# Patient Record
Sex: FEMALE | Race: BLACK OR AFRICAN AMERICAN | HISPANIC OR LATINO | Employment: PART TIME | URBAN - METROPOLITAN AREA
[De-identification: names, ages, dates, MRNs, and addresses within clinical notes are randomized per-mention and may not be internally consistent; named-entity substitution may affect disease eponyms.]

---

## 2018-05-23 ENCOUNTER — OFFICE VISIT (OUTPATIENT)
Dept: URGENT CARE | Facility: CLINIC | Age: 38
End: 2018-05-23
Payer: COMMERCIAL

## 2018-05-23 VITALS
HEIGHT: 61 IN | RESPIRATION RATE: 20 BRPM | OXYGEN SATURATION: 99 % | TEMPERATURE: 98.9 F | DIASTOLIC BLOOD PRESSURE: 78 MMHG | HEART RATE: 84 BPM | WEIGHT: 160 LBS | SYSTOLIC BLOOD PRESSURE: 124 MMHG | BODY MASS INDEX: 30.21 KG/M2

## 2018-05-23 DIAGNOSIS — J06.9 ACUTE URI: Primary | ICD-10-CM

## 2018-05-23 PROCEDURE — S9088 SERVICES PROVIDED IN URGENT: HCPCS | Performed by: PHYSICIAN ASSISTANT

## 2018-05-23 PROCEDURE — 99213 OFFICE O/P EST LOW 20 MIN: CPT | Performed by: PHYSICIAN ASSISTANT

## 2018-05-23 RX ORDER — AZITHROMYCIN 250 MG/1
TABLET, FILM COATED ORAL
Qty: 6 TABLET | Refills: 0 | Status: SHIPPED | OUTPATIENT
Start: 2018-05-23 | End: 2018-05-28

## 2018-05-23 NOTE — PROGRESS NOTES
[facility]    NAME: Khadijah Kirkland is a 44 y o  female  : 1980    MRN: [id number]  DATE: May 23, 2018  TIME: 3:39 PM    Assessment and Plan   Acute URI [J06 9]  1  Acute URI  azithromycin (ZITHROMAX) 250 mg tablet       Patient Instructions     Patient Instructions   I have prescribed an antibiotic for the infection  Please take the antibiotic as prescribed and finish the entire prescription  I recommend that the patient takes an over the counter probiotic or eats yogurt with live cultures in it [place]) to keep good bacteria in the gut and help prevent diarrhea  Wash hands frequently to prevent the spread of infection  Can use over the counter cough and cold medications to help with symptoms  Ibuprofen and/or tylenol as needed for pain or fever  If not improving over the next 7-10 days, follow up with PCP  Chief Complaint     Chief Complaint   Patient presents with    Cold Like Symptoms     vomiting started yesterday       History of Present Illness   39-year-old female here with complaint of cold symptoms for the last 2 days  States she has a lot of postnasal drip, sinus pressure and headaches  Eyes hurt in the morning  No fever or chills  Review of Systems   Review of Systems   Constitutional: Negative for activity change, appetite change, chills, diaphoresis, fatigue, fever and unexpected weight change  HENT: Positive for congestion, postnasal drip, sinus pressure and sore throat  Negative for dental problem, hearing loss, sneezing, tinnitus, trouble swallowing and voice change  Eyes: Negative for photophobia, redness and visual disturbance  Respiratory: Positive for cough  Negative for apnea, chest tightness, shortness of breath, wheezing and stridor  Cardiovascular: Negative for chest pain, palpitations and leg swelling  Gastrointestinal: Negative for abdominal distention, abdominal pain, blood in stool, constipation, diarrhea, nausea and vomiting  Endocrine: Negative for cold intolerance, heat intolerance, polydipsia, polyphagia and polyuria  Genitourinary: Negative for difficulty urinating, dysuria, flank pain, frequency, hematuria and urgency  Musculoskeletal: Negative for arthralgias, back pain, gait problem, joint swelling, myalgias, neck pain and neck stiffness  Skin: Negative for pallor, rash and wound  Neurological: Negative for dizziness, tremors, seizures, speech difficulty, weakness and headaches  Hematological: Negative for adenopathy  Does not bruise/bleed easily  Psychiatric/Behavioral: Negative for agitation, confusion, dysphoric mood and sleep disturbance  The patient is not nervous/anxious  All other systems reviewed and are negative  Current Medications     Current Outpatient Prescriptions:     azithromycin (ZITHROMAX) 250 mg tablet, 2 tablets today then 1 tablet daily for 4 days  , Disp: 6 tablet, Rfl: 0    Current Allergies     Allergies as of 05/23/2018    (No Known Allergies)          The following portions of the patient's history were reviewed and updated as appropriate: allergies, current medications, past family history, past medical history, past social history, past surgical history and problem list    No past medical history on file  No past surgical history on file  No family history on file  Medications have been verified  Objective   /78   Pulse 84   Temp 98 9 °F (37 2 °C) (Tympanic)   Resp 20   Ht 5' 1" (1 549 m)   Wt 72 6 kg (160 lb)   SpO2 99%   BMI 30 23 kg/m²      Physical Exam   Physical Exam   Constitutional: She appears well-developed and well-nourished  No distress  HENT:   Head: Normocephalic  Right Ear: Tympanic membrane and external ear normal    Left Ear: Tympanic membrane and external ear normal    Nose: Mucosal edema present  Right sinus exhibits frontal sinus tenderness  Left sinus exhibits frontal sinus tenderness     Mouth/Throat: Posterior oropharyngeal erythema present  No oropharyngeal exudate  Neck: Normal range of motion  Neck supple  Cardiovascular: Normal rate, regular rhythm and normal heart sounds  No murmur heard  Pulmonary/Chest: Effort normal and breath sounds normal  No respiratory distress  She has no wheezes  She has no rales  Abdominal: Soft  Bowel sounds are normal  There is no tenderness  Musculoskeletal: Normal range of motion  Lymphadenopathy:     She has no cervical adenopathy  Skin: Skin is warm  No rash noted

## 2018-09-24 ENCOUNTER — OFFICE VISIT (OUTPATIENT)
Dept: URGENT CARE | Facility: CLINIC | Age: 38
End: 2018-09-24
Payer: COMMERCIAL

## 2018-09-24 ENCOUNTER — APPOINTMENT (OUTPATIENT)
Dept: RADIOLOGY | Facility: CLINIC | Age: 38
End: 2018-09-24
Payer: COMMERCIAL

## 2018-09-24 VITALS
DIASTOLIC BLOOD PRESSURE: 78 MMHG | TEMPERATURE: 97.9 F | HEART RATE: 83 BPM | RESPIRATION RATE: 20 BRPM | OXYGEN SATURATION: 100 % | WEIGHT: 150 LBS | SYSTOLIC BLOOD PRESSURE: 130 MMHG | HEIGHT: 59 IN | BODY MASS INDEX: 30.24 KG/M2

## 2018-09-24 DIAGNOSIS — R05.9 COUGH: ICD-10-CM

## 2018-09-24 DIAGNOSIS — B96.89 ACUTE BACTERIAL BRONCHITIS: Primary | ICD-10-CM

## 2018-09-24 DIAGNOSIS — J20.8 ACUTE BACTERIAL BRONCHITIS: Primary | ICD-10-CM

## 2018-09-24 DIAGNOSIS — R06.2 WHEEZING: ICD-10-CM

## 2018-09-24 PROCEDURE — 99214 OFFICE O/P EST MOD 30 MIN: CPT | Performed by: PHYSICIAN ASSISTANT

## 2018-09-24 PROCEDURE — 94640 AIRWAY INHALATION TREATMENT: CPT | Performed by: PHYSICIAN ASSISTANT

## 2018-09-24 PROCEDURE — S9088 SERVICES PROVIDED IN URGENT: HCPCS | Performed by: PHYSICIAN ASSISTANT

## 2018-09-24 PROCEDURE — 71046 X-RAY EXAM CHEST 2 VIEWS: CPT

## 2018-09-24 RX ORDER — AZITHROMYCIN 250 MG/1
TABLET, FILM COATED ORAL
Qty: 6 TABLET | Refills: 0 | Status: SHIPPED | OUTPATIENT
Start: 2018-09-24 | End: 2018-09-28

## 2018-09-24 RX ORDER — BENZONATATE 100 MG/1
100 CAPSULE ORAL 3 TIMES DAILY PRN
Qty: 20 CAPSULE | Refills: 0 | Status: SHIPPED | OUTPATIENT
Start: 2018-09-24 | End: 2018-10-01 | Stop reason: ALTCHOICE

## 2018-09-24 RX ORDER — ALBUTEROL SULFATE 90 UG/1
2 AEROSOL, METERED RESPIRATORY (INHALATION) EVERY 6 HOURS PRN
Qty: 18 G | Refills: 0 | Status: SHIPPED | OUTPATIENT
Start: 2018-09-24 | End: 2018-12-03 | Stop reason: ALTCHOICE

## 2018-09-24 RX ORDER — IPRATROPIUM BROMIDE AND ALBUTEROL SULFATE 2.5; .5 MG/3ML; MG/3ML
3 SOLUTION RESPIRATORY (INHALATION)
Status: DISCONTINUED | OUTPATIENT
Start: 2018-09-24 | End: 2018-09-24

## 2018-09-24 RX ORDER — IPRATROPIUM BROMIDE AND ALBUTEROL SULFATE 2.5; .5 MG/3ML; MG/3ML
3 SOLUTION RESPIRATORY (INHALATION) ONCE
Status: COMPLETED | OUTPATIENT
Start: 2018-09-24 | End: 2018-09-24

## 2018-09-24 RX ADMIN — IPRATROPIUM BROMIDE AND ALBUTEROL SULFATE 3 ML: 2.5; .5 SOLUTION RESPIRATORY (INHALATION) at 09:05

## 2018-09-24 RX ADMIN — IPRATROPIUM BROMIDE AND ALBUTEROL SULFATE 3 ML: 2.5; .5 SOLUTION RESPIRATORY (INHALATION) at 08:56

## 2018-09-24 NOTE — PATIENT INSTRUCTIONS
Chest xray; negative for pneumonia- will follow up with final radiology read if positive  Encourage fluids and hydration  Albuterol as needed for wheezing  Start antibiotics today as directed  Follow up with PCP in 3-5 days  Proceed to ER if symptoms worsen    Acute Bronchitis   AMBULATORY CARE:   Acute bronchitis  is swelling and irritation in the air passages of your lungs  This irritation may cause you to cough or have other breathing problems  Acute bronchitis often starts because of another illness, such as a cold or the flu  The illness spreads from your nose and throat to your windpipe and airways  Bronchitis is often called a chest cold  Acute bronchitis lasts about 3 to 6 weeks and is usually not a serious illness  Your cough can last for several weeks  You may have any of the following symptoms:   · A cough with sputum that may be clear, yellow, or green    · Feeling more tired than usual, and body aches    · A fever and chills    · Wheezing when you breathe    · A tight chest or pain when you breathe or cough  Seek care immediately if:   · You cough up blood  · Your lips or fingernails turn blue  · You feel like you are not getting enough air when you breathe  Contact your healthcare provider if:   · You have a fever  · Your breathing problems do not go away or get worse  · Your cough does not get better within 4 weeks  · You have questions or concerns about your condition or care  Self-care:   · Get more rest   Rest helps your body to heal  Slowly start to do more each day  Rest when you feel it is needed  · Avoid irritants in the air  Avoid chemicals, fumes, and dust  Wear a face mask if you must work around dust or fumes  Stay inside on days when air pollution levels are high  If you have allergies, stay inside when pollen counts are high  Do not use aerosol products, such as spray-on deodorant, bug spray, and hair spray  · Do not smoke or be around others who smoke    Nicotine and other chemicals in cigarettes and cigars damages the cilia that move mucus out of your lungs  Ask your healthcare provider for information if you currently smoke and need help to quit  E-cigarettes or smokeless tobacco still contain nicotine  Talk to your healthcare provider before you use these products  · Drink liquids as directed  Liquids help keep your air passages moist and help you cough up mucus  You may need to drink more liquids when you have acute bronchitis  Ask how much liquid to drink each day and which liquids are best for you  · Use a humidifier or vaporizer  Use a cool mist humidifier or a vaporizer to increase air moisture in your home  This may make it easier for you to breathe and help decrease your cough  Prevent acute bronchitis by doing the following:   · Get the vaccinations you need  Ask your healthcare provider if you should get vaccinated against the flu or pneumonia  · Prevent the spread of germs  You can decrease your risk of acute bronchitis and other illnesses by doing the following:     [facility] your hands often with soap and water  Carry germ-killing hand lotion or gel with you  You can use the lotion or gel to clean your hands when soap and water are not available  ¨ Do not touch your eyes, nose, or mouth unless you have washed your hands first     ¨ Always cover your mouth when you cough to prevent the spread of germs  It is best to cough into a tissue or your shirt sleeve instead of into your hand  Ask those around you cover their mouths when they cough  ¨ Try to avoid people who have a cold or the flu  If you are sick, stay away from others as much as possible  Medicines: Your healthcare provider may  give you any of the following:  · Ibuprofen or acetaminophen  are medicines that help lower your fever  They are available without a doctor's order  Ask your healthcare provider which medicine is right for you  Ask how much to take and how often to take it  Follow directions  These medicines can cause stomach bleeding if not taken correctly  Ibuprofen can cause kidney damage  Do not take ibuprofen if you have kidney disease, an ulcer, or allergies to aspirin  Acetaminophen can cause liver damage  Do not take more than 4,000 milligrams in 24 hours  · Decongestants  help loosen mucus in your lungs and make it easier to cough up  This can help you breathe easier  · Cough suppressants  decrease your urge to cough  If your cough produces mucus, do not take a cough suppressant unless your healthcare provider tells you to  Your healthcare provider may suggest that you take a cough suppressant at night so you can rest     · Inhalers  may be given  Your healthcare provider may give you one or more inhalers to help you breathe easier and cough less  An inhaler gives your medicine to open your airways  Ask your healthcare provider to show you how to use your inhaler correctly  Follow up with your healthcare provider as directed:  Write down questions you have so you will remember to ask them during your follow-up visits  © 2017 [facility] Information is for End User's use only and may not be sold, redistributed or otherwise used for commercial purposes  All illustrations and images included in CareNotes® are the copyrighted property of [employer] M [employer]  or [employer]  The above information is an  only  It is not intended as medical advice for individual conditions or treatments  Talk to your doctor, nurse or pharmacist before following any medical regimen to see if it is safe and effective for you

## 2018-09-24 NOTE — PROGRESS NOTES
[facility]        NAME: Doc Chinchilla is a 43 y o  female  : 1980    MRN: [id number]  DATE: 2018  TIME: 9:31 AM    Assessment and Plan   Acute bacterial bronchitis [J20 8, B96 89]  1  Acute bacterial bronchitis  azithromycin (ZITHROMAX) 250 mg tablet    benzonatate (TESSALON PERLES) 100 mg capsule   2  Wheezing  ipratropium-albuterol (DUO-NEB) 0 5-2 5 mg/3 mL inhalation solution 3 mL    albuterol (VENTOLIN HFA) 90 mcg/act inhaler    DISCONTINUED: ipratropium-albuterol (DUO-NEB) 0 5-2 5 mg/3 mL inhalation solution 3 mL   3  Cough  XR chest pa & lateral         Patient Instructions     Chest xray; negative for pneumonia- will follow up with final radiology read if positive  Encourage fluids and hydration  Albuterol as needed for wheezing  Start antibiotics today as directed  Follow up with PCP in 3-5 days  Proceed to  ER if symptoms worsen  Chief Complaint     Chief Complaint   Patient presents with    Cough     Cough and headache with congestion started yesterday         History of Present Illness       43 y o  Female presents with head pressure and productive cough x 3 days  Patient states she has been light sensitive and when she coughs is when she feels the pressure in her head  Patient states the cough is producing dark yellow sputum  Denies hemoptysis  C/o chills, SOB, nausea  Denies fever  Patient states her son and daughter are both sick  Patient states she used mothers inhaler yesterday with minimal relief  Review of Systems   Review of Systems   Constitutional: Positive for chills  Negative for fatigue and fever  HENT: Positive for congestion  Negative for ear pain, sinus pain, sore throat and trouble swallowing  Eyes: Negative for pain, discharge and redness  Respiratory: Positive for cough and shortness of breath  Negative for chest tightness  Cardiovascular: Negative for chest pain, palpitations and leg swelling  Gastrointestinal: Positive for nausea  Negative for abdominal pain, diarrhea and vomiting  Musculoskeletal: Negative for arthralgias, joint swelling and myalgias  Skin: Negative for rash  Neurological: Positive for headaches  Negative for dizziness, weakness and numbness  Current Medications       Current Outpatient Prescriptions:     albuterol (VENTOLIN HFA) 90 mcg/act inhaler, Inhale 2 puffs every 6 (six) hours as needed for wheezing, Disp: 18 g, Rfl: 0    azithromycin (ZITHROMAX) 250 mg tablet, Take 2 tablets today then 1 tablet daily x 4 days, Disp: 6 tablet, Rfl: 0    benzonatate (TESSALON PERLES) 100 mg capsule, Take 1 capsule (100 mg total) by mouth 3 (three) times a day as needed for cough, Disp: 20 capsule, Rfl: 0  No current facility-administered medications for this visit  Current Allergies     Allergies as of 09/24/2018    (No Known Allergies)            The following portions of the patient's history were reviewed and updated as appropriate: allergies, current medications, past family history, past medical history, past social history, past surgical history and problem list      History reviewed  No pertinent past medical history  History reviewed  No pertinent surgical history  No family history on file  Medications have been verified  Objective   /78   Pulse 83   Temp 97 9 °F (36 6 °C) (Tympanic)   Resp 20   Ht 4' 11" (1 499 m)   Wt 68 kg (150 lb)   LMP 09/16/2018   SpO2 100%   BMI 30 30 kg/m²        Physical Exam     Physical Exam   Constitutional: She is oriented to person, place, and time  She appears well-developed and well-nourished  No distress  HENT:   Head: Normocephalic and atraumatic  Right Ear: External ear normal    Left Ear: External ear normal    Mouth/Throat: Uvula is midline, oropharynx is clear and moist and mucous membranes are normal  No posterior oropharyngeal edema or posterior oropharyngeal erythema     Eyes: Conjunctivae and EOM are normal  Pupils are equal, round, and reactive to light  Neck: Normal range of motion  Neck supple  Cardiovascular: Normal rate, regular rhythm, S1 normal, S2 normal and normal heart sounds  Pulmonary/Chest: Effort normal  No respiratory distress  She has wheezes in the right upper field, the right middle field, the right lower field, the left upper field and the left middle field  She has rhonchi in the left lower field  She has no rales  She exhibits no tenderness  No History of intubation or hospitalization   Abdominal: Soft  Normal appearance and bowel sounds are normal    Musculoskeletal: Normal range of motion  Lymphadenopathy:     She has no cervical adenopathy  Neurological: She is alert and oriented to person, place, and time  Skin: Skin is warm and dry  Psychiatric: She has a normal mood and affect  Nursing note and vitals reviewed

## 2018-10-01 ENCOUNTER — OFFICE VISIT (OUTPATIENT)
Dept: URGENT CARE | Facility: CLINIC | Age: 38
End: 2018-10-01
Payer: COMMERCIAL

## 2018-10-01 VITALS
OXYGEN SATURATION: 99 % | SYSTOLIC BLOOD PRESSURE: 140 MMHG | TEMPERATURE: 98.8 F | WEIGHT: 160 LBS | HEART RATE: 75 BPM | HEIGHT: 59 IN | BODY MASS INDEX: 32.25 KG/M2 | DIASTOLIC BLOOD PRESSURE: 80 MMHG | RESPIRATION RATE: 20 BRPM

## 2018-10-01 DIAGNOSIS — K08.89 PAIN, DENTAL: Primary | ICD-10-CM

## 2018-10-01 PROCEDURE — 99213 OFFICE O/P EST LOW 20 MIN: CPT | Performed by: PHYSICIAN ASSISTANT

## 2018-10-01 PROCEDURE — S9088 SERVICES PROVIDED IN URGENT: HCPCS | Performed by: PHYSICIAN ASSISTANT

## 2018-10-01 RX ORDER — KETOROLAC TROMETHAMINE 30 MG/ML
30 INJECTION, SOLUTION INTRAMUSCULAR; INTRAVENOUS ONCE
Status: COMPLETED | OUTPATIENT
Start: 2018-10-01 | End: 2018-10-01

## 2018-10-01 RX ORDER — IBUPROFEN 800 MG/1
800 TABLET ORAL EVERY 6 HOURS PRN
Qty: 30 TABLET | Refills: 0 | Status: SHIPPED | OUTPATIENT
Start: 2018-10-01 | End: 2018-12-03 | Stop reason: ALTCHOICE

## 2018-10-01 RX ORDER — AMOXICILLIN AND CLAVULANATE POTASSIUM 875; 125 MG/1; MG/1
1 TABLET, FILM COATED ORAL EVERY 12 HOURS SCHEDULED
Qty: 20 TABLET | Refills: 0 | Status: SHIPPED | OUTPATIENT
Start: 2018-10-01 | End: 2018-10-11

## 2018-10-01 RX ADMIN — KETOROLAC TROMETHAMINE 30 MG: 30 INJECTION, SOLUTION INTRAMUSCULAR; INTRAVENOUS at 09:47

## 2018-10-01 NOTE — PROGRESS NOTES
[facility]        NAME: Tommy Galeas is a 43 y o  female  : 1980    MRN: [id number]  DATE: 2018  TIME: 9:50 AM    Assessment and Plan   Pain, dental [K08 89]  1  Pain, dental  ketorolac (TORADOL) injection 30 mg    amoxicillin-clavulanate (AUGMENTIN) 875-125 mg per tablet    ibuprofen (MOTRIN) 800 mg tablet         Patient Instructions     Follow up with dentist within 3-5 days  Follow up with PCP in 3-5 days  Proceed to ER if symptoms worsen    Chief Complaint     Chief Complaint   Patient presents with    Dental Pain     Broken tooth bottom molar with pain strted last night         History of Present Illness       43 y o  Female presents with dental pain x 1 month  Pt states the pain started a month ago and she was able to see a dentist  Pt states last night the tooth broke off and her mouth began to swell  C/o sharp pain on the left side of her jaw  Rates the pain a 5/10  States she took 1 percocet last night for the pain  Denies fever, chills, n/v          Review of Systems   Review of Systems   Constitutional: Negative for chills, fatigue and fever  HENT: Positive for dental problem  Negative for congestion, drooling, ear pain, mouth sores, sinus pain, sore throat and trouble swallowing  Eyes: Negative for pain, discharge and redness  Respiratory: Negative for cough, chest tightness, shortness of breath and wheezing  Cardiovascular: Negative for chest pain, palpitations and leg swelling  Gastrointestinal: Negative for abdominal pain, diarrhea, nausea and vomiting  Musculoskeletal: Negative for arthralgias, joint swelling and myalgias  Skin: Negative for rash  Neurological: Negative for dizziness, weakness, numbness and headaches           Current Medications       Current Outpatient Prescriptions:     albuterol (VENTOLIN HFA) 90 mcg/act inhaler, Inhale 2 puffs every 6 (six) hours as needed for wheezing, Disp: 18 g, Rfl: 0    amoxicillin-clavulanate (AUGMENTIN) 875-125 mg per tablet, Take 1 tablet by mouth every 12 (twelve) hours for 10 days, Disp: 20 tablet, Rfl: 0    ibuprofen (MOTRIN) 800 mg tablet, Take 1 tablet (800 mg total) by mouth every 6 (six) hours as needed for mild pain for up to 14 days, Disp: 30 tablet, Rfl: 0  No current facility-administered medications for this visit  Current Allergies     Allergies as of 10/01/2018    (No Known Allergies)            The following portions of the patient's history were reviewed and updated as appropriate: allergies, current medications, past family history, past medical history, past social history, past surgical history and problem list      Past Medical History:   Diagnosis Date    Allergic     Asthma        History reviewed  No pertinent surgical history  No family history on file  Medications have been verified  Objective   /80   Pulse 75   Temp 98 8 °F (37 1 °C) (Tympanic)   Resp 20   Ht 4' 11" (1 499 m)   Wt 72 6 kg (160 lb)   LMP 09/16/2018   SpO2 99%   BMI 32 32 kg/m²        Physical Exam     Physical Exam   Constitutional: She is oriented to person, place, and time  She appears well-developed and well-nourished  No distress  HENT:   Head: Normocephalic  Right Ear: External ear normal    Left Ear: External ear normal    Mouth/Throat: Oropharynx is clear and moist        Eyes: Pupils are equal, round, and reactive to light  Conjunctivae and EOM are normal    Neck: Normal range of motion  Neck supple  Cardiovascular: Normal rate, regular rhythm and normal heart sounds  No murmur heard  Pulmonary/Chest: Effort normal and breath sounds normal  No respiratory distress  She has no wheezes  Abdominal: Soft  Bowel sounds are normal  There is no tenderness  Musculoskeletal: Normal range of motion  Lymphadenopathy:     She has no cervical adenopathy  Neurological: She is alert and oriented to person, place, and time  She has normal reflexes     Skin: Skin is warm and dry    Psychiatric: She has a normal mood and affect  Nursing note and vitals reviewed

## 2018-10-01 NOTE — PATIENT INSTRUCTIONS
Follow up with dentist within 3-5 days  Follow up with PCP in 3-5 days  Proceed to ER if symptoms worsen    Dental Abscess   AMBULATORY CARE:   A dental abscess  is a collection of pus in or around a tooth  A dental abscess is caused by bacteria  The bacteria usually enter the tooth when the enamel (outer part of the tooth) is damaged by tooth decay  Bacteria may also enter the tooth through a break or chip in the tooth, or a cut in the gum  Food particles that are stuck between the teeth for a long time may also lead to an abscess  Signs and symptoms of a dental abscess:   · Toothache, a loose tooth, or a tooth that is very sensitive to pressure or temperature    · Bad breath, unpleasant taste, and drooling    · Fever    · Pain, redness, and swelling of the gums, or swelling of your face and neck    · Pain when you open or close your mouth    · Trouble opening your mouth  Seek care immediately if:   · You have severe pain  · You have trouble breathing because of pain or swelling  Contact your healthcare provider if:   · Your symptoms get worse, even after treatment  · Your mouth is bleeding  · You cannot eat or drink because of pain or swelling  · Your abscess returns  · You have an injury that causes a crack in your tooth  · You have questions or concerns about your condition or care  Treatment:  You may  need any of the following:  · Medicines  may be given to treat a bacterial infection and decrease pain  · Incision and drainage  is a cut in the abscess to allow the pus to drain  A sample of fluid may be collected from your abscess  The fluid is sent to a lab and tested for bacteria  Ask your healthcare provider for more information  · A root canal  is a procedure to remove the bacteria and prevent more infection  It is usually done after an incision and drainage   A filling or crown will be placed over the tooth after you have healed from your root canal      · Tooth removal may be needed if the infection affects deeper tissues  This is usually done after an incision and drainage  Self-care:   · Rinse your mouth every 2 hours with salt water  This will help keep the area clean  · Gently brush your teeth twice a day with a soft tooth brush  This will help keep the area clean  · Eat soft foods as directed  Soft foods may cause less pain  Examples include applesauce, yogurt, and cooked pasta  Ask your healthcare provider how long to follow this instruction  · Apply a warm compress to your tooth or gum  Use a cotton ball or gauze soaked in warm water  Remove the compress in 10 minutes or when it becomes cool  Repeat 3 times a day  Prevent another abscess:   · Brush your teeth at least 2 times a day with fluoride toothpaste  · Use dental floss to clean between your teeth at least once a day  · Rinse your mouth with water or mouthwash after meals and snacks  · Chew sugarless gum after meals and snacks  · Limit foods that are sticky and high in sugar such as raisons  Also limit drinks high in sugar, such as soda  · See your dentist every 6 months for dental cleanings and oral exams  Follow up with your healthcare provider in 24 hours: Your healthcare provider will need to check your teeth and gums  Write down your questions so you remember to ask them during your visits  © 2017 [address] [employer] Information is for End User's use only and may not be sold, redistributed or otherwise used for commercial purposes  All illustrations and images included in CareNotes® are the copyrighted property of A [employer] M [employer]  or [employer]  The above information is an  only  It is not intended as medical advice for individual conditions or treatments  Talk to your doctor, nurse or pharmacist before following any medical regimen to see if it is safe and effective for you

## 2018-12-03 ENCOUNTER — APPOINTMENT (OUTPATIENT)
Dept: RADIOLOGY | Facility: CLINIC | Age: 38
End: 2018-12-03
Payer: COMMERCIAL

## 2018-12-03 ENCOUNTER — OFFICE VISIT (OUTPATIENT)
Dept: URGENT CARE | Facility: CLINIC | Age: 38
End: 2018-12-03
Payer: COMMERCIAL

## 2018-12-03 VITALS
SYSTOLIC BLOOD PRESSURE: 125 MMHG | HEIGHT: 59 IN | OXYGEN SATURATION: 100 % | WEIGHT: 160 LBS | BODY MASS INDEX: 32.25 KG/M2 | TEMPERATURE: 97.2 F | DIASTOLIC BLOOD PRESSURE: 69 MMHG | HEART RATE: 75 BPM | RESPIRATION RATE: 18 BRPM

## 2018-12-03 DIAGNOSIS — R05.9 COUGH: ICD-10-CM

## 2018-12-03 DIAGNOSIS — J20.9 ACUTE BRONCHITIS, UNSPECIFIED ORGANISM: Primary | ICD-10-CM

## 2018-12-03 DIAGNOSIS — R06.2 WHEEZING: ICD-10-CM

## 2018-12-03 PROCEDURE — 71046 X-RAY EXAM CHEST 2 VIEWS: CPT

## 2018-12-03 PROCEDURE — S9088 SERVICES PROVIDED IN URGENT: HCPCS | Performed by: PHYSICIAN ASSISTANT

## 2018-12-03 PROCEDURE — 99203 OFFICE O/P NEW LOW 30 MIN: CPT | Performed by: PHYSICIAN ASSISTANT

## 2018-12-03 RX ORDER — AZITHROMYCIN 250 MG/1
TABLET, FILM COATED ORAL
Qty: 6 TABLET | Refills: 0 | Status: SHIPPED | OUTPATIENT
Start: 2018-12-03 | End: 2018-12-07

## 2018-12-03 RX ORDER — ALBUTEROL SULFATE 90 UG/1
2 AEROSOL, METERED RESPIRATORY (INHALATION) EVERY 4 HOURS PRN
Qty: 1 INHALER | Refills: 0 | Status: SHIPPED | OUTPATIENT
Start: 2018-12-03 | End: 2019-01-02

## 2018-12-03 NOTE — PROGRESS NOTES
[facility] Now        NAME: Tommy Galeas is a 43 y o  female  : 1980    MRN: [id number]  DATE: December 3, 2018  TIME: 2:08 PM    Assessment and Plan   Acute bronchitis, unspecified organism [J20 9]  1  Acute bronchitis, unspecified organism  azithromycin (ZITHROMAX) 250 mg tablet    albuterol (PROVENTIL HFA,VENTOLIN HFA) 90 mcg/act inhaler   2  Cough  XR chest pa & lateral         Patient Instructions       Follow up with PCP in 3-5 days  Proceed to  ER if symptoms worsen  Chief Complaint     Chief Complaint   Patient presents with    Cough     Pt c/o cough, hard time breathing, and left side pain x 1 week  History of Present Illness       Patient states she started with a cough approximately a week ago  Is occasionally productive of yellow mucus  More recently she developed pain in her left side which is worse with coughing  She denies any cold symptoms, fever, chills or shortness of breath on exertion  Review of Systems   Review of Systems   Constitutional: Negative for chills and fever  HENT: Positive for rhinorrhea (Clear)  Negative for congestion, ear pain, postnasal drip, sore throat and trouble swallowing  Eyes: Negative for redness  Respiratory: Positive for cough  Negative for chest tightness, shortness of breath and wheezing  Cardiovascular: Positive for chest pain (Left lower ribcage)  Gastrointestinal: Negative for abdominal pain, diarrhea, nausea and vomiting  Musculoskeletal: Negative for back pain  Skin: Negative for rash  Neurological: Negative for dizziness and headaches  Hematological: Negative for adenopathy           Current Medications       Current Outpatient Prescriptions:     albuterol (PROVENTIL HFA,VENTOLIN HFA) 90 mcg/act inhaler, Inhale 2 puffs every 4 (four) hours as needed for wheezing or shortness of breath for up to 30 days, Disp: 1 Inhaler, Rfl: 0    azithromycin (ZITHROMAX) 250 mg tablet, Take 2 tablets today then 1 tablet daily x 4 days, Disp: 6 tablet, Rfl: 0    Levonorgestrel 19 5 MCG/DAY IUD, 1 each by Intrauterine route, Disp: , Rfl:     Current Allergies     Allergies as of 12/03/2018 - Reviewed 12/03/2018   Allergen Reaction Noted    No known allergies  12/16/2016            The following portions of the patient's history were reviewed and updated as appropriate: allergies, current medications, past family history, past medical history, past social history, past surgical history and problem list      Past Medical History:   Diagnosis Date    Allergic     Asthma        No past surgical history on file  No family history on file  Medications have been verified  Objective   /69   Pulse 75   Temp (!) 97 2 °F (36 2 °C)   Resp 18   Ht 4' 11" (1 499 m)   Wt 72 6 kg (160 lb)   SpO2 100%   BMI 32 32 kg/m²        Physical Exam     Physical Exam   Constitutional: She appears well-developed and well-nourished  HENT:   Head: Normocephalic and atraumatic  Right Ear: External ear normal    Left Ear: External ear normal    Nose: Nose normal    Mouth/Throat: Oropharynx is clear and moist    Eyes: Conjunctivae are normal    Neck: Neck supple  Cardiovascular: Normal rate, regular rhythm and normal heart sounds  Pulmonary/Chest: Effort normal and breath sounds normal    Lymphadenopathy:     She has no cervical adenopathy  Neurological: She is alert  Skin: Skin is warm and dry  No rash noted  Psychiatric: She has a normal mood and affect  Her behavior is normal  Judgment and thought content normal    Nursing note and vitals reviewed  Chest x-ray viewed by me and independently reviewed by me contemporaneously as no acute cardiopulmonary process

## 2018-12-03 NOTE — PATIENT INSTRUCTIONS

## 2019-03-07 ENCOUNTER — OFFICE VISIT (OUTPATIENT)
Dept: URGENT CARE | Facility: CLINIC | Age: 39
End: 2019-03-07
Payer: COMMERCIAL

## 2019-03-07 VITALS
DIASTOLIC BLOOD PRESSURE: 84 MMHG | SYSTOLIC BLOOD PRESSURE: 155 MMHG | HEART RATE: 92 BPM | OXYGEN SATURATION: 100 % | RESPIRATION RATE: 18 BRPM | TEMPERATURE: 98.6 F

## 2019-03-07 DIAGNOSIS — J06.9 VIRAL URI WITH COUGH: ICD-10-CM

## 2019-03-07 DIAGNOSIS — J02.9 SORE THROAT: Primary | ICD-10-CM

## 2019-03-07 LAB — S PYO AG THROAT QL: NEGATIVE

## 2019-03-07 PROCEDURE — 87430 STREP A AG IA: CPT | Performed by: NURSE PRACTITIONER

## 2019-03-07 PROCEDURE — 87070 CULTURE OTHR SPECIMN AEROBIC: CPT | Performed by: NURSE PRACTITIONER

## 2019-03-07 PROCEDURE — 99213 OFFICE O/P EST LOW 20 MIN: CPT | Performed by: NURSE PRACTITIONER

## 2019-03-07 PROCEDURE — S9088 SERVICES PROVIDED IN URGENT: HCPCS | Performed by: NURSE PRACTITIONER

## 2019-03-07 NOTE — PROGRESS NOTES
[facility] Now        NAME: Terra Harley is a 43 y o  female  : 1980    MRN: [id number]  DATE: 2019  TIME: 5:50 PM    Assessment and Plan   Sore throat [J02 9]  1  Sore throat  POCT rapid strepA   2  Viral URI with cough           Patient Instructions     Infection appears viral   Recommend symptomatic treatment  Can take ibuprofen or tylenol as needed for pain or fever  Over the counter cough and cold medications to help with symptoms  Use salt water gargles for sore throat and throat lozenges  Cough drops as needed  Wash hands frequently to prevent the spread of infection  If not improving over the next 7-10 days, follow up with PCP  Symptoms may persist for 10-14 days  Follow up with PCP in 3-5 days  Proceed to  ER if symptoms worsen  Chief Complaint     Chief Complaint   Patient presents with    Sore Throat     Pt c/o a sore throat and a cough that started a few hours ago  History of Present Illness       59-year-old female presents to urgent care with chief complaint of new onset this morning of cough, sore throat, nasal congestion  Denies any fevers chills chest tightness or shortness of breath  Has not used any over-the-counter medication reports symptoms have been worsening    URI    This is a new problem  The current episode started today  The problem has been unchanged  There has been no fever  Associated symptoms include congestion, coughing, rhinorrhea and a sore throat  Pertinent negatives include no abdominal pain, chest pain, diarrhea, dysuria, ear pain, headaches, joint pain, joint swelling, nausea, neck pain, plugged ear sensation, rash, sinus pain, sneezing, swollen glands, vomiting or wheezing  She has tried nothing for the symptoms  The treatment provided no relief  Review of Systems   Review of Systems   Constitutional: Negative  HENT: Positive for congestion, postnasal drip, rhinorrhea, sinus pressure and sore throat   Negative for dental problem, drooling, ear discharge, ear pain, facial swelling, hearing loss, mouth sores, nosebleeds, sinus pain, sneezing, tinnitus, trouble swallowing and voice change  Eyes: Negative  Respiratory: Positive for cough  Negative for apnea, choking, chest tightness, shortness of breath, wheezing and stridor  Cardiovascular: Negative for chest pain, palpitations and leg swelling  Gastrointestinal: Negative  Negative for abdominal distention, abdominal pain, anal bleeding, blood in stool, constipation, diarrhea, nausea, rectal pain and vomiting  Endocrine: Negative  Genitourinary: Negative  Negative for dysuria  Musculoskeletal: Negative  Negative for joint pain and neck pain  Skin: Negative  Negative for rash  Allergic/Immunologic: Negative  Neurological: Negative  Negative for headaches  Hematological: Negative  Psychiatric/Behavioral: Negative  Current Medications       Current Outpatient Medications:     Levonorgestrel 19 5 MCG/DAY IUD, 1 each by Intrauterine route, Disp: , Rfl:     Current Allergies     Allergies as of 03/07/2019 - Reviewed 03/07/2019   Allergen Reaction Noted    No known allergies  12/16/2016            The following portions of the patient's history were reviewed and updated as appropriate: allergies, current medications, past family history, past medical history, past social history, past surgical history and problem list      Past Medical History:   Diagnosis Date    Allergic     Asthma        No past surgical history on file  No family history on file  Medications have been verified  Objective   /84   Pulse 92   Temp 98 6 °F (37 °C)   Resp 18   SpO2 100%        Physical Exam     Physical Exam   Constitutional: She is oriented to person, place, and time  Vital signs are normal  She appears well-developed and well-nourished  HENT:   Head: Normocephalic and atraumatic     Right Ear: Hearing, tympanic membrane, external ear and ear canal normal    Left Ear: Hearing, tympanic membrane, external ear and ear canal normal    Nose: Rhinorrhea and sinus tenderness present  Right sinus exhibits no maxillary sinus tenderness and no frontal sinus tenderness  Left sinus exhibits no maxillary sinus tenderness and no frontal sinus tenderness  Mouth/Throat: Uvula is midline and mucous membranes are normal  Posterior oropharyngeal erythema present  Eyes: Pupils are equal, round, and reactive to light  Conjunctivae and EOM are normal    Neck: Trachea normal, normal range of motion and full passive range of motion without pain  Cardiovascular: Normal rate, regular rhythm, S1 normal, S2 normal, normal heart sounds and normal pulses  Pulmonary/Chest: Effort normal and breath sounds normal    Abdominal: Soft  Normal appearance  Musculoskeletal: Normal range of motion  Lymphadenopathy:     She has cervical adenopathy  Right cervical: Superficial cervical adenopathy present  Left cervical: Superficial cervical adenopathy present  Neurological: She is alert and oriented to person, place, and time  Nursing note and vitals reviewed

## 2019-03-10 LAB — BACTERIA THROAT CULT: NORMAL

## 2019-03-18 ENCOUNTER — HOSPITAL ENCOUNTER (EMERGENCY)
Facility: HOSPITAL | Age: 39
Discharge: HOME | End: 2019-03-18
Attending: EMERGENCY MEDICINE | Admitting: EMERGENCY MEDICINE
Payer: COMMERCIAL

## 2019-03-18 VITALS
SYSTOLIC BLOOD PRESSURE: 137 MMHG | OXYGEN SATURATION: 97 % | HEART RATE: 79 BPM | DIASTOLIC BLOOD PRESSURE: 83 MMHG | TEMPERATURE: 98.7 F | HEIGHT: 59 IN | BODY MASS INDEX: 30.24 KG/M2 | WEIGHT: 150 LBS | RESPIRATION RATE: 12 BRPM

## 2019-03-18 DIAGNOSIS — R00.2 PALPITATIONS: Primary | ICD-10-CM

## 2019-03-18 PROCEDURE — 93005 ELECTROCARDIOGRAM TRACING: CPT

## 2019-03-18 PROCEDURE — 99284 EMERGENCY DEPT VISIT MOD MDM: CPT

## 2019-03-18 RX ORDER — ACETAMINOPHEN 325 MG/1
650 TABLET ORAL ONCE
Status: COMPLETED | OUTPATIENT
Start: 2019-03-18 | End: 2019-03-18

## 2019-03-18 RX ADMIN — ACETAMINOPHEN 650 MG: 325 TABLET ORAL at 20:02

## 2019-03-18 NOTE — ED PROVIDER NOTES
History  Chief Complaint   Patient presents with    Arm Pain     left arm, shoulder pain for the past two days  states it hurts worse when she does not move it   Rapid Heart Rate     43YEAR-OLD FEMALE WITH A HISTORY OF ASTHMA WHO PRESENTS TO THE EMERGENCY DEPARTMENT A RAPID HEARTBEAT SMOKING WEED  STATES THAT THERE ARE HEART THE AND THE BLADDER AND SHE GOT SCARED SO SHE PRESENTED TO THE EMERGENCY DEPARTMENT  SHE DENIES ANY SHORTNESS OF BREATH  NO NAUSEA VOMITING OR DIARRHEA  RECENT URI  BUT NO CURRENT COUGH OR SORE THROAT OR RUNNY NOSE  SHE REPORTS NO CHEST PAIN NOR DOES SHE REPORT ANY CHANGES IN HER BOWEL OR BLADDER HABITS  SHE STATES THAT SHE HAS HAD SOME NUMBNESS IN HER LEFT WRIST AND HAND  SHE ALSO EXPLAINS THAT SHE HAS ENGAGED DAILY IN LIFTING HEAVY BOXES  OTHERWISE SHE DENIES TRAUMA  SHE ATTRIBUTES THE LEFT WRIST AND ARM PAIN TO FALLING ASLEEP WITH HER GRANDDAUGHTER RESTING IN HER AXILLA WHILE THE LEFT ARM IS ELEVATED  Prior to Admission Medications   Prescriptions Last Dose Informant Patient Reported? Taking? Levonorgestrel 19 5 MCG/DAY IUD   Yes No   Si each by Intrauterine route      Facility-Administered Medications: None       Past Medical History:   Diagnosis Date    Allergic     Asthma        History reviewed  No pertinent surgical history  History reviewed  No pertinent family history  I have reviewed and agree with the history as documented  Social History     Tobacco Use    Smoking status: Current Every Day Smoker    Smokeless tobacco: Never Used   Substance Use Topics    Alcohol use: No    Drug use: No        Review of Systems   Constitutional: Negative for chills and fever  HENT: Negative for ear pain, rhinorrhea and sore throat  Eyes: Negative for pain, redness and visual disturbance  Respiratory: Negative for cough and shortness of breath  Cardiovascular: Negative for chest pain and leg swelling     Gastrointestinal: Negative for abdominal pain, diarrhea, nausea and vomiting  Genitourinary: Negative for dysuria, flank pain, frequency and urgency  Musculoskeletal: Negative for back pain, myalgias and neck pain  Skin: Negative for rash  Neurological: Negative for dizziness, weakness, light-headedness and headaches  Hematological: Negative  Psychiatric/Behavioral: Negative for agitation, confusion and suicidal ideas  The patient is not nervous/anxious  All other systems reviewed and are negative  Physical Exam  Physical Exam   Constitutional: She is oriented to person, place, and time  She appears well-developed and well-nourished  HENT:   Nose: Nose normal    Mouth/Throat: Oropharynx is clear and moist  No oropharyngeal exudate  Eyes: Pupils are equal, round, and reactive to light  Conjunctivae and EOM are normal  No scleral icterus  Neck: Normal range of motion  Neck supple  No JVD present  No tracheal deviation present  Cardiovascular: Normal rate, regular rhythm and normal heart sounds  No murmur heard  Pulmonary/Chest: Effort normal and breath sounds normal  No respiratory distress  She has no wheezes  She has no rales  Abdominal: Soft  Bowel sounds are normal  There is no tenderness  There is no guarding  Musculoskeletal: Normal range of motion  She exhibits no edema or tenderness  Neurological: She is alert and oriented to person, place, and time  No cranial nerve deficit or sensory deficit  She exhibits normal muscle tone  5/5 motor, nl sens   Skin: Skin is warm and dry  Psychiatric: She has a normal mood and affect  Her behavior is normal    Nursing note and vitals reviewed        Vital Signs  ED Triage Vitals [03/18/19 1837]   Temperature Pulse Respirations Blood Pressure SpO2   98 7 °F (37 1 °C) (!) 120 20 (!) 194/100 99 %      Temp Source Heart Rate Source Patient Position - Orthostatic VS BP Location FiO2 (%)   Temporal Monitor Sitting Right arm --      Pain Score       No Pain           Vitals: 03/18/19 1837 03/18/19 1900 03/18/19 1915 03/18/19 1930   BP: (!) 194/100 141/91  140/86   Pulse: (!) 120 101 94 90   Patient Position - Orthostatic VS: Sitting          qSOFA     Row Name 03/18/19 1930 03/18/19 1915 03/18/19 1900 03/18/19 1837       Altered mental status GCS < 15             Respiratory Rate > / =22  0  0  0  0     Systolic BP < / =215  0    0  0     Q Sofa Score  0  0  0  0           Visual Acuity      ED Medications  Medications - No data to display    Diagnostic Studies  Results Reviewed     None                 No orders to display              Procedures  ECG 12 Lead Documentation  Date/Time: 3/18/2019 6:48 PM  Performed by: Janet Ch MD  Authorized by: Janet Ch MD     ECG reviewed by me, the ED Provider: yes    Patient location:  ED  Previous ECG:     Previous ECG:  Unavailable    Comparison to cardiac monitor: No    Interpretation:     Interpretation: normal    Rate:     ECG rate:  90    ECG rate assessment: normal    Rhythm:     Rhythm comment:  SINUS ARRHYTHMIA  Ectopy:     Ectopy: none    QRS:     QRS axis:  Normal  Conduction:     Conduction: normal    ST segments:     ST segments:  Normal  T waves:     T waves: normal             Phone Contacts  ED Phone Contact    ED Course                               MDM    Disposition  Final diagnoses:   None     ED Disposition     None      Follow-up Information    None         Patient's Medications   Discharge Prescriptions    No medications on file     No discharge procedures on file      ED Provider  Electronically Signed by           Janet Ch MD  03/18/19 Tosha Acosta MD  03/18/19 1947       Janet Ch MD  03/18/19 1949

## 2019-03-19 LAB
ATRIAL RATE: 92 BPM
P AXIS: 50 DEGREES
PR INTERVAL: 142 MS
QRS AXIS: 64 DEGREES
QRSD INTERVAL: 92 MS
QT INTERVAL: 350 MS
QTC INTERVAL: 432 MS
T WAVE AXIS: 41 DEGREES
VENTRICULAR RATE: 92 BPM

## 2019-03-19 PROCEDURE — 93010 ELECTROCARDIOGRAM REPORT: CPT | Performed by: INTERNAL MEDICINE

## 2019-04-16 ENCOUNTER — TRANSCRIBE ORDERS (OUTPATIENT)
Dept: URGENT CARE | Facility: CLINIC | Age: 39
End: 2019-04-16

## 2019-04-16 ENCOUNTER — APPOINTMENT (OUTPATIENT)
Dept: LAB | Facility: CLINIC | Age: 39
End: 2019-04-16
Payer: COMMERCIAL

## 2019-04-16 DIAGNOSIS — R00.2 PALPITATIONS: ICD-10-CM

## 2019-04-16 DIAGNOSIS — R10.84 GENERALIZED ABDOMINAL PAIN: Primary | ICD-10-CM

## 2019-04-16 DIAGNOSIS — R00.0 TACHYCARDIA, UNSPECIFIED: ICD-10-CM

## 2019-04-16 DIAGNOSIS — E01.0 THYROMEGALY: ICD-10-CM

## 2019-04-16 DIAGNOSIS — E78.5 HYPERLIPIDEMIA, UNSPECIFIED HYPERLIPIDEMIA TYPE: ICD-10-CM

## 2019-04-16 LAB
ALBUMIN SERPL BCP-MCNC: 4 G/DL (ref 3.5–5)
ALP SERPL-CCNC: 60 U/L (ref 46–116)
ALT SERPL W P-5'-P-CCNC: 18 U/L (ref 12–78)
ANION GAP SERPL CALCULATED.3IONS-SCNC: 5 MMOL/L (ref 4–13)
AST SERPL W P-5'-P-CCNC: 15 U/L (ref 5–45)
BACTERIA UR QL AUTO: ABNORMAL /HPF
BASOPHILS # BLD AUTO: 0.05 THOUSANDS/ΜL (ref 0–0.1)
BASOPHILS NFR BLD AUTO: 1 % (ref 0–1)
BILIRUB SERPL-MCNC: 0.45 MG/DL (ref 0.2–1)
BILIRUB UR QL STRIP: ABNORMAL
BUN SERPL-MCNC: 8 MG/DL (ref 5–25)
CALCIUM SERPL-MCNC: 8.5 MG/DL (ref 8.3–10.1)
CHLORIDE SERPL-SCNC: 108 MMOL/L (ref 100–108)
CHOLEST SERPL-MCNC: 169 MG/DL (ref 50–200)
CLARITY UR: CLEAR
CO2 SERPL-SCNC: 30 MMOL/L (ref 21–32)
COLOR UR: YELLOW
CREAT SERPL-MCNC: 0.71 MG/DL (ref 0.6–1.3)
EOSINOPHIL # BLD AUTO: 0.15 THOUSAND/ΜL (ref 0–0.61)
EOSINOPHIL NFR BLD AUTO: 3 % (ref 0–6)
ERYTHROCYTE [DISTWIDTH] IN BLOOD BY AUTOMATED COUNT: 12.8 % (ref 11.6–15.1)
GFR SERPL CREATININE-BSD FRML MDRD: 124 ML/MIN/1.73SQ M
GLUCOSE P FAST SERPL-MCNC: 102 MG/DL (ref 65–99)
GLUCOSE UR STRIP-MCNC: NEGATIVE MG/DL
HCT VFR BLD AUTO: 41.2 % (ref 34.8–46.1)
HDLC SERPL-MCNC: 32 MG/DL (ref 40–60)
HGB BLD-MCNC: 13.4 G/DL (ref 11.5–15.4)
HGB UR QL STRIP.AUTO: ABNORMAL
HYALINE CASTS #/AREA URNS LPF: ABNORMAL /LPF
IMM GRANULOCYTES # BLD AUTO: 0.01 THOUSAND/UL (ref 0–0.2)
IMM GRANULOCYTES NFR BLD AUTO: 0 % (ref 0–2)
KETONES UR STRIP-MCNC: NEGATIVE MG/DL
LDLC SERPL CALC-MCNC: 122 MG/DL (ref 0–100)
LEUKOCYTE ESTERASE UR QL STRIP: NEGATIVE
LYMPHOCYTES # BLD AUTO: 1.31 THOUSANDS/ΜL (ref 0.6–4.47)
LYMPHOCYTES NFR BLD AUTO: 28 % (ref 14–44)
MAGNESIUM SERPL-MCNC: 2.1 MG/DL (ref 1.6–2.6)
MCH RBC QN AUTO: 31.1 PG (ref 26.8–34.3)
MCHC RBC AUTO-ENTMCNC: 32.5 G/DL (ref 31.4–37.4)
MCV RBC AUTO: 96 FL (ref 82–98)
MONOCYTES # BLD AUTO: 0.4 THOUSAND/ΜL (ref 0.17–1.22)
MONOCYTES NFR BLD AUTO: 9 % (ref 4–12)
NEUTROPHILS # BLD AUTO: 2.71 THOUSANDS/ΜL (ref 1.85–7.62)
NEUTS SEG NFR BLD AUTO: 59 % (ref 43–75)
NITRITE UR QL STRIP: NEGATIVE
NON-SQ EPI CELLS URNS QL MICRO: ABNORMAL /HPF
NONHDLC SERPL-MCNC: 137 MG/DL
NRBC BLD AUTO-RTO: 0 /100 WBCS
PH UR STRIP.AUTO: 6.5 [PH]
PLATELET # BLD AUTO: 188 THOUSANDS/UL (ref 149–390)
PMV BLD AUTO: 12.9 FL (ref 8.9–12.7)
POTASSIUM SERPL-SCNC: 3.7 MMOL/L (ref 3.5–5.3)
PROT SERPL-MCNC: 7.3 G/DL (ref 6.4–8.2)
PROT UR STRIP-MCNC: ABNORMAL MG/DL
RBC # BLD AUTO: 4.31 MILLION/UL (ref 3.81–5.12)
RBC #/AREA URNS AUTO: ABNORMAL /HPF
SODIUM SERPL-SCNC: 143 MMOL/L (ref 136–145)
SP GR UR STRIP.AUTO: 1.03 (ref 1–1.03)
TRIGL SERPL-MCNC: 75 MG/DL
TSH SERPL DL<=0.05 MIU/L-ACNC: 0.4 UIU/ML (ref 0.36–3.74)
UROBILINOGEN UR QL STRIP.AUTO: 1 E.U./DL
WBC # BLD AUTO: 4.63 THOUSAND/UL (ref 4.31–10.16)
WBC #/AREA URNS AUTO: ABNORMAL /HPF

## 2019-04-16 PROCEDURE — 80061 LIPID PANEL: CPT

## 2019-04-16 PROCEDURE — 85025 COMPLETE CBC W/AUTO DIFF WBC: CPT

## 2019-04-16 PROCEDURE — 36415 COLL VENOUS BLD VENIPUNCTURE: CPT

## 2019-04-16 PROCEDURE — 83735 ASSAY OF MAGNESIUM: CPT

## 2019-04-16 PROCEDURE — 86618 LYME DISEASE ANTIBODY: CPT

## 2019-04-16 PROCEDURE — 80053 COMPREHEN METABOLIC PANEL: CPT

## 2019-04-16 PROCEDURE — 84443 ASSAY THYROID STIM HORMONE: CPT

## 2019-04-16 PROCEDURE — 81001 URINALYSIS AUTO W/SCOPE: CPT

## 2019-04-17 LAB
B BURGDOR IGG SER IA-ACNC: 0.22
B BURGDOR IGM SER IA-ACNC: 0.43

## 2019-08-01 ENCOUNTER — OFFICE VISIT (OUTPATIENT)
Dept: URGENT CARE | Facility: CLINIC | Age: 39
End: 2019-08-01
Payer: COMMERCIAL

## 2019-08-01 VITALS
RESPIRATION RATE: 18 BRPM | OXYGEN SATURATION: 99 % | SYSTOLIC BLOOD PRESSURE: 155 MMHG | DIASTOLIC BLOOD PRESSURE: 90 MMHG | TEMPERATURE: 97.8 F | HEART RATE: 74 BPM

## 2019-08-01 DIAGNOSIS — J01.10 ACUTE FRONTAL SINUSITIS, RECURRENCE NOT SPECIFIED: Primary | ICD-10-CM

## 2019-08-01 PROCEDURE — 99213 OFFICE O/P EST LOW 20 MIN: CPT | Performed by: NURSE PRACTITIONER

## 2019-08-01 PROCEDURE — S9088 SERVICES PROVIDED IN URGENT: HCPCS | Performed by: NURSE PRACTITIONER

## 2019-08-01 RX ORDER — AMOXICILLIN 875 MG/1
875 TABLET, COATED ORAL 2 TIMES DAILY
Qty: 20 TABLET | Refills: 0 | Status: SHIPPED | OUTPATIENT
Start: 2019-08-01 | End: 2019-08-11

## 2019-08-01 NOTE — PROGRESS NOTES
[facility] Now        NAME: Isidro Pugh is a 43 y o  female  : 1980    MRN: [id number]  DATE: 2019  TIME: 5:21 PM    Assessment and Plan   Acute frontal sinusitis, recurrence not specified [J01 10]  1  Acute frontal sinusitis, recurrence not specified  amoxicillin (AMOXIL) 875 mg tablet         Patient Instructions     I have prescribed an antibiotic for the infection  Please take the antibiotic as prescribed and finish the entire prescription  I recommend that the patient takes an over the counter probiotic or eats yogurt with live cultures in it [place]) to keep good bacteria in the gut and help prevent diarrhea  Wash hands frequently to prevent the spread of infection  Can use over the counter cough and cold medications to help with symptoms  Ibuprofen and/or tylenol as needed for pain or fever  If not improving over the next 7-10 days, follow up with PCP  Follow up with PCP in 3-5 days  Proceed to  ER if symptoms worsen  Chief Complaint     Chief Complaint   Patient presents with    Headache     Pt c/o a headache since yesterday  History of Present Illness       50-year-old female presents to urgent care with chief complaint of new onset frontal headache for the past day  Patient also states she has sinus pressure, sinus congestion, postnasal drainage, for the past week  She denies any cough chest tightness or shortness of breath  She reports complaints of generalized fatigue for the past 2 days denies any fevers or chills  Review of Systems   Review of Systems   Constitutional: Negative  HENT: Positive for congestion, postnasal drip, sinus pressure and sinus pain  Negative for dental problem, drooling, ear discharge, ear pain, facial swelling, hearing loss, mouth sores, nosebleeds, sneezing, tinnitus, trouble swallowing and voice change  Eyes: Negative  Respiratory: Negative    Negative for apnea, choking, chest tightness, shortness of breath, wheezing and stridor  Cardiovascular: Negative  Negative for chest pain, palpitations and leg swelling  Gastrointestinal: Negative  Negative for abdominal distention, abdominal pain, anal bleeding, blood in stool, constipation, diarrhea, nausea, rectal pain and vomiting  Endocrine: Negative  Genitourinary: Negative  Musculoskeletal: Negative  Skin: Negative  Allergic/Immunologic: Negative  Neurological: Positive for headaches  Hematological: Negative  Psychiatric/Behavioral: Negative  All other systems reviewed and are negative  Current Medications       Current Outpatient Medications:     amoxicillin (AMOXIL) 875 mg tablet, Take 1 tablet (875 mg total) by mouth 2 (two) times a day for 10 days, Disp: 20 tablet, Rfl: 0    Levonorgestrel 19 5 MCG/DAY IUD, 1 each by Intrauterine route, Disp: , Rfl:     Current Allergies     Allergies as of 08/01/2019 - Reviewed 08/01/2019   Allergen Reaction Noted    No known allergies  12/16/2016            The following portions of the patient's history were reviewed and updated as appropriate: allergies, current medications, past family history, past medical history, past social history, past surgical history and problem list      Past Medical History:   Diagnosis Date    Allergic     Asthma        No past surgical history on file  No family history on file  Medications have been verified  Objective   /90   Pulse 74   Temp 97 8 °F (36 6 °C)   Resp 18   SpO2 99%        Physical Exam     Physical Exam   Constitutional: She is oriented to person, place, and time  Vital signs are normal  She appears well-developed and well-nourished  HENT:   Head: Normocephalic and atraumatic  Right Ear: Hearing, tympanic membrane, external ear and ear canal normal    Left Ear: Hearing, tympanic membrane, external ear and ear canal normal    Nose: Rhinorrhea and sinus tenderness present   Right sinus exhibits frontal sinus tenderness  Left sinus exhibits frontal sinus tenderness  Mouth/Throat: Uvula is midline and mucous membranes are normal  Posterior oropharyngeal erythema present  Eyes: Pupils are equal, round, and reactive to light  Conjunctivae and EOM are normal    Neck: Trachea normal, normal range of motion and full passive range of motion without pain  Cardiovascular: Normal rate and regular rhythm  Pulmonary/Chest: Effort normal and breath sounds normal    Abdominal: Soft  Normal appearance  Musculoskeletal: Normal range of motion  Lymphadenopathy:     She has cervical adenopathy  Right cervical: Superficial cervical adenopathy present  Left cervical: Superficial cervical adenopathy present  Neurological: She is alert and oriented to person, place, and time

## 2019-09-25 ENCOUNTER — OFFICE VISIT (OUTPATIENT)
Dept: URGENT CARE | Facility: CLINIC | Age: 39
End: 2019-09-25
Payer: COMMERCIAL

## 2019-09-25 VITALS
OXYGEN SATURATION: 100 % | TEMPERATURE: 98 F | BODY MASS INDEX: 23.32 KG/M2 | RESPIRATION RATE: 18 BRPM | HEART RATE: 78 BPM | HEIGHT: 65 IN | DIASTOLIC BLOOD PRESSURE: 87 MMHG | WEIGHT: 140 LBS | SYSTOLIC BLOOD PRESSURE: 139 MMHG

## 2019-09-25 DIAGNOSIS — M79.602 LEFT ARM PAIN: Primary | ICD-10-CM

## 2019-09-25 PROCEDURE — 99213 OFFICE O/P EST LOW 20 MIN: CPT | Performed by: PHYSICIAN ASSISTANT

## 2019-09-25 PROCEDURE — S9088 SERVICES PROVIDED IN URGENT: HCPCS | Performed by: PHYSICIAN ASSISTANT

## 2019-09-25 RX ORDER — METHYLPREDNISOLONE 4 MG/1
TABLET ORAL
Qty: 1 EACH | Refills: 0 | Status: SHIPPED | OUTPATIENT
Start: 2019-09-25 | End: 2019-11-24

## 2019-09-25 NOTE — PROGRESS NOTES
[facility] Now        NAME: Jonas Martin is a 35 y o  female  : 1980    MRN: [id number]  DATE: 2019  TIME: 8:42 AM    Assessment and Plan   Left arm pain [M79 602]  1  Left arm pain  methylPREDNISolone 4 MG tablet therapy pack         Patient Instructions     Medrol dose pack  RICE  F/U with ortho if no improvement  Follow up with PCP in 3-5 days  Proceed to  ER if symptoms worsen  Chief Complaint     Chief Complaint   Patient presents with    Arm Pain     left upper arm pain for 2 days pain in deltoid niyah and radiates down arm and up to neck         History of Present Illness       36 y/o F presents for eval of L arm pain onset 3 days ago with no known injury, though pt does state she thinks she may have slept on it wrong  She has been taking aleve which does help slightly  States pain is mostly in the bicep muscle but sometimes radiates down her forearm and then up into her shoulder  No numbness or tingling, erythema, ecchymosis, trauma or injury  Review of Systems   Review of Systems   All other systems reviewed and are negative  Current Medications       Current Outpatient Medications:     Levonorgestrel 19 5 MCG/DAY IUD, 1 each by Intrauterine route, Disp: , Rfl:     methylPREDNISolone 4 MG tablet therapy pack, Use as directed on package, Disp: 1 each, Rfl: 0    Current Allergies     Allergies as of 2019 - Reviewed 2019   Allergen Reaction Noted    No known allergies  2016            The following portions of the patient's history were reviewed and updated as appropriate: allergies, current medications, past family history, past medical history, past social history, past surgical history and problem list      Past Medical History:   Diagnosis Date    Allergic     Asthma        History reviewed  No pertinent surgical history  No family history on file  Medications have been verified          Objective   /87   Pulse 78   Temp 98 °F (36 7 °C) (Tympanic)   Resp 18   Ht 5' 5" (1 651 m)   Wt 63 5 kg (140 lb)   LMP 09/20/2019   SpO2 100%   BMI 23 30 kg/m²        Physical Exam     Physical Exam   Constitutional: She is oriented to person, place, and time  She appears well-developed and well-nourished  No distress  HENT:   Head: Normocephalic and atraumatic  Cardiovascular: Normal rate, regular rhythm and normal heart sounds  Exam reveals no gallop and no friction rub  No murmur heard  Pulmonary/Chest: Effort normal and breath sounds normal  She has no wheezes  She has no rales  Musculoskeletal:        Left shoulder: Normal         Left elbow: Normal    L arm - Tenderness to palpation of entire bicep muscle  No deformity, swelling   Neurological: She is alert and oriented to person, place, and time  Psychiatric: She has a normal mood and affect  Vitals reviewed

## 2019-10-10 ENCOUNTER — HOSPITAL ENCOUNTER (EMERGENCY)
Facility: HOSPITAL | Age: 39
Discharge: HOME | End: 2019-10-10
Attending: EMERGENCY MEDICINE | Admitting: EMERGENCY MEDICINE
Payer: COMMERCIAL

## 2019-10-10 ENCOUNTER — TRANSCRIBE ORDERS (OUTPATIENT)
Dept: LAB | Facility: CLINIC | Age: 39
End: 2019-10-10

## 2019-10-10 ENCOUNTER — APPOINTMENT (EMERGENCY)
Dept: RADIOLOGY | Facility: HOSPITAL | Age: 39
End: 2019-10-10
Payer: COMMERCIAL

## 2019-10-10 ENCOUNTER — APPOINTMENT (OUTPATIENT)
Dept: LAB | Facility: CLINIC | Age: 39
End: 2019-10-10
Payer: COMMERCIAL

## 2019-10-10 VITALS
WEIGHT: 150 LBS | DIASTOLIC BLOOD PRESSURE: 90 MMHG | SYSTOLIC BLOOD PRESSURE: 169 MMHG | BODY MASS INDEX: 24.99 KG/M2 | TEMPERATURE: 97.8 F | HEART RATE: 98 BPM | RESPIRATION RATE: 18 BRPM | HEIGHT: 65 IN | OXYGEN SATURATION: 98 %

## 2019-10-10 DIAGNOSIS — M25.50 PAIN IN JOINT, MULTIPLE SITES: ICD-10-CM

## 2019-10-10 DIAGNOSIS — R00.2 PALPITATIONS: Primary | ICD-10-CM

## 2019-10-10 DIAGNOSIS — R73.9 BLOOD GLUCOSE ELEVATED: ICD-10-CM

## 2019-10-10 DIAGNOSIS — M79.602 LEFT ARM PAIN: ICD-10-CM

## 2019-10-10 DIAGNOSIS — R00.2 PALPITATIONS: ICD-10-CM

## 2019-10-10 DIAGNOSIS — M54.12 CERVICAL RADICULOPATHY: Primary | ICD-10-CM

## 2019-10-10 DIAGNOSIS — E01.0 THYROMEGALY: ICD-10-CM

## 2019-10-10 LAB
ALBUMIN SERPL BCP-MCNC: 4.1 G/DL (ref 3.5–5)
ALP SERPL-CCNC: 76 U/L (ref 46–116)
ALT SERPL W P-5'-P-CCNC: 16 U/L (ref 12–78)
ANION GAP SERPL CALCULATED.3IONS-SCNC: 7 MMOL/L (ref 4–13)
AST SERPL W P-5'-P-CCNC: 7 U/L (ref 5–45)
BACTERIA UR QL AUTO: ABNORMAL /HPF
BASOPHILS # BLD AUTO: 0.02 THOUSANDS/ΜL (ref 0–0.1)
BASOPHILS NFR BLD AUTO: 0 % (ref 0–1)
BILIRUB SERPL-MCNC: 0.31 MG/DL (ref 0.2–1)
BILIRUB UR QL STRIP: NEGATIVE
BUN SERPL-MCNC: 10 MG/DL (ref 5–25)
CALCIUM SERPL-MCNC: 9.6 MG/DL (ref 8.3–10.1)
CHLORIDE SERPL-SCNC: 107 MMOL/L (ref 100–108)
CLARITY UR: CLEAR
CO2 SERPL-SCNC: 26 MMOL/L (ref 21–32)
COLOR UR: YELLOW
CREAT SERPL-MCNC: 0.68 MG/DL (ref 0.6–1.3)
EOSINOPHIL # BLD AUTO: 0 THOUSAND/ΜL (ref 0–0.61)
EOSINOPHIL NFR BLD AUTO: 0 % (ref 0–6)
ERYTHROCYTE [DISTWIDTH] IN BLOOD BY AUTOMATED COUNT: 12.3 % (ref 11.6–15.1)
ERYTHROCYTE [SEDIMENTATION RATE] IN BLOOD: 31 MM/HOUR (ref 0–20)
EST. AVERAGE GLUCOSE BLD GHB EST-MCNC: 103 MG/DL
GFR SERPL CREATININE-BSD FRML MDRD: 127 ML/MIN/1.73SQ M
GLUCOSE P FAST SERPL-MCNC: 137 MG/DL (ref 65–99)
GLUCOSE UR STRIP-MCNC: NEGATIVE MG/DL
HBA1C MFR BLD: 5.2 % (ref 4.2–6.3)
HCT VFR BLD AUTO: 42.9 % (ref 34.8–46.1)
HGB BLD-MCNC: 13.9 G/DL (ref 11.5–15.4)
HGB UR QL STRIP.AUTO: ABNORMAL
HYALINE CASTS #/AREA URNS LPF: ABNORMAL /LPF
IMM GRANULOCYTES # BLD AUTO: 0.02 THOUSAND/UL (ref 0–0.2)
IMM GRANULOCYTES NFR BLD AUTO: 0 % (ref 0–2)
KETONES UR STRIP-MCNC: ABNORMAL MG/DL
LEUKOCYTE ESTERASE UR QL STRIP: NEGATIVE
LYMPHOCYTES # BLD AUTO: 0.61 THOUSANDS/ΜL (ref 0.6–4.47)
LYMPHOCYTES NFR BLD AUTO: 10 % (ref 14–44)
MAGNESIUM SERPL-MCNC: 2.2 MG/DL (ref 1.6–2.6)
MCH RBC QN AUTO: 30.6 PG (ref 26.8–34.3)
MCHC RBC AUTO-ENTMCNC: 32.4 G/DL (ref 31.4–37.4)
MCV RBC AUTO: 95 FL (ref 82–98)
MONOCYTES # BLD AUTO: 0.14 THOUSAND/ΜL (ref 0.17–1.22)
MONOCYTES NFR BLD AUTO: 2 % (ref 4–12)
NEUTROPHILS # BLD AUTO: 5.65 THOUSANDS/ΜL (ref 1.85–7.62)
NEUTS SEG NFR BLD AUTO: 88 % (ref 43–75)
NITRITE UR QL STRIP: NEGATIVE
NON-SQ EPI CELLS URNS QL MICRO: ABNORMAL /HPF
NRBC BLD AUTO-RTO: 0 /100 WBCS
PH UR STRIP.AUTO: 7 [PH]
PLATELET # BLD AUTO: 268 THOUSANDS/UL (ref 149–390)
PMV BLD AUTO: 12.7 FL (ref 8.9–12.7)
POTASSIUM SERPL-SCNC: 3.7 MMOL/L (ref 3.5–5.3)
PROT SERPL-MCNC: 8.5 G/DL (ref 6.4–8.2)
PROT UR STRIP-MCNC: NEGATIVE MG/DL
RBC # BLD AUTO: 4.54 MILLION/UL (ref 3.81–5.12)
RBC #/AREA URNS AUTO: ABNORMAL /HPF
SODIUM SERPL-SCNC: 140 MMOL/L (ref 136–145)
SP GR UR STRIP.AUTO: 1.02 (ref 1–1.03)
T4 FREE SERPL-MCNC: 1.16 NG/DL (ref 0.76–1.46)
TSH SERPL DL<=0.05 MIU/L-ACNC: 0.44 UIU/ML (ref 0.36–3.74)
UROBILINOGEN UR QL STRIP.AUTO: 1 E.U./DL
VIT B12 SERPL-MCNC: 422 PG/ML (ref 100–900)
WBC # BLD AUTO: 6.44 THOUSAND/UL (ref 4.31–10.16)
WBC #/AREA URNS AUTO: ABNORMAL /HPF

## 2019-10-10 PROCEDURE — 99283 EMERGENCY DEPT VISIT LOW MDM: CPT

## 2019-10-10 PROCEDURE — 73030 X-RAY EXAM OF SHOULDER: CPT

## 2019-10-10 PROCEDURE — 83036 HEMOGLOBIN GLYCOSYLATED A1C: CPT

## 2019-10-10 PROCEDURE — 85652 RBC SED RATE AUTOMATED: CPT

## 2019-10-10 PROCEDURE — 86618 LYME DISEASE ANTIBODY: CPT

## 2019-10-10 PROCEDURE — 86430 RHEUMATOID FACTOR TEST QUAL: CPT

## 2019-10-10 PROCEDURE — 81001 URINALYSIS AUTO W/SCOPE: CPT

## 2019-10-10 PROCEDURE — 85025 COMPLETE CBC W/AUTO DIFF WBC: CPT

## 2019-10-10 PROCEDURE — 84439 ASSAY OF FREE THYROXINE: CPT

## 2019-10-10 PROCEDURE — 82607 VITAMIN B-12: CPT

## 2019-10-10 PROCEDURE — 72050 X-RAY EXAM NECK SPINE 4/5VWS: CPT

## 2019-10-10 PROCEDURE — 84443 ASSAY THYROID STIM HORMONE: CPT

## 2019-10-10 PROCEDURE — 80053 COMPREHEN METABOLIC PANEL: CPT

## 2019-10-10 PROCEDURE — 83735 ASSAY OF MAGNESIUM: CPT

## 2019-10-10 PROCEDURE — 36415 COLL VENOUS BLD VENIPUNCTURE: CPT

## 2019-10-10 PROCEDURE — 86800 THYROGLOBULIN ANTIBODY: CPT

## 2019-10-10 RX ORDER — PREDNISONE 20 MG/1
TABLET ORAL
Qty: 15 TABLET | Refills: 0 | Status: SHIPPED | OUTPATIENT
Start: 2019-10-10 | End: 2019-11-24

## 2019-10-10 RX ADMIN — PREDNISONE 30 MG: 10 TABLET ORAL at 01:47

## 2019-10-10 NOTE — DISCHARGE INSTRUCTIONS
AVOID NECK POSITIONS THAT AGGRAVATE YOUR PAIN  APPLY GENTLE HEAT, 10 MINUTES 3-4 TIMES DAILY TO THE LEFT SIDE OF HER NECK  TAKE THE PRESCRIPTION PREDNISONE TWICE DAILY WITH FOOD AS DIRECTED  YOU MAY ALSO USE TYLENOL FOR ADDITIONAL PAIN RELIEF UP TO 4 TIMES DAILY  RECOMMEND TAKING PEPCID TO HELP REDUCE STOMACH ACID AND STOMACH UPSET

## 2019-10-10 NOTE — ED PROVIDER NOTES
History  Chief Complaint   Patient presents with    Arm Pain     Pt states she's had LT arm pain for about a week  Pt was seen at [facility] and dx with muscle strain  Pt given Rx for "muscle relaxer" but was unable to take r/t it made her sick  Pt c/o LT arm pain that radiates into her shoulder blade and side  Pt as been taking Tylenol arthrits with some relief  PATIENT PRESENTS TO THE ER BECAUSE OF 3 WEEKS OF LEFT ARM PAIN  SHE STATES SHE WAS SEEN PREVIOUSLY AT [facility] IN GIVEN A MUSCLE RELAXER  HOWEVER AFTER 1 DOSE IT MADE HER SICK AND DID NOT TAKE ANY FURTHER OF THE MEDICATION  ACCORDING TO THE CHARTING, SHE WAS GIVEN A MEDROL DOSEPAK  THE LEFT ARM PAIN IS IN THE MIDPORTION OF THE UPPER ARM AND ALSO THE LEFT TRAPEZIUS AND THE LEFT RHOMBOIDS  PATIENT DID NOT HAVE ANY X-RAYS  Prior to Admission Medications   Prescriptions Last Dose Informant Patient Reported? Taking? Levonorgestrel 19 5 MCG/DAY IUD   Yes No   Si each by Intrauterine route   methylPREDNISolone 4 MG tablet therapy pack   No No   Sig: Use as directed on package      Facility-Administered Medications: None       Past Medical History:   Diagnosis Date    Allergic     Asthma        History reviewed  No pertinent surgical history  History reviewed  No pertinent family history  I have reviewed and agree with the history as documented  Social History     Tobacco Use    Smoking status: Current Every Day Smoker     Packs/day: 0 50    Smokeless tobacco: Never Used   Substance Use Topics    Alcohol use: No    Drug use: Yes     Types: Marijuana        Review of Systems   Constitutional: Negative for chills and fever  Respiratory: Negative  Cardiovascular: Negative  Musculoskeletal: Positive for arthralgias and back pain  SEE HPI  LEFT UPPER ARM PAIN, LEFT UPPER BACK AND TRAPEZIUS PAIN   Neurological: Positive for weakness   Negative for dizziness, facial asymmetry, light-headedness, numbness and headaches  Physical Exam  Physical Exam   Constitutional: She is oriented to person, place, and time  She appears well-nourished  PATIENT IS AMBULATORY, NO ACUTE DISTRESS AND NONTOXIC  HENT:   Head: Normocephalic and atraumatic  Eyes: Conjunctivae and EOM are normal    Neck: Normal range of motion  NORMAL RANGE OF MOTION, HOWEVER UNCOMFORTABLE ON THE LEFT SIDE WHEN ROTATING LEFT AND SIDE BENDING RIGHT  THERE IS INCREASED LEFT-SIDED PARA SPINAL MUSCLE SPASM IN THE CERVICAL REGION   Cardiovascular: Normal rate and normal heart sounds  NO ECTOPY   Pulmonary/Chest: Effort normal and breath sounds normal  No respiratory distress  Musculoskeletal:   NONTENDER CERVICAL AND DORSAL SPINE  THERE IS MUSCLE SPASM OF THE SCALENES AND RHOMBOIDS ON THE LEFT AS WELL AS THE LEFT TRAPEZIUS  THERE IS NO CREPITUS OF THE LEFT SHOULDER NO TENDERNESS TO ABDUCTION OR FLEXION OF THE SHOULDER  Lymphadenopathy:     She has no cervical adenopathy  Neurological: She is alert and oriented to person, place, and time  No cranial nerve deficit or sensory deficit  She exhibits normal muscle tone  Skin: Capillary refill takes less than 2 seconds  No rash noted  She is not diaphoretic  No erythema         Vital Signs  ED Triage Vitals [10/10/19 0038]   Temperature Pulse Respirations Blood Pressure SpO2   97 8 °F (36 6 °C) 98 18 169/90 98 %      Temp Source Heart Rate Source Patient Position - Orthostatic VS BP Location FiO2 (%)   Temporal Monitor -- Left arm --      Pain Score       6           Vitals:    10/10/19 0038   BP: 169/90   Pulse: 98         Visual Acuity      ED Medications  Medications   predniSONE tablet 30 mg (30 mg Oral Given 10/10/19 0147)       Diagnostic Studies  Results Reviewed     None                 XR shoulder 2+ views LEFT   ED Interpretation by Tiara Pedersen DO (10/10 0133)   NO ACUTE OSSEOUS ABNORMALITY, NO SIGNIFICANT DEGENERATIVE ABNORMALITIES      XR spine cervical complete 4 or 5 vw non injury ED Interpretation by Kit Soler DO (10/10 0133)   LIKELY DIFFUSE LEFT-SIDED FACET ARTHROPATHIES                 Procedures  Procedures       ED Course      EXAMINATIONS AND EVALUATION  PATIENT'S SYMPTOM AND EXAM SUGGESTIVE POSSIBLE CERVICAL RADICULOPATHY  X-RAY OF THE LEFT SHOULDER IS UNREMARKABLE, NO SIGNS OF DJD OR CALCIFIC TENDINITIS  THE C-SPINE X-RAYS ARE CONCERNING FOR POTENTIAL LEFT-SIDED FACET ARTHROPATHY ON MULTIPLE LEVELS  PATIENT GIVEN PREDNISONE HERE AND RECOMMENDED TAKE PREDNISONE TWICE DAILY WITH FOOD  SHE IS ALSO RECOMMENDED TO TAKE PEPCID TWICE A DAY FOR STOMACH PROTECTION  ALSO RECOMMENDED POTENTIALLY USING BENADRYL OR MELATONIN A SHE HAS DIFFICULTY SLEEPING WHILE TAKING THE PREDNISONE  SHE IS ASKED TO CALL HER DOCTOR MORNING FOR THE PHYSICAL THERAPY REFERRAL  MDM    Disposition  Final diagnoses:   Cervical radiculopathy   Left arm pain     Time reflects when diagnosis was documented in both MDM as applicable and the Disposition within this note     Time User Action Codes Description Comment    10/10/2019  1:41 AM Jocelyn Garrison [[id number]] Cervical radiculopathy     10/10/2019  1:41 AM Jocelyn Garrison [[id number]] Left arm pain       ED Disposition     ED Disposition Condition Date/Time Comment    Discharge Stable u Oct 10, 2019  1:41 AM Park Escalera discharge to home/self care              Follow-up Information     Follow up With Specialties Details Why [address], MD Internal Medicine  CALL TODAY FOR A FOLLOW-UP APPOINTMENT, POSSIBLE PHYSICAL THERAPY REFERRAL [address] [place] [address]  [phone]            Discharge Medication List as of 10/10/2019  1:45 AM      START taking these medications    Details   predniSONE 20 mg tablet TAKE 1 TABLET TWICE DAILY WITH FOOD FOR 4 DAYS THEN ONCE DAILY FOR 7 DAYS, Print         CONTINUE these medications which have NOT CHANGED    Details   Levonorgestrel 19 5 MCG/DAY IUD 1 each by Intrauterine route, Historical Med      methylPREDNISolone 4 MG tablet therapy pack Use as directed on package, Normal           No discharge procedures on file      ED Provider  Electronically Signed by           Sherri John DO  10/10/19 1750

## 2019-10-11 LAB
B BURGDOR IGG+IGM SER-ACNC: <0.91 ISR (ref 0–0.9)
RHEUMATOID FACT SER QL LA: NEGATIVE
THYROGLOB AB SERPL-ACNC: 3 IU/ML (ref 0–0.9)

## 2019-10-16 ENCOUNTER — TRANSCRIBE ORDERS (OUTPATIENT)
Dept: URGENT CARE | Facility: CLINIC | Age: 39
End: 2019-10-16

## 2019-10-16 ENCOUNTER — APPOINTMENT (OUTPATIENT)
Dept: LAB | Facility: CLINIC | Age: 39
End: 2019-10-16
Payer: COMMERCIAL

## 2019-10-16 DIAGNOSIS — E07.9 DISEASE OF THYROID GLAND: Primary | ICD-10-CM

## 2019-10-16 LAB — TSH SERPL DL<=0.05 MIU/L-ACNC: 0.6 UIU/ML (ref 0.36–3.74)

## 2019-10-16 PROCEDURE — 84443 ASSAY THYROID STIM HORMONE: CPT

## 2019-10-16 PROCEDURE — 36415 COLL VENOUS BLD VENIPUNCTURE: CPT

## 2019-11-16 ENCOUNTER — HOSPITAL ENCOUNTER (EMERGENCY)
Facility: HOSPITAL | Age: 39
Discharge: HOME | End: 2019-11-16
Attending: INTERNAL MEDICINE
Payer: COMMERCIAL

## 2019-11-16 ENCOUNTER — APPOINTMENT (EMERGENCY)
Dept: CT IMAGING | Facility: HOSPITAL | Age: 39
End: 2019-11-16
Payer: COMMERCIAL

## 2019-11-16 VITALS
WEIGHT: 150 LBS | HEART RATE: 82 BPM | SYSTOLIC BLOOD PRESSURE: 144 MMHG | DIASTOLIC BLOOD PRESSURE: 89 MMHG | HEIGHT: 61 IN | OXYGEN SATURATION: 97 % | RESPIRATION RATE: 16 BRPM | BODY MASS INDEX: 28.32 KG/M2

## 2019-11-16 DIAGNOSIS — M50.20 HERNIATED CERVICAL DISC: ICD-10-CM

## 2019-11-16 DIAGNOSIS — R20.2 FACIAL PARESTHESIA: Primary | ICD-10-CM

## 2019-11-16 LAB
ALBUMIN SERPL BCP-MCNC: 4.6 G/DL (ref 3.5–5.7)
ALP SERPL-CCNC: 55 U/L (ref 40–150)
ALT SERPL W P-5'-P-CCNC: 7 U/L (ref 7–52)
ANION GAP SERPL CALCULATED.3IONS-SCNC: 7 MMOL/L (ref 4–13)
APTT PPP: 29 SECONDS (ref 23–37)
AST SERPL W P-5'-P-CCNC: 12 U/L (ref 13–39)
BASOPHILS # BLD AUTO: 0 THOUSANDS/ΜL (ref 0–0.1)
BASOPHILS NFR BLD AUTO: 0 % (ref 0–2)
BILIRUB SERPL-MCNC: 0.4 MG/DL (ref 0.2–1)
BUN SERPL-MCNC: 8 MG/DL (ref 7–25)
CALCIUM SERPL-MCNC: 9.4 MG/DL (ref 8.6–10.5)
CHLORIDE SERPL-SCNC: 104 MMOL/L (ref 98–107)
CO2 SERPL-SCNC: 29 MMOL/L (ref 21–31)
CREAT SERPL-MCNC: 0.69 MG/DL (ref 0.6–1.2)
CRP SERPL HS-MCNC: 1 MG/L
EOSINOPHIL # BLD AUTO: 0 THOUSAND/ΜL (ref 0–0.61)
EOSINOPHIL NFR BLD AUTO: 0 % (ref 0–5)
ERYTHROCYTE [DISTWIDTH] IN BLOOD BY AUTOMATED COUNT: 13.7 % (ref 11.5–14.5)
ERYTHROCYTE [SEDIMENTATION RATE] IN BLOOD: 9 MM/HOUR (ref 0–20)
GFR SERPL CREATININE-BSD FRML MDRD: 126 ML/MIN/1.73SQ M
GLUCOSE SERPL-MCNC: 124 MG/DL (ref 65–99)
HCT VFR BLD AUTO: 41.4 % (ref 42–47)
HGB BLD-MCNC: 14.1 G/DL (ref 12–16)
INR PPP: 1.04 (ref 0.9–1.5)
LYMPHOCYTES # BLD AUTO: 1.2 THOUSANDS/ΜL (ref 0.6–4.47)
LYMPHOCYTES NFR BLD AUTO: 14 % (ref 21–51)
MCH RBC QN AUTO: 31.3 PG (ref 26–34)
MCHC RBC AUTO-ENTMCNC: 33.9 G/DL (ref 31–37)
MCV RBC AUTO: 92 FL (ref 81–99)
MONOCYTES # BLD AUTO: 0.4 THOUSAND/ΜL (ref 0.17–1.22)
MONOCYTES NFR BLD AUTO: 5 % (ref 2–12)
NEUTROPHILS # BLD AUTO: 7.2 THOUSANDS/ΜL (ref 1.4–6.5)
NEUTS SEG NFR BLD AUTO: 81 % (ref 42–75)
PLATELET # BLD AUTO: 235 THOUSANDS/UL (ref 149–390)
PMV BLD AUTO: 9.9 FL (ref 8.6–11.7)
POTASSIUM SERPL-SCNC: 3.3 MMOL/L (ref 3.5–5.5)
PROT SERPL-MCNC: 7.3 G/DL (ref 6.4–8.9)
PROTHROMBIN TIME: 12.1 SECONDS (ref 10.2–13)
RBC # BLD AUTO: 4.49 MILLION/UL (ref 3.9–5.2)
SODIUM SERPL-SCNC: 140 MMOL/L (ref 134–143)
WBC # BLD AUTO: 8.9 THOUSAND/UL (ref 4.8–10.8)

## 2019-11-16 PROCEDURE — 85652 RBC SED RATE AUTOMATED: CPT | Performed by: INTERNAL MEDICINE

## 2019-11-16 PROCEDURE — 99284 EMERGENCY DEPT VISIT MOD MDM: CPT

## 2019-11-16 PROCEDURE — 80053 COMPREHEN METABOLIC PANEL: CPT | Performed by: INTERNAL MEDICINE

## 2019-11-16 PROCEDURE — 86618 LYME DISEASE ANTIBODY: CPT | Performed by: INTERNAL MEDICINE

## 2019-11-16 PROCEDURE — 85025 COMPLETE CBC W/AUTO DIFF WBC: CPT | Performed by: INTERNAL MEDICINE

## 2019-11-16 PROCEDURE — 70496 CT ANGIOGRAPHY HEAD: CPT

## 2019-11-16 PROCEDURE — 36415 COLL VENOUS BLD VENIPUNCTURE: CPT | Performed by: INTERNAL MEDICINE

## 2019-11-16 PROCEDURE — 85610 PROTHROMBIN TIME: CPT | Performed by: INTERNAL MEDICINE

## 2019-11-16 PROCEDURE — 70498 CT ANGIOGRAPHY NECK: CPT

## 2019-11-16 PROCEDURE — 85730 THROMBOPLASTIN TIME PARTIAL: CPT | Performed by: INTERNAL MEDICINE

## 2019-11-16 PROCEDURE — 86141 C-REACTIVE PROTEIN HS: CPT | Performed by: INTERNAL MEDICINE

## 2019-11-16 PROCEDURE — 93005 ELECTROCARDIOGRAM TRACING: CPT

## 2019-11-16 PROCEDURE — 99285 EMERGENCY DEPT VISIT HI MDM: CPT | Performed by: INTERNAL MEDICINE

## 2019-11-16 RX ORDER — METHOCARBAMOL 750 MG/1
750 TABLET, FILM COATED ORAL EVERY 6 HOURS PRN
COMMUNITY
End: 2019-11-24

## 2019-11-16 RX ORDER — METHOCARBAMOL 750 MG/1
750 TABLET, FILM COATED ORAL ONCE
Status: COMPLETED | OUTPATIENT
Start: 2019-11-16 | End: 2019-11-16

## 2019-11-16 RX ADMIN — METHOCARBAMOL TABLETS 750 MG: 750 TABLET, COATED ORAL at 20:45

## 2019-11-16 RX ADMIN — IOHEXOL 85 ML: 350 INJECTION, SOLUTION INTRAVENOUS at 19:57

## 2019-11-16 NOTE — ED PROVIDER NOTES
History  Chief Complaint   Patient presents with    Facial Numbness     left sided facial numbness,resolved, left sided upper extremity numbness  26-year-old female, currently being worked up for left arm numbness and tingling  During that workup she had an MRI of her neck revealing a herniated disc at C6  This is consistent with her symptoms  She went to work today at roughly 330  She arrive in be developed left facial numbness  She just felt like her lip was swollen, and her face she just could not feel correctly  She is anxious over this neck issue  The symptoms lasted roughly 2 hours, have pretty much gone since she is here now  She is a smoker, has been told recently she has been having some high blood pressure  Notably her initial workup was based on left arm symptomatology, which started with complete left arm numbness while she was driving  This also dissipated  Persistent numbness or continued and therefore she sought help  Evaluation revealing herniated disc thus far  She has had issues in her legs as well, stating she also has a herniated disc in her low back  She states she has had MRIs of both areas but I only see the MRI of her cervical spine  She denies any head trauma  Facial trauma  Rash  No fevers or chills  Denies significant headaches  No visual abnormalities  Prior to Admission Medications   Prescriptions Last Dose Informant Patient Reported? Taking?    Levonorgestrel 19 5 MCG/DAY IUD   Yes Yes   Si each by Intrauterine route   methocarbamol (ROBAXIN) 750 mg tablet   Yes Yes   Sig: Take 750 mg by mouth every 6 (six) hours as needed for muscle spasms   methylPREDNISolone 4 MG tablet therapy pack Not Taking at Unknown time  No No   Sig: Use as directed on package   Patient not taking: Reported on 2019   predniSONE 20 mg tablet Not Taking at Unknown time  No No   Sig: TAKE 1 TABLET TWICE DAILY WITH FOOD FOR 4 DAYS THEN ONCE DAILY FOR 7 DAYS   Patient not taking: Reported on 2019      Facility-Administered Medications: None       Past Medical History:   Diagnosis Date    Allergic     Asthma     Herniated disc, cervical        Past Surgical History:   Procedure Laterality Date     SECTION         History reviewed  No pertinent family history  I have reviewed and agree with the history as documented  Social History     Tobacco Use    Smoking status: Current Every Day Smoker     Packs/day: 1 00     Types: Cigarettes    Smokeless tobacco: Never Used   Substance Use Topics    Alcohol use: No    Drug use: Not Currently     Types: Marijuana        Review of Systems   Constitutional: Negative for chills and fever  HENT: Negative for rhinorrhea and sore throat  Eyes: Negative for visual disturbance  Respiratory: Negative for cough and shortness of breath  Cardiovascular: Negative for chest pain and leg swelling  Gastrointestinal: Negative for abdominal pain, diarrhea, nausea and vomiting  Genitourinary: Negative for dysuria  Musculoskeletal: Negative for back pain and myalgias  Skin: Negative for rash  Neurological: Positive for numbness  Negative for dizziness and headaches  Psychiatric/Behavioral: Negative for confusion  The patient is nervous/anxious  All other systems reviewed and are negative  Physical Exam  Physical Exam   Constitutional: She is oriented to person, place, and time  She appears well-developed and well-nourished  HENT:   Nose: Nose normal    Mouth/Throat: Oropharynx is clear and moist  No oropharyngeal exudate  Eyes: Pupils are equal, round, and reactive to light  Conjunctivae and EOM are normal  No scleral icterus  Neck: Normal range of motion  Neck supple  No JVD present  No tracheal deviation present  Cardiovascular: Normal rate, regular rhythm and normal heart sounds  No murmur heard  Pulmonary/Chest: Effort normal and breath sounds normal  No respiratory distress  She has no wheezes  She has no rales  Abdominal: Soft  Bowel sounds are normal  There is no tenderness  There is no guarding  Musculoskeletal: Normal range of motion  She exhibits no edema or tenderness  Neurological: She is alert and oriented to person, place, and time  No cranial nerve deficit or sensory deficit  She exhibits normal muscle tone  5/5 motor, nl sens   Skin: Skin is warm and dry  Psychiatric: She has a normal mood and affect  Her behavior is normal    Nursing note and vitals reviewed  Vital Signs  ED Triage Vitals [11/16/19 1726]   Temp Pulse Respirations Blood Pressure SpO2   -- 95 16 152/93 98 %      Temp src Heart Rate Source Patient Position - Orthostatic VS BP Location FiO2 (%)   -- -- -- -- --      Pain Score       No Pain           Vitals:    11/16/19 1726   BP: 152/93   Pulse: 95         Visual Acuity      ED Medications  Medications - No data to display    Diagnostic Studies  Results Reviewed     None                 No orders to display              Procedures  ECG 12 Lead Documentation Only  Date/Time: 11/16/2019 6:23 PM  Performed by: Eitan Sellers DO  Authorized by: Eitan Sellers DO     Indications / Diagnosis:  Facial numbness  ECG reviewed by me, the ED Provider: yes    Patient location:  ED  Previous ECG:     Previous ECG:  Unavailable    Comparison to cardiac monitor: No    Interpretation:     Interpretation: non-specific    Rate:     ECG rate:  88    ECG rate assessment: normal    Rhythm:     Rhythm: sinus rhythm    Ectopy:     Ectopy: none    QRS:     QRS axis:  Normal    QRS intervals:  Normal  Conduction:     Conduction: normal    ST segments:     ST segments:  Non-specific  T waves:     T waves: non-specific and inverted      Inverted:  III and aVF           ED Course  ED Course as of Nov 16 2113   Sat Nov 16, 2019 2038 Discussed with patient the abnormality on her CT scan within the sphenoid bone    She will follow up with her family doctor regards to this rate appropriate follow-up testing  Clinically not significant to her current symptomatology  Review of her MRI from [facility] does show the C6 herniated disc to the left explaining her left arm symptoms  Facial numbness remains resolved  MDM    Disposition  Final diagnoses:   None     ED Disposition     None      Follow-up Information    None         Patient's Medications   Discharge Prescriptions    No medications on file     No discharge procedures on file      ED Provider  Electronically Signed by           Barak Felton DO  11/16/19 6372

## 2019-11-17 NOTE — DISCHARGE INSTRUCTIONS
Up with family doctor with regards to the abnormality seen within your CT scan within your sphenoid bone of your skull  Continue medications as before

## 2019-11-18 ENCOUNTER — OFFICE VISIT (OUTPATIENT)
Dept: URGENT CARE | Facility: CLINIC | Age: 39
End: 2019-11-18
Payer: COMMERCIAL

## 2019-11-18 VITALS
BODY MASS INDEX: 28.32 KG/M2 | HEART RATE: 99 BPM | OXYGEN SATURATION: 99 % | RESPIRATION RATE: 20 BRPM | SYSTOLIC BLOOD PRESSURE: 100 MMHG | HEIGHT: 61 IN | WEIGHT: 150 LBS | TEMPERATURE: 98 F | DIASTOLIC BLOOD PRESSURE: 68 MMHG

## 2019-11-18 DIAGNOSIS — E01.0 THYROMEGALY: Primary | ICD-10-CM

## 2019-11-18 DIAGNOSIS — E04.1 THYROID NODULE: ICD-10-CM

## 2019-11-18 LAB
ATRIAL RATE: 88 BPM
P AXIS: 37 DEGREES
PR INTERVAL: 142 MS
QRS AXIS: 78 DEGREES
QRSD INTERVAL: 80 MS
QT INTERVAL: 348 MS
QTC INTERVAL: 421 MS
T WAVE AXIS: 5 DEGREES
VENTRICULAR RATE: 88 BPM

## 2019-11-18 PROCEDURE — S9088 SERVICES PROVIDED IN URGENT: HCPCS | Performed by: NURSE PRACTITIONER

## 2019-11-18 PROCEDURE — 93010 ELECTROCARDIOGRAM REPORT: CPT | Performed by: INTERNAL MEDICINE

## 2019-11-18 PROCEDURE — 99213 OFFICE O/P EST LOW 20 MIN: CPT | Performed by: NURSE PRACTITIONER

## 2019-11-19 LAB — B BURGDOR IGG+IGM SER-ACNC: <0.91 ISR (ref 0–0.9)

## 2019-11-19 NOTE — PATIENT INSTRUCTIONS
The thyroid nodule is likely causing that full feeling in your neck/throat and heaviness when you sleep  Your PCP did thorough blood work  Everything appears stable for routine monitoring  If the full feeling would significantly worsen, you should see your PCP again or an endocrinologist (unless severe like your throat is closing--then go to the ER)  Thyroid Nodules   WHAT YOU NEED TO KNOW:   What are thyroid nodules? Thyroid nodules are growths on your thyroid gland  Your thyroid makes hormones that help control your body temperature, heart rate, and growth  The hormones also control how fast your body uses food for energy  Some nodules are lumps of tissue, and others are filled with fluid  What increases my risk for thyroid nodules? A lack of iodine in the foods you eat is the most common cause of thyroid nodules  The following may increase your risk:  · Autoimmune thyroid disorders, such as Hashimoto disease    · Medical conditions, such as cancer, a thyroid infection, thyroid goiter, or a thyroid cyst    · A family history  of thyroid nodules or thyroid cancer    · Pregnancy  that causes your body to create more hormones    · Past radiation  treatment to your head or neck  What are the signs and symptoms of thyroid nodules? A small nodule may have no signs or symptoms  As your nodule grows, you may be able to see a lump on your neck  A large nodule may press on your airway or neck veins and cause the following:  · A cough or choking and hoarse voice    · Flushed face and swollen neck or neck veins    · Noisy, high-pitched breathing    · Pain when you swallow or trouble swallowing    · Trouble breathing when you lie down  How are thyroid nodules diagnosed? · Blood tests  are done to check the level of thyroid hormones in your body  A blood test may also show if you have an autoimmune disease that caused your nodules      · An ultrasound  uses sound waves to show pictures of your thyroid on a screen  · A fine-needle biopsy  is done to get a tissue sample from your thyroid gland to be tested  How are thyroid nodules treated? · Thyroid medicine  is given to bring your thyroid hormone levels back to a normal range  · Radioactive iodine  is given to damage cells in your thyroid gland and decrease the size of your nodules  · Laser ablation  is done to make your nodules smaller  Ask for more information about laser ablation  · Surgery  may be done to remove all or part of your thyroid gland  Surgery is done if your nodules are cancerous  Ask for more information about thyroid surgery  What can I do to manage my thyroid nodules? · Eat iodine-rich foods  Examples include fish, seaweed, dairy products, eggs, beans, and lean meat  Iodized salt also contains iodine  You may need to use iodized table salt when you cook and season your food  Iodine may be added to bread or to your drinking water  Ask for a list of foods that contain iodine, and ask how much iodine you need each day  · Go to follow-up appointments  Write down your questions so you remember to ask them during your visits  When should I contact my healthcare provider? · You have a new cough that does not improve  · You begin choking or have new or increased trouble swallowing  · Your voice becomes hoarse  · You are losing weight without trying  · You have questions or concerns about your condition or care  When should I seek immediate care or call 911? · You have sudden chest pain or trouble breathing  · Your symptoms worsen, even after you take your medicines  CARE AGREEMENT:   You have the right to help plan your care  Learn about your health condition and how it may be treated  Discuss treatment options with your caregivers to decide what care you want to receive  You always have the right to refuse treatment  The above information is an  only   It is not intended as medical advice for individual conditions or treatments  Talk to your doctor, nurse or pharmacist before following any medical regimen to see if it is safe and effective for you  © 2017 [employer] Information is for End User's use only and may not be sold, redistributed or otherwise used for commercial purposes  All illustrations and images included in CareNotes® are the copyrighted property of [employer]  or Sher[employer] Cramer

## 2019-11-19 NOTE — PROGRESS NOTES
[facility]        NAME: Juli Harris is a 35 y o  female  : 1980    MRN: [id number]  DATE: 2019  TIME: 8:04 PM    Assessment and Plan   Thyromegaly [E01 0]  1  Thyromegaly     2  Thyroid nodule           Patient Instructions     Patient Instructions     The thyroid nodule is likely causing that full feeling in your neck/throat and heaviness when you sleep  Your PCP did thorough blood work  Everything appears stable for routine monitoring  If the full feeling would significantly worsen, you should see your PCP again or an endocrinologist (unless severe like your throat is closing--then go to the ER)  Thyroid Nodules   WHAT YOU NEED TO KNOW:   What are thyroid nodules? Thyroid nodules are growths on your thyroid gland  Your thyroid makes hormones that help control your body temperature, heart rate, and growth  The hormones also control how fast your body uses food for energy  Some nodules are lumps of tissue, and others are filled with fluid  What increases my risk for thyroid nodules? A lack of iodine in the foods you eat is the most common cause of thyroid nodules  The following may increase your risk:  · Autoimmune thyroid disorders, such as Hashimoto disease    · Medical conditions, such as cancer, a thyroid infection, thyroid goiter, or a thyroid cyst    · A family history  of thyroid nodules or thyroid cancer    · Pregnancy  that causes your body to create more hormones    · Past radiation  treatment to your head or neck  What are the signs and symptoms of thyroid nodules? A small nodule may have no signs or symptoms  As your nodule grows, you may be able to see a lump on your neck   A large nodule may press on your airway or neck veins and cause the following:  · A cough or choking and hoarse voice    · Flushed face and swollen neck or neck veins    · Noisy, high-pitched breathing    · Pain when you swallow or trouble swallowing    · Trouble breathing when you lie down  How are thyroid nodules diagnosed? · Blood tests  are done to check the level of thyroid hormones in your body  A blood test may also show if you have an autoimmune disease that caused your nodules  · An ultrasound  uses sound waves to show pictures of your thyroid on a screen  · A fine-needle biopsy  is done to get a tissue sample from your thyroid gland to be tested  How are thyroid nodules treated? · Thyroid medicine  is given to bring your thyroid hormone levels back to a normal range  · Radioactive iodine  is given to damage cells in your thyroid gland and decrease the size of your nodules  · Laser ablation  is done to make your nodules smaller  Ask for more information about laser ablation  · Surgery  may be done to remove all or part of your thyroid gland  Surgery is done if your nodules are cancerous  Ask for more information about thyroid surgery  What can I do to manage my thyroid nodules? · Eat iodine-rich foods  Examples include fish, seaweed, dairy products, eggs, beans, and lean meat  Iodized salt also contains iodine  You may need to use iodized table salt when you cook and season your food  Iodine may be added to bread or to your drinking water  Ask for a list of foods that contain iodine, and ask how much iodine you need each day  · Go to follow-up appointments  Write down your questions so you remember to ask them during your visits  When should I contact my healthcare provider? · You have a new cough that does not improve  · You begin choking or have new or increased trouble swallowing  · Your voice becomes hoarse  · You are losing weight without trying  · You have questions or concerns about your condition or care  When should I seek immediate care or call 911? · You have sudden chest pain or trouble breathing  · Your symptoms worsen, even after you take your medicines  CARE AGREEMENT:   You have the right to help plan your care  Learn about your health condition and how it may be treated  Discuss treatment options with your caregivers to decide what care you want to receive  You always have the right to refuse treatment  The above information is an  only  It is not intended as medical advice for individual conditions or treatments  Talk to your doctor, nurse or pharmacist before following any medical regimen to see if it is safe and effective for you  © 2017 [facility] Information is for End User's use only and may not be sold, redistributed or otherwise used for commercial purposes  All illustrations and images included in CareNotes® are the copyrighted property of A D [employer] M [employer]  or Sherwin Cramer  Follow up with PCP in 3-5 days  Proceed to  ER if symptoms worsen  Chief Complaint     Chief Complaint   Patient presents with    Allergic Reaction     possible allergic reaction, having trouble swallowing for 1 day  Seen in ER yesterday for numbness in lips         History of Present Illness       Patient's left forearm symptoms are stable  She has not had a return of the lip tingling that she experienced yesterday before her ER visit  She expresses concern about trouble swallowing  She has had a full feeling in her neck for quite a while now, at least several months  She states it is worse when she was laying down  Her PCP brand thyroid lab work last month  A nodule was noted on the CT scan yesterday  She has been noticing the symptoms more, but on reflection states they are not really different than they have been  She states that she has been more anxious since finding out about the C6 disc issue (sees neuro 11/26)  She has also had increased heartburn  She was originally on Celebrex for her left arm pain which she states did not help  Her PCP ordered famotidine at the end of October which was helping the heartburn symptoms    Patient stopped the phimotic denied when she was ordered the Robaxin because she was not sure they were safe to take together  Confirmed patient she should not take Celebrex and Robaxin together, but it is fine to resume her from what edema with the Robaxin      Review of Systems   Review of Systems   HENT: Positive for trouble swallowing  Musculoskeletal:        Left arm--stable symptoms   All other systems reviewed and are negative  Current Medications       Current Outpatient Medications:     methocarbamol (ROBAXIN) 750 mg tablet, Take 750 mg by mouth every 6 (six) hours as needed for muscle spasms, Disp: , Rfl:     Levonorgestrel 19 5 MCG/DAY IUD, 1 each by Intrauterine route, Disp: , Rfl:     methylPREDNISolone 4 MG tablet therapy pack, Use as directed on package (Patient not taking: Reported on 2019), Disp: 1 each, Rfl: 0    predniSONE 20 mg tablet, TAKE 1 TABLET TWICE DAILY WITH FOOD FOR 4 DAYS THEN ONCE DAILY FOR 7 DAYS (Patient not taking: Reported on 2019), Disp: 15 tablet, Rfl: 0    Current Allergies     Allergies as of 2019 - Reviewed 2019   Allergen Reaction Noted    No known allergies  2016            The following portions of the patient's history were reviewed and updated as appropriate: allergies, current medications, past family history, past medical history, past social history, past surgical history and problem list      Past Medical History:   Diagnosis Date    Allergic     Asthma     Herniated disc, cervical        Past Surgical History:   Procedure Laterality Date     SECTION         History reviewed  No pertinent family history  Medications have been verified  Objective   /68   Pulse 99   Temp 98 °F (36 7 °C)   Resp 20   Ht 5' 1" (1 549 m)   Wt 68 kg (150 lb)   LMP 2019   SpO2 99%   BMI 28 34 kg/m²        Physical Exam     Physical Exam   Constitutional: She is oriented to person, place, and time  She appears well-developed and well-nourished   No distress  HENT:   Head: Normocephalic and atraumatic  Right Ear: Hearing, tympanic membrane, external ear and ear canal normal    Left Ear: Hearing, tympanic membrane, external ear and ear canal normal    Nose: Nose normal    Mouth/Throat: Uvula is midline, oropharynx is clear and moist and mucous membranes are normal  No uvula swelling  No oropharyngeal exudate, posterior oropharyngeal edema, posterior oropharyngeal erythema or tonsillar abscesses  Tonsils are 1+ on the right  Tonsils are 1+ on the left  No tonsillar exudate  Eyes: Pupils are equal, round, and reactive to light  Neck: Normal range of motion  Neck supple  Thyromegaly present  Cardiovascular: Normal rate, regular rhythm and normal heart sounds  Pulmonary/Chest: Effort normal and breath sounds normal  No accessory muscle usage or stridor  No apnea, no tachypnea and no bradypnea  No respiratory distress  She has no decreased breath sounds  She has no wheezes  She has no rhonchi  She has no rales  Abdominal: Soft  Bowel sounds are normal  She exhibits no distension  There is no tenderness  Musculoskeletal: Normal range of motion  Neurological: She is alert and oriented to person, place, and time  Skin: Skin is warm and dry  Capillary refill takes less than 2 seconds  She is not diaphoretic  Psychiatric: She has a normal mood and affect  Her behavior is normal  Judgment and thought content normal    Nursing note and vitals reviewed

## 2019-11-24 ENCOUNTER — HOSPITAL ENCOUNTER (EMERGENCY)
Facility: HOSPITAL | Age: 39
Discharge: HOME | End: 2019-11-24
Attending: EMERGENCY MEDICINE | Admitting: EMERGENCY MEDICINE
Payer: COMMERCIAL

## 2019-11-24 VITALS
SYSTOLIC BLOOD PRESSURE: 146 MMHG | OXYGEN SATURATION: 95 % | RESPIRATION RATE: 16 BRPM | HEIGHT: 61 IN | TEMPERATURE: 97.6 F | DIASTOLIC BLOOD PRESSURE: 89 MMHG | HEART RATE: 82 BPM | WEIGHT: 150 LBS | BODY MASS INDEX: 28.32 KG/M2

## 2019-11-24 DIAGNOSIS — M54.9 BACK PAIN: Primary | ICD-10-CM

## 2019-11-24 DIAGNOSIS — M62.830 SPASM OF MUSCLE, BACK: ICD-10-CM

## 2019-11-24 PROCEDURE — 99283 EMERGENCY DEPT VISIT LOW MDM: CPT

## 2019-11-24 PROCEDURE — 99284 EMERGENCY DEPT VISIT MOD MDM: CPT | Performed by: EMERGENCY MEDICINE

## 2019-11-24 RX ORDER — LIDOCAINE 50 MG/G
1 PATCH TOPICAL ONCE
Status: DISCONTINUED | OUTPATIENT
Start: 2019-11-24 | End: 2019-11-24 | Stop reason: HOSPADM

## 2019-11-24 RX ORDER — ACETAMINOPHEN 325 MG/1
650 TABLET ORAL ONCE
Status: COMPLETED | OUTPATIENT
Start: 2019-11-24 | End: 2019-11-24

## 2019-11-24 RX ORDER — METHOCARBAMOL 500 MG/1
500 TABLET, FILM COATED ORAL 2 TIMES DAILY PRN
Qty: 20 TABLET | Refills: 0 | Status: SHIPPED | OUTPATIENT
Start: 2019-11-24

## 2019-11-24 RX ADMIN — ACETAMINOPHEN 650 MG: 325 TABLET ORAL at 17:34

## 2019-11-24 RX ADMIN — LIDOCAINE 1 PATCH: 50 PATCH TOPICAL at 17:34

## 2019-11-24 NOTE — DISCHARGE INSTRUCTIONS
Please follow-up with the primary care provider  If anything changes or worsens, please return to the emergency department

## 2019-11-24 NOTE — ED PROVIDER NOTES
History  Chief Complaint   Patient presents with    Back Pain     upper back pain  Seen here last week  History provided by:  Patient  Back Pain   Location:  Thoracic spine  Quality:  Aching and cramping  Radiates to:  R shoulder  Pain severity:  Moderate  Pain is: Worse during the day  Onset quality:  Gradual  Duration:  6 hours  Timing:  Constant  Progression:  Worsening  Context: physical stress and twisting    Relieved by:  Nothing  Worsened by:  Nothing  Ineffective treatments:  Ibuprofen  Associated symptoms: no abdominal pain, no chest pain, no dysuria, no fever, no headaches, no leg pain, no pelvic pain, no perianal numbness, no tingling and no weakness    Risk factors: no hx of cancer, no hx of osteoporosis, no recent surgery and no steroid use          Prior to Admission Medications   Prescriptions Last Dose Informant Patient Reported? Taking? Levonorgestrel 19 5 MCG/DAY IUD   Yes No   Si each by Intrauterine route      Facility-Administered Medications: None       Past Medical History:   Diagnosis Date    Allergic     Asthma     Herniated disc, cervical        Past Surgical History:   Procedure Laterality Date     SECTION         History reviewed  No pertinent family history  I have reviewed and agree with the history as documented  Social History     Tobacco Use    Smoking status: Current Every Day Smoker     Packs/day: 1 00     Types: Cigarettes    Smokeless tobacco: Never Used   Substance Use Topics    Alcohol use: No    Drug use: Not Currently     Types: Marijuana        Review of Systems   Constitutional: Negative  Negative for chills and fever  HENT: Negative  Negative for congestion and sore throat  Eyes: Negative  Negative for discharge and redness  Respiratory: Negative  Negative for chest tightness and shortness of breath  Cardiovascular: Negative  Negative for chest pain and palpitations  Gastrointestinal: Negative    Negative for abdominal pain, nausea and vomiting  Endocrine: Negative  Negative for cold intolerance and polyphagia  Genitourinary: Negative  Negative for difficulty urinating, dysuria and pelvic pain  Musculoskeletal: Positive for back pain  Negative for arthralgias  Skin: Negative  Negative for color change and wound  Allergic/Immunologic: Negative  Negative for environmental allergies  Neurological: Negative  Negative for dizziness, tingling, weakness and headaches  Hematological: Negative  Psychiatric/Behavioral: Negative  Negative for behavioral problems  The patient is not nervous/anxious  All other systems reviewed and are negative  Physical Exam  Physical Exam   Constitutional: She is oriented to person, place, and time  She appears well-developed and well-nourished  No distress  HENT:   Head: Normocephalic and atraumatic  Right Ear: External ear normal    Left Ear: External ear normal    Mouth/Throat: Oropharynx is clear and moist    Eyes: Pupils are equal, round, and reactive to light  Conjunctivae and EOM are normal  Right eye exhibits no discharge  Left eye exhibits no discharge  No scleral icterus  Neck: Normal range of motion  Neck supple  No tracheal deviation present  No thyromegaly present  Cardiovascular: Normal rate, regular rhythm and intact distal pulses  Exam reveals no gallop and no friction rub  No murmur heard  Pulmonary/Chest: Effort normal and breath sounds normal  No stridor  No respiratory distress  She has no wheezes  She has no rales  Abdominal: Soft  Bowel sounds are normal  She exhibits no distension  There is no tenderness  There is no rebound and no guarding  Musculoskeletal: Normal range of motion  She exhibits tenderness (Tenderness and spasm to the room right thoracic paraspinal)  She exhibits no edema or deformity  Neurological: She is alert and oriented to person, place, and time  No cranial nerve deficit  Skin: Skin is warm and dry  No rash noted  She is not diaphoretic  No erythema  Psychiatric: She has a normal mood and affect  Her behavior is normal  Thought content normal    Nursing note and vitals reviewed  Vital Signs  ED Triage Vitals   Temperature Pulse Respirations Blood Pressure SpO2   11/24/19 1646 11/24/19 1646 11/24/19 1646 11/24/19 1647 11/24/19 1646   97 6 °F (36 4 °C) 92 20 147/92 100 %      Temp Source Heart Rate Source Patient Position - Orthostatic VS BP Location FiO2 (%)   11/24/19 1646 11/24/19 1646 11/24/19 1647 11/24/19 1647 --   Temporal Monitor Sitting Left arm       Pain Score       11/24/19 1646       8           Vitals:    11/24/19 1646 11/24/19 1647 11/24/19 1731   BP:  147/92 146/89   Pulse: 92  82   Patient Position - Orthostatic VS:  Sitting Sitting         Visual Acuity      ED Medications  Medications   lidocaine (LIDODERM) 5 % patch 1 patch (1 patch Topical Medication Applied 11/24/19 1734)   acetaminophen (TYLENOL) tablet 650 mg (650 mg Oral Given 11/24/19 1734)       Diagnostic Studies  Results Reviewed     None                 No orders to display              Procedures  Procedures       ED Course  ED Course as of Nov 24 1825   Mary Jimenez Nov 24, 2019   1727 This is a 58-year-old woman who presents with thoracic back pain  Started today progressively worsen, right paraspinal   She was doing a lot of lifting today at work  Denies any shortness of breath, denies any hemoptysis tachypnea, denies any dyspnea on exertion  Denies any numbness in her legs, denies saddle anesthesia, denies fevers chills denies history cancer IV drug use  Denies any urinary or bowel continence  Denies fevers chills sweats  On exam, patient has a muscle spasm in the right thoracic paraspinal   It is tender to touch, does get better with massage  Assessment plan: This is a muscle spasm  Patient PERCs out  She has normal vital signs  Will defer any workup at this time  Will treat symptomatically with Tylenol and lidocaine patch  Patient did take Motrin prior to arrival   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, will defer any muscle relaxants and will discharge with muscle relaxants and a work note  [facility] patient  Patient states that her pain is much improved after the lidocaine patch  Discussed with her that I would not give her a muscle relaxant that she drove here but I will give her a prescription and work note  Patient states this is falling  I did stress that while it looks like a muscle spasm, no test is been done so if anything changed specifically if she had any chest pain cough fevers chills trouble breathing or any other concerns to return emergency department  Patient verbalized understanding patient was discharged  I personally discussed return precautions with this patient and family  I provided the patient with written discharge instructions and particularly highlighted specific areas of interest to this patient, including but not limited to: medications for symptom managment, follow up recommendations, and return precautions  Patient and family are in agreement with this plan as outlined above  PERC Rule for PE      Most Recent Value   PERC Rule for PE   Age >=50  0 Filed at: 11/24/2019 1724   HR >=100  0 Filed at: 11/24/2019 1724   O2 Sat on room air < 95%  0 Filed at: 11/24/2019 1724   History of PE or DVT  0 Filed at: 11/24/2019 1724   Recent trauma or surgery  0 Filed at: 11/24/2019 1724   Hemoptysis  0 Filed at: 11/24/2019 1724   Exogenous estrogen  0 Filed at: 11/24/2019 1724   Unilateral leg swelling  0 Filed at: 11/24/2019 1724   PERC Rule for PE Results  0 Filed at: 11/24/2019 1724                      MDM  Number of Diagnoses or Management Options  Back pain:   Spasm of muscle, back:   Diagnosis management comments: 2year-old female presents with a right-sided thoracic paraspinal muscle spasm  Perc negative  Vitals are stable  The paraspinals felt better with massage    Will treat conservatively with muscle relaxants, lidocaine patch, Motrin Tylenol  Disposition  Final diagnoses:   Back pain   Spasm of muscle, back     Time reflects when diagnosis was documented in both MDM as applicable and the Disposition within this note     Time User Action Codes Description Comment    11/24/2019  5:42 PM Eulogio Level Add [M54 9] Back pain     11/24/2019  5:42 PM Eulogio Level Add [S18 431] Spasm of muscle, back       ED Disposition     ED Disposition Condition Date/Time Comment    Discharge Stable Sun Nov 24, 2019  5:42 PM [facility] JIMENEZLELLAN [facility] discharge to home/self care              Follow-up Information     Follow up With Specialties Details Why [facility], [place] Internal Medicine Schedule an appointment as soon as possible for a visit in 3 days follow up being seen in the emergency department [address] [place] [address]  Emergency Department Emergency Medicine Go to  As needed, If symptoms worsen [address] [place] [id number] [phone]          Discharge Medication List as of 11/24/2019  5:53 PM      START taking these medications    Details   methocarbamol (ROBAXIN) 500 mg tablet Take 1 tablet (500 mg total) by mouth 2 (two) times a day as needed for muscle spasms, Starting Sun 11/24/2019, Normal         CONTINUE these medications which have NOT CHANGED    Details   Levonorgestrel 19 5 MCG/DAY IUD 1 each by Intrauterine route, Historical [facility]               ED Provider  Electronically Signed by           Harvey Tello MD  11/24/19 0327

## 2019-11-25 ENCOUNTER — OFFICE VISIT (OUTPATIENT)
Dept: OBGYN CLINIC | Facility: CLINIC | Age: 39
End: 2019-11-25
Payer: COMMERCIAL

## 2019-11-25 ENCOUNTER — TELEPHONE (OUTPATIENT)
Dept: PHYSICAL THERAPY | Facility: OTHER | Age: 39
End: 2019-11-25

## 2019-11-25 VITALS
RESPIRATION RATE: 16 BRPM | SYSTOLIC BLOOD PRESSURE: 118 MMHG | HEART RATE: 76 BPM | HEIGHT: 61 IN | BODY MASS INDEX: 27.7 KG/M2 | DIASTOLIC BLOOD PRESSURE: 84 MMHG | WEIGHT: 146.7 LBS

## 2019-11-25 DIAGNOSIS — M54.12 RADICULOPATHY, CERVICAL REGION: Primary | ICD-10-CM

## 2019-11-25 DIAGNOSIS — M79.602 LEFT ARM PAIN: ICD-10-CM

## 2019-11-25 PROCEDURE — 99203 OFFICE O/P NEW LOW 30 MIN: CPT | Performed by: EMERGENCY MEDICINE

## 2019-11-25 RX ORDER — NAPROXEN 500 MG/1
500 TABLET ORAL 2 TIMES DAILY WITH MEALS
Qty: 30 TABLET | Refills: 0 | Status: SHIPPED | OUTPATIENT
Start: 2019-11-25 | End: 2019-12-10

## 2019-11-25 NOTE — PROGRESS NOTES
Assessment/Plan:    Diagnoses and all orders for this visit:    Radiculopathy, cervical region  -     naproxen (EC NAPROSYN) 500 MG EC tablet; Take 1 tablet (500 mg total) by mouth 2 (two) times a day with meals    Left arm pain  -     naproxen (EC NAPROSYN) 500 MG EC tablet; Take 1 tablet (500 mg total) by mouth 2 (two) times a day with meals    Patient presents with approximately 1-2 months of left arm pain most likely caused by a cervical etiology  I have reviewed her imaging from the emergency department  She is scheduled to see neuro surgery and start physical therapy tomorrow  I have provided a prescription for naproxen she may continue the muscle relaxant as needed we will see her back on an as-needed basis  Return if symptoms worsen or fail to improve  Chief Complaint:     Chief Complaint   Patient presents with    Neck - Pain, Follow-up    Left Shoulder - Pain, Follow-up       Subjective:   Patient ID: Lesa Tellez is a 35 y o  female  NP presents for LEFT arm pain from shoulder to hand x 1-2 months, intermittent, shooting sensation, along with tightness and tingling  She also has upper back pain  Worse bending, twisting, reaching, and she does have night pain  Also worse with stress  She was evaluated in ER with imaging with concerns of possible TIA  She is taking methocarbamol  No NSAIDs  She has PT and neurosurgery appt tomorrow  Hx of herniated disc of her spine,       Review of Systems   Constitutional: Negative for chills and fever  HENT: Negative for drooling and sore throat  Eyes: Negative for pain and redness  Respiratory: Negative for shortness of breath and stridor  Cardiovascular: Negative for chest pain and palpitations  Gastrointestinal: Negative for abdominal pain  Genitourinary: Negative for difficulty urinating  Musculoskeletal: Positive for arthralgias, back pain and neck pain  Skin: Negative for rash  Neurological: Negative for weakness  Psychiatric/Behavioral: Negative for self-injury and suicidal ideas  The following portions of the patient's chart were reviewed and updated as appropriate:      Allergie  Allergies   Allergen Reactions    No Known Allergies      Other reaction(s): Unknown   s   Allergen Reactions    No Known Allergies      Other reaction(s): Unknown      Diagnosis Date    Allergic     Asthma     Herniated disc, cervical        Past Surgical History:   Procedure Laterality Date     SECTION         Social History     Socioeconomic History    Marital status: Single     Spouse name: Not on file    Number of children: Not on file    Years of education: Not on file    Highest education level: Not on file   Occupational History    Not on file   Social Needs    Financial resource strain: Not on file    Food insecurity:     Worry: Not on file     Inability: Not on file    Transportation needs:     Medical: Not on file     Non-medical: Not on file   Tobacco Use    Smoking status: Current Every Day Smoker     Packs/day: 1 00     Types: Cigarettes    Smokeless tobacco: Never Used   Substance and Sexual Activity    Alcohol use: No    Drug use: Not Currently     Types: Marijuana    Sexual activity: Not on file   Lifestyle    Physical activity:     Days per week: Not on file     Minutes per session: Not on file    Stress: Not on file   Relationships    Social connections:     Talks on phone: Not on file     Gets together: Not on file     Attends Anabaptist service: Not on file     Active member of club or organization: Not on file     Attends meetings of clubs or organizations: Not on file     Relationship status: Not on file    Intimate partner violence:     Fear of current or ex partner: Not on file     Emotionally abused: Not on file     Physically abused: Not on file     Forced sexual activity: Not on file   Other Topics Concern    Not on file   Social History Narrative    Not on file       Family History   Problem Relation Age of Onset    Diabetes Mother     Bone cancer Father     Diabetes Sister        Medications:    Current Outpatient Medications:     Levonorgestrel 19 5 MCG/DAY IUD, 1 each by Intrauterine route, Disp: , Rfl:     methocarbamol (ROBAXIN) 500 mg tablet, Take 1 tablet (500 mg total) by mouth 2 (two) times a day as needed for muscle spasms, Disp: 20 tablet, Rfl: 0    naproxen (EC NAPROSYN) 500 MG EC tablet, Take 1 tablet (500 mg total) by mouth 2 (two) times a day with meals, Disp: 30 tablet, Rfl: 0  No current facility-administered medications for this visit  There is no problem list on file for this patient  Objective:  /84 (BP Location: Right arm, Patient Position: Sitting, Cuff Size: Large)   Pulse 76   Resp 16   Ht 5' 1" (1 549 m)   Wt 66 5 kg (146 lb 11 2 oz)   LMP 11/02/2019   BMI 27 72 kg/m²      Back Exam     Other   Gait: normal     Comments:  C spine full AROm pain with rotation right and with flexion, also reproducing left arm tingling     + Spurlings LEFT  B/L UE strength 5/5      Right Shoulder Exam     Range of Motion   The patient has normal right shoulder ROM  Left Shoulder Exam     Range of Motion   The patient has normal left shoulder ROM  Muscle Strength   The patient has normal left shoulder strength  Tests   Drop arm: negative            Physical Exam   Constitutional: She is oriented to person, place, and time  She appears well-developed and well-nourished  HENT:   Head: Normocephalic and atraumatic  Eyes: Conjunctivae are normal    Neck: Normal range of motion  Neck supple  Cardiovascular: Normal rate and intact distal pulses  Pulmonary/Chest: Effort normal  No respiratory distress  Neurological: She is alert and oriented to person, place, and time  Skin: Skin is warm and dry  Psychiatric: She has a normal mood and affect  Her behavior is normal    Vitals reviewed          Neurologic Exam     Mental Status   Oriented to person, place, and time  Procedures    I have personally reviewed the written report of the pertinent studies  CERVICAL SPINE     INDICATION:   Left neck and left arm pain      COMPARISON:  None     VIEWS:  XR SPINE CERVICAL COMPLETE 4 OR 5 VW NON INJURY   Study consists of AP, oblique, and lateral views      FINDINGS:     No evidence of fracture       Normal alignment without subluxation      The intervertebral disc spaces are preserved       Left-sided neural foramina appear unremarkable, oblique view for right neuroforamina suboptimally positioned but no obvious narrowing      The prevertebral soft tissues are within normal limits        The lung apices are clear      IMPRESSION:     Unremarkable cervical spine

## 2019-11-25 NOTE — TELEPHONE ENCOUNTER
This nurse did review in detail the comp spine program and what we can provide for the pt for their back pain  Pt is already doing physical therapy, and is following up with Orthopedic doctor      Referral closed

## 2019-11-25 NOTE — PATIENT INSTRUCTIONS
Follow up with your family physician regarding your thyroid gland, and to review CT neck findings:  Heterogeneous enlarged left thyroid lobe with a large hypodense lesion measuring at least 2 5 x 2 7 x 3 5 cm  There is mild indentation and rightward deviation of the trachea the level of the enlarged thyroid gland

## 2019-11-26 ENCOUNTER — OFFICE VISIT (OUTPATIENT)
Dept: URGENT CARE | Facility: CLINIC | Age: 39
End: 2019-11-26
Payer: COMMERCIAL

## 2019-11-26 VITALS
TEMPERATURE: 97.8 F | OXYGEN SATURATION: 100 % | WEIGHT: 146 LBS | HEIGHT: 61 IN | BODY MASS INDEX: 27.56 KG/M2 | DIASTOLIC BLOOD PRESSURE: 80 MMHG | SYSTOLIC BLOOD PRESSURE: 124 MMHG | HEART RATE: 72 BPM | RESPIRATION RATE: 18 BRPM

## 2019-11-26 DIAGNOSIS — R00.2 PALPITATIONS: Primary | ICD-10-CM

## 2019-11-26 LAB
ATRIAL RATE: 69 BPM
P AXIS: 27 DEGREES
PR INTERVAL: 146 MS
QRS AXIS: 47 DEGREES
QRSD INTERVAL: 82 MS
QT INTERVAL: 388 MS
QTC INTERVAL: 415 MS
T WAVE AXIS: 18 DEGREES
VENTRICULAR RATE: 69 BPM

## 2019-11-26 PROCEDURE — S9088 SERVICES PROVIDED IN URGENT: HCPCS | Performed by: PHYSICIAN ASSISTANT

## 2019-11-26 PROCEDURE — 93005 ELECTROCARDIOGRAM TRACING: CPT | Performed by: PHYSICIAN ASSISTANT

## 2019-11-26 PROCEDURE — 93010 ELECTROCARDIOGRAM REPORT: CPT | Performed by: INTERNAL MEDICINE

## 2019-11-26 PROCEDURE — 99213 OFFICE O/P EST LOW 20 MIN: CPT | Performed by: PHYSICIAN ASSISTANT

## 2019-11-26 RX ORDER — CELECOXIB 200 MG/1
200 CAPSULE ORAL 2 TIMES DAILY
COMMUNITY
Start: 2019-10-30 | End: 2019-12-10

## 2019-11-26 RX ORDER — FAMOTIDINE 40 MG/1
40 TABLET, FILM COATED ORAL DAILY
COMMUNITY
Start: 2019-10-30 | End: 2020-01-14

## 2019-11-26 NOTE — PROGRESS NOTES
[facility]        NAME: Leah Honeycutt is a 35 y o  female  : 1980    MRN: [id number]  DATE: 2019  TIME: 8:33 AM    Assessment and Plan   Palpitations [R00 2]  1  Palpitations  ECG 12 lead         Patient Instructions     EKG shows NSR with sinus arrhythmia  Recommend patient d/c robaxin and f/u with PCP  If she develops any worsening of symptoms, dyspnea, chest pain should go to ED immediately  Chief Complaint     Chief Complaint   Patient presents with    Palpitations     C/O feeling of heart palpitations after using Robaxin last PM           History of Present Illness       36 y/o F w h/o recently dx cervical disc herniation at C6 presents for eval of palpitations  Pt was seen in the ED 2 days ago due to symptoms from the disc herniation and prescribed robaxin  She states she took this last night to fall asleep but it kept her awake, so she took another dose at 2AM and thinks she may have taken the doses too close together as since then she has been having palpitations  She denies any chest pain, shortness of breath, N/V, dizziness, weakness  She states this is the first time she has ever taken this dose of robaxin  Review of Systems   Review of Systems   All other systems reviewed and are negative          Current Medications       Current Outpatient Medications:     famotidine (PEPCID) 40 MG tablet, Take 40 mg by mouth daily, Disp: , Rfl:     celecoxib (CeleBREX) 200 mg capsule, Take 200 mg by mouth 2 (two) times a day, Disp: , Rfl:     Levonorgestrel 19 5 MCG/DAY IUD, 1 each by Intrauterine route, Disp: , Rfl:     methocarbamol (ROBAXIN) 500 mg tablet, Take 1 tablet (500 mg total) by mouth 2 (two) times a day as needed for muscle spasms, Disp: 20 tablet, Rfl: 0    naproxen (EC NAPROSYN) 500 MG EC tablet, Take 1 tablet (500 mg total) by mouth 2 (two) times a day with meals, Disp: 30 tablet, Rfl: 0    Current Allergies     Allergies as of 2019 - Reviewed 2019   Allergen Reaction Noted    No known allergies  2016            The following portions of the patient's history were reviewed and updated as appropriate: allergies, current medications, past family history, past medical history, past social history, past surgical history and problem list      Past Medical History:   Diagnosis Date    Allergic     Asthma     Herniated disc, cervical        Past Surgical History:   Procedure Laterality Date     SECTION         Family History   Problem Relation Age of Onset    Diabetes Mother     Bone cancer Father     Diabetes Sister          Medications have been verified  Objective   /80   Pulse 72   Temp 97 8 °F (36 6 °C) (Tympanic)   Resp 18   Ht 5' 1" (1 549 m)   Wt 66 2 kg (146 lb)   LMP 2019   SpO2 100%   BMI 27 59 kg/m²        Physical Exam     Physical Exam   Constitutional: She is oriented to person, place, and time  She appears well-developed and well-nourished  No distress  HENT:   Head: Normocephalic and atraumatic  Right Ear: Hearing, tympanic membrane, external ear and ear canal normal    Left Ear: Hearing, tympanic membrane, external ear and ear canal normal    Nose: Nose normal    Mouth/Throat: Uvula is midline, oropharynx is clear and moist and mucous membranes are normal    Eyes: Pupils are equal, round, and reactive to light  Conjunctivae and EOM are normal    Cardiovascular: Normal rate and regular rhythm  Exam reveals no gallop and no friction rub  No murmur heard  Pulmonary/Chest: Effort normal and breath sounds normal  No tachypnea and no bradypnea  No respiratory distress  She has no wheezes  She has no rhonchi  She has no rales  Abdominal: Soft  Neurological: She is alert and oriented to person, place, and time  Skin: Skin is warm and dry

## 2019-12-05 ENCOUNTER — EVALUATION (OUTPATIENT)
Dept: PHYSICAL THERAPY | Facility: CLINIC | Age: 39
End: 2019-12-05
Payer: COMMERCIAL

## 2019-12-05 DIAGNOSIS — M50.20 HERNIATED CERVICAL INTERVERTEBRAL DISC: Primary | ICD-10-CM

## 2019-12-05 PROCEDURE — 97162 PT EVAL MOD COMPLEX 30 MIN: CPT | Performed by: PHYSICAL THERAPIST

## 2019-12-05 NOTE — PROGRESS NOTES
PT Evaluation     Today's date: 2019  Patient name: Bassam Cheng  : 1980  MRN: [id number]  Referring provider: Delaney Morgan MD  Dx:   Encounter Diagnosis     ICD-10-CM    1  Herniated cervical intervertebral disc M50 20                   Assessment  Assessment details: Bassam Cheng is a 35 y o  female who presents to outpatient PT with a  Herniated cervical intervertebral disc  (primary encounter diagnosis)  No further referral appears necessary at this time based upon examination results  Pt presents with decreased strength, ROM, balance, functional activity tolerance, and pain with movement in her cervical spine,  which is  limiting her ability to perform the aforementioned functional activities  Posture is grossly poor with forward head and rounded shoulders in sitting, Special testing unremarkable this session  Decreased sensation to T1 dermatome level  Etiologic factors include repetitive poor body mechanics  Prognosis is good given HEP compliance and PT 2/wk  Please contact me if you have any questions or recommendations  Thank you for the opportunity to share in  Park's care  Impairments: abnormal or restricted ROM, abnormal movement, activity intolerance, difficulty understanding, lacks appropriate home exercise program, pain with function and poor posture     Symptom irritability: moderateUnderstanding of Dx/Px/POC: good   Prognosis: good    Goals  STG    1  In 4-6 weeks, patient will demonstrate a decrease in neck pain to 0/10 during functional activities  2  In 4-6 weeks, patient will demonstrate an increase in cervical range of motion by 5 degrees  3  In 4-6 weeks, patient will demonstrate an increase in Bilateral shoulder strength by 1/2 grade on MMT    LTG    1  In 6-8 weeks, patient will be independent with their home exercise program  2  In 6-8 weeks, patient will have zero limitations with B/L shoulder strength  3    In 6-8 weeks, patient will have zero limitations with cervical and B/L shoulder ROM   4  In 6-8 weeks, patient will have zero limitations with return to work   5  In 6-8 weeks, patient will have zero limitations with Sleeping             Plan  Patient would benefit from: skilled physical therapy  Planned therapy interventions: manual therapy, therapeutic exercise, balance, balance/weight bearing training, body mechanics training, flexibility, functional ROM exercises, graded motor, graded exercise, home exercise program, therapeutic activities, stretching, strengthening, postural training, neuromuscular re-education and joint mobilization  Frequency: 2x week  Duration in weeks: 4  Plan of Care beginning date: 2019  Plan of Care expiration date: 2020  Treatment plan discussed with: patient        Subjective Evaluation    History of Present Illness  Mechanism of injury: Pt is a 35year old female who presents to outpatient physical therapy with a chief complaint of left arm/neck pain  Insidious onset  Pt reports that pain started in left arm  Was getting progressively worse, so she went to the ED  Had X-rays, and MRI of the neck, which shows herniated cervical disc  Reports pain in her left UE at the time of her initial evaluation     Pain  Current pain ratin  At best pain ratin  At worst pain ratin  Quality: throbbing and tight  Relieving factors: medications  Progression: improved    Social Support    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, has to [employer] )  Hand dominance: right      Diagnostic Tests  X-ray: abnormal  MRI studies: abnormal  Treatments  Previous treatment: medication  Current treatment: physical therapy  Patient Goals  Patient goals for therapy: increased strength, decreased pain and increased motion          Objective     Active Range of Motion   Cervical/Thoracic Spine       Cervical    Flexion: 30 degrees   Extension: 30 degrees      Left lateral flexion: 35 degrees      Right lateral flexion: 35 degrees      Left rotation: 80 degrees  Right rotation: 80 degrees         Left Shoulder   Flexion: WFL  Abduction: WFL  External rotation 90°: WFL  Internal rotation 90°: WFL    Right Shoulder   Flexion: WFL  Abduction: WFL  External rotation 90°: WFL  Internal rotation 90°: WFL    Additional Active Range of Motion Details  Compression/Distraction -  Negative     Spurling - Negative     VBI - Negative     Sharp Nya - Negative     Decreased Sensation to Left T1    Strength/Myotome Testing     Left Shoulder     Planes of Motion   Flexion: 4   Abduction: 4   External rotation at 90°: 4   Internal rotation at 90°: 4     Right Shoulder     Planes of Motion   Flexion: 4   Abduction: 4   External rotation at 90°: 4   Internal rotation at 90°: 4              Precautions:  None RA due 1/9       Manual              SOR                                                                      Exercise Diary              Cervical AROM              Cervical Isometrics              Thoracic Ext Over chair              Doorway Stretch              Scap Squeeze              MTP/LTP             TB ER/IR             Chin Tucks              UBE                                                                                                                                                                 Modalities              Cervical Traction

## 2019-12-06 ENCOUNTER — TRANSCRIBE ORDERS (OUTPATIENT)
Dept: LAB | Facility: CLINIC | Age: 39
End: 2019-12-06

## 2019-12-06 ENCOUNTER — APPOINTMENT (OUTPATIENT)
Dept: RADIOLOGY | Facility: CLINIC | Age: 39
End: 2019-12-06
Payer: COMMERCIAL

## 2019-12-06 DIAGNOSIS — M50.20 HERNIATED CERVICAL INTERVERTEBRAL DISC: ICD-10-CM

## 2019-12-06 DIAGNOSIS — M50.20 HERNIATED CERVICAL INTERVERTEBRAL DISC: Primary | ICD-10-CM

## 2019-12-06 PROCEDURE — 72040 X-RAY EXAM NECK SPINE 2-3 VW: CPT

## 2019-12-10 ENCOUNTER — HOSPITAL ENCOUNTER (EMERGENCY)
Facility: HOSPITAL | Age: 39
Discharge: HOME | End: 2019-12-10
Attending: EMERGENCY MEDICINE
Payer: COMMERCIAL

## 2019-12-10 ENCOUNTER — TRANSCRIBE ORDERS (OUTPATIENT)
Dept: PHYSICAL THERAPY | Facility: CLINIC | Age: 39
End: 2019-12-10

## 2019-12-10 ENCOUNTER — APPOINTMENT (OUTPATIENT)
Dept: PHYSICAL THERAPY | Facility: CLINIC | Age: 39
End: 2019-12-10
Payer: COMMERCIAL

## 2019-12-10 VITALS
SYSTOLIC BLOOD PRESSURE: 161 MMHG | RESPIRATION RATE: 18 BRPM | WEIGHT: 150 LBS | TEMPERATURE: 98.5 F | HEART RATE: 111 BPM | OXYGEN SATURATION: 99 % | HEIGHT: 59 IN | DIASTOLIC BLOOD PRESSURE: 92 MMHG | BODY MASS INDEX: 30.24 KG/M2

## 2019-12-10 DIAGNOSIS — M50.20 HERNIATED CERVICAL INTERVERTEBRAL DISC: Primary | ICD-10-CM

## 2019-12-10 DIAGNOSIS — M79.604 RIGHT LEG PAIN: Primary | ICD-10-CM

## 2019-12-10 PROCEDURE — 99284 EMERGENCY DEPT VISIT MOD MDM: CPT | Performed by: EMERGENCY MEDICINE

## 2019-12-10 PROCEDURE — 99283 EMERGENCY DEPT VISIT LOW MDM: CPT

## 2019-12-10 RX ORDER — IBUPROFEN 400 MG/1
400 TABLET ORAL EVERY 6 HOURS PRN
Qty: 30 TABLET | Refills: 0 | Status: ON HOLD | OUTPATIENT
Start: 2019-12-10 | End: 2020-06-26

## 2019-12-10 NOTE — ED PROVIDER NOTES
History  Chief Complaint   Patient presents with    Leg Pain     right tib-fib area feels like it is burning  no injury  79-year-old female presents to the emergency department for evaluation of intermittent right calf numbness  Patient states that today she started to notice intermittent burning sensation of her right lateral calf and wanted to be evaluated  Patient states she took 1 of her at home Robaxin which resolved her symptoms  Patient denies any exacerbating factors  Currently the patient has no symptoms  Patient is trauma, history of MS, visual changes/ocular pain, chest pain, shortness of breath, dyspnea, palpitations, history of diabetes  Prior to Admission Medications   Prescriptions Last Dose Informant Patient Reported? Taking? Levonorgestrel 19 5 MCG/DAY IUD   Yes No   Si each by Intrauterine route   famotidine (PEPCID) 40 MG tablet 2019 at Unknown time  Yes Yes   Sig: Take 40 mg by mouth daily   methocarbamol (ROBAXIN) 500 mg tablet 12/10/2019 at Unknown time  No Yes   Sig: Take 1 tablet (500 mg total) by mouth 2 (two) times a day as needed for muscle spasms      Facility-Administered Medications: None       Past Medical History:   Diagnosis Date    Allergic     Asthma     Herniated disc, cervical        Past Surgical History:   Procedure Laterality Date     SECTION         Family History   Problem Relation Age of Onset    Diabetes Mother     Bone cancer Father     Diabetes Sister      I have reviewed and agree with the history as documented  Social History     Tobacco Use    Smoking status: Current Every Day Smoker     Packs/day: 0 50     Types: Cigarettes    Smokeless tobacco: Never Used   Substance Use Topics    Alcohol use: No    Drug use: Not Currently     Types: Marijuana        Review of Systems   Constitutional: Negative for chills, diaphoresis, fatigue and fever     HENT: Negative for congestion, ear discharge, facial swelling, hearing loss, rhinorrhea, sinus pressure, sinus pain, sneezing, sore throat, tinnitus and trouble swallowing  Eyes: Negative for pain, discharge and redness  Respiratory: Negative for cough, choking, chest tightness, shortness of breath, wheezing and stridor  Cardiovascular: Negative for chest pain, palpitations and leg swelling  Gastrointestinal: Negative for abdominal distention, abdominal pain, blood in stool, constipation, diarrhea, nausea and vomiting  Endocrine: Negative for cold intolerance, polydipsia and polyuria  Genitourinary: Negative for difficulty urinating, dysuria, enuresis, flank pain, frequency and hematuria  Musculoskeletal: Negative for arthralgias, back pain, gait problem and neck stiffness  Skin: Negative for rash and wound  Burning sensation, right lateral calf   Neurological: Negative for dizziness, seizures, syncope, weakness, numbness and headaches  Hematological: Negative for adenopathy  Psychiatric/Behavioral: Negative for agitation, confusion, hallucinations, sleep disturbance and suicidal ideas  All other systems reviewed and are negative  Physical Exam  Physical Exam   Constitutional: She is oriented to person, place, and time  She appears well-developed and well-nourished  HENT:   Head: Normocephalic and atraumatic  Right Ear: External ear normal    Left Ear: External ear normal    Nose: No sinus tenderness  No epistaxis  Mouth/Throat: No oropharyngeal exudate  Eyes: Pupils are equal, round, and reactive to light  Conjunctivae and EOM are normal  Right eye exhibits no discharge  Left eye exhibits no discharge  Neck: No JVD present  Cardiovascular: Normal rate, regular rhythm, normal heart sounds and intact distal pulses  Exam reveals no gallop and no friction rub  No murmur heard  Pulmonary/Chest: Effort normal and breath sounds normal  No stridor  No respiratory distress  She has no wheezes  She has no rales  Abdominal: Soft   Bowel sounds are normal  She exhibits no distension and no mass  There is no tenderness  There is no rebound and no guarding  Musculoskeletal: Normal range of motion  She exhibits no edema, tenderness or deformity  Lymphadenopathy:     She has no cervical adenopathy  Neurological: She is alert and oriented to person, place, and time  She has normal strength  No cranial nerve deficit or sensory deficit  GCS eye subscore is 4  GCS verbal subscore is 5  GCS motor subscore is 6  Reflex Scores:       Patellar reflexes are 2+ on the right side and 2+ on the left side  UE and LE 5/5 strength, No focal neuro deficits  Skin: Skin is warm, dry and intact  Capillary refill takes less than 2 seconds  Psychiatric: She has a normal mood and affect  Her speech is normal and behavior is normal  Judgment and thought content normal    Nursing note and vitals reviewed  Vital Signs  ED Triage Vitals [12/10/19 1548]   Temperature Pulse Respirations Blood Pressure SpO2   98 5 °F (36 9 °C) (!) 111 18 161/92 99 %      Temp Source Heart Rate Source Patient Position - Orthostatic VS BP Location FiO2 (%)   Temporal Monitor Sitting Left arm --      Pain Score       3           Vitals:    12/10/19 1548   BP: 161/92   Pulse: (!) 111   Patient Position - Orthostatic VS: Sitting         Visual Acuity      ED Medications  Medications - No data to display    Diagnostic Studies  Results Reviewed     None                 No orders to display              Procedures  Procedures         ED Course                               MDM  Number of Diagnoses or Management Options  Right leg pain: new and requires workup  Diagnosis management comments: No current symptoms    Negative physical exam     1  Right calf pain  -Motrin as needed  -PCP follow-up  -ED as needed          Disposition  Final diagnoses:   Right leg pain     Time reflects when diagnosis was documented in both MDM as applicable and the Disposition within this note     Time User Action Codes Description Comment    12/10/2019  4:04 PM Parmjit Burnett Add [B55 453] Right leg pain       ED Disposition     ED Disposition Condition Date/Time Comment    Discharge Stable Tue Dec 10, 2019  4:04 PM Park JIMENEZLELLAN [facility] discharge to home/self care  Follow-up Information     Follow up With Specialties Details Why [address], [place] Internal Medicine Schedule an appointment as soon as possible for a visit in 1 day  [address] [facility] [address]      [facility] 55  Emergency Department Emergency Medicine Go to  If symptoms worsen, As needed [address] [place] [id number] [phone]          Patient's Medications   Discharge Prescriptions    IBUPROFEN (MOTRIN) 400 MG TABLET    Take 1 tablet (400 mg total) by mouth every 6 (six) hours as needed for mild pain       Start Date: 12/10/2019End Date: --       Order Dose: 400 mg       Quantity: 30 tablet    Refills: 0     No discharge procedures on file      ED Provider  Electronically Signed by           Trisha Romero DO  12/10/19 4527

## 2019-12-11 ENCOUNTER — OFFICE VISIT (OUTPATIENT)
Dept: PHYSICAL THERAPY | Facility: CLINIC | Age: 39
End: 2019-12-11
Payer: COMMERCIAL

## 2019-12-11 DIAGNOSIS — M50.20 HERNIATED CERVICAL INTERVERTEBRAL DISC: Primary | ICD-10-CM

## 2019-12-11 PROCEDURE — 97110 THERAPEUTIC EXERCISES: CPT

## 2019-12-11 PROCEDURE — 97012 MECHANICAL TRACTION THERAPY: CPT

## 2019-12-11 NOTE — PROGRESS NOTES
Daily Note     Today's date: 2019  Patient name: Madeleine Bansal  : 1980  MRN: [id number]  Referring provider: Alessandro Lara MD  Dx:   Encounter Diagnosis     ICD-10-CM    1  Herniated cervical intervertebral disc M50 20                   Subjective: "My neck is sore today " Pt denies all contraindications for mechanical cervical traction  Objective: See treatment diary below      Assessment: Tolerated treatment well  Initiated multiple new exercises as outlined in pt POC  No exacerbation of pain this date  Patient demonstrated fatigue post treatment and would benefit from continued PT  No adverse affect to mechanical traction this date  Plan: Continue per plan of care        Precautions:  None RA due        Manual         SOR                                             Exercise Diary         Cervical AROM  5" x10 Rot/ Flex/ Ext       Cervical Isometrics  5" x10 ext at wall with towel       Thoracic Ext Over chair  5" x10 with towel        Doorway Stretch  10" x5 high hands       Scap Squeeze         MTP/LTP Red TB 5" x10 ea       TB ER/IR        Chin Tucks  5" x10       UBE  120 rpm 3' fwd/3' retro                                                                                                   Modalities         Cervical Traction  Static 12# pull @ 10* w/ a 30% rope speed, 3 steps up/down x 10 min hold 15# NV

## 2019-12-13 ENCOUNTER — OFFICE VISIT (OUTPATIENT)
Dept: PHYSICAL THERAPY | Facility: CLINIC | Age: 39
End: 2019-12-13
Payer: COMMERCIAL

## 2019-12-13 DIAGNOSIS — M50.20 HERNIATED CERVICAL INTERVERTEBRAL DISC: Primary | ICD-10-CM

## 2019-12-13 PROCEDURE — 97012 MECHANICAL TRACTION THERAPY: CPT

## 2019-12-13 PROCEDURE — 97110 THERAPEUTIC EXERCISES: CPT

## 2019-12-13 NOTE — PROGRESS NOTES
Daily Note     Today's date: 2019  Patient name: Robin Luna  : 1980  MRN: [id number]  Referring provider: Nehemias Quinones MD  Dx:   Encounter Diagnosis     ICD-10-CM    1  Herniated cervical intervertebral disc M50 20                   Subjective: Pt  reports her neck is feeling really good today  Objective: See treatment diary below      Assessment: Tolerated treatment well  Patient exhibited good technique with therapeutic exercises and would benefit from continued PT  Pt  does note some discomfort with active extension  Pt  without c/o following RX session  Plan: Progress treatment as tolerated         Precautions:  None RA due        Manual        SOR                                             Exercise Diary        Cervical AROM  5" x10 Rot/ Flex/ Ext 5"x10 Rot/Flex/  Ext      Cervical Isometrics  5" x10 ext at wall with towel 5" x10 ext  at wall  with towel      Thoracic Ext Over chair  5" x10 with towel  5" x10  With towel      Doorway Stretch  10" x5 high hands 10" x 5 high hands      Scap Squeeze         MTP/LTP Red TB 5" x10 ea Red TB  5" x10 ea      TB ER/IR        Chin Tucks  5" x10 5"x10      UBE  120 rpm 3' fwd/3' retro 120 rpm  4' fwd/4' retro                                                                                                  Modalities        Cervical Traction  Static 12# pull @ 10* w/ a 30% rope speed, 3 steps up/down x 10 min hold 15# NV

## 2019-12-17 ENCOUNTER — APPOINTMENT (OUTPATIENT)
Dept: PHYSICAL THERAPY | Facility: CLINIC | Age: 39
End: 2019-12-17
Payer: COMMERCIAL

## 2019-12-19 ENCOUNTER — OFFICE VISIT (OUTPATIENT)
Dept: PHYSICAL THERAPY | Facility: CLINIC | Age: 39
End: 2019-12-19
Payer: COMMERCIAL

## 2019-12-19 DIAGNOSIS — M50.20 HERNIATED CERVICAL INTERVERTEBRAL DISC: Primary | ICD-10-CM

## 2019-12-19 PROCEDURE — 97012 MECHANICAL TRACTION THERAPY: CPT

## 2019-12-19 PROCEDURE — 97110 THERAPEUTIC EXERCISES: CPT

## 2019-12-19 NOTE — PROGRESS NOTES
Daily Note     Today's date: 2019  Patient name: Elena Rodriguez  : 1980  MRN: [id number]  Referring provider: Tommy Ortega MD  Dx:   Encounter Diagnosis     ICD-10-CM    1  Herniated cervical intervertebral disc M50 20                   Subjective: Pt c/o L cervical soreness today  Feels traction is helpful  Objective: See treatment diary below      Assessment: Tolerated treatment fair  Pt c/o pain with extension and rot to R cervical ROM today  Pt reports some relief of pain following treatment  Patient would benefit from continued PT to decrease pain and increase strength and  functional cervical  ROM  Plan: Continue per plan of care        Precautions:  None RA due        Manual       SOR                                             Exercise Diary       Cervical AROM  5" x10 Rot/ Flex/ Ext 5"x10 Rot/Flex/  Ext 5" x10  Rot/flex/  ext     Cervical Isometrics  5" x10 ext at wall with towel 5" x10 ext  at wall  with towel 5" x10 ext  With towel     Thoracic Ext Over chair  5" x10 with towel  5" x10  With towel 5" x10 with towel     Doorway Stretch  10" x5 high hands 10" x 5 high hands 10" x5  High hands     Scap Squeeze         MTP/LTP Red TB 5" x10 ea Red TB  5" x10 ea Red TB  5" x10 ea     TB ER/IR        Chin Tucks  5" x10 5"x10 5" x10      UBE  120 rpm 3' fwd/3' retro 120 rpm  4' fwd/4' retro 120 rpm  4'fwd/4'retro                                                                                                 Modalities       Cervical Traction  Static 12# pull @ 10* w/ a 30% rope speed, 3 steps up/down x 10 min hold 15# NV Static 15# pull @ 10*, 30% rope speed, 3 steps up/down x  10 min

## 2019-12-24 ENCOUNTER — OFFICE VISIT (OUTPATIENT)
Dept: PHYSICAL THERAPY | Facility: CLINIC | Age: 39
End: 2019-12-24
Payer: COMMERCIAL

## 2019-12-24 DIAGNOSIS — M50.20 HERNIATED CERVICAL INTERVERTEBRAL DISC: Primary | ICD-10-CM

## 2019-12-24 PROCEDURE — 97110 THERAPEUTIC EXERCISES: CPT

## 2019-12-24 PROCEDURE — 97012 MECHANICAL TRACTION THERAPY: CPT

## 2019-12-24 NOTE — PROGRESS NOTES
Daily Note     Today's date: 2019  Patient name: Rayna Dinh  : 1980  MRN: [id number]  Referring provider: Denny Crandall MD  Dx:   Encounter Diagnosis     ICD-10-CM    1  Herniated cervical intervertebral disc M50 20        Start Time: 0800  Stop Time: 1569  Total time in clinic (min): 55 minutes    Subjective: Pt reports L cervical into L shoulder soreness today  Thinks she may have slept "wrong"  States she returns to doctor 19  Objective: See treatment diary below      Assessment: Tolerated treatment well  Discomfort noted with chin tucks today  Pt reports noticed relief following traction today  Patient exhibited good technique with therapeutic exercises and would benefit from continued PT      Plan: Continue per plan of care        Precautions:  None RA due        Manual      SOR                                             Exercise Diary      Cervical AROM  5" x10 Rot/ Flex/ Ext 5"x10 Rot/Flex/  Ext 5" x10  Rot/flex/  ext 5" x10   rot    Cervical Isometrics  5" x10 ext at wall with towel 5" x10 ext  at wall  with towel 5" x10 ext  With towel 5" x10 ext with towel    Thoracic Ext Over chair  5" x10 with towel  5" x10  With towel 5" x10 with towel 5" x10  With towel    Doorway Stretch  10" x5 high hands 10" x 5 high hands 10" x5  High hands 10" x5   High hands    Scap Squeeze         MTP/LTP Red TB 5" x10 ea Red TB  5" x10 ea Red TB  5" x10 ea Red TB  5" x10    TB ER/IR        Chin Tucks  5" x10 5"x10 5" x10 5" x5    UBE  120 rpm 3' fwd/3' retro 120 rpm  4' fwd/4' retro 120 rpm  4'fwd/4'retro 120 rpm  5'fwd/5'retro                                                                                                Modalities      Cervical Traction  Static 12# pull @ 10* w/ a 30% rope speed, 3 steps up/down x 10 min hold 15# NV Static 15# pull @ 10*, 30% rope speed, 3 steps up/down x  10 min  Static 15# pull @ 10*, 30% rope speed, 3 steps up/down x  10 min

## 2019-12-26 ENCOUNTER — OFFICE VISIT (OUTPATIENT)
Dept: PHYSICAL THERAPY | Facility: CLINIC | Age: 39
End: 2019-12-26
Payer: COMMERCIAL

## 2019-12-26 DIAGNOSIS — M50.20 HERNIATED CERVICAL INTERVERTEBRAL DISC: Primary | ICD-10-CM

## 2019-12-26 PROCEDURE — 97012 MECHANICAL TRACTION THERAPY: CPT

## 2019-12-26 PROCEDURE — 97110 THERAPEUTIC EXERCISES: CPT

## 2019-12-26 NOTE — PROGRESS NOTES
Daily Note     Today's date: 2019  Patient name: Aamir Glass  : 1980  MRN: [id number]  Referring provider: Alexa Martinez MD  Dx:   Encounter Diagnosis     ICD-10-CM    1  Herniated cervical intervertebral disc M50 20                   Subjective: "My neck is feeling a little better " Pt reports minor "soreness" before therapy with relief post Tx  Objective: See treatment diary below      Assessment: Tolerated treatment well  Progressed TB resistance to increase UE strength and endurance this date  Patient demonstrated fatigue post treatment and would benefit from continued PT  No adverse affect to Mechanical traction this date  Plan: Continue per plan of care        Precautions:  None RA due        Manual     SOR                                             Exercise Diary     Cervical AROM  5" x10 Rot/ Flex/ Ext 5"x10 Rot/Flex/  Ext 5" x10  Rot/flex/  ext 5" x10   rot 5" x10  Rot   Cervical Isometrics  5" x10 ext at wall with towel 5" x10 ext  at wall  with towel 5" x10 ext  With towel 5" x10 ext with towel 5" x10 ext with towel   Thoracic Ext Over chair  5" x10 with towel  5" x10  With towel 5" x10 with towel 5" x10  With towel 5" x10 with towel   Doorway Stretch  10" x5 high hands 10" x 5 high hands 10" x5  High hands 10" x5   High hands 10" x5   High hands   Scap Squeeze         MTP/LTP Red TB 5" x10 ea Red TB  5" x10 ea Red TB  5" x10 ea Red TB  5" x10 Green TB 2x10 5"   TB ER/IR        Chin Tucks  5" x10 5"x10 5" x10 5" x5 5" x10   UBE  120 rpm 3' fwd/3' retro 120 rpm  4' fwd/4' retro 120 rpm  4'fwd/4'retro 120 rpm  5'fwd/5'retro 120 rpm  5'fwd/5'retro                                                                                               Modalities     Cervical Traction  Static 12# pull @ 10* w/ a 30% rope speed, 3 steps up/down x 10 min hold 15# NV Static 15# pull @ 10*, 30% morgan speed, 3 steps up/down x  10 min  Static 15# pull @ 10*, 30% rope speed, 3 steps up/down x  10 min Static 15# pull @ 10*, 30% rope speed, 3 steps up/down x  10 min hold

## 2019-12-27 ENCOUNTER — TELEPHONE (OUTPATIENT)
Dept: OTOLARYNGOLOGY | Facility: CLINIC | Age: 39
End: 2019-12-27

## 2019-12-27 NOTE — TELEPHONE ENCOUNTER
I called and left message for the patient to call the office back regarding to schedule an appointment        12/30/19  I called and left another message for the patient to call the office back regarding an appointment for ENT     1/2/20  I called and left message for the patient to call the office back regarding we have a sooner appt than her scheduled time of 1/21/20 @ 1130am   We have a sooner appt for 1/14/20 for 845am or 215pm

## 2019-12-31 ENCOUNTER — OFFICE VISIT (OUTPATIENT)
Dept: PHYSICAL THERAPY | Facility: CLINIC | Age: 39
End: 2019-12-31
Payer: COMMERCIAL

## 2019-12-31 DIAGNOSIS — M50.20 HERNIATED CERVICAL INTERVERTEBRAL DISC: Primary | ICD-10-CM

## 2019-12-31 PROCEDURE — 97012 MECHANICAL TRACTION THERAPY: CPT | Performed by: PHYSICAL THERAPIST

## 2019-12-31 PROCEDURE — 97110 THERAPEUTIC EXERCISES: CPT | Performed by: PHYSICAL THERAPIST

## 2019-12-31 NOTE — PROGRESS NOTES
Daily Note     Today's date: 2019  Patient name: Kenyetta Deluna  : 1980  MRN: [id number]  Referring provider: Lei Buitrago MD  Dx:   Encounter Diagnosis     ICD-10-CM    1  Herniated cervical intervertebral disc M50 20                   Subjective: " I need to leave by 8:30, I feel pretty good "       Objective: See treatment diary below      Assessment: Tolerated treatment well  Patient exhibited good technique with therapeutic exercises and would benefit from continued PT  Pt reports decreased sx post treatment  Modified POC this session 2* to subjective comments        Plan: Continue per plan of care        Precautions:  None RA due        Manual     SOR                                             Exercise Diary     Cervical AROM   5"x10 Rot/Flex/  Ext 5" x10  Rot/flex/  ext 5" x10   rot 5" x10  Rot   Cervical Isometrics   5" x10 ext  at wall  with towel 5" x10 ext  With towel 5" x10 ext with towel 5" x10 ext with towel   Thoracic Ext Over chair   5" x10  With towel 5" x10 with towel 5" x10  With towel 5" x10 with towel   Doorway Stretch   10" x 5 high hands 10" x5  High hands 10" x5   High hands 10" x5   High hands   Scap Squeeze         MTP/LTP  Red TB  5" x10 ea Red TB  5" x10 ea Red TB  5" x10 Green TB 2x10 5"   TB ER/IR        Chin Tucks   5"x10 5" x10 5" x5 5" x10   UBE  120 rpm 5' fwd/5' retro 120 rpm  4' fwd/4' retro 120 rpm  4'fwd/4'retro 120 rpm  5'fwd/5'retro 120 rpm  5'fwd/5'retro                                                                                               Modalities     Cervical Traction  Static 15# pull @ 10* w/ a 30% rope speed, 3 steps up/down x 10 min hold 15# NV Static 15# pull @ 10*, 30% rope speed, 3 steps up/down x  10 min  Static 15# pull @ 10*, 30% rope speed, 3 steps up/down x  10 min Static 15# pull @ 10*, 30% rope speed, 3 steps up/down x  10 min hold

## 2020-01-02 ENCOUNTER — HOSPITAL ENCOUNTER (EMERGENCY)
Facility: HOSPITAL | Age: 40
Discharge: HOME | End: 2020-01-02
Attending: EMERGENCY MEDICINE | Admitting: EMERGENCY MEDICINE
Payer: COMMERCIAL

## 2020-01-02 VITALS
DIASTOLIC BLOOD PRESSURE: 80 MMHG | BODY MASS INDEX: 30.24 KG/M2 | WEIGHT: 150 LBS | SYSTOLIC BLOOD PRESSURE: 153 MMHG | OXYGEN SATURATION: 97 % | RESPIRATION RATE: 18 BRPM | TEMPERATURE: 97.8 F | HEART RATE: 74 BPM | HEIGHT: 59 IN

## 2020-01-02 DIAGNOSIS — R10.11 ABDOMINAL PRESSURE IN RIGHT UPPER QUADRANT: Primary | ICD-10-CM

## 2020-01-02 LAB
ALBUMIN SERPL BCP-MCNC: 4.3 G/DL (ref 3.5–5.7)
ALP SERPL-CCNC: 46 U/L (ref 40–150)
ALT SERPL W P-5'-P-CCNC: 9 U/L (ref 7–52)
ANION GAP SERPL CALCULATED.3IONS-SCNC: 7 MMOL/L (ref 4–13)
AST SERPL W P-5'-P-CCNC: 11 U/L (ref 13–39)
ATRIAL RATE: 68 BPM
BASOPHILS # BLD AUTO: 0.1 THOUSANDS/ΜL (ref 0–0.1)
BASOPHILS NFR BLD AUTO: 1 % (ref 0–2)
BILIRUB SERPL-MCNC: 0.6 MG/DL (ref 0.2–1)
BUN SERPL-MCNC: 14 MG/DL (ref 7–25)
CALCIUM SERPL-MCNC: 9.3 MG/DL (ref 8.6–10.5)
CHLORIDE SERPL-SCNC: 105 MMOL/L (ref 98–107)
CO2 SERPL-SCNC: 28 MMOL/L (ref 21–31)
CREAT SERPL-MCNC: 0.75 MG/DL (ref 0.6–1.2)
EOSINOPHIL # BLD AUTO: 0.1 THOUSAND/ΜL (ref 0–0.61)
EOSINOPHIL NFR BLD AUTO: 2 % (ref 0–5)
ERYTHROCYTE [DISTWIDTH] IN BLOOD BY AUTOMATED COUNT: 13.4 % (ref 11.5–14.5)
GFR SERPL CREATININE-BSD FRML MDRD: 115 ML/MIN/1.73SQ M
GLUCOSE SERPL-MCNC: 95 MG/DL (ref 65–99)
HCT VFR BLD AUTO: 40.8 % (ref 42–47)
HGB BLD-MCNC: 13.6 G/DL (ref 12–16)
LIPASE SERPL-CCNC: 25 U/L (ref 11–82)
LYMPHOCYTES # BLD AUTO: 2.1 THOUSANDS/ΜL (ref 0.6–4.47)
LYMPHOCYTES NFR BLD AUTO: 36 % (ref 21–51)
MCH RBC QN AUTO: 31.1 PG (ref 26–34)
MCHC RBC AUTO-ENTMCNC: 33.2 G/DL (ref 31–37)
MCV RBC AUTO: 94 FL (ref 81–99)
MONOCYTES # BLD AUTO: 0.4 THOUSAND/ΜL (ref 0.17–1.22)
MONOCYTES NFR BLD AUTO: 8 % (ref 2–12)
NEUTROPHILS # BLD AUTO: 3.1 THOUSANDS/ΜL (ref 1.4–6.5)
NEUTS SEG NFR BLD AUTO: 53 % (ref 42–75)
P AXIS: 37 DEGREES
PLATELET # BLD AUTO: 182 THOUSANDS/UL (ref 149–390)
PMV BLD AUTO: 10.1 FL (ref 8.6–11.7)
POTASSIUM SERPL-SCNC: 3.3 MMOL/L (ref 3.5–5.5)
PR INTERVAL: 158 MS
PROT SERPL-MCNC: 7.1 G/DL (ref 6.4–8.9)
QRS AXIS: 82 DEGREES
QRSD INTERVAL: 88 MS
QT INTERVAL: 406 MS
QTC INTERVAL: 431 MS
RBC # BLD AUTO: 4.35 MILLION/UL (ref 3.9–5.2)
SODIUM SERPL-SCNC: 140 MMOL/L (ref 134–143)
T WAVE AXIS: 35 DEGREES
VENTRICULAR RATE: 68 BPM
WBC # BLD AUTO: 5.7 THOUSAND/UL (ref 4.8–10.8)

## 2020-01-02 PROCEDURE — 99213 OFFICE O/P EST LOW 20 MIN: CPT | Performed by: EMERGENCY MEDICINE

## 2020-01-02 PROCEDURE — 93005 ELECTROCARDIOGRAM TRACING: CPT

## 2020-01-02 PROCEDURE — 83690 ASSAY OF LIPASE: CPT | Performed by: EMERGENCY MEDICINE

## 2020-01-02 PROCEDURE — 85025 COMPLETE CBC W/AUTO DIFF WBC: CPT | Performed by: EMERGENCY MEDICINE

## 2020-01-02 PROCEDURE — 93010 ELECTROCARDIOGRAM REPORT: CPT | Performed by: INTERNAL MEDICINE

## 2020-01-02 PROCEDURE — 80053 COMPREHEN METABOLIC PANEL: CPT | Performed by: EMERGENCY MEDICINE

## 2020-01-02 PROCEDURE — 36415 COLL VENOUS BLD VENIPUNCTURE: CPT | Performed by: EMERGENCY MEDICINE

## 2020-01-02 PROCEDURE — 99284 EMERGENCY DEPT VISIT MOD MDM: CPT

## 2020-01-02 NOTE — ED PROVIDER NOTES
History  Chief Complaint   Patient presents with    Abdominal Pain     RUQ abdominal pressure, not pain, x2 days "feels like there's something there"  belching relieves pain momentarily     This is a 58-year-old female who complains of epigastric discomfort but not pain in 1 specific location  She has 0/10 but it is a feeling of fullness  It is worsened with palpation of a specific 1 cm wide area  She states she has never had anything like this before  She states she could not sleep due to unrelated issue so decided that she would come in for evaluation  She has she has not tried any medicine to make it go away and nothing seems to make to the worse  She denies nausea, vomiting, fever, chills, change in bowel or bladder habits  Prior to Admission Medications   Prescriptions Last Dose Informant Patient Reported? Taking? Levonorgestrel 19 5 MCG/DAY IUD 2020 at Unknown time  Yes Yes   Si each by Intrauterine route   famotidine (PEPCID) 40 MG tablet Not Taking at Unknown time  Yes No   Sig: Take 40 mg by mouth daily   ibuprofen (MOTRIN) 400 mg tablet   No No   Sig: Take 1 tablet (400 mg total) by mouth every 6 (six) hours as needed for mild pain   methocarbamol (ROBAXIN) 500 mg tablet Not Taking at Unknown time  No No   Sig: Take 1 tablet (500 mg total) by mouth 2 (two) times a day as needed for muscle spasms   Patient not taking: Reported on 2020      Facility-Administered Medications: None       Past Medical History:   Diagnosis Date    Allergic     Asthma     Herniated disc, cervical     Pinched nerve in shoulder, right        Past Surgical History:   Procedure Laterality Date     SECTION         Family History   Problem Relation Age of Onset    Diabetes Mother     Bone cancer Father     Diabetes Sister      I have reviewed and agree with the history as documented      Social History     Tobacco Use    Smoking status: Current Every Day Smoker     Packs/day: 0 50 Types: Cigarettes    Smokeless tobacco: Never Used   Substance Use Topics    Alcohol use: No    Drug use: Not Currently     Types: Marijuana        Review of Systems   Constitutional: Negative for activity change, chills, fatigue and fever  HENT: Negative for congestion  Eyes: Negative for visual disturbance  Respiratory: Negative for cough, chest tightness and shortness of breath  Cardiovascular: Negative for chest pain  Gastrointestinal: Negative for abdominal pain, diarrhea and vomiting  Genitourinary: Negative for dysuria  Skin: Negative for rash  Allergic/Immunologic: Negative for immunocompromised state  Neurological: Negative for dizziness, weakness and numbness  Physical Exam  Physical Exam   Constitutional: She is oriented to person, place, and time  She appears well-developed and well-nourished  HENT:   Head: Normocephalic and atraumatic  Eyes: Pupils are equal, round, and reactive to light  Conjunctivae and EOM are normal    Neck: Normal range of motion  Neck supple  Cardiovascular: Normal rate, regular rhythm and normal heart sounds  Pulmonary/Chest: Effort normal and breath sounds normal  No respiratory distress  Abdominal: Soft  Bowel sounds are normal  She exhibits no distension and no mass  There is no hepatosplenomegaly  There is no tenderness  There is no rigidity, no rebound, no guarding, no CVA tenderness, no tenderness at McBurney's point and negative Duran's sign  Musculoskeletal: Normal range of motion  Neurological: She is alert and oriented to person, place, and time  Skin: Skin is warm and dry  Capillary refill takes less than 2 seconds  Psychiatric: She has a normal mood and affect   Her behavior is normal        Vital Signs  ED Triage Vitals   Temperature Pulse Respirations Blood Pressure SpO2   01/02/20 0213 01/02/20 0213 01/02/20 0213 01/02/20 0213 01/02/20 0213   97 8 °F (36 6 °C) 78 18 143/90 98 %      Temp Source Heart Rate Source Patient Position - Orthostatic VS BP Location FiO2 (%)   01/02/20 0213 01/02/20 0407 -- 01/02/20 0407 --   Temporal Monitor  Left arm       Pain Score       01/02/20 0213       4           Vitals:    01/02/20 0213 01/02/20 0407   BP: 143/90 153/80   Pulse: 78 74         Visual Acuity      ED Medications  Medications - No data to display    Diagnostic Studies  Results Reviewed     Procedure Component Value Units Date/Time    Lipase [[id number]]  (Normal) Collected:  01/02/20 0328    Lab Status:  Final result Specimen:  Blood from Arm, Right Updated:  01/02/20 0355     Lipase 25 u/L     CMP [[id number]]  (Abnormal) Collected:  01/02/20 0328    Lab Status:  Final result Specimen:  Blood from Arm, Right Updated:  01/02/20 0355     Sodium 140 mmol/L      Potassium 3 3 mmol/L      Chloride 105 mmol/L      CO2 28 mmol/L      ANION GAP 7 mmol/L      BUN 14 mg/dL      Creatinine 0 75 mg/dL      Glucose 95 mg/dL      Calcium 9 3 mg/dL      AST 11 U/L      ALT 9 U/L      Alkaline Phosphatase 46 U/L      Total Protein 7 1 g/dL      Albumin 4 3 g/dL      Total Bilirubin 0 60 mg/dL      eGFR 115 ml/min/1 73sq m     Narrative:       [place] guidelines for Chronic Kidney Disease (CKD):     Stage 1 with normal or high GFR (GFR > 90 mL/min/1 73 square meters)    Stage 2 Mild CKD (GFR = 60-89 mL/min/1 73 square meters)    Stage 3A Moderate CKD (GFR = 45-59 mL/min/1 73 square meters)    Stage 3B Moderate CKD (GFR = 30-44 mL/min/1 73 square meters)    Stage 4 Severe CKD (GFR = 15-29 mL/min/1 73 square meters)    Stage 5 End Stage CKD (GFR <15 mL/min/1 73 square meters)  Note: GFR calculation is accurate only with a steady state creatinine    CBC and differential [[id number]]  (Abnormal) Collected:  01/02/20 0328    Lab Status:  Final result Specimen:  Blood from Arm, Right Updated:  01/02/20 0340     WBC 5 70 Thousand/uL      RBC 4 35 Million/uL      Hemoglobin 13 6 g/dL      Hematocrit 40 8 %      MCV 94 fL      MCH 31 1 pg      MCHC 33 2 g/dL      RDW 13 4 %      MPV 10 1 fL      Platelets 717 Thousands/uL      Neutrophils Relative 53 %      Lymphocytes Relative 36 %      Monocytes Relative 8 %      Eosinophils Relative 2 %      Basophils Relative 1 %      Neutrophils Absolute 3 10 Thousands/µL      Lymphocytes Absolute 2 10 Thousands/µL      Monocytes Absolute 0 40 Thousand/µL      Eosinophils Absolute 0 10 Thousand/µL      Basophils Absolute 0 10 Thousands/µL                  No orders to display              Procedures  ECG 12 Lead Documentation Only  Date/Time: 1/2/2020 5:41 AM  Performed by: Nessa Everett MD  Authorized by: Nessa Everett MD     ECG reviewed by me, the ED Provider: yes    Patient location:  ED  Interpretation:     Interpretation: normal    Rate:     ECG rate assessment: normal    Rhythm:     Rhythm: sinus rhythm    Ectopy:     Ectopy: none    QRS:     QRS axis:  Normal  Conduction:     Conduction: normal    ST segments:     ST segments:  Normal  T waves:     T waves: normal               ED Course                               MDM  Number of Diagnoses or Management Options  Abdominal pressure in right upper quadrant: new and requires workup  Diagnosis management comments: This is a 43-year-old female with right upper quadrant point sensation of fullness  Patient appears clinically well, states she is not currently having any symptoms  EKG did not demonstrate any ST changes  CBC, CMP and lipase within normal limits  Patient has close active outpatient follow-up available  Discussed warning signs and symptoms with the patient as well as when to return to the emergency department versus follow up with [facility]  Patient states understanding and agreement with the plan            Disposition  Final diagnoses:   Abdominal pressure in right upper quadrant     Time reflects when diagnosis was documented in both MDM as applicable and the Disposition within this note     Time User Action Codes Description Comment    1/2/2020  4:05 AM Shirley Jeronimo Add [R10 11] Abdominal pressure in right upper quadrant       ED Disposition     ED Disposition Condition Date/Time Comment    Discharge Stable Thu Jan 2, 2020  4:05 AM Tiarra Shine discharge to home/self care  Follow-up Information     Follow up With Specialties Details Why [address], [place] Internal Medicine In 2 days  [address]  [phone]            Discharge Medication List as of 1/2/2020  4:05 AM      CONTINUE these medications which have NOT CHANGED    Details   Levonorgestrel 19 5 MCG/DAY IUD 1 each by Intrauterine route, Historical Med      famotidine (PEPCID) 40 MG tablet Take 40 mg by mouth daily, Starting Wed 10/30/2019, Until Thu 10/29/2020, Historical Med      ibuprofen (MOTRIN) 400 mg tablet Take 1 tablet (400 mg total) by mouth every 6 (six) hours as needed for mild pain, Starting Tue 12/10/2019, Print      methocarbamol (ROBAXIN) 500 mg tablet Take 1 tablet (500 mg total) by mouth 2 (two) times a day as needed for muscle spasms, Starting Sun 11/24/2019, Normal           No discharge procedures on file      ED Provider  Electronically Signed by           Mei Haddad MD  01/02/20 9677

## 2020-01-03 ENCOUNTER — OFFICE VISIT (OUTPATIENT)
Dept: PHYSICAL THERAPY | Facility: CLINIC | Age: 40
End: 2020-01-03
Payer: COMMERCIAL

## 2020-01-03 DIAGNOSIS — M50.20 HERNIATED CERVICAL INTERVERTEBRAL DISC: Primary | ICD-10-CM

## 2020-01-03 PROCEDURE — 97012 MECHANICAL TRACTION THERAPY: CPT

## 2020-01-03 PROCEDURE — 97110 THERAPEUTIC EXERCISES: CPT

## 2020-01-03 NOTE — PROGRESS NOTES
Daily Note     Today's date: 1/3/2020  Patient name: Leah Honeycutt  : 1980  MRN: [id number]  Referring provider: Blanche Teran MD  Dx:   Encounter Diagnosis     ICD-10-CM    1  Herniated cervical intervertebral disc M50 20                   Subjective: Pt reports she had appt with doctor and was not released to return to work at this time  States she is to continue PT and if no improvement, then possible surgery  C/o neck feels achy today  Objective: See treatment diary below      Assessment: Tolerated treatment well  Patient exhibited good technique with therapeutic exercises and would benefit from continued PT      Plan: Continue per plan of care        Precautions:  None RA due        Manual  12/31 1/3 12/19 12/24 12/26   SOR                                             Exercise Diary  12/31 1/3 12/19 12/24 12/26   Cervical AROM   5" x10ea  Rot/flex/  ext 5" x10  Rot/flex/  ext 5" x10   rot 5" x10  Rot   Cervical Isometrics   5" x10 ext with towel 5" x10 ext  With towel 5" x10 ext with towel 5" x10 ext with towel   Thoracic Ext Over chair   5" x10 with towel 5" x10 with towel 5" x10  With towel 5" x10 with towel   Doorway Stretch   10" x5  10" x5  High hands 10" x5   High hands 10" x5   High hands   Scap Squeeze   5" x10      MTP/LTP  Green TB  5" 2x10 ea Red TB  5" x10 ea Red TB  5" x10 Green TB 2x10 5"   TB ER/IR        Chin Tucks   5" x10 5" x10 5" x5 5" x10   UBE  120 rpm 5' fwd/5' retro 110 rpm  5'fwd/5'retro 120 rpm  4'fwd/4'retro 120 rpm  5'fwd/5'retro 120 rpm  5'fwd/5'retro                                                                                               Modalities  12/31 1/3 12/19 12/24 12/26   Cervical Traction  Static 15# pull @ 10* w/ a 30% rope speed, 3 steps up/down x 10 min hold Static 15# pull @ 10* w/ a 30% rope speed, 3 steps up/down x 10 min hold Static 15# pull @ 10*, 30% rope speed, 3 steps up/down x  10 min  Static 15# pull @ 10*, 30% rope speed, 3 steps up/down x  10 min Static 15# pull @ 10*, 30% rope speed, 3 steps up/down x  10 min hold

## 2020-01-06 ENCOUNTER — APPOINTMENT (OUTPATIENT)
Dept: RADIOLOGY | Facility: CLINIC | Age: 40
End: 2020-01-06
Payer: COMMERCIAL

## 2020-01-06 ENCOUNTER — CONSULT (OUTPATIENT)
Dept: PAIN MEDICINE | Facility: CLINIC | Age: 40
End: 2020-01-06
Payer: COMMERCIAL

## 2020-01-06 VITALS
SYSTOLIC BLOOD PRESSURE: 152 MMHG | HEIGHT: 59 IN | WEIGHT: 152.4 LBS | DIASTOLIC BLOOD PRESSURE: 87 MMHG | BODY MASS INDEX: 30.72 KG/M2

## 2020-01-06 DIAGNOSIS — M50.120 CERVICAL DISC DISORDER WITH RADICULOPATHY OF MID-CERVICAL REGION: ICD-10-CM

## 2020-01-06 DIAGNOSIS — M54.2 NECK PAIN: ICD-10-CM

## 2020-01-06 DIAGNOSIS — M54.12 CERVICAL RADICULOPATHY: Primary | ICD-10-CM

## 2020-01-06 DIAGNOSIS — M43.12 ACQUIRED SPONDYLOLISTHESIS OF CERVICAL VERTEBRA: ICD-10-CM

## 2020-01-06 DIAGNOSIS — M54.12 CERVICAL RADICULOPATHY: ICD-10-CM

## 2020-01-06 PROCEDURE — 72052 X-RAY EXAM NECK SPINE 6/>VWS: CPT

## 2020-01-06 PROCEDURE — 99244 OFF/OP CNSLTJ NEW/EST MOD 40: CPT | Performed by: ANESTHESIOLOGY

## 2020-01-06 NOTE — PROGRESS NOTES
Assessment:  1  Cervical radiculopathy    2  Neck pain    3  Cervical disc disorder with radiculopathy of mid-cervical region    4  Acquired spondylolisthesis of cervical vertebra        Plan:  Patient is a 54-year-old female with complaints of neck pain and left-sided arm pain with history significant for cervical spondylolisthesis with slight C4-C5 instability, C5-C6 left paramedian disc protrusion with impingement on the C6 nerve root presents office for initial consultation  Patient reports the moderate alleviation of her symptoms after 6 weeks of physical therapy for cervical spine strengthening  Patient has been seen by neurosurgeon who at this time would like to exhaust physical therapy and conservative management via on cervical epidural steroid injections  1  We will order repeat cervical spine flexion extension to assess for stability status post physical therapy for cervical spine strengthening  3  We will schedule patient for a C7-T1 interlaminar epidural steroid injection    [place] Prescription Drug Monitoring Program report was reviewed and was appropriate     Complete risks and benefits including bleeding, infection, tissue reaction, nerve injury and allergic reaction were discussed  The approach was demonstrated using models and literature was provided  Verbal and written consent was obtained  History of Present Illness: The patient is a 35 y o  female who presents for consultation in regards to Neck Pain; Shoulder Pain; and Arm Pain  Symptoms have been present for 4 months  Symptoms began without any precipitating injury or trauma  Pain is reported to be 8 on the numeric rating scale  Symptoms are felt nearly constantly and worst in the morning, nighttime  Symptoms are characterized as burning, sharp, dull/aching, numbing and tingling  Symptoms are associated with no weakness  Aggravating factors include lying down, standing, sitting and exercise    Relieving factors include nothing  No change in symptoms with nothing  Treatments that have been helpful include physical therapy  Medications to relieve symptoms include ibuprofen  Review of Systems:    Review of Systems   Musculoskeletal: Positive for arthralgias  All other systems reviewed and are negative  Past Medical History:   Diagnosis Date    Allergic     Asthma     Herniated disc, cervical     Pinched nerve in shoulder, right        Past Surgical History:   Procedure Laterality Date     SECTION         Family History   Problem Relation Age of Onset    Diabetes Mother     Bone cancer Father     Diabetes Sister        Social History     Occupational History    Not on file   Tobacco Use    Smoking status: Current Every Day Smoker     Packs/day: 0 50     Types: Cigarettes    Smokeless tobacco: Never Used   Substance and Sexual Activity    Alcohol use: No    Drug use: Not Currently     Types: Marijuana    Sexual activity: Not on file         Current Outpatient Medications:     famotidine (PEPCID) 40 MG tablet, Take 40 mg by mouth daily, Disp: , Rfl:     ibuprofen (MOTRIN) 400 mg tablet, Take 1 tablet (400 mg total) by mouth every 6 (six) hours as needed for mild pain, Disp: 30 tablet, Rfl: 0    Levonorgestrel 19 5 MCG/DAY IUD, 1 each by Intrauterine route, Disp: , Rfl:     methocarbamol (ROBAXIN) 500 mg tablet, Take 1 tablet (500 mg total) by mouth 2 (two) times a day as needed for muscle spasms (Patient not taking: Reported on 2020), Disp: 20 tablet, Rfl: 0    Allergies   Allergen Reactions    No Known Allergies      Other reaction(s): Unknown       Physical Exam:    /87 (BP Location: Right arm, Patient Position: Sitting, Cuff Size: Standard)   Ht 4' 11" (1 499 m)   Wt 69 1 kg (152 lb 6 4 oz)   LMP 2019   BMI 30 78 kg/m²     Constitutional: normal, well developed, well nourished, alert, in no distress and non-toxic and no overt pain behavior   and overweight  Eyes: anicteric  HEENT: grossly intact  Neck: supple, symmetric, trachea midline and no masses   Pulmonary:even and unlabored  Cardiovascular:No edema or pitting edema present  Skin:Normal without rashes or lesions and well hydrated  Psychiatric:Mood and affect appropriate  Neurologic:Cranial Nerves II-XII grossly intact  Musculoskeletal:normal     Cervical Spine examination demonstrates  Decreased ROM secondary to pain with lateral rotation to the left/right and bending to the left/right, in addition to neck flexion  5/5 upper extremity strength in all muscle groups bilaterally  Negative Spurling's maneuver to the b/l Ue, sensitivity to light touch intact b/l Ue  Imaging  CERVICAL SPINE     INDICATION:   M50 20: Other cervical disc displacement, unspecified cervical region      COMPARISON:  None     VIEWS:  XR SPINE CERVICAL 2 OR 3 VW INJURY         FINDINGS:     No evidence of fracture       Anatomic alignment with cervical lordosis straightening      C4-C5 slight spondylolisthesis with flexion and C4-C5 slight retrolisthesis with extension      The intervertebral disc spaces are preserved      The prevertebral soft tissues are within normal limits        The lung apices are clear      IMPRESSION:     Cervical lordosis straightening      Slight C4-C5 instability      No acute osseous abnormality        MRI of the cervical spine   Comparison: None    Findings:     Alignment: The cervical vertebrae are normal in alignment  Marrow: There is no abnormal marrow signal on the STIR images  Vertebral bodies: Unremarkable    Disk Spaces: Degenerative disc disease at C5-C6    Cord: The spinal cord is normal in volume and signal intensity       C2-C3: No neuroforaminal or spinal canal stenosis  C3-C4: Mild facet degeneration with mild neuroforaminal narrowing  No  significant spinal canal narrowing  C4-C5:Mild facet degeneration  No significant neuroforaminal narrowing or spinal  canal stenosis    C5-C6:There is a 1 1 x 0 6 x 0 4 centimeter (CC x AP x TR) left paramedian disc  extrusion which likely impinges the exiting C6 nerve root  There is also mild  ventral cord flattening on the left  Mild to moderate right neuroforaminal  stenosis is also present  C6-C7:No neuroforaminal or spinal canal stenosis  C7-T1: No neuroforaminal or spinal canal stenosis  Posterior Fossa: Unremarkable    Flow Voids: Unremarkable    Additional Findings: There is a probable exophytic left thyroid lobe cystic  lesion       Impression: At C5-C6 there is a 1 1 cm left paramedian disc extrusion which likely impinges  the exiting C6 nerve root  There is also mild ventral cord flattening at this  level on the left  Surgical consultation is advised

## 2020-01-07 ENCOUNTER — EVALUATION (OUTPATIENT)
Dept: PHYSICAL THERAPY | Facility: CLINIC | Age: 40
End: 2020-01-07
Payer: COMMERCIAL

## 2020-01-07 ENCOUNTER — TRANSCRIBE ORDERS (OUTPATIENT)
Dept: PHYSICAL THERAPY | Facility: CLINIC | Age: 40
End: 2020-01-07

## 2020-01-07 DIAGNOSIS — M50.20 HERNIATED CERVICAL INTERVERTEBRAL DISC: Primary | ICD-10-CM

## 2020-01-07 PROCEDURE — 97012 MECHANICAL TRACTION THERAPY: CPT | Performed by: PHYSICAL THERAPIST

## 2020-01-07 PROCEDURE — 97110 THERAPEUTIC EXERCISES: CPT | Performed by: PHYSICAL THERAPIST

## 2020-01-07 NOTE — LETTER
January 7, 2020    [facility] [address] [place] [address]    Patient: Daiana Waddell   YOB: 1980   Date of Visit: 1/7/2020     Encounter Diagnosis     ICD-10-CM    1  Herniated cervical intervertebral disc M50 20        Dear Dr Brodie Shaikh:    Thank you for your recent referral of Daiana Waddell  Please review the attached evaluation summary from Park's recent visit  Please verify that you agree with the plan of care by signing the attached order  If you have any questions or concerns, please do not hesitate to call  I sincerely appreciate the opportunity to share in the care of one of your patients and hope to have another opportunity to work with you in the near future  Sincerely,    Cher Betancourt, PT      Referring Provider:      I certify that I have read the below Plan of Care and certify the need for these services furnished under this plan of treatment while under my care  Jemal Candelario MD  [address]  VIA Facsimile: [phone]                                        PT Re-Evaluation         Assessment  Assessment details: Daiana Waddell is a 35 y o  female who presents to outpatient PT with a  Herniated cervical intervertebral disc  (primary encounter diagnosis)  No further referral appears necessary at this time based upon examination results  Pt presents with decreased strength, ROM, balance, functional activity tolerance, and pain with movement in her cervical spine,  which is  limiting her ability to perform the aforementioned functional activities  Posture is grossly poor with forward head and rounded shoulders in sitting, Special testing unremarkable this session  Decreased sensation to T1 dermatome level  Etiologic factors include repetitive poor body mechanics  Prognosis is good given HEP compliance and PT 2/wk  Please contact me if you have any questions or recommendations    Thank you for the opportunity to share in  Park's care  RA 1/7    Park Browne has demonstrated decreased pain, increased strength, increased range of motion, and increased activity tolerance since starting physical therapy services  She reports an overall improvement of 60% thus far  Limitations in right cervical rotation, and side bending  She continues to present with pain, decreased strength, decreased range of motion, and decreased activity tolerance and would benefit from additional skilled physical therapy interventions to address impairments and maximize function  Impairments: abnormal or restricted ROM, abnormal movement, activity intolerance, difficulty understanding, lacks appropriate home exercise program, pain with function and poor posture     Symptom irritability: moderateUnderstanding of Dx/Px/POC: good   Prognosis: good    Goals  STG    1  In 4-6 weeks, patient will demonstrate a decrease in neck pain to 0/10 during functional activities  Progressing   2  In 4-6 weeks, patient will demonstrate an increase in cervical range of motion by 5 degrees   Progressing   3  In 4-6 weeks, patient will demonstrate an increase in Bilateral shoulder strength by 1/2 grade on MMT  Progressing     LTG    1  In 6-8 weeks, patient will be independent with their home exercise program  Progressing   2  In 6-8 weeks, patient will have zero limitations with B/L shoulder strength  Progressing   3  In 6-8 weeks, patient will have zero limitations with cervical and B/L shoulder ROM,   Progressing   4  In 6-8 weeks, patient will have zero limitations with return to work   Progressing   5    In 6-8 weeks, patient will have zero limitations with Sleeping   Progressing              Plan  Patient would benefit from: skilled physical therapy  Planned therapy interventions: manual therapy, therapeutic exercise, balance, balance/weight bearing training, body mechanics training, flexibility, functional ROM exercises, graded motor, graded exercise, home exercise program, therapeutic activities, stretching, strengthening, postural training, neuromuscular re-education and joint mobilization  Frequency: 2x week  Duration in weeks: 4  Plan of Care beginning date: 2020  Plan of Care expiration date: 2/3/2020  Treatment plan discussed with: patient        Subjective Evaluation    History of Present Illness  Mechanism of injury: Pt is a 35year old female who presents to outpatient physical therapy with a chief complaint of left arm/neck pain  Insidious onset  Pt reports that pain started in left arm  Was getting progressively worse, so she went to the ED  Had X-rays, and MRI of the neck, which shows herniated cervical disc  Reports pain in her left UE at the time of her initial evaluation  RA      The patient reports an improvement of 60 percent since beginning Skilled PT  She recently saw her MD, who is keeping her out of work for another 30 days  She reports that she does not have to take her muscle relaxers, as often as before   She has an appointment with pain management on , for injections  " My Dr Debbi Bajwa think the injections are going to work, he thinks I may need surgery "  Pain                                          RA    Current pain ratin                    7   At best pain ratin                     0   At worst pain ratin                  10  Quality: throbbing and tight  Relieving factors: medications  Progression: improved    Social Support    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, has to [employer] )  Hand dominance: right      Diagnostic Tests  X-ray: abnormal  MRI studies: abnormal  Treatments  Previous treatment: medication  Current treatment: physical therapy  Patient Goals  Patient goals for therapy: increased strength, decreased pain and increased motion          Objective      Active Range of Motion   Cervical/Thoracic Spine       Cervical 1/7   Flexion: 30 degrees                      25 degrees   Extension: 30 degrees                  42 degrees                  Left lateral flexion: 35 degrees      45 degrees      Right lateral flexion: 35 degrees    35 degrees      Left rotation: 80 degrees                80 degrees   Right rotation: 80 degrees              60 degrees          Left Shoulder   Flexion: WFL  Abduction: WFL  External rotation 90°: WFL  Internal rotation 90°: WFL    Right Shoulder   Flexion: WFL  Abduction: WFL  External rotation 90°: WFL  Internal rotation 90°: WFL    Additional Active Range of Motion Details  Compression/Distraction -  Negative     Spurling - Negative     VBI - Negative     Sharp Nya - Negative     Decreased Sensation to Left T1    Strength/Myotome Testing     Left Shoulder     Planes of Motion                             RA 1/7   Flexion: 4                                          4+/5                         Abduction: 4                                   4+/5   External rotation at 90°: 4                4+/5   Internal rotation at 90°: 4                  4+/5     Right Shoulder     Planes of Motion                            RA 1/7   Flexion: 4                                         4+/5                                    Abduction: 4             4+/5   External rotation at 90°: 4                4+/5   Internal rotation at 90°: 4          4+/5     Modified treatment this session, due to patient arriving 25 minutes late         Manual  12/31 1/3 1/7 12/24 12/26   SOR                                             Exercise Diary  12/31 1/3 1/7 12/24 12/26   Cervical AROM   5" x10ea  Rot/flex/  ext  5" x10   rot 5" x10  Rot   Cervical Isometrics   5" x10 ext with towel  5" x10 ext with towel 5" x10 ext with towel   Thoracic Ext Over chair   5" x10 with towel  5" x10  With towel 5" x10 with towel   Doorway Stretch   10" x5   10" x5   High hands 10" x5   High hands   Scap Squeeze   5" x10      MTP/LTP Green TB  5" 2x10 ea  Red TB  5" x10 Green TB 2x10 5"   TB ER/IR        Chin Tucks   5" x10  5" x5 5" x10   UBE  120 rpm 5' fwd/5' retro 110 rpm  5'fwd/5'retro 120 rpm  5'fwd/5'retro 120 rpm  5'fwd/5'retro 120 rpm  5'fwd/5'retro                                                                                               Modalities  12/31 1/3 1/7 12/24 12/26   Cervical Traction  Static 15# pull @ 10* w/ a 30% rope speed, 3 steps up/down x 10 min hold Static 15# pull @ 10* w/ a 30% rope speed, 3 steps up/down x 10 min hold Static 15# pull @ 10*, 30% rope speed, 3 steps up/down x  10 min  Static 15# pull @ 10*, 30% rope speed, 3 steps up/down x  10 min Static 15# pull @ 10*, 30% rope speed, 3 steps up/down x  10 min hold

## 2020-01-07 NOTE — PROGRESS NOTES
PT Re-Evaluation         Assessment  Assessment details: Rai Hernadez is a 35 y o  female who presents to outpatient PT with a  Herniated cervical intervertebral disc  (primary encounter diagnosis)  No further referral appears necessary at this time based upon examination results  Pt presents with decreased strength, ROM, balance, functional activity tolerance, and pain with movement in her cervical spine,  which is  limiting her ability to perform the aforementioned functional activities  Posture is grossly poor with forward head and rounded shoulders in sitting, Special testing unremarkable this session  Decreased sensation to T1 dermatome level  Etiologic factors include repetitive poor body mechanics  Prognosis is good given HEP compliance and PT 2/wk  Please contact me if you have any questions or recommendations  Thank you for the opportunity to share in  Park's care  RA 1/7    Parkmaximo Boxsusu Nagel has demonstrated decreased pain, increased strength, increased range of motion, and increased activity tolerance since starting physical therapy services  She reports an overall improvement of 60% thus far  Limitations in right cervical rotation, and side bending  She continues to present with pain, decreased strength, decreased range of motion, and decreased activity tolerance and would benefit from additional skilled physical therapy interventions to address impairments and maximize function  Impairments: abnormal or restricted ROM, abnormal movement, activity intolerance, difficulty understanding, lacks appropriate home exercise program, pain with function and poor posture     Symptom irritability: moderateUnderstanding of Dx/Px/POC: good   Prognosis: good    Goals  STG    1  In 4-6 weeks, patient will demonstrate a decrease in neck pain to 0/10 during functional activities  Progressing   2    In 4-6 weeks, patient will demonstrate an increase in cervical range of motion by 5 degrees   Progressing   3  In 4-6 weeks, patient will demonstrate an increase in Bilateral shoulder strength by 1/2 grade on MMT  Progressing     LTG    1  In 6-8 weeks, patient will be independent with their home exercise program  Progressing   2  In 6-8 weeks, patient will have zero limitations with B/L shoulder strength  Progressing   3  In 6-8 weeks, patient will have zero limitations with cervical and B/L shoulder ROM,   Progressing   4  In 6-8 weeks, patient will have zero limitations with return to work   Progressing   5  In 6-8 weeks, patient will have zero limitations with Sleeping   Progressing              Plan  Patient would benefit from: skilled physical therapy  Planned therapy interventions: manual therapy, therapeutic exercise, balance, balance/weight bearing training, body mechanics training, flexibility, functional ROM exercises, graded motor, graded exercise, home exercise program, therapeutic activities, stretching, strengthening, postural training, neuromuscular re-education and joint mobilization  Frequency: 2x week  Duration in weeks: 4  Plan of Care beginning date: 1/7/2020  Plan of Care expiration date: 2/3/2020  Treatment plan discussed with: patient        Subjective Evaluation    History of Present Illness  Mechanism of injury: Pt is a 35year old female who presents to outpatient physical therapy with a chief complaint of left arm/neck pain  Insidious onset  Pt reports that pain started in left arm  Was getting progressively worse, so she went to the ED  Had X-rays, and MRI of the neck, which shows herniated cervical disc  Reports pain in her left UE at the time of her initial evaluation  RA 1/7     The patient reports an improvement of 60 percent since beginning Skilled PT  She recently saw her MD, who is keeping her out of work for another 30 days  She reports that she does not have to take her muscle relaxers, as often as before   She has an appointment with pain management on , for injections  " My Dr Rebeka Payton think the injections are going to work, he thinks I may need surgery "  Pain                                          RA    Current pain ratin                    7   At best pain ratin                     0   At worst pain ratin                  10  Quality: throbbing and tight  Relieving factors: medications  Progression: improved    Social Support    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, has to [employer] )  Hand dominance: right      Diagnostic Tests  X-ray: abnormal  MRI studies: abnormal  Treatments  Previous treatment: medication  Current treatment: physical therapy  Patient Goals  Patient goals for therapy: increased strength, decreased pain and increased motion          Objective      Active Range of Motion   Cervical/Thoracic Spine       Cervical                                                               Flexion: 30 degrees                      25 degrees   Extension: 30 degrees                  42 degrees                  Left lateral flexion: 35 degrees      45 degrees      Right lateral flexion: 35 degrees    35 degrees      Left rotation: 80 degrees                80 degrees   Right rotation: 80 degrees              60 degrees          Left Shoulder   Flexion: WFL  Abduction: WFL  External rotation 90°: WFL  Internal rotation 90°: WFL    Right Shoulder   Flexion: WFL  Abduction: WFL  External rotation 90°: WFL  Internal rotation 90°: WFL    Additional Active Range of Motion Details  Compression/Distraction -  Negative     Spurling - Negative     VBI - Negative     Sharp Nya - Negative     Decreased Sensation to Left T1    Strength/Myotome Testing     Left Shoulder     Planes of Motion                             RA    Flexion: 4                                          4+/5                         Abduction: 4                                   4+/5   External rotation at 90°: 4                4+/5 Internal rotation at 90°: 4                  4+/5     Right Shoulder     Planes of Motion                            RA 1/7   Flexion: 4                                         4+/5                                    Abduction: 4             4+/5   External rotation at 90°: 4                4+/5   Internal rotation at 90°: 4          4+/5     Modified treatment this session, due to patient arriving 25 minutes late         Manual  12/31 1/3 1/7 12/24 12/26   SOR                                             Exercise Diary  12/31 1/3 1/7 12/24 12/26   Cervical AROM   5" x10ea  Rot/flex/  ext  5" x10   rot 5" x10  Rot   Cervical Isometrics   5" x10 ext with towel  5" x10 ext with towel 5" x10 ext with towel   Thoracic Ext Over chair   5" x10 with towel  5" x10  With towel 5" x10 with towel   Doorway Stretch   10" x5   10" x5   High hands 10" x5   High hands   Scap Squeeze   5" x10      MTP/LTP  Green TB  5" 2x10 ea  Red TB  5" x10 Green TB 2x10 5"   TB ER/IR        Chin Tucks   5" x10  5" x5 5" x10   UBE  120 rpm 5' fwd/5' retro 110 rpm  5'fwd/5'retro 120 rpm  5'fwd/5'retro 120 rpm  5'fwd/5'retro 120 rpm  5'fwd/5'retro                                                                                               Modalities  12/31 1/3 1/7 12/24 12/26   Cervical Traction  Static 15# pull @ 10* w/ a 30% rope speed, 3 steps up/down x 10 min hold Static 15# pull @ 10* w/ a 30% rope speed, 3 steps up/down x 10 min hold Static 15# pull @ 10*, 30% rope speed, 3 steps up/down x  10 min  Static 15# pull @ 10*, 30% rope speed, 3 steps up/down x  10 min Static 15# pull @ 10*, 30% rope speed, 3 steps up/down x  10 min hold

## 2020-01-09 ENCOUNTER — OFFICE VISIT (OUTPATIENT)
Dept: PHYSICAL THERAPY | Facility: CLINIC | Age: 40
End: 2020-01-09
Payer: COMMERCIAL

## 2020-01-09 DIAGNOSIS — M50.20 HERNIATED CERVICAL INTERVERTEBRAL DISC: Primary | ICD-10-CM

## 2020-01-09 PROCEDURE — 97012 MECHANICAL TRACTION THERAPY: CPT

## 2020-01-09 PROCEDURE — 97110 THERAPEUTIC EXERCISES: CPT

## 2020-01-13 ENCOUNTER — TELEPHONE (OUTPATIENT)
Dept: OTOLARYNGOLOGY | Facility: CLINIC | Age: 40
End: 2020-01-13

## 2020-01-14 ENCOUNTER — HOSPITAL ENCOUNTER (OUTPATIENT)
Dept: ULTRASOUND IMAGING | Facility: HOSPITAL | Age: 40
Discharge: HOME | End: 2020-01-14
Payer: COMMERCIAL

## 2020-01-14 ENCOUNTER — OFFICE VISIT (OUTPATIENT)
Dept: OTOLARYNGOLOGY | Facility: CLINIC | Age: 40
End: 2020-01-14
Payer: COMMERCIAL

## 2020-01-14 ENCOUNTER — APPOINTMENT (OUTPATIENT)
Dept: PHYSICAL THERAPY | Facility: CLINIC | Age: 40
End: 2020-01-14
Payer: COMMERCIAL

## 2020-01-14 ENCOUNTER — TELEPHONE (OUTPATIENT)
Dept: OTOLARYNGOLOGY | Facility: CLINIC | Age: 40
End: 2020-01-14

## 2020-01-14 VITALS
HEART RATE: 96 BPM | WEIGHT: 151 LBS | BODY MASS INDEX: 30.44 KG/M2 | DIASTOLIC BLOOD PRESSURE: 82 MMHG | SYSTOLIC BLOOD PRESSURE: 122 MMHG | OXYGEN SATURATION: 98 % | HEIGHT: 59 IN

## 2020-01-14 DIAGNOSIS — E04.1 THYROID NODULE: ICD-10-CM

## 2020-01-14 DIAGNOSIS — E04.1 THYROID NODULE: Primary | ICD-10-CM

## 2020-01-14 PROCEDURE — 99243 OFF/OP CNSLTJ NEW/EST LOW 30: CPT | Performed by: OTOLARYNGOLOGY

## 2020-01-14 PROCEDURE — 76536 US EXAM OF HEAD AND NECK: CPT

## 2020-01-14 NOTE — PROGRESS NOTES
Daily Note     Today's date: 2020  Patient name: Tiarra Shine  : 1980  MRN: [id number]  Referring provider: aJciel Mejias MD  Dx: No diagnosis found  Subjective: ***      Objective: See treatment diary below      Assessment: Tolerated treatment {Tolerated treatment :[id number]}   Patient {assessment:[id number]}      Plan: {PLAN:6477250364}     Precautions:  None RA due        Manual  12/31 1/3 1/9 1/14 12/26   SOR                                             Exercise Diary  12/31 1/3 1/9 1/14 12/26   Cervical AROM  5" x10 5" x10ea  Rot/flex/  ext 5" x10 ea   Rot/flex/  ext  5" x10  Rot   Cervical Isometrics   5" x10 ext with towel   5" x10 ext with towel   Thoracic Ext Over chair   5" x10 with towel   5" x10 with towel   Doorway Stretch   10" x5  10" x5  High hands  10" x5   High hands   Scap Squeeze   5" x10 10" x5     MTP/LTP  Green TB  5" 2x10 ea Green TB  5" 2x10 ea  Green TB 2x10 5"   TB ER/IR        Chin Tucks   5" x10 5" x10  5" x10   UBE  120 rpm 5' fwd/5' retro 110 rpm  5'fwd/5'retro 110rpm x5'fwd/  5'retro  120 rpm  5'fwd/5'retro                                                                                               Modalities  12/31 1/3 1/9 12/24 12/26   Cervical Traction  Static 15# pull @ 10* w/ a 30% rope speed, 3 steps up/down x 10 min hold Static 15# pull @ 10* w/ a 30% rope speed, 3 steps up/down x 10 min hold Static 15# pull @ 10*, 30% rope speed, 3 steps up/down x  10 min  Static 15# pull @ 10*, 30% rope speed, 3 steps up/down x  10 min Static 15# pull @ 10*, 30% rope speed, 3 steps up/down x  10 min hold

## 2020-01-14 NOTE — TELEPHONE ENCOUNTER
2nd time calling and for referral  Spoke to [facility] yesterday and again today

## 2020-01-14 NOTE — PROGRESS NOTES
Consultation - Otolaryngology - Head and Neck Surgery  Facial Plastic and Reconstructive Surgery  Park Hurtado 35 y o  female MRN: [id number]  Encounter: [id number]        Assessment/Plan:  1  Thyroid nodule  US thyroid       We discussed at length the nature of thyroid nodules and the classification of the nodules with TIRADS  Her prior ultrasound did not comment on the characteristics of the nodule aside from the size  Prior to any biopsy recommend she have another ultrasound done preferably at [facility] this time  Depending on the nodules and classifications we can consider biopsy or aspiration of the left-sided nodule  At this point she agrees to proceed with ultrasound and follow-up afterwards to review results discussed options  History of Present Illness   Physician Requesting Consult: Anastacio Childress MD  Reason for Consult / Principal Problem:  Thyroid nodules  HPI: Cammie Schrader is a 35y o  year old female who presents for evaluation of thyroid nodules  She was previously followed with another physician however she was not satisfied with the care she was receiving  She had an ultrasound of her thyroid done in April 2019 which showed bilateral nodules  There was a left nodule that was predominantly cystic measuring 3 8 x 2 0 x 2 0 cm  She was also noted a right-sided nodule measuring 1 8 x 0 9 x 0 9 cm  Unfortunately the ultrasound was done without using TIRAD classification  It was recommended that she have a biopsy of the right nodule and drainage of the left cystic nodule  She denies any symptoms of mass effect including shortness of breath or difficulty swallowing  No family history of thyroid cancers  No personal history of radiation exposure  Review of systems:  ROS was performed by the MA and documented in the attached note  This was reviewed personally      Historical Information   Past Medical History:   Diagnosis Date    Allergic     Asthma     Herniated disc, cervical     Pinched nerve in shoulder, right      Past Surgical History:   Procedure Laterality Date     SECTION       Social History   Social History     Substance and Sexual Activity   Alcohol Use No     Social History     Substance and Sexual Activity   Drug Use Not Currently    Types: Marijuana     Social History     Tobacco Use   Smoking Status Current Every Day Smoker    Packs/day: 0 50    Types: Cigarettes   Smokeless Tobacco Never Used     Family History:   Family History   Problem Relation Age of Onset    Diabetes Mother     Bone cancer Father     Diabetes Sister        Current Outpatient Medications on File Prior to Visit   Medication Sig    ibuprofen (MOTRIN) 400 mg tablet Take 1 tablet (400 mg total) by mouth every 6 (six) hours as needed for mild pain    Levonorgestrel 19 5 MCG/DAY IUD 1 each by Intrauterine route    methocarbamol (ROBAXIN) 500 mg tablet Take 1 tablet (500 mg total) by mouth 2 (two) times a day as needed for muscle spasms    [DISCONTINUED] famotidine (PEPCID) 40 MG tablet Take 40 mg by mouth daily     No current facility-administered medications on file prior to visit  Allergies   Allergen Reactions    No Known Allergies      Other reaction(s): Unknown       Vitals:    20 0859   BP: 122/82   Pulse: 96   SpO2: 98%       Physical Exam   Constitutional: Oriented to person, place, and time  Well-developed and well-nourished, no apparent distress, non-toxic appearance  Cooperative, able to hear and answer questions without difficulty  Voice: Normal voice quality  Head: Normocephalic, atraumatic  No scars, masses or lesions  Face: Symmetric, no edema, no sinus tenderness  Eyes: Vision grossly intact, extra-ocular movement intact  Ears: External ears normal  Tympanic membranes intact with intact normal landmarks  No post-auricular erythema or tenderness  Nose: Septum intact, nares clear  Mucosa moist, turbinates well appearing    No crusting, polyps or discharge evident  Oral cavity: Dentition intact  Mucosa moist, lips without lesions or masses  Tongue mobile, floor of mouth soft and flat  Hard palate intact  No masses or lesions  Oropharynx: Uvula is midline, soft palate intact without lesion or mass  Oropharyngeal inlet without obstruction  Tonsils unremarkable  Posterior pharyngeal wall clear  No masses or lesions  Salivary glands:  Parotid glands and submandibular glands symmetric, no enlargement or tenderness  Neck: Normal laryngeal elevation with swallow  Trachea midline  No masses or lesions  No palpable adenopathy  Thyroid:  Bilateral nodules, left > than right  Pulmonary/Chest: Normal effort and rate  No respiratory distress  No stertor or stridor  Musculoskeletal: Normal range of motion  Neurological: Cranial nerves 2-12 intact  Skin: Skin is warm and dry  Psychiatric: Normal mood and affect  Imaging Studies: I have personally reviewed pertinent reports  Lab Results: I have personally reviewed pertinent lab results  Greater than 40 minutes were spent in consultation  More than 1/2 of that time was spent in counselling and coordination of care

## 2020-01-15 ENCOUNTER — OFFICE VISIT (OUTPATIENT)
Dept: OBGYN CLINIC | Facility: MEDICAL CENTER | Age: 40
End: 2020-01-15
Payer: COMMERCIAL

## 2020-01-15 ENCOUNTER — TELEPHONE (OUTPATIENT)
Dept: PAIN MEDICINE | Facility: CLINIC | Age: 40
End: 2020-01-15

## 2020-01-15 ENCOUNTER — TRANSCRIBE ORDERS (OUTPATIENT)
Dept: ADMINISTRATIVE | Facility: HOSPITAL | Age: 40
End: 2020-01-15

## 2020-01-15 VITALS
BODY MASS INDEX: 28.32 KG/M2 | HEIGHT: 61 IN | DIASTOLIC BLOOD PRESSURE: 80 MMHG | WEIGHT: 150 LBS | SYSTOLIC BLOOD PRESSURE: 110 MMHG

## 2020-01-15 DIAGNOSIS — Z12.31 VISIT FOR SCREENING MAMMOGRAM: Primary | ICD-10-CM

## 2020-01-15 DIAGNOSIS — Z01.419 ENCNTR FOR GYN EXAM (GENERAL) (ROUTINE) W/O ABN FINDINGS: Primary | ICD-10-CM

## 2020-01-15 DIAGNOSIS — Z12.31 ENCOUNTER FOR SCREENING MAMMOGRAM FOR MALIGNANT NEOPLASM OF BREAST: ICD-10-CM

## 2020-01-15 PROCEDURE — 99386 PREV VISIT NEW AGE 40-64: CPT | Performed by: NURSE PRACTITIONER

## 2020-01-15 NOTE — TELEPHONE ENCOUNTER
Pt called and stated she missed the call about the procedure being scheduled  Pt disconnected prior to me being able to let her know I would send a task      Pt would like a callback at [phone]

## 2020-01-15 NOTE — PROGRESS NOTES
ASSESSMENT & PLAN: Margot Lovell is a 35 y o  [id number] with normal gynecologic exam     1   Routine well woman exam done today  2  Pap and HPV:  The patient's last pap and hpv was 2018  It was normal     Pap and cotesting was not done today  Current ASCCP Guidelines reviewed  3   Mammogram ordered  4  The following were reviewed in today's visit: breast self exam, mammography screening ordered, adequate intake of calcium and vitamin D, exercise, healthy diet and tobacco cessation  5  Rto yearly gyn exam     CC:  Annual Gynecologic Examination    HPI: Margot Lovell is a 35 y o  [id number] who presents for annual gynecologic examination  She has the following concerns: none  Wants iud string checked  Has Liljazlyn since 2017  Health Maintenance:    She wears her seatbelt routinely  She does perform regular monthly self breast exams  She feels safe at home  Patient Active Problem List   Diagnosis    Acquired spondylolisthesis of cervical vertebra    Neck pain    Cervical radiculopathy    Cervical disc disorder with radiculopathy of mid-cervical region    Thyroid nodule       Past Medical History:   Diagnosis Date    Allergic     Asthma     Herniated disc, cervical     Pinched nerve in shoulder, right        Past Surgical History:   Procedure Laterality Date     SECTION         Past OB/Gyn History:  OB History        2    Para   2    Term   2            AB        Living   2       SAB        TAB        Ectopic        Multiple        Live Births   2                  Pt does not have menstrual issues  Has spotting q month     History of sexually transmitted infection: No   History of abnormal pap smears: No      Patient is currently sexually active  heterosexual  The current method of family planning is IUD      Family History   Problem Relation Age of Onset    Diabetes Mother     Heart disease Mother     Bone cancer Father     Diabetes Brother        Social History:  Social History     Socioeconomic History    Marital status: Single     Spouse name: Not on file    Number of children: Not on file    Years of education: Not on file    Highest education level: Not on file   Occupational History    Not on file   Social Needs    Financial resource strain: Not on file    Food insecurity:     Worry: Not on file     Inability: Not on file    Transportation needs:     Medical: Not on file     Non-medical: Not on file   Tobacco Use    Smoking status: Current Every Day Smoker     Packs/day: 0 50     Types: Cigarettes    Smokeless tobacco: Never Used   Substance and Sexual Activity    Alcohol use: No    Drug use: Not Currently     Types: Marijuana    Sexual activity: Yes     Partners: Male     Birth control/protection: IUD   Lifestyle    Physical activity:     Days per week: Not on file     Minutes per session: Not on file    Stress: Not on file   Relationships    Social connections:     Talks on phone: Not on file     Gets together: Not on file     Attends Restorationist service: Not on file     Active member of club or organization: Not on file     Attends meetings of clubs or organizations: Not on file     Relationship status: Not on file    Intimate partner violence:     Fear of current or ex partner: Not on file     Emotionally abused: Not on file     Physically abused: Not on file     Forced sexual activity: Not on file   Other Topics Concern    Not on file   Social History Narrative    Not on file     Presently lives with boyfriend 13 yo daughter and 2 yo son     Patient is single  Patient is currently employed at a state [employer]    Allergies   Allergen Reactions    No Known Allergies      Other reaction(s): Unknown       Current Outpatient Medications:     ibuprofen (MOTRIN) 400 mg tablet, Take 1 tablet (400 mg total) by mouth every 6 (six) hours as needed for mild pain, Disp: 30 tablet, Rfl: 0    Levonorgestrel 19 5 MCG/DAY IUD, 1 each by Intrauterine route, Disp: , Rfl:     methocarbamol (ROBAXIN) 500 mg tablet, Take 1 tablet (500 mg total) by mouth 2 (two) times a day as needed for muscle spasms, Disp: 20 tablet, Rfl: 0    Review of Systems:    Review of Systems  Constitutional :no fever, feels well, no tiredness, no recent weight gain or loss  ENT: no ear ache, no loss of hearing, no nosebleeds or nasal discharge, no sore throat or hoarseness  Cardiovascular: no complaints of slow or fast heart beat, no chest pain, no palpitations, no leg claudication or lower extremity edema  Respiratory: no complaints of shortness of shortness of breath, no PARNELL  Breasts:no complaints of breast pain, breast lump, or nipple discharge  Gastrointestinal: no complaints of abdominal pain, constipation, nausea, vomiting, or diarrhea or bloody stools  Genitourinary : no complaints of dysuria, incontinence, pelvic pain, no dysmenorrhea, vaginal discharge or abnormal vaginal bleeding and as noted in HPI  Musculoskeletal: no complaints of arthralgia, no myalgia, no joint swelling or stiffness, no limb pain or swelling  Integumentary: no complaints of skin rash or lesion, itching or dry skin  Neurological: no complaints of headache, no confusion, no numbness or tingling, no dizziness or fainting    Objective      /80   Ht 5' 1" (1 549 m)   Wt 68 kg (150 lb)   LMP 12/27/2019   BMI 28 34 kg/m²     General:   appears stated age, cooperative, alert normal mood and affect   Neck: normal, supple,trachea midline, no masses   Heart: regular rate and rhythm, S1, S2 normal, no murmur, click, rub or gallop   Lungs: clear to auscultation bilaterally   Breasts: normal appearance, no masses or tenderness   Abdomen: soft, non-tender, without masses or organomegaly   Vulva: normal   Vagina: normal vagina   Urethra: normal   Cervix: Normal, no discharge  iud string seen     Uterus: normal size, contour, position, consistency, mobility, non-tender   Adnexa: no mass, fullness, tenderness Lymphatic palpation of lymph nodes in neck, axilla, groin and/or other locations: no lymphadenopathy or masses noted   Skin normal skin turgor and no rashes     Psychiatric orientation to person, place, and time: normal  mood and affect: normal

## 2020-01-15 NOTE — TELEPHONE ENCOUNTER
I tried calling patient back to discuss who she got a call from as I have not reached out to this patient  Patient did not answer  I will try calling again later

## 2020-01-16 ENCOUNTER — OFFICE VISIT (OUTPATIENT)
Dept: PHYSICAL THERAPY | Facility: CLINIC | Age: 40
End: 2020-01-16
Payer: COMMERCIAL

## 2020-01-16 DIAGNOSIS — M50.20 HERNIATED CERVICAL INTERVERTEBRAL DISC: Primary | ICD-10-CM

## 2020-01-16 PROCEDURE — 97012 MECHANICAL TRACTION THERAPY: CPT

## 2020-01-16 PROCEDURE — 97110 THERAPEUTIC EXERCISES: CPT

## 2020-01-16 NOTE — PROGRESS NOTES
Daily Note     Today's date: 2020  Patient name: Kenyetta Deluna  : 1980  MRN: [id number]  Referring provider: Lei Buitrago MD  Dx:   Encounter Diagnosis     ICD-10-CM    1  Herniated cervical intervertebral disc M50 20                   Subjective: "My neck feels stiff today "      Objective: See treatment diary below      Assessment: Tolerated treatment well  Patient demonstrated fatigue post treatment and would benefit from continued PT  No adverse affect to mechanical traction this date  Plan: Continue per plan of care        Precautions:  None RA due        Manual  12/31 1/3 1/9 1/16 12/26   SOR                                             Exercise Diary  12/31 1/3 1/9 1/16 12/26   Cervical AROM  5" x10 5" x10ea  Rot/flex/  ext 5" x10 ea   Rot/flex/  ext 5" x10   Rot/flex/ ext 5" x10  Rot   Cervical Isometrics   5" x10 ext with towel   5" x10 ext with towel   Thoracic Ext Over chair   5" x10 with towel  5" x10 with towel 5" x10 with towel   Doorway Stretch   10" x5  10" x5  High hands 30" x3   High hands 10" x5   High hands   Scap Squeeze   5" x10 10" x5     MTP/LTP  Green TB  5" 2x10 ea Green TB  5" 2x10 ea Green TB  5" 2x10 Green TB 2x10 5"   TB ER/IR        Chin Tucks   5" x10 5" x10 5" x10 5" x10   UBE  120 rpm 5' fwd/5' retro 110 rpm  5'fwd/5'retro 110rpm x5'fwd/  5'retro 110 rpm  5'fwd/5'retro 120 rpm  5'fwd/5'retro                                                                                               Modalities  12/31 1/3 1/9 1/16 12/26   Cervical Traction  Static 15# pull @ 10* w/ a 30% rope speed, 3 steps up/down x 10 min hold Static 15# pull @ 10* w/ a 30% rope speed, 3 steps up/down x 10 min hold Static 15# pull @ 10*, 30% rope speed, 3 steps up/down x  10 min  Static 15# pull @ 10*, 30% rope speed, 3 steps up/down x  10 min Static 15# pull @ 10*, 30% rope speed, 3 steps up/down x  10 min hold

## 2020-01-19 ENCOUNTER — HOSPITAL ENCOUNTER (EMERGENCY)
Facility: HOSPITAL | Age: 40
Discharge: HOME | End: 2020-01-19
Attending: EMERGENCY MEDICINE | Admitting: EMERGENCY MEDICINE
Payer: COMMERCIAL

## 2020-01-19 VITALS
DIASTOLIC BLOOD PRESSURE: 94 MMHG | OXYGEN SATURATION: 100 % | TEMPERATURE: 97.9 F | BODY MASS INDEX: 30.24 KG/M2 | HEIGHT: 59 IN | HEART RATE: 65 BPM | RESPIRATION RATE: 16 BRPM | SYSTOLIC BLOOD PRESSURE: 180 MMHG | WEIGHT: 150 LBS

## 2020-01-19 DIAGNOSIS — H53.9 TRANSIENT VISUAL DISTURBANCE: Primary | ICD-10-CM

## 2020-01-19 PROCEDURE — 99281 EMR DPT VST MAYX REQ PHY/QHP: CPT | Performed by: EMERGENCY MEDICINE

## 2020-01-19 PROCEDURE — 99284 EMERGENCY DEPT VISIT MOD MDM: CPT

## 2020-01-19 NOTE — ED PROVIDER NOTES
History  Chief Complaint   Patient presents with    Visual Field Change     patient states since yesterday she has had visual disturbances, patient states she has no eye doctor and wears no prescription lenses, patient denies slurred speech, dizziness, or weakness     78-year-old female presents to just get checked out   Since she has another individual here with a toothache  She states that last night before she went to bed the closed caption on her TV seem to be a little bit blurry  When she woke this morning there was no blurriness  She had no stroke-like symptoms  She has no headache  She has no neck pain  She has no paresthesias in her arms or legs  She has no focal neurologic deficits  Patient's visual acuity when I checked it myself was 20/13 bilaterally  She denies any symptoms at this time  History provided by:  Patient  Eye Problem   Location:  Both eyes  Quality: 3D type vision  Now resolved  Severity:  Mild  Onset quality:  Gradual  Timing:  Rare  Progression:  Resolved  Chronicity:  New  Context: not burn, not chemical exposure and not contact lens problem    Relieved by: Sleep  Worsened by:  Nothing  Ineffective treatments:  None tried  Associated symptoms: blurred vision    Associated symptoms: no crusting, no decreased vision and no discharge    Risk factors: no conjunctival hemorrhage and no exposure to pinkeye        Prior to Admission Medications   Prescriptions Last Dose Informant Patient Reported? Taking?    Levonorgestrel 19 5 MCG/DAY IUD  Self Yes No   Si each by Intrauterine route   ibuprofen (MOTRIN) 400 mg tablet  Self No No   Sig: Take 1 tablet (400 mg total) by mouth every 6 (six) hours as needed for mild pain   methocarbamol (ROBAXIN) 500 mg tablet  Self No No   Sig: Take 1 tablet (500 mg total) by mouth 2 (two) times a day as needed for muscle spasms      Facility-Administered Medications: None       Past Medical History:   Diagnosis Date    Allergic     Asthma     Herniated disc, cervical     Pinched nerve in shoulder, right        Past Surgical History:   Procedure Laterality Date     SECTION         Family History   Problem Relation Age of Onset    Diabetes Mother     Heart disease Mother     Bone cancer Father     Diabetes Brother      I have reviewed and agree with the history as documented  Social History     Tobacco Use    Smoking status: Current Every Day Smoker     Packs/day: 0 50     Types: Cigarettes    Smokeless tobacco: Never Used   Substance Use Topics    Alcohol use: No    Drug use: Not Currently     Types: Marijuana        Review of Systems   Constitutional: Negative  Eyes: Positive for blurred vision  Negative for discharge  All other systems reviewed and are negative  Physical Exam  Physical Exam   Constitutional: She appears well-developed and well-nourished  HENT:   Head: Normocephalic  Right Ear: External ear normal    Left Ear: External ear normal    Eyes: Pupils are equal, round, and reactive to light  Right eye exhibits no discharge  Left eye exhibits no discharge  Neck: Normal range of motion  Cardiovascular: Normal rate, regular rhythm and normal heart sounds  Pulmonary/Chest: Effort normal  No stridor  No respiratory distress  She has no wheezes  Abdominal: Soft  She exhibits no distension  There is no tenderness  There is no guarding  Musculoskeletal: Normal range of motion  She exhibits no edema or deformity  Neurological: She is alert  She displays normal reflexes  No cranial nerve deficit  Coordination normal    Skin: Skin is warm  Capillary refill takes less than 2 seconds  No erythema  Psychiatric: She has a normal mood and affect  Her behavior is normal    Vitals reviewed        Vital Signs  ED Triage Vitals [20 1140]   Temperature Pulse Respirations Blood Pressure SpO2   97 9 °F (36 6 °C) 65 16 (!) 180/94 100 %      Temp src Heart Rate Source Patient Position - Orthostatic VS BP Location FiO2 (%)   -- Monitor Lying Right arm --      Pain Score       No Pain           Vitals:    01/19/20 1140   BP: (!) 180/94   Pulse: 65   Patient Position - Orthostatic VS: Lying         Visual Acuity  Visual Acuity      Most Recent Value   Visual acuity R eye is  20/20   Visual acuity Left eye is  20/20   Visual acuity in both eyes is  20/20   Wearing corrective eyewear/lenses? No   L Pupil Size (mm)  3   R Pupil Size (mm)  3   L Pupil Shape  Round   R Pupil Shape  Round          ED Medications  Medications - No data to display    Diagnostic Studies  Results Reviewed     None                 No orders to display              Procedures  Procedures         ED Course                               MDM      Disposition  Final diagnoses:   Transient visual disturbance     Time reflects when diagnosis was documented in both MDM as applicable and the Disposition within this note     Time User Action Codes Description Comment    1/19/2020 11:57 AM Terese Gardner Add [H53 9] Transient visual disturbance       ED Disposition     ED Disposition Condition Date/Time Comment    Discharge Stable Sun Jan 19, 2020 11:57 AM Ai Cao discharge to home/self care  Follow-up Information    None         Patient's Medications   Discharge Prescriptions    No medications on file     No discharge procedures on file      ED Provider  Electronically Signed by           Paulo Steiner DO  01/19/20 1201

## 2020-01-20 ENCOUNTER — APPOINTMENT (OUTPATIENT)
Dept: PHYSICAL THERAPY | Facility: CLINIC | Age: 40
End: 2020-01-20
Payer: COMMERCIAL

## 2020-01-21 ENCOUNTER — APPOINTMENT (EMERGENCY)
Dept: CT IMAGING | Facility: HOSPITAL | Age: 40
End: 2020-01-21
Payer: COMMERCIAL

## 2020-01-21 ENCOUNTER — OFFICE VISIT (OUTPATIENT)
Dept: PHYSICAL THERAPY | Facility: CLINIC | Age: 40
End: 2020-01-21
Payer: COMMERCIAL

## 2020-01-21 ENCOUNTER — HOSPITAL ENCOUNTER (EMERGENCY)
Facility: HOSPITAL | Age: 40
Discharge: HOME | End: 2020-01-21
Attending: EMERGENCY MEDICINE | Admitting: EMERGENCY MEDICINE
Payer: COMMERCIAL

## 2020-01-21 VITALS
DIASTOLIC BLOOD PRESSURE: 84 MMHG | RESPIRATION RATE: 18 BRPM | SYSTOLIC BLOOD PRESSURE: 156 MMHG | BODY MASS INDEX: 30.24 KG/M2 | HEIGHT: 59 IN | OXYGEN SATURATION: 99 % | TEMPERATURE: 97.7 F | HEART RATE: 86 BPM | WEIGHT: 150 LBS

## 2020-01-21 DIAGNOSIS — R51.9 ACUTE NONINTRACTABLE HEADACHE, UNSPECIFIED HEADACHE TYPE: Primary | ICD-10-CM

## 2020-01-21 DIAGNOSIS — M50.20 HERNIATED CERVICAL INTERVERTEBRAL DISC: Primary | ICD-10-CM

## 2020-01-21 DIAGNOSIS — I10 ESSENTIAL HYPERTENSION: ICD-10-CM

## 2020-01-21 LAB
ALBUMIN SERPL BCP-MCNC: 4.2 G/DL (ref 3.5–5.7)
ALP SERPL-CCNC: 50 U/L (ref 40–150)
ALT SERPL W P-5'-P-CCNC: 7 U/L (ref 7–52)
ANION GAP SERPL CALCULATED.3IONS-SCNC: 6 MMOL/L (ref 4–13)
APTT PPP: 31 SECONDS (ref 23–37)
AST SERPL W P-5'-P-CCNC: 11 U/L (ref 13–39)
BASOPHILS # BLD AUTO: 0.1 THOUSANDS/ΜL (ref 0–0.1)
BASOPHILS NFR BLD AUTO: 1 % (ref 0–2)
BILIRUB SERPL-MCNC: 0.5 MG/DL (ref 0.2–1)
BUN SERPL-MCNC: 10 MG/DL (ref 7–25)
CALCIUM SERPL-MCNC: 9.2 MG/DL (ref 8.6–10.5)
CHLORIDE SERPL-SCNC: 105 MMOL/L (ref 98–107)
CK SERPL-CCNC: 70 U/L (ref 30–192)
CO2 SERPL-SCNC: 28 MMOL/L (ref 21–31)
CREAT SERPL-MCNC: 0.78 MG/DL (ref 0.6–1.2)
EOSINOPHIL # BLD AUTO: 0.1 THOUSAND/ΜL (ref 0–0.61)
EOSINOPHIL NFR BLD AUTO: 2 % (ref 0–5)
ERYTHROCYTE [DISTWIDTH] IN BLOOD BY AUTOMATED COUNT: 13 % (ref 11.5–14.5)
GFR SERPL CREATININE-BSD FRML MDRD: 110 ML/MIN/1.73SQ M
GLUCOSE SERPL-MCNC: 120 MG/DL (ref 65–99)
HCG SERPL QL: NEGATIVE
HCT VFR BLD AUTO: 40.4 % (ref 42–47)
HGB BLD-MCNC: 13.4 G/DL (ref 12–16)
INR PPP: 1.05 (ref 0.9–1.5)
LYMPHOCYTES # BLD AUTO: 2.3 THOUSANDS/ΜL (ref 0.6–4.47)
LYMPHOCYTES NFR BLD AUTO: 38 % (ref 21–51)
MAGNESIUM SERPL-MCNC: 2 MG/DL (ref 1.9–2.7)
MCH RBC QN AUTO: 31.2 PG (ref 26–34)
MCHC RBC AUTO-ENTMCNC: 33.3 G/DL (ref 31–37)
MCV RBC AUTO: 94 FL (ref 81–99)
MONOCYTES # BLD AUTO: 0.4 THOUSAND/ΜL (ref 0.17–1.22)
MONOCYTES NFR BLD AUTO: 7 % (ref 2–12)
NEUTROPHILS # BLD AUTO: 3.2 THOUSANDS/ΜL (ref 1.4–6.5)
NEUTS SEG NFR BLD AUTO: 53 % (ref 42–75)
PLATELET # BLD AUTO: 176 THOUSANDS/UL (ref 149–390)
PMV BLD AUTO: 10.2 FL (ref 8.6–11.7)
POTASSIUM SERPL-SCNC: 3.4 MMOL/L (ref 3.5–5.5)
PROT SERPL-MCNC: 6.8 G/DL (ref 6.4–8.9)
PROTHROMBIN TIME: 12.2 SECONDS (ref 10.2–13)
RBC # BLD AUTO: 4.32 MILLION/UL (ref 3.9–5.2)
SODIUM SERPL-SCNC: 139 MMOL/L (ref 134–143)
TROPONIN I SERPL-MCNC: <0.03 NG/ML
TSH SERPL DL<=0.05 MIU/L-ACNC: 1.72 UIU/ML (ref 0.45–5.33)
WBC # BLD AUTO: 6.2 THOUSAND/UL (ref 4.8–10.8)

## 2020-01-21 PROCEDURE — 85025 COMPLETE CBC W/AUTO DIFF WBC: CPT | Performed by: EMERGENCY MEDICINE

## 2020-01-21 PROCEDURE — 96361 HYDRATE IV INFUSION ADD-ON: CPT

## 2020-01-21 PROCEDURE — 99284 EMERGENCY DEPT VISIT MOD MDM: CPT

## 2020-01-21 PROCEDURE — 85610 PROTHROMBIN TIME: CPT | Performed by: EMERGENCY MEDICINE

## 2020-01-21 PROCEDURE — 97110 THERAPEUTIC EXERCISES: CPT | Performed by: PHYSICAL THERAPIST

## 2020-01-21 PROCEDURE — 84484 ASSAY OF TROPONIN QUANT: CPT | Performed by: EMERGENCY MEDICINE

## 2020-01-21 PROCEDURE — 80053 COMPREHEN METABOLIC PANEL: CPT | Performed by: EMERGENCY MEDICINE

## 2020-01-21 PROCEDURE — 70450 CT HEAD/BRAIN W/O DYE: CPT

## 2020-01-21 PROCEDURE — 83735 ASSAY OF MAGNESIUM: CPT | Performed by: EMERGENCY MEDICINE

## 2020-01-21 PROCEDURE — 84443 ASSAY THYROID STIM HORMONE: CPT | Performed by: EMERGENCY MEDICINE

## 2020-01-21 PROCEDURE — 36415 COLL VENOUS BLD VENIPUNCTURE: CPT | Performed by: EMERGENCY MEDICINE

## 2020-01-21 PROCEDURE — 97012 MECHANICAL TRACTION THERAPY: CPT | Performed by: PHYSICAL THERAPIST

## 2020-01-21 PROCEDURE — 96374 THER/PROPH/DIAG INJ IV PUSH: CPT

## 2020-01-21 PROCEDURE — 82550 ASSAY OF CK (CPK): CPT | Performed by: EMERGENCY MEDICINE

## 2020-01-21 PROCEDURE — 85730 THROMBOPLASTIN TIME PARTIAL: CPT | Performed by: EMERGENCY MEDICINE

## 2020-01-21 PROCEDURE — 84703 CHORIONIC GONADOTROPIN ASSAY: CPT | Performed by: EMERGENCY MEDICINE

## 2020-01-21 PROCEDURE — 99284 EMERGENCY DEPT VISIT MOD MDM: CPT | Performed by: EMERGENCY MEDICINE

## 2020-01-21 RX ORDER — DIPHENHYDRAMINE HYDROCHLORIDE 50 MG/ML
25 INJECTION INTRAMUSCULAR; INTRAVENOUS ONCE
Status: DISCONTINUED | OUTPATIENT
Start: 2020-01-21 | End: 2020-01-21

## 2020-01-21 RX ORDER — METOCLOPRAMIDE HYDROCHLORIDE 5 MG/ML
10 INJECTION INTRAMUSCULAR; INTRAVENOUS ONCE
Status: COMPLETED | OUTPATIENT
Start: 2020-01-21 | End: 2020-01-21

## 2020-01-21 RX ADMIN — METOCLOPRAMIDE 10 MG: 5 INJECTION, SOLUTION INTRAMUSCULAR; INTRAVENOUS at 02:40

## 2020-01-21 RX ADMIN — SODIUM CHLORIDE 1000 ML: 0.9 INJECTION, SOLUTION INTRAVENOUS at 02:40

## 2020-01-21 NOTE — DISCHARGE INSTRUCTIONS
RETURN IF WORSE IN ANY WAY:   CHEST PAIN, SHORTNESS OF BREATH, INCREASED PAIN, FEVER OR FLU LIKE SYMPTOMS, OR NEW AND CONCERNING SYMPTOMS SIGNS OR SYMPTOMS    PLEASE CALL YOUR PRIMARY DOCTOR IN THE MORNING TO SET UP FOLLOW UP  FOR TODAY  PLEASE REVIEW THE WORK UP RESULTS WITH YOUR DOCTOR  PLEASE HAVE YOUR BLOOD PRESSURE REVIEWED   TODAY IT /93

## 2020-01-21 NOTE — PROGRESS NOTES
Daily Note     Today's date: 2020  Patient name: Davy Rodríguez  : 1980  MRN: [id number]  Referring provider: Jocy Pemberton MD  Dx:   Encounter Diagnosis     ICD-10-CM    1  Herniated cervical intervertebral disc M50 20                   Subjective:      " My neck actually feels pretty good "       Objective: See treatment diary below      Assessment: Tolerated treatment well  Patient exhibited good technique with therapeutic exercises and would benefit from continued PT      Plan: Continue per plan of care        Precautions:  None RA due        Manual  12/31 1/3 1/9 1/16 1/21   SOR                                             Exercise Diary  12/31 1/3 1/9 1/16 1/21   Cervical AROM  5" x10 5" x10ea  Rot/flex/  ext 5" x10 ea   Rot/flex/  ext 5" x10   Rot/flex/ ext 5" x10  Rot   Cervical Isometrics   5" x10 ext with towel   5" x10 ext with towel   Thoracic Ext Over chair   5" x10 with towel  5" x10 with towel 5" x10 with towel   Doorway Stretch   10" x5  10" x5  High hands 30" x3   High hands 10" x5   High hands   Scap Squeeze   5" x10 10" x5  10''x5   MTP/LTP  Green TB  5" 2x10 ea Green TB  5" 2x10 ea Green TB  5" 2x10 Green TB 2x10 5"   TB ER/IR        Chin Tucks   5" x10 5" x10 5" x10 5" x10   UBE  120 rpm 5' fwd/5' retro 110 rpm  5'fwd/5'retro 110rpm x5'fwd/  5'retro 110 rpm  5'fwd/5'retro 120 rpm  5'fwd/5'retro                                                                                               Modalities  12/31 1/3 1/9 1/16 1/21   Cervical Traction  Static 15# pull @ 10* w/ a 30% rope speed, 3 steps up/down x 10 min hold Static 15# pull @ 10* w/ a 30% rope speed, 3 steps up/down x 10 min hold Static 15# pull @ 10*, 30% rope speed, 3 steps up/down x  10 min  Static 15# pull @ 10*, 30% rope speed, 3 steps up/down x  10 min Static 15# pull @ 10*, 30% rope speed, 3 steps up/down x  10 min hold

## 2020-01-21 NOTE — ED PROVIDER NOTES
History  Chief Complaint   Patient presents with    Headache     Pt states she began with throbbing in her head around 0030  Pt came into ER for BP check   Hypertension     35YEAR-OLD FEMALE  PMH:  Thyroid nodules  Soc: 1/2 ppd smoker, no drugs/etoh       PATIENT IS HERE FOR ACUTE "foggy headache", 4/10    STATES THIS STARTED THIS MORNING, around 2 hours ago, CAME ON GRADUALLY, BUT NOW CONSTANT    DESCRIBED AS Pressure    Pt states that she does not get headaches much, but this headache feels familiar to her - SYMPTOMS ARE STABLE IN CHARACTER AND INTENSITY WHEN COMPARED TO PRIOR HEADACHES  SHE STATES SHE HAS NO NEW SYMPTOMS WHEN COMPARED TO PRIOR HEADACHES      PAIN LOCATION:  LOCATED IN THE Front OF THE HEAD AND THE Bilateral Temples  SHE Denies NAUSEA, DENIES VOMITING        NO FEVERS OR CHILLS  NO LIGHT SENSITIVITY  NO NECK PAIN OR STIFFNESS  NO TRAUMA    NO PT HX OR FAM HX OF ANEURYSM  PAIN DID NOT COME ON WITH EXERTION AND WAS NOT SUDDEN, OR DESCRIBED AS WORST HEADACHE OF HER LIFE      INTERVENTIONS:   None      PRIOR WORK UP:   SHE HAS not HAD PRIOR MRI TO EVALUATE HER HEADACHE  SHE Does Not FOLLOW W/ NEUROLOGY       OTHER ASSOCIATED SYMPTOMS:   SHE DOES NOT HAVE ANY ABDOMINAL PAIN ASSOCIATED   URINARY  SYMPTOMS: THERE IS NO DYSURIA, NO HEMATURIA, NO FREQUENCY   NO STOOL CHANGES       ALLEVIATING OR EXACERBATING FACTORS:  UNCERTAIN  PAIN IS NOT WORSE WITH MOVEMENT        History provided by:  Patient  Headache   Pain location:  Frontal, L temporal and R temporal  Quality:  Dull  Radiates to:  Does not radiate  Severity currently:  4/10  Timing:  Constant  Similar to prior headaches: yes    Context: not activity, not exposure to bright light, not defecating, not eating, not intercourse and not loud noise    Relieved by:  Nothing  Worsened by:  Nothing  Associated symptoms: no abdominal pain, no back pain, no blurred vision, no congestion, no cough, no diarrhea, no dizziness, no drainage, no ear pain, no eye pain, no facial pain, no fatigue, no fever, no focal weakness, no hearing loss, no loss of balance, no myalgias, no nausea, no near-syncope, no neck pain, no neck stiffness, no numbness, no paresthesias, no photophobia, no sinus pressure, no sore throat, no swollen glands, no syncope, no tingling, no URI, no visual change, no vomiting and no weakness    Risk factors: no anger, no family hx of SAH, does not have insomnia and lifestyle not sedentary    Hypertension   Associated symptoms: headaches    Associated symptoms: no abdominal pain, no blurred vision, no chest pain, no dizziness, no ear pain, no fatigue, no fever, no nausea, no neck pain, no palpitations, no shortness of breath, no syncope, no tinnitus, not vomiting and no weakness        Prior to Admission Medications   Prescriptions Last Dose Informant Patient Reported? Taking? Levonorgestrel 19 5 MCG/DAY IUD 2020 at Unknown time Self Yes Yes   Si each by Intrauterine route   ibuprofen (MOTRIN) 400 mg tablet 2020 at Unknown time Self No Yes   Sig: Take 1 tablet (400 mg total) by mouth every 6 (six) hours as needed for mild pain   methocarbamol (ROBAXIN) 500 mg tablet 2020 at Unknown time Self No Yes   Sig: Take 1 tablet (500 mg total) by mouth 2 (two) times a day as needed for muscle spasms      Facility-Administered Medications: None       Past Medical History:   Diagnosis Date    Allergic     Asthma     Herniated disc, cervical     Pinched nerve in shoulder, right     Thyroid nodule        Past Surgical History:   Procedure Laterality Date     SECTION         Family History   Problem Relation Age of Onset    Diabetes Mother     Heart disease Mother     Bone cancer Father     Diabetes Brother      I have reviewed and agree with the history as documented      Social History     Tobacco Use    Smoking status: Current Every Day Smoker     Packs/day: 0 50     Types: Cigarettes    Smokeless tobacco: Never Used   Substance Use Topics    Alcohol use: No    Drug use: Not Currently        Review of Systems   Constitutional: Negative for chills, diaphoresis, fatigue and fever  HENT: Negative for congestion, ear pain, hearing loss, postnasal drip, rhinorrhea, sinus pressure, sinus pain, sneezing, sore throat, tinnitus and voice change  Eyes: Negative for blurred vision, photophobia and pain  Respiratory: Negative for cough, shortness of breath, wheezing and stridor  Cardiovascular: Negative for chest pain, palpitations, leg swelling, syncope and near-syncope  Gastrointestinal: Negative for abdominal pain, blood in stool, diarrhea, nausea and vomiting  Genitourinary: Negative for difficulty urinating, dysuria, flank pain and frequency  Musculoskeletal: Negative for arthralgias, back pain, gait problem, joint swelling, myalgias, neck pain and neck stiffness  Skin: Negative for rash and wound  Neurological: Positive for headaches  Negative for dizziness, focal weakness, syncope, speech difficulty, weakness, light-headedness, numbness, paresthesias and loss of balance  Hematological: Negative for adenopathy  Does not bruise/bleed easily  All other systems reviewed and are negative  Physical Exam  Physical Exam   Constitutional: She is oriented to person, place, and time  She appears well-developed and well-nourished  No distress  HENT:   Head: Normocephalic and atraumatic  Nose: Nose normal    Mouth/Throat: Oropharynx is clear and moist  No oropharyngeal exudate  Eyes: Pupils are equal, round, and reactive to light  Conjunctivae and EOM are normal  Right eye exhibits no discharge  Left eye exhibits no discharge  No scleral icterus  Neck: Normal range of motion  Neck supple  No JVD present  No tracheal deviation present  Cardiovascular: Normal rate, regular rhythm, normal heart sounds and intact distal pulses  Exam reveals no gallop and no friction rub  No murmur heard    Pulmonary/Chest: Effort normal and breath sounds normal  No stridor  No respiratory distress  She has no wheezes  She has no rales  She exhibits no tenderness  Abdominal: Soft  Bowel sounds are normal  She exhibits no distension and no mass  There is no tenderness  There is no rebound and no guarding  No hernia  Musculoskeletal: Normal range of motion  She exhibits no edema, tenderness or deformity  Lymphadenopathy:     She has no cervical adenopathy  Neurological: She is alert and oriented to person, place, and time  No cranial nerve deficit or sensory deficit  She exhibits normal muscle tone  Coordination normal    Skin: Skin is warm  Capillary refill takes less than 2 seconds  No rash noted  She is not diaphoretic  No erythema  No pallor  Psychiatric: She has a normal mood and affect  Her behavior is normal  Judgment and thought content normal        Vital Signs  ED Triage Vitals [01/21/20 0146]   Temperature Pulse Respirations Blood Pressure SpO2   97 7 °F (36 5 °C) 84 18 168/93 100 %      Temp Source Heart Rate Source Patient Position - Orthostatic VS BP Location FiO2 (%)   Temporal Monitor -- Left arm --      Pain Score       No Pain           Vitals:    01/21/20 0146 01/21/20 0338   BP: 168/93 156/84   Pulse: 84 86         Visual Acuity  Visual Acuity      Most Recent Value   L Pupil Size (mm)  3   R Pupil Size (mm)  3          ED Medications  Medications   sodium chloride 0 9 % bolus 1,000 mL (0 mL Intravenous Stopped 1/21/20 0330)   metoclopramide (REGLAN) injection 10 mg (10 mg Intravenous Given 1/21/20 0240)       Diagnostic Studies  Results Reviewed     Procedure Component Value Units Date/Time    TSH [[id number]]  (Normal) Collected:  01/21/20 0216    Lab Status:  Final result Specimen:  Blood from Arm, Left Updated:  01/21/20 0252     TSH 3RD GENERATON 1 720 uIU/mL     Narrative:       Patients undergoing fluorescein dye angiography may retain small amounts of fluorescein in the body for 48-72 hours post procedure   Samples containing fluorescein can produce falsely depressed TSH values  If the patient had this procedure,a specimen should be resubmitted post fluorescein clearance        hCG, qualitative pregnancy [[id number]]  (Normal) Collected:  01/21/20 0216    Lab Status:  Final result Specimen:  Blood from Arm, Left Updated:  01/21/20 0246     Preg, Serum Negative    Comprehensive metabolic panel [[id number]]  (Abnormal) Collected:  01/21/20 0216    Lab Status:  Final result Specimen:  Blood from Arm, Left Updated:  01/21/20 0241     Sodium 139 mmol/L      Potassium 3 4 mmol/L      Chloride 105 mmol/L      CO2 28 mmol/L      ANION GAP 6 mmol/L      BUN 10 mg/dL      Creatinine 0 78 mg/dL      Glucose 120 mg/dL      Calcium 9 2 mg/dL      AST 11 U/L      ALT 7 U/L      Alkaline Phosphatase 50 U/L      Total Protein 6 8 g/dL      Albumin 4 2 g/dL      Total Bilirubin 0 50 mg/dL      eGFR 110 ml/min/1 73sq m     Narrative:       [place] guidelines for Chronic Kidney Disease (CKD):     Stage 1 with normal or high GFR (GFR > 90 mL/min/1 73 square meters)    Stage 2 Mild CKD (GFR = 60-89 mL/min/1 73 square meters)    Stage 3A Moderate CKD (GFR = 45-59 mL/min/1 73 square meters)    Stage 3B Moderate CKD (GFR = 30-44 mL/min/1 73 square meters)    Stage 4 Severe CKD (GFR = 15-29 mL/min/1 73 square meters)    Stage 5 End Stage CKD (GFR <15 mL/min/1 73 square meters)  Note: GFR calculation is accurate only with a steady state creatinine    Magnesium [[id number]]  (Normal) Collected:  01/21/20 0216    Lab Status:  Final result Specimen:  Blood from Arm, Left Updated:  01/21/20 0241     Magnesium 2 0 mg/dL     CK Total with Reflex CKMB [[id number]]  (Normal) Collected:  01/21/20 0216    Lab Status:  Final result Specimen:  Blood from Arm, Left Updated:  01/21/20 0241     Total CK 70 U/L     Troponin I [[id number]]  (Normal) Collected:  01/21/20 0216    Lab Status:  Final result Specimen:  Blood from Arm, Left Updated:  01/21/20 0240     Troponin I <0 03 ng/mL     Protime-INR [[id number]]  (Normal) Collected:  01/21/20 0216    Lab Status:  Final result Specimen:  Blood from Arm, Left Updated:  01/21/20 0240     Protime 12 2 seconds      INR 1 05    APTT [[id number]]  (Normal) Collected:  01/21/20 0216    Lab Status:  Final result Specimen:  Blood from Arm, Left Updated:  01/21/20 0240     PTT 31 seconds     CBC and differential [[id number]]  (Abnormal) Collected:  01/21/20 0216    Lab Status:  Final result Specimen:  Blood from Arm, Left Updated:  01/21/20 0227     WBC 6 20 Thousand/uL      RBC 4 32 Million/uL      Hemoglobin 13 4 g/dL      Hematocrit 40 4 %      MCV 94 fL      MCH 31 2 pg      MCHC 33 3 g/dL      RDW 13 0 %      MPV 10 2 fL      Platelets 458 Thousands/uL      Neutrophils Relative 53 %      Lymphocytes Relative 38 %      Monocytes Relative 7 %      Eosinophils Relative 2 %      Basophils Relative 1 %      Neutrophils Absolute 3 20 Thousands/µL      Lymphocytes Absolute 2 30 Thousands/µL      Monocytes Absolute 0 40 Thousand/µL      Eosinophils Absolute 0 10 Thousand/µL      Basophils Absolute 0 10 Thousands/µL                  CT head without contrast   Final Result by Lillie Godoy MD (01/21 0322)      No acute intracranial abnormality                    Workstation performed: [id number]                    Procedures  Procedures         ED Course  ED Course as of Jan 21 0342   Tue Jan 21, 2020   0318 TSH 3RD GENERATON: 1 720   0318 Troponin I: <0 03   0318 PREGNANCY, SERUM: Negative   0318 Total CK: 70   0318 PTT: 31   0318 Magnesium: 2 0   0318 INR: 1 05   0318 WBC: 6 20   0318 Hemoglobin: 13 4   0318 HCT(!): 40 4   0318 MCV: 94   0318 Platelet Count: 869   0318 Neutrophils %: 53   0318 Lymphocytes Relative: 38   0318 Monocytes Relative: 7   0318 Eosinophils: 2   0318 Sodium: 139   0318 Potassium(!): 3 4   0318 Chloride: 105   0318 CO2: 28   0318 Anion Gap: 6   0318 BUN: 10   0318 Creatinine: 0 78 0318 Glucose, Random(!): 120   0318 Calcium: 9 2   0318 AST(!): 11   0318 ALT: 7   0318 Labs reviewed, unremarkable    CT brain pending    Alkaline Phosphatase: 50   0336 CT BRAIN - WITHOUT CONTRAST     INDICATION:   Headache, acute, normal neuro exam      COMPARISON:  November 16, 2019      TECHNIQUE:  CT examination of the brain was performed  In addition to axial images, coronal 2D reformatted images were created and submitted for interpretation        Radiation dose length product (DLP) for this visit:  852 9 mGy-cm   This examination, like all CT scans performed in the [facility], was performed utilizing techniques to minimize radiation dose exposure, including the use of iterative   reconstruction and automated exposure control        IMAGE QUALITY:  Diagnostic      FINDINGS:     PARENCHYMA:  No intracranial mass, mass effect or midline shift  No CT signs of acute infarction  No acute parenchymal hemorrhage      VENTRICLES AND EXTRA-AXIAL SPACES:  Normal for the patient's age      VISUALIZED ORBITS AND PARANASAL SINUSES:  Unremarkable      CALVARIUM AND EXTRACRANIAL SOFT TISSUES:  No acute osseous abnormality  The appearance of the basisphenoid appears similar to the prior study      IMPRESSION:     No acute intracranial abnormality             0336 PATIENT UNDERSTANDS THE RESULTS OF HER WORKUP  SHE IS VERY APPRECIATIVE OF HER ER CARE     SHE IS READY TO MANAGE FROM HOME                                      MDM      Disposition  Final diagnoses:   None     ED Disposition     None      Follow-up Information    None         Patient's Medications   Discharge Prescriptions    No medications on file     No discharge procedures on file      ED Provider  Electronically Signed by           Kenan Dukes MD  01/21/20 2462

## 2020-01-23 ENCOUNTER — APPOINTMENT (OUTPATIENT)
Dept: PHYSICAL THERAPY | Facility: CLINIC | Age: 40
End: 2020-01-23
Payer: COMMERCIAL

## 2020-01-27 ENCOUNTER — APPOINTMENT (EMERGENCY)
Dept: RADIOLOGY | Facility: HOSPITAL | Age: 40
End: 2020-01-27
Payer: COMMERCIAL

## 2020-01-27 ENCOUNTER — OFFICE VISIT (OUTPATIENT)
Dept: PHYSICAL THERAPY | Facility: CLINIC | Age: 40
End: 2020-01-27
Payer: COMMERCIAL

## 2020-01-27 ENCOUNTER — HOSPITAL ENCOUNTER (EMERGENCY)
Facility: HOSPITAL | Age: 40
Discharge: HOME | End: 2020-01-27
Attending: EMERGENCY MEDICINE
Payer: COMMERCIAL

## 2020-01-27 VITALS
TEMPERATURE: 98.1 F | WEIGHT: 150 LBS | RESPIRATION RATE: 18 BRPM | HEART RATE: 97 BPM | DIASTOLIC BLOOD PRESSURE: 93 MMHG | OXYGEN SATURATION: 99 % | BODY MASS INDEX: 30.3 KG/M2 | SYSTOLIC BLOOD PRESSURE: 142 MMHG

## 2020-01-27 DIAGNOSIS — M50.20 HERNIATED CERVICAL INTERVERTEBRAL DISC: Primary | ICD-10-CM

## 2020-01-27 DIAGNOSIS — K21.00 GERD WITH ESOPHAGITIS: Primary | ICD-10-CM

## 2020-01-27 LAB
ALBUMIN SERPL BCP-MCNC: 4 G/DL (ref 3.5–5)
ALP SERPL-CCNC: 60 U/L (ref 46–116)
ALT SERPL W P-5'-P-CCNC: 13 U/L (ref 12–78)
ANION GAP SERPL CALCULATED.3IONS-SCNC: 8 MMOL/L (ref 4–13)
APTT PPP: 28 SECONDS (ref 23–37)
AST SERPL W P-5'-P-CCNC: 12 U/L (ref 5–45)
ATRIAL RATE: 75 BPM
BACTERIA UR QL AUTO: ABNORMAL /HPF
BASOPHILS # BLD AUTO: 0.04 THOUSANDS/ΜL (ref 0–0.1)
BASOPHILS NFR BLD AUTO: 1 % (ref 0–1)
BILIRUB SERPL-MCNC: 0.3 MG/DL (ref 0.2–1)
BILIRUB UR QL STRIP: NEGATIVE
BUN SERPL-MCNC: 9 MG/DL (ref 5–25)
CALCIUM SERPL-MCNC: 9 MG/DL (ref 8.3–10.1)
CHLORIDE SERPL-SCNC: 103 MMOL/L (ref 100–108)
CK SERPL-CCNC: 68 U/L (ref 26–192)
CLARITY UR: ABNORMAL
CO2 SERPL-SCNC: 29 MMOL/L (ref 21–32)
COLOR UR: YELLOW
CREAT SERPL-MCNC: 0.73 MG/DL (ref 0.6–1.3)
EOSINOPHIL # BLD AUTO: 0.13 THOUSAND/ΜL (ref 0–0.61)
EOSINOPHIL NFR BLD AUTO: 3 % (ref 0–6)
ERYTHROCYTE [DISTWIDTH] IN BLOOD BY AUTOMATED COUNT: 12.3 % (ref 11.6–15.1)
EXT PREG TEST URINE: NEGATIVE
EXT. CONTROL ED NAV: NORMAL
GFR SERPL CREATININE-BSD FRML MDRD: 119 ML/MIN/1.73SQ M
GLUCOSE SERPL-MCNC: 103 MG/DL (ref 65–140)
GLUCOSE UR STRIP-MCNC: NEGATIVE MG/DL
HCT VFR BLD AUTO: 42.8 % (ref 34.8–46.1)
HGB BLD-MCNC: 13.9 G/DL (ref 11.5–15.4)
HGB UR QL STRIP.AUTO: ABNORMAL
IMM GRANULOCYTES # BLD AUTO: 0.01 THOUSAND/UL (ref 0–0.2)
IMM GRANULOCYTES NFR BLD AUTO: 0 % (ref 0–2)
INR PPP: 0.96 (ref 0.84–1.19)
KETONES UR STRIP-MCNC: NEGATIVE MG/DL
LEUKOCYTE ESTERASE UR QL STRIP: NEGATIVE
LIPASE SERPL-CCNC: 181 U/L (ref 73–393)
LYMPHOCYTES # BLD AUTO: 1.43 THOUSANDS/ΜL (ref 0.6–4.47)
LYMPHOCYTES NFR BLD AUTO: 35 % (ref 14–44)
MAGNESIUM SERPL-MCNC: 2.1 MG/DL (ref 1.6–2.6)
MCH RBC QN AUTO: 31 PG (ref 26.8–34.3)
MCHC RBC AUTO-ENTMCNC: 32.5 G/DL (ref 31.4–37.4)
MCV RBC AUTO: 96 FL (ref 82–98)
MONOCYTES # BLD AUTO: 0.33 THOUSAND/ΜL (ref 0.17–1.22)
MONOCYTES NFR BLD AUTO: 8 % (ref 4–12)
MUCOUS THREADS UR QL AUTO: ABNORMAL
NEUTROPHILS # BLD AUTO: 2.17 THOUSANDS/ΜL (ref 1.85–7.62)
NEUTS SEG NFR BLD AUTO: 53 % (ref 43–75)
NITRITE UR QL STRIP: NEGATIVE
NON-SQ EPI CELLS URNS QL MICRO: ABNORMAL /HPF
NRBC BLD AUTO-RTO: 0 /100 WBCS
NT-PROBNP SERPL-MCNC: 8 PG/ML
P AXIS: 30 DEGREES
PH UR STRIP.AUTO: 6.5 [PH]
PLATELET # BLD AUTO: 202 THOUSANDS/UL (ref 149–390)
PMV BLD AUTO: 11.6 FL (ref 8.9–12.7)
POTASSIUM SERPL-SCNC: 3.2 MMOL/L (ref 3.5–5.3)
PR INTERVAL: 164 MS
PROT SERPL-MCNC: 7.6 G/DL (ref 6.4–8.2)
PROT UR STRIP-MCNC: NEGATIVE MG/DL
PROTHROMBIN TIME: 12.8 SECONDS (ref 11.6–14.5)
QRS AXIS: 46 DEGREES
QRSD INTERVAL: 86 MS
QT INTERVAL: 382 MS
QTC INTERVAL: 426 MS
RBC # BLD AUTO: 4.48 MILLION/UL (ref 3.81–5.12)
RBC #/AREA URNS AUTO: ABNORMAL /HPF
SODIUM SERPL-SCNC: 140 MMOL/L (ref 136–145)
SP GR UR STRIP.AUTO: <=1.005 (ref 1–1.03)
T WAVE AXIS: 22 DEGREES
TROPONIN I SERPL-MCNC: <0.02 NG/ML
UROBILINOGEN UR QL STRIP.AUTO: 0.2 E.U./DL
VENTRICULAR RATE: 75 BPM
WBC # BLD AUTO: 4.11 THOUSAND/UL (ref 4.31–10.16)
WBC #/AREA URNS AUTO: ABNORMAL /HPF

## 2020-01-27 PROCEDURE — 83690 ASSAY OF LIPASE: CPT | Performed by: EMERGENCY MEDICINE

## 2020-01-27 PROCEDURE — 83880 ASSAY OF NATRIURETIC PEPTIDE: CPT | Performed by: EMERGENCY MEDICINE

## 2020-01-27 PROCEDURE — 71046 X-RAY EXAM CHEST 2 VIEWS: CPT

## 2020-01-27 PROCEDURE — 82550 ASSAY OF CK (CPK): CPT | Performed by: EMERGENCY MEDICINE

## 2020-01-27 PROCEDURE — 85730 THROMBOPLASTIN TIME PARTIAL: CPT | Performed by: EMERGENCY MEDICINE

## 2020-01-27 PROCEDURE — 85025 COMPLETE CBC W/AUTO DIFF WBC: CPT | Performed by: EMERGENCY MEDICINE

## 2020-01-27 PROCEDURE — 80053 COMPREHEN METABOLIC PANEL: CPT | Performed by: EMERGENCY MEDICINE

## 2020-01-27 PROCEDURE — 97110 THERAPEUTIC EXERCISES: CPT

## 2020-01-27 PROCEDURE — 81001 URINALYSIS AUTO W/SCOPE: CPT | Performed by: EMERGENCY MEDICINE

## 2020-01-27 PROCEDURE — 99284 EMERGENCY DEPT VISIT MOD MDM: CPT | Performed by: EMERGENCY MEDICINE

## 2020-01-27 PROCEDURE — 85610 PROTHROMBIN TIME: CPT | Performed by: EMERGENCY MEDICINE

## 2020-01-27 PROCEDURE — 99284 EMERGENCY DEPT VISIT MOD MDM: CPT

## 2020-01-27 PROCEDURE — 36415 COLL VENOUS BLD VENIPUNCTURE: CPT | Performed by: EMERGENCY MEDICINE

## 2020-01-27 PROCEDURE — 93005 ELECTROCARDIOGRAM TRACING: CPT

## 2020-01-27 PROCEDURE — 84484 ASSAY OF TROPONIN QUANT: CPT | Performed by: EMERGENCY MEDICINE

## 2020-01-27 PROCEDURE — 83735 ASSAY OF MAGNESIUM: CPT | Performed by: EMERGENCY MEDICINE

## 2020-01-27 PROCEDURE — 93010 ELECTROCARDIOGRAM REPORT: CPT | Performed by: INTERNAL MEDICINE

## 2020-01-27 PROCEDURE — 81025 URINE PREGNANCY TEST: CPT | Performed by: EMERGENCY MEDICINE

## 2020-01-27 PROCEDURE — 97012 MECHANICAL TRACTION THERAPY: CPT

## 2020-01-27 RX ORDER — METHOCARBAMOL 500 MG/1
500 TABLET, FILM COATED ORAL 2 TIMES DAILY PRN
COMMUNITY
Start: 2020-01-21 | End: 2020-02-21 | Stop reason: SDUPTHER

## 2020-01-27 RX ORDER — PANTOPRAZOLE SODIUM 40 MG/1
40 TABLET, DELAYED RELEASE ORAL DAILY
COMMUNITY
Start: 2020-01-20

## 2020-01-27 RX ORDER — SODIUM CHLORIDE 9 MG/ML
3 INJECTION INTRAVENOUS AS NEEDED
Status: DISCONTINUED | OUTPATIENT
Start: 2020-01-27 | End: 2020-01-27 | Stop reason: HOSPADM

## 2020-01-27 NOTE — ED PROVIDER NOTES
History  Chief Complaint   Patient presents with    Anxiety     flushing sensation starts in the chest and moves into her head, feels hot all over self resolving,      HPI     Pt presents from home, hx of asthma, cervical radiculopathy, c/o burning epigastric pain with radiation into her anterior chest, and to her head  Pt denies any prior similar symptoms  Pt states she was laying in bed when the pain began tonight  Pt states she notices the symptoms more with eating as well  Pt was seen several days ago at [address] [place] [address] ED for chest pain and headache  Her workup included cardiac labs and a ct head that was negative for any acute  Pt o/w denies any symptoms  Pt denies fevers, cough, sob, n/v/d/c, dysuria, focal def or syncope  Prior to Admission Medications   Prescriptions Last Dose Informant Patient Reported? Taking? Levonorgestrel 19 5 MCG/DAY IUD  Self Yes No   Si each by Intrauterine route   ibuprofen (MOTRIN) 400 mg tablet  Self No No   Sig: Take 1 tablet (400 mg total) by mouth every 6 (six) hours as needed for mild pain   methocarbamol (ROBAXIN) 500 mg tablet  Self No No   Sig: Take 1 tablet (500 mg total) by mouth 2 (two) times a day as needed for muscle spasms   methocarbamol (ROBAXIN) 500 mg tablet   Yes Yes   Sig: Take 500 mg by mouth 2 (two) times a day as needed   pantoprazole (PROTONIX) 40 mg tablet   Yes Yes   Sig: Take 40 mg by mouth daily      Facility-Administered Medications: None       Past Medical History:   Diagnosis Date    Allergic     Asthma     Herniated disc, cervical     Pinched nerve in shoulder, right     Thyroid nodule        Past Surgical History:   Procedure Laterality Date     SECTION         Family History   Problem Relation Age of Onset    Diabetes Mother     Heart disease Mother     Bone cancer Father     Diabetes Brother      I have reviewed and agree with the history as documented      Social History     Tobacco Use    Smoking status: Current Every Day Smoker     Packs/day: 0 50     Types: Cigarettes    Smokeless tobacco: Never Used   Substance Use Topics    Alcohol use: No    Drug use: Not Currently        Review of Systems   Constitutional: Negative for activity change, appetite change, diaphoresis, fatigue and fever  HENT: Negative for congestion, facial swelling, mouth sores and trouble swallowing  Eyes: Negative for photophobia, discharge and visual disturbance  Respiratory: Negative for apnea, cough, shortness of breath and wheezing  Cardiovascular: Positive for chest pain  Negative for leg swelling  Gastrointestinal: Positive for abdominal pain  Negative for constipation, diarrhea, nausea and vomiting  Endocrine: Negative for heat intolerance and polydipsia  Genitourinary: Negative for dysuria, flank pain, frequency and hematuria  Musculoskeletal: Negative for back pain, gait problem, myalgias and neck pain  Skin: Negative for rash and wound  Allergic/Immunologic: Negative for immunocompromised state  Neurological: Positive for headaches  Negative for dizziness, syncope, weakness and light-headedness  Hematological: Negative for adenopathy  Psychiatric/Behavioral: Negative for agitation, confusion and self-injury  The patient is not nervous/anxious  Physical Exam  Physical Exam   Constitutional: She is oriented to person, place, and time  She appears well-developed and well-nourished  No distress  HENT:   Head: Normocephalic and atraumatic  Right Ear: External ear normal    Left Ear: External ear normal    Nose: Nose normal    Mouth/Throat: Oropharynx is clear and moist  No oropharyngeal exudate  Eyes: Pupils are equal, round, and reactive to light  Conjunctivae and EOM are normal  Right eye exhibits no discharge  Left eye exhibits no discharge  No scleral icterus  Neck: Normal range of motion  Neck supple  No JVD present  No tracheal deviation present  No thyromegaly present     Cardiovascular: Normal rate, regular rhythm and normal heart sounds  No murmur heard  Pulmonary/Chest: Effort normal and breath sounds normal  No stridor  No respiratory distress  She has no wheezes  She has no rales  She exhibits no tenderness  Abdominal: Soft  Bowel sounds are normal  She exhibits no distension and no mass  There is no tenderness  There is no rebound and no guarding  Musculoskeletal: Normal range of motion  She exhibits no edema, tenderness or deformity  Lymphadenopathy:     She has no cervical adenopathy  Neurological: She is alert and oriented to person, place, and time  She has normal reflexes  She displays normal reflexes  No cranial nerve deficit  She exhibits normal muscle tone  Coordination normal    Skin: Skin is warm and dry  No rash noted  She is not diaphoretic  No erythema  No pallor  Psychiatric: She has a normal mood and affect  Her behavior is normal  Judgment and thought content normal    Nursing note and vitals reviewed        Vital Signs  ED Triage Vitals [01/27/20 0255]   Temperature Pulse Respirations Blood Pressure SpO2   98 1 °F (36 7 °C) 97 18 142/93 99 %      Temp Source Heart Rate Source Patient Position - Orthostatic VS BP Location FiO2 (%)   Temporal Monitor Lying Left arm --      Pain Score       No Pain           Vitals:    01/27/20 0255   BP: 142/93   Pulse: 97   Patient Position - Orthostatic VS: Lying         Visual Acuity      ED Medications  Medications   sodium chloride (PF) 0 9 % injection 3 mL (has no administration in time range)       Diagnostic Studies  Results Reviewed     Procedure Component Value Units Date/Time    Urine Microscopic [[id number]]  (Abnormal) Collected:  01/27/20 0459    Lab Status:  Final result Specimen:  Urine, Clean Catch Updated:  01/27/20 0523     RBC, UA 1-2 /hpf      WBC, UA 0-1 /hpf      Epithelial Cells Moderate /hpf      Bacteria, UA Occasional /hpf      MUCUS THREADS Occasional    UA w Reflex to Microscopic w Reflex to Culture [[id number]] (Abnormal) Collected:  01/27/20 0450    Lab Status:  Final result Specimen:  Urine, Clean Catch Updated:  01/27/20 0506     Color, UA Yellow     Clarity, UA Slightly Cloudy     Specific Gravity, UA <=1 005     pH, UA 6 5     Leukocytes, UA Negative     Nitrite, UA Negative     Protein, UA Negative mg/dl      Glucose, UA Negative mg/dl      Ketones, UA Negative mg/dl      Urobilinogen, UA 0 2 E U /dl      Bilirubin, UA Negative     Blood, UA Trace-Intact    POCT pregnancy, urine [[id number]]  (Normal) Resulted:  01/27/20 0445    Lab Status:  Final result Updated:  01/27/20 0505     EXT PREG TEST UR (Ref: Negative) negative     Control valid    NT-BNP PRO [[id number]]  (Normal) Collected:  01/27/20 0359    Lab Status:  Final result Specimen:  Blood from Arm, Left Updated:  01/27/20 0430     NT-proBNP 8 pg/mL     Lipase [[id number]]  (Normal) Collected:  01/27/20 0359    Lab Status:  Final result Specimen:  Blood from Arm, Left Updated:  01/27/20 0430     Lipase 181 u/L     Magnesium [[id number]]  (Normal) Collected:  01/27/20 0359    Lab Status:  Final result Specimen:  Blood from Arm, Left Updated:  01/27/20 0430     Magnesium 2 1 mg/dL     CK (with reflex to MB) [[id number]]  (Normal) Collected:  01/27/20 0359    Lab Status:  Final result Specimen:  Blood from Arm, Left Updated:  01/27/20 0430     Total CK 68 U/L     Troponin I [[id number]]  (Normal) Collected:  01/27/20 0359    Lab Status:  Final result Specimen:  Blood from Arm, Left Updated:  01/27/20 0429     Troponin I <0 02 ng/mL     Comprehensive metabolic panel [[id number]]  (Abnormal) Collected:  01/27/20 0359    Lab Status:  Final result Specimen:  Blood from Arm, Left Updated:  01/27/20 0426     Sodium 140 mmol/L      Potassium 3 2 mmol/L      Chloride 103 mmol/L      CO2 29 mmol/L      ANION GAP 8 mmol/L      BUN 9 mg/dL      Creatinine 0 73 mg/dL      Glucose 103 mg/dL      Calcium 9 0 mg/dL      AST 12 U/L      ALT 13 U/L      Alkaline Phosphatase 60 U/L Total Protein 7 6 g/dL      Albumin 4 0 g/dL      Total Bilirubin 0 30 mg/dL      eGFR 119 ml/min/1 73sq m     Narrative:       [place] guidelines for Chronic Kidney Disease (CKD):     Stage 1 with normal or high GFR (GFR > 90 mL/min/1 73 square meters)    Stage 2 Mild CKD (GFR = 60-89 mL/min/1 73 square meters)    Stage 3A Moderate CKD (GFR = 45-59 mL/min/1 73 square meters)    Stage 3B Moderate CKD (GFR = 30-44 mL/min/1 73 square meters)    Stage 4 Severe CKD (GFR = 15-29 mL/min/1 73 square meters)    Stage 5 End Stage CKD (GFR <15 mL/min/1 73 square meters)  Note: GFR calculation is accurate only with a steady state creatinine    Protime-INR [[id number]]  (Normal) Collected:  01/27/20 0359    Lab Status:  Final result Specimen:  Blood from Arm, Left Updated:  01/27/20 0420     Protime 12 8 seconds      INR 0 96    APTT [[id number]]  (Normal) Collected:  01/27/20 0359    Lab Status:  Final result Specimen:  Blood from Arm, Left Updated:  01/27/20 0420     PTT 28 seconds     CBC and differential [[id number]]  (Abnormal) Collected:  01/27/20 0359    Lab Status:  Final result Specimen:  Blood from Arm, Left Updated:  01/27/20 0410     WBC 4 11 Thousand/uL      RBC 4 48 Million/uL      Hemoglobin 13 9 g/dL      Hematocrit 42 8 %      MCV 96 fL      MCH 31 0 pg      MCHC 32 5 g/dL      RDW 12 3 %      MPV 11 6 fL      Platelets 122 Thousands/uL      nRBC 0 /100 WBCs      Neutrophils Relative 53 %      Immat GRANS % 0 %      Lymphocytes Relative 35 %      Monocytes Relative 8 %      Eosinophils Relative 3 %      Basophils Relative 1 %      Neutrophils Absolute 2 17 Thousands/µL      Immature Grans Absolute 0 01 Thousand/uL      Lymphocytes Absolute 1 43 Thousands/µL      Monocytes Absolute 0 33 Thousand/µL      Eosinophils Absolute 0 13 Thousand/µL      Basophils Absolute 0 04 Thousands/µL              EKG: normal sinus rhythm, no acute ischemia    X-ray chest 2 views    (Results Pending) Procedures  Procedures         ED Course                               MDM        Disposition  Final diagnoses:   GERD with esophagitis     Time reflects when diagnosis was documented in both MDM as applicable and the Disposition within this note     Time User Action Codes Description Comment    1/27/2020  5:28 AM Syed Marroquin Add [K21 0] GERD with esophagitis       ED Disposition     ED Disposition Condition Date/Time Comment    Discharge Stable Mon Jan 27, 2020  5:28 AM Dasha Clements discharge to home/self care  Follow-up Information     Follow up With Specialties Details Why [address], [place] Internal Medicine Schedule an appointment as soon as possible for a visit in 1 day Return immediately, If symptoms worsen [address] [place] [address]  [phone]            Patient's Medications   Discharge Prescriptions    No medications on file     No discharge procedures on file      ED Provider  Electronically Signed by           Joe Chery DO  01/27/20 9900

## 2020-01-27 NOTE — PROGRESS NOTES
Daily Note     Today's date: 2020  Patient name: Rayna Dinh  : 1980  MRN: [id number]  Referring provider: Denny Crandall MD  Dx:   Encounter Diagnosis     ICD-10-CM    1  Herniated cervical intervertebral disc M50 20        Start Time: 805          Subjective: Pt reports neck is so so today  States she has an appt with pain management 20  Objective: See treatment diary below      Assessment: Tolerated treatment well  Patient exhibited good technique with therapeutic exercises and would benefit from continued PT      Plan: Continue per plan of care        Precautions:  None        Manual  1/27 1/3 1/9 1/16 1/21   SOR                                             Exercise Diary  1/27 1/3 1/9 1/16 1/21   Cervical AROM  5" x10 flex/ext /rot 5" x10ea  Rot/flex/  ext 5" x10 ea   Rot/flex/  ext 5" x10   Rot/flex/ ext 5" x10  Rot   Cervical Isometrics  5" x10 with towel 5" x10 ext with towel   5" x10 ext with towel   Thoracic Ext Over chair  5" x10 with towel 5" x10 with towel  5" x10 with towel 5" x10 with towel   Doorway Stretch  10" x5 high hands 10" x5  10" x5  High hands 30" x3   High hands 10" x5   High hands   Scap Squeeze  5" 2x10 5" x10 10" x5  10''x5   MTP/LTP Green TB  5" 2x10 Green TB  5" 2x10 ea Green TB  5" 2x10 ea Green TB  5" 2x10 Green TB 2x10 5"   TB ER/IR        Chin Tucks  5" x10 5" x10 5" x10 5" x10 5" x10   UBE  110 rpm  5'fwd/  5'retro 110 rpm  5'fwd/5'retro 110rpm x5'fwd/  5'retro 110 rpm  5'fwd/5'retro 120 rpm  5'fwd/5'retro                                                                                               Modalities  1/27 1/3 1/9 1/16 1/21   Cervical Traction  Static 15# pull @ 10* w/ a 30% rope speed, 3 steps up/down x 10 min hold Static 15# pull @ 10* w/ a 30% rope speed, 3 steps up/down x 10 min hold Static 15# pull @ 10*, 30% rope speed, 3 steps up/down x  10 min  Static 15# pull @ 10*, 30% rope speed, 3 steps up/down x  10 min Static 15# pull @ 10*, 30% rope speed, 3 steps up/down x  10 min hold

## 2020-01-30 ENCOUNTER — OFFICE VISIT (OUTPATIENT)
Dept: PHYSICAL THERAPY | Facility: CLINIC | Age: 40
End: 2020-01-30
Payer: COMMERCIAL

## 2020-01-30 DIAGNOSIS — M50.20 HERNIATED CERVICAL INTERVERTEBRAL DISC: Primary | ICD-10-CM

## 2020-01-30 PROCEDURE — 97012 MECHANICAL TRACTION THERAPY: CPT

## 2020-01-30 PROCEDURE — 97110 THERAPEUTIC EXERCISES: CPT

## 2020-01-30 NOTE — PROGRESS NOTES
Daily Note     Today's date: 2020  Patient name: Yanely Hernández  : 1980  MRN: [id number]  Referring provider: Genny Bonilla MD  Dx:   Encounter Diagnosis     ICD-10-CM    1  Herniated cervical intervertebral disc M50 20                   Subjective: Pt's chief complaint is a pain that she recently started having in her L/S region  Pt denies pain in the cervical region at rest        Objective: See treatment diary below      Assessment: Tolerated treatment well  Progressed TB and UBE resistance to facilitate strength this date  No exacerbation of symptoms this date  Patient demonstrated fatigue post treatment and would benefit from continued PT  No adverse affect to Mechanical traction this date  Plan: Continue per plan of care        Precautions:  None        Manual     SOR                                             Exercise Diary     Cervical AROM  5" x10 flex/ext /rot 5" x10 ea  Rot/flex/  ext 5" x10 ea   Rot/flex/  ext 5" x10   Rot/flex/ ext 5" x10  Rot   Cervical Isometrics  5" x10 with towel    5" x10 ext with towel   Thoracic Ext Over chair  5" x10 with towel 5" x10 with towel  5" x10 with towel 5" x10 with towel   Doorway Stretch  10" x5 high hands 10" x5  High hands 10" x5  High hands 30" x3   High hands 10" x5   High hands   Scap Squeeze  5" 2x10  10" x5  10''x5   MTP/LTP Green TB  5" 2x10 Blue TB  5" 2x10 ea Green TB  5" 2x10 ea Green TB  5" 2x10 Green TB 2x10 5"   TB ER/IR        Chin Tucks  5" x10 5" x10 5" x10 5" x10 5" x10   UBE  110 rpm  5'fwd/  5'retro 100 rpm  5'fwd/5'retro 110rpm x5'fwd/  5'retro 110 rpm  5'fwd/5'retro 120 rpm  5'fwd/5'retro                                                                                               Modalities     Cervical Traction  Static 15# pull @ 10* w/ a 30% rope speed, 3 steps up/down x 10 min hold Static 15# pull @ 10* w/ a 30% rope speed, 3 steps up/down x 10 min hold Static 15# pull @ 10*, 30% rope speed, 3 steps up/down x  10 min  Static 15# pull @ 10*, 30% rope speed, 3 steps up/down x  10 min Static 15# pull @ 10*, 30% rope speed, 3 steps up/down x  10 min hold

## 2020-02-09 ENCOUNTER — OFFICE VISIT (OUTPATIENT)
Dept: URGENT CARE | Facility: CLINIC | Age: 40
End: 2020-02-09
Payer: COMMERCIAL

## 2020-02-09 VITALS
TEMPERATURE: 97.8 F | DIASTOLIC BLOOD PRESSURE: 86 MMHG | RESPIRATION RATE: 18 BRPM | SYSTOLIC BLOOD PRESSURE: 153 MMHG | OXYGEN SATURATION: 100 % | HEART RATE: 64 BPM

## 2020-02-09 DIAGNOSIS — J40 BRONCHITIS: Primary | ICD-10-CM

## 2020-02-09 PROCEDURE — S9088 SERVICES PROVIDED IN URGENT: HCPCS | Performed by: PHYSICIAN ASSISTANT

## 2020-02-09 PROCEDURE — 99213 OFFICE O/P EST LOW 20 MIN: CPT | Performed by: PHYSICIAN ASSISTANT

## 2020-02-09 RX ORDER — PREDNISONE 10 MG/1
TABLET ORAL
Qty: 18 TABLET | Refills: 0 | Status: SHIPPED | OUTPATIENT
Start: 2020-02-09 | End: 2020-02-18

## 2020-02-09 RX ORDER — ALBUTEROL SULFATE 90 UG/1
2 AEROSOL, METERED RESPIRATORY (INHALATION) EVERY 6 HOURS PRN
Qty: 8.5 G | Refills: 0 | Status: SHIPPED | OUTPATIENT
Start: 2020-02-09

## 2020-02-09 RX ORDER — BROMPHENIRAMINE MALEATE, PSEUDOEPHEDRINE HYDROCHLORIDE, AND DEXTROMETHORPHAN HYDROBROMIDE 2; 30; 10 MG/5ML; MG/5ML; MG/5ML
5 SYRUP ORAL 4 TIMES DAILY PRN
Qty: 118 ML | Refills: 0 | Status: SHIPPED | OUTPATIENT
Start: 2020-02-09 | End: 2020-02-14

## 2020-02-09 NOTE — PROGRESS NOTES
[facility]        NAME: Margot Lovell is a 35 y o  female  : 1980    MRN: [id number]  DATE: 2020  TIME: 9:10 AM    Assessment and Plan   Bronchitis [J40]  1  Bronchitis  albuterol (PROAIR HFA) 90 mcg/act inhaler    predniSONE 10 mg tablet    brompheniramine-pseudoephedrine-DM 30-2-10 MG/5ML syrup         Patient Instructions   Patient Instructions   Hydration and rest  Tylenol and motrin for pain and fever  Humidifier at night  Nasal saline rinses  Follow up with PCP in 3-5 days  Go to ER with worsening symptoms, difficulty breathing or swallowing  Chief Complaint     Chief Complaint   Patient presents with    Cough     Pt c/o a cough and congestion since yesterday  History of Present Illness       50-year-old female presents to clinic with complaints of runny nose and cough x3 days  She denies fever, chills, body aches, nausea vomiting or diarrhea  She is tolerating oral hydration  No difficulty breathing or chest pain  Patient has upper and has a history of COPD  No over-the-counter medications  Review of Systems   Review of Systems   Constitutional: Negative for chills, fatigue and fever  HENT: Positive for congestion, rhinorrhea and sore throat  Negative for ear pain, postnasal drip, sinus pressure and voice change  Eyes: Negative for discharge and redness  Respiratory: Positive for cough  Negative for shortness of breath and wheezing  Cardiovascular: Negative for chest pain  Gastrointestinal: Negative for diarrhea, nausea and vomiting  Musculoskeletal: Positive for myalgias  Neurological: Negative for dizziness and headaches  Hematological: Negative for adenopathy           Current Medications       Current Outpatient Medications:     albuterol (PROAIR HFA) 90 mcg/act inhaler, Inhale 2 puffs every 6 (six) hours as needed for wheezing, Disp: 8 5 g, Rfl: 0    brompheniramine-pseudoephedrine-DM 30-2-10 MG/5ML syrup, Take 5 mL by mouth 4 (four) times a day as needed for congestion or cough for up to 5 days, Disp: 118 mL, Rfl: 0    ibuprofen (MOTRIN) 400 mg tablet, Take 1 tablet (400 mg total) by mouth every 6 (six) hours as needed for mild pain, Disp: 30 tablet, Rfl: 0    Levonorgestrel 19 5 MCG/DAY IUD, 1 each by Intrauterine route, Disp: , Rfl:     methocarbamol (ROBAXIN) 500 mg tablet, Take 1 tablet (500 mg total) by mouth 2 (two) times a day as needed for muscle spasms, Disp: 20 tablet, Rfl: 0    methocarbamol (ROBAXIN) 500 mg tablet, Take 500 mg by mouth 2 (two) times a day as needed, Disp: , Rfl:     pantoprazole (PROTONIX) 40 mg tablet, Take 40 mg by mouth daily, Disp: , Rfl:     predniSONE 10 mg tablet, Take 3 tablets (30 mg total) by mouth daily for 3 days, THEN 2 tablets (20 mg total) daily for 3 days, THEN 1 tablet (10 mg total) daily for 3 days  , Disp: 18 tablet, Rfl: 0    Current Allergies     Allergies as of 2020 - Reviewed 2020   Allergen Reaction Noted    No known allergies  2016            The following portions of the patient's history were reviewed and updated as appropriate: allergies, current medications, past family history, past medical history, past social history, past surgical history and problem list      Past Medical History:   Diagnosis Date    Allergic     Asthma     Herniated disc, cervical     Pinched nerve in shoulder, right     Thyroid nodule        Past Surgical History:   Procedure Laterality Date     SECTION         Family History   Problem Relation Age of Onset    Diabetes Mother     Heart disease Mother     Bone cancer Father     Diabetes Brother          Medications have been verified  Objective   /86   Pulse 64   Temp 97 8 °F (36 6 °C)   Resp 18   SpO2 100%        Physical Exam     Physical Exam   Constitutional: She appears well-developed and well-nourished  No distress  HENT:   Head: Normocephalic and atraumatic     Right Ear: Tympanic membrane normal    Left Ear: Tympanic membrane normal    Nose: Mucosal edema and rhinorrhea present  Mouth/Throat: Uvula is midline  Posterior oropharyngeal erythema present  No tonsillar exudate  Cardiovascular: Normal rate, regular rhythm and normal heart sounds  Pulmonary/Chest: Effort normal  No respiratory distress  She has wheezes  Lymphadenopathy:     She has no cervical adenopathy  Skin: No rash noted  Vitals reviewed

## 2020-02-09 NOTE — PATIENT INSTRUCTIONS
Hydration and rest  Tylenol and motrin for pain and fever  Humidifier at night  Nasal saline rinses  Follow up with PCP in 3-5 days  Go to ER with worsening symptoms, difficulty breathing or swallowing

## 2020-02-11 ENCOUNTER — TRANSCRIBE ORDERS (OUTPATIENT)
Dept: NEUROSURGERY | Facility: CLINIC | Age: 40
End: 2020-02-11

## 2020-02-11 DIAGNOSIS — M54.2 NECK PAIN: Primary | ICD-10-CM

## 2020-02-12 ENCOUNTER — OFFICE VISIT (OUTPATIENT)
Dept: URGENT CARE | Facility: CLINIC | Age: 40
End: 2020-02-12
Payer: COMMERCIAL

## 2020-02-12 VITALS
SYSTOLIC BLOOD PRESSURE: 153 MMHG | TEMPERATURE: 98 F | HEART RATE: 90 BPM | BODY MASS INDEX: 27.56 KG/M2 | OXYGEN SATURATION: 99 % | DIASTOLIC BLOOD PRESSURE: 86 MMHG | RESPIRATION RATE: 18 BRPM | HEIGHT: 61 IN | WEIGHT: 146 LBS

## 2020-02-12 DIAGNOSIS — J06.9 ACUTE URI: Primary | ICD-10-CM

## 2020-02-12 PROCEDURE — 99213 OFFICE O/P EST LOW 20 MIN: CPT | Performed by: PHYSICIAN ASSISTANT

## 2020-02-12 PROCEDURE — S9088 SERVICES PROVIDED IN URGENT: HCPCS | Performed by: PHYSICIAN ASSISTANT

## 2020-02-12 NOTE — PROGRESS NOTES
[facility]        NAME: Lesa Tellez is a 35 y o  female  : 1980    MRN: [id number]  DATE: 2020  TIME: 4:45 PM    Assessment and Plan   Acute URI [J06 9]  1  Acute URI           Patient Instructions     Use Ocean nasal spray throughout the day and Vaseline on a Q-tip at nighttime to each nares before bed to was tries the nasal passages  Continue prednisone albuterol and cough medicine as previously prescribed  Symptoms worsen go to the emergency room for further evaluation  Follow up with PCP in 3-5 days  Proceed to  ER if symptoms worsen  Chief Complaint     Chief Complaint   Patient presents with    Cough     cough started 3 days ago, went [facility] on  RX cough syrup and prednisone; here today for dry nose and continuing cough  History of Present Illness       Patient is currently on a prednisone taper using albuterol HFA and cough medicine with some relief but she still has a lingering cough  She states she is now having very dry nasal passages in does not know what to do  Denies fever chills chest pain shortness of breath nausea vomiting diarrhea  Patient was also complaining of some insomnia and muscle relaxers for her neck do not allow her to fall asleep  Patient was advised to take melatonin  Review of Systems   Review of Systems   Constitutional: Negative for chills and fever  HENT: Negative for congestion, ear pain, postnasal drip, rhinorrhea and sore throat  Respiratory: Positive for cough  Negative for chest tightness, shortness of breath and wheezing  Cardiovascular: Negative for chest pain  Gastrointestinal: Negative for diarrhea, nausea and vomiting  Musculoskeletal: Positive for neck pain  Skin: Negative for rash  Neurological: Negative for headaches  Hematological: Negative for adenopathy           Current Medications       Current Outpatient Medications:     albuterol (PROAIR HFA) 90 mcg/act inhaler, Inhale 2 puffs every 6 (six) hours as needed for wheezing, Disp: 8 5 g, Rfl: 0    brompheniramine-pseudoephedrine-DM 30-2-10 MG/5ML syrup, Take 5 mL by mouth 4 (four) times a day as needed for congestion or cough for up to 5 days, Disp: 118 mL, Rfl: 0    ibuprofen (MOTRIN) 400 mg tablet, Take 1 tablet (400 mg total) by mouth every 6 (six) hours as needed for mild pain, Disp: 30 tablet, Rfl: 0    Levonorgestrel 19 5 MCG/DAY IUD, 1 each by Intrauterine route, Disp: , Rfl:     methocarbamol (ROBAXIN) 500 mg tablet, Take 1 tablet (500 mg total) by mouth 2 (two) times a day as needed for muscle spasms, Disp: 20 tablet, Rfl: 0    methocarbamol (ROBAXIN) 500 mg tablet, Take 500 mg by mouth 2 (two) times a day as needed, Disp: , Rfl:     pantoprazole (PROTONIX) 40 mg tablet, Take 40 mg by mouth daily, Disp: , Rfl:     predniSONE 10 mg tablet, Take 3 tablets (30 mg total) by mouth daily for 3 days, THEN 2 tablets (20 mg total) daily for 3 days, THEN 1 tablet (10 mg total) daily for 3 days  , Disp: 18 tablet, Rfl: 0    Current Allergies     Allergies as of 2020 - Reviewed 2020   Allergen Reaction Noted    No known allergies  2016            The following portions of the patient's history were reviewed and updated as appropriate: allergies, current medications, past family history, past medical history, past social history, past surgical history and problem list      Past Medical History:   Diagnosis Date    Allergic     Asthma     Herniated disc, cervical     Pinched nerve in shoulder, right     Thyroid nodule        Past Surgical History:   Procedure Laterality Date     SECTION         Family History   Problem Relation Age of Onset    Diabetes Mother     Heart disease Mother     Bone cancer Father     Diabetes Brother          Medications have been verified          Objective   /86 (BP Location: Left arm, Patient Position: Sitting, Cuff Size: Large)   Pulse 90   Temp 98 °F (36 7 °C) (Tympanic)   Resp 18   Ht 5' 1" (1 549 m)   Wt 66 2 kg (146 lb)   SpO2 99%   BMI 27 59 kg/m²        Physical Exam     Physical Exam   Constitutional: She is oriented to person, place, and time  She appears well-developed and well-nourished  HENT:   Head: Normocephalic and atraumatic  Right Ear: External ear normal    Left Ear: External ear normal    Nose: Nose normal    Mouth/Throat: Oropharynx is clear and moist    Eyes: Conjunctivae are normal    Neck: Neck supple  Cardiovascular: Normal rate, regular rhythm and normal heart sounds  Pulmonary/Chest: Effort normal and breath sounds normal    Lymphadenopathy:     She has no cervical adenopathy  Neurological: She is alert and oriented to person, place, and time  Skin: Skin is warm and dry  Psychiatric: She has a normal mood and affect  Her behavior is normal    Nursing note and vitals reviewed

## 2020-02-12 NOTE — PATIENT INSTRUCTIONS
Use Ocean nasal spray throughout the day and Vaseline on a Q-tip at nighttime to each nares before bed to was tries the nasal passages  Continue prednisone albuterol and cough medicine as previously prescribed  Symptoms worsen go to the emergency room for further evaluation  Upper Respiratory Infection   AMBULATORY CARE:   An upper respiratory infection  is also called a common cold  It can affect your nose, throat, ears, and sinuses  Common signs and symptoms include the following:  Cold symptoms are usually worst for the first 3 to 5 days  You may have any of the following:  · Runny or stuffy nose    · Sneezing and coughing    · Sore throat or hoarseness    · Red, watery, and sore eyes    · Fatigue     · Chills and fever    · Headache, body aches, or sore muscles  Seek care immediately if:   · You have chest pain or trouble breathing  Contact your healthcare provider if:   · You have a fever over 102ºF (39°C)  · Your sore throat gets worse or you see white or yellow spots in your throat  · Your symptoms get worse after 3 to 5 days or your cold is not better in 14 days  · You have a rash anywhere on your skin  · You have large, tender lumps in your neck  · You have thick, green or yellow drainage from your nose  · You cough up thick yellow, green, or bloody mucus  · You have vomiting for more than 24 hours and cannot keep fluids down  · You have a bad earache  · You have questions or concerns about your condition or care  Treatment for a cold: There is no cure for the common cold  Colds are caused by viruses and do not get better with antibiotics  Most people get better in 7 to 14 days  You may continue to cough for 2 to 3 weeks  The following may help decrease your symptoms:  · Decongestants  help reduce nasal congestion and help you breathe more easily  If you take decongestant pills, they may make you feel restless or not able to sleep   Do not use decongestant sprays for more than a few days  · Cough suppressants  help reduce coughing  Ask your healthcare provider which type of cough medicine is best for you  · NSAIDs , such as ibuprofen, help decrease swelling, pain, and fever  NSAIDs can cause stomach bleeding or kidney problems in certain people  If you take blood thinner medicine, always ask your healthcare provider if NSAIDs are safe for you  Always read the medicine label and follow directions  · Acetaminophen  decreases pain and fever  It is available without a doctor's order  Ask how much to take and how often to take it  Follow directions  Read the labels of all other medicines you are using to see if they also contain acetaminophen, or ask your doctor or pharmacist  Acetaminophen can cause liver damage if not taken correctly  Do not use more than 4 grams (4,000 milligrams) total of acetaminophen in one day  Manage your cold:   · Rest as much as possible  Slowly start to do more each day  · Drink more liquids as directed  Liquids will help thin and loosen mucus so you can cough it up  Liquids will also help prevent dehydration  Liquids that help prevent dehydration include water, fruit juice, and broth  Do not drink liquids that contain caffeine  Caffeine can increase your risk for dehydration  Ask your healthcare provider how much liquid to drink each day  · Soothe a sore throat  Gargle with warm salt water  This helps your sore throat feel better  Make salt water by dissolving ¼ teaspoon salt in 1 cup warm water  You may also suck on hard candy or throat lozenges  You may use a sore throat spray  · Use a humidifier or vaporizer  Use a cool mist humidifier or a vaporizer to increase air moisture in your home  This may make it easier for you to breathe and help decrease your cough  · Use saline nasal drops as directed  These help relieve congestion  · Apply petroleum-based jelly around the outside of your nostrils    This can decrease irritation from blowing your nose  · Do not smoke  Nicotine and other chemicals in cigarettes and cigars can make your symptoms worse  They can also cause infections such as bronchitis or pneumonia  Ask your healthcare provider for information if you currently smoke and need help to quit  E-cigarettes or smokeless tobacco still contain nicotine  Talk to your healthcare provider before you use these products  Prevent spreading your cold to others:   · Try to stay away from other people during the first 2 to 3 days of your cold when it is more easily spread  · Do not share food or drinks  · Do not share hand towels with household members  · Wash your hands often, especially after you blow your nose  Turn away from other people and cover your mouth and nose with a tissue when you sneeze or cough  Follow up with your healthcare provider as directed:  Write down your questions so you remember to ask them during your visits  © 2017 [address] [employer] Information is for End User's use only and may not be sold, redistributed or otherwise used for commercial purposes  All illustrations and images included in CareNotes® are the copyrighted property of [employer]  or [employer]  The above information is an  only  It is not intended as medical advice for individual conditions or treatments  Talk to your doctor, nurse or pharmacist before following any medical regimen to see if it is safe and effective for you

## 2020-02-15 ENCOUNTER — HOSPITAL ENCOUNTER (OUTPATIENT)
Dept: MAMMOGRAPHY | Facility: HOSPITAL | Age: 40
Discharge: HOME | End: 2020-02-15
Payer: COMMERCIAL

## 2020-02-15 VITALS — WEIGHT: 146 LBS | HEIGHT: 61 IN | BODY MASS INDEX: 27.56 KG/M2

## 2020-02-15 DIAGNOSIS — Z12.31 ENCOUNTER FOR SCREENING MAMMOGRAM FOR MALIGNANT NEOPLASM OF BREAST: ICD-10-CM

## 2020-02-15 PROCEDURE — 77067 SCR MAMMO BI INCL CAD: CPT

## 2020-02-15 PROCEDURE — 77063 BREAST TOMOSYNTHESIS BI: CPT

## 2020-02-20 ENCOUNTER — TELEPHONE (OUTPATIENT)
Dept: NEUROSURGERY | Facility: CLINIC | Age: 40
End: 2020-02-20

## 2020-02-20 NOTE — TELEPHONE ENCOUNTER
Tried to reach patient to offer sooner available appointment, no answer, LMOM and provided office phone number, asked for return call      (When patient returns call, needs to be rescheduled either way, provider unavailable for current appointment )

## 2020-02-21 ENCOUNTER — OFFICE VISIT (OUTPATIENT)
Dept: NEUROSURGERY | Facility: CLINIC | Age: 40
End: 2020-02-21
Payer: COMMERCIAL

## 2020-02-21 VITALS
HEART RATE: 103 BPM | TEMPERATURE: 99 F | HEIGHT: 61 IN | DIASTOLIC BLOOD PRESSURE: 98 MMHG | BODY MASS INDEX: 29.27 KG/M2 | WEIGHT: 155 LBS | SYSTOLIC BLOOD PRESSURE: 150 MMHG | RESPIRATION RATE: 18 BRPM

## 2020-02-21 DIAGNOSIS — M50.20 HERNIATED CERVICAL DISC: ICD-10-CM

## 2020-02-21 DIAGNOSIS — M54.2 NECK PAIN: Primary | ICD-10-CM

## 2020-02-21 PROBLEM — M50.120 CERVICAL DISC DISORDER WITH RADICULOPATHY OF MID-CERVICAL REGION: Status: RESOLVED | Noted: 2020-01-06 | Resolved: 2020-02-21

## 2020-02-21 PROBLEM — M54.12 CERVICAL RADICULOPATHY: Status: RESOLVED | Noted: 2020-01-06 | Resolved: 2020-02-21

## 2020-02-21 PROCEDURE — 99203 OFFICE O/P NEW LOW 30 MIN: CPT | Performed by: NEUROLOGICAL SURGERY

## 2020-02-21 RX ORDER — CELECOXIB 200 MG/1
200 CAPSULE ORAL 2 TIMES DAILY PRN
COMMUNITY
Start: 2020-02-17 | End: 2021-02-16

## 2020-02-21 NOTE — PROGRESS NOTES
Neurosurgery Office Note  Raquel Leopoldo Chain 35 y o  female MRN: [id number]      Assessment/Plan      Diagnoses and all orders for this visit:    Neck pain    -     Ambulatory referral to Physical Medicine Rehab; Future    Herniated cervical disc            Discussion:     21-year-old woman who spontaneously developed left arm numbness in 10/2019  Numbness has since resolved  She has no arm weakness, pins needles, or radicular pain  She has neck pain particularly precipitated with driving  Severity 6/10  Worse if she is active  She is not working presently, her primary care doctor has suggested she not work  Her job involves lifting boxes, she works at the   [employer]  MRI scan from 12/2019 demonstrated herniated disc C5-6, ectopic into the left foramina  On physical exam, she has no weakness biceps, triceps, wrist extensors,  Interosseous  Flexion-extension shows good range of motion through her cervical spinal levels  As she is improving, and has no radicular pain or weakness, I do not recommend surgery for her  She does have good range of motion therefore would be a candidate for disc replacement if she relapsed  She has questions about return to work  I informed her we do not handle functional capacity evaluations etc and therefore am referring her to [facility] for that assessment  They can help her primary physician manage that aspect of her care  Otherwise we will see her back on an as-needed basis  Neck pain 8/10, arm pain 0/10, and the eye 52%, DQ 5 D3 L: 20085, VA S 90, mJOA 17/17  CHIEF COMPLAINT    Chief Complaint   Patient presents with    Consult     NP Consult for C-spine evaluation; self-referred       HISTORY    History of Present Illness     35y o  year old female     HPI    See Discussion    REVIEW OF SYSTEMS    Review of Systems   Cardiovascular: Positive for palpitations     Musculoskeletal: Positive for arthralgias (across back of shoulder), back pain, gait problem (occasional with low back pain) and neck pain  Neurological: Positive for headaches (with neck pain)  Negative for weakness and numbness  Hematological: Bruises/bleeds easily  Psychiatric/Behavioral: Positive for dysphoric mood  Meds/Allergies     Current Outpatient Medications   Medication Sig Dispense Refill    albuterol (PROAIR HFA) 90 mcg/act inhaler Inhale 2 puffs every 6 (six) hours as needed for wheezing 8 5 g 0    celecoxib (CeleBREX) 200 mg capsule Take 200 mg by mouth 2 (two) times a day as needed      Levonorgestrel 19 5 MCG/DAY IUD 1 each by Intrauterine route      methocarbamol (ROBAXIN) 500 mg tablet Take 1 tablet (500 mg total) by mouth 2 (two) times a day as needed for muscle spasms 20 tablet 0    pantoprazole (PROTONIX) 40 mg tablet Take 40 mg by mouth daily      ibuprofen (MOTRIN) 400 mg tablet Take 1 tablet (400 mg total) by mouth every 6 (six) hours as needed for mild pain (Patient not taking: Reported on 2020) 30 tablet 0     No current facility-administered medications for this visit          Allergies   Allergen Reactions    No Known Allergies      Other reaction(s): Unknown       PAST HISTORY    Past Medical History:   Diagnosis Date    Allergic     Asthma     Herniated disc, cervical     Pinched nerve in shoulder, right     Thyroid nodule        Past Surgical History:   Procedure Laterality Date     SECTION         Social History     Tobacco Use    Smoking status: Current Every Day Smoker     Packs/day: 1 00     Types: Cigarettes    Smokeless tobacco: Never Used   Substance Use Topics    Alcohol use: No    Drug use: Not Currently       Family History   Problem Relation Age of Onset    Diabetes Mother     Heart disease Mother     Bone cancer Father     Diabetes Brother     No Known Problems Daughter     No Known Problems Maternal Grandmother     No Known Problems Paternal Grandmother     No Known Problems Maternal Aunt     No Known Problems Paternal Aunt     No Known Problems Paternal Aunt          The following portions of the patient's history were reviewed in this encounter and updated as appropriate: Past medical, surgical, family, and social history, as well as medications, allergies, and review of systems  EXAM    Vitals:Blood pressure 150/98, pulse 103, temperature 99 °F (37 2 °C), temperature source Tympanic, resp  rate 18, height 5' 1" (1 549 m), weight 70 3 kg (155 lb)  ,Body mass index is 29 29 kg/m²  Physical Exam   Constitutional: She is oriented to person, place, and time  She appears well-developed and well-nourished  HENT:   Head: Normocephalic  Eyes: No scleral icterus  Neck: Neck supple  Cardiovascular: Normal rate  Pulmonary/Chest: Effort normal    Abdominal: Soft  Neurological: She is alert and oriented to person, place, and time  GCS eye subscore is 4  GCS verbal subscore is 5  GCS motor subscore is 6  Skin: Skin is warm and dry  Psychiatric: She has a normal mood and affect  Her speech is normal and behavior is normal    Vitals reviewed  Neurologic Exam     Mental Status   Oriented to person, place, and time  Attention: normal    Speech: speech is normal   Level of consciousness: alert    Cranial Nerves     CN VII   Facial expression full, symmetric  Motor Exam   Muscle bulk: normal  Overall muscle tone: normal    Strength   Right biceps: 5/5  Left biceps: 5/5  Right triceps: 5/5  Left triceps: 5/5  Right wrist extension: 5/5  Left wrist extension: 5/5  Right interossei: 5/5  Left interossei: 5/5  Moves all extremities, grossly normal     Gait, Coordination, and Reflexes     Tremor   Resting tremor: absent  Intention tremor: absent  Action tremor: absent  No aids  MEDICAL DECISION MAKING    Imaging Studies:       MRI scan 12/19, and flexion-extension x-rays cervical spine 01/2020: I have personally reviewed pertinent reports     and I have personally reviewed pertinent films in PACS

## 2020-02-26 ENCOUNTER — OFFICE VISIT (OUTPATIENT)
Dept: GASTROENTEROLOGY | Facility: CLINIC | Age: 40
End: 2020-02-26
Payer: COMMERCIAL

## 2020-02-26 VITALS
BODY MASS INDEX: 29.07 KG/M2 | RESPIRATION RATE: 16 BRPM | OXYGEN SATURATION: 98 % | WEIGHT: 154 LBS | DIASTOLIC BLOOD PRESSURE: 80 MMHG | HEART RATE: 87 BPM | HEIGHT: 61 IN | TEMPERATURE: 98.8 F | SYSTOLIC BLOOD PRESSURE: 122 MMHG

## 2020-02-26 DIAGNOSIS — K21.9 GASTROESOPHAGEAL REFLUX DISEASE, ESOPHAGITIS PRESENCE NOT SPECIFIED: ICD-10-CM

## 2020-02-26 DIAGNOSIS — R10.13 EPIGASTRIC ABDOMINAL PAIN: Primary | ICD-10-CM

## 2020-02-26 PROCEDURE — 99244 OFF/OP CNSLTJ NEW/EST MOD 40: CPT | Performed by: INTERNAL MEDICINE

## 2020-02-26 RX ORDER — FAMOTIDINE 40 MG/1
40 TABLET, FILM COATED ORAL
Qty: 30 TABLET | Refills: 3 | Status: SHIPPED | OUTPATIENT
Start: 2020-02-26 | End: 2021-08-19 | Stop reason: SDUPTHER

## 2020-02-26 NOTE — PATIENT INSTRUCTIONS
GERD (gastroesophageal reflux disease)  Patient does have reflux dating back a couple years  She was using Nexium on an intermittent basis, currently on pantoprazole 40 mg once a day  I am not sure that reflux is playing a role in her current symptoms of upper abdominal pain in the feeling of fullness  Continue pantoprazole 40 mg once a day  She should limit her NSAID use such as Celebrex, and she certainly should not combine this with any other NSAIDs such as Motrin, Advil, ibuprofen etc  Lifestyle modifications for gastroesophageal reflux disease were discussed and include limiting fried and fatty foods, mints, chocolates, carbonated and caffeinated beverages , and alcohol, etc   Avoid lying down for 2-3 hours after meals  If you have nighttime symptoms consider raising the head of the bed up on 4-6 inch blocks  Pillows typically are not useful  If you are overweight, weight loss will be helpful  Epigastric abdominal pain  Patient is experiencing upper abdominal pain at least 3 times per week no associated nausea or vomiting however  Mylanta may help  Burping may help as well  Rule out gallbladder disease, although liver enzymes are normal   Esophageal spasm must be considered as well  At 1 point during the interview it sounded like she was experiencing some dysphagia as well  Not entirely clear even with a careful history  Check ultrasound of the abdomen  Schedule upper endoscopy  I explained to the patient the procedure of upper endoscopy as well as its potential risk which are approximately 1 in 1000 chance of bleeding, infection, and perforation  Given that Mylanta helps her symptom, I will add famotidine 40 mg to be taken 2 hours prior to bed        Pt is scheduled with dr Andrei Chua at [facility] for egd on 3/17/20, pt has instructions in blue folder  Patient is aware she will need a  to and from   She will get a call the day before with an exact time for arrival   Us is scheduled on 3/5/20 at [address] [place] [address]

## 2020-02-26 NOTE — ASSESSMENT & PLAN NOTE
Patient is experiencing upper abdominal pain at least 3 times per week no associated nausea or vomiting however  Mylanta may help  Burping may help as well  Rule out gallbladder disease, although liver enzymes are normal   Esophageal spasm must be considered as well  At 1 point during the interview it sounded like she was experiencing some dysphagia as well  Not entirely clear even with a careful history  Check ultrasound of the abdomen  Schedule upper endoscopy  I explained to the patient the procedure of upper endoscopy as well as its potential risk which are approximately 1 in 1000 chance of bleeding, infection, and perforation    Given that Mylanta helps her symptom, I will add famotidine 40 mg to be taken 2 hours prior to bed

## 2020-02-26 NOTE — ASSESSMENT & PLAN NOTE
Patient does have reflux dating back a couple years  She was using Nexium on an intermittent basis, currently on pantoprazole 40 mg once a day  I am not sure that reflux is playing a role in her current symptoms of upper abdominal pain in the feeling of fullness  Continue pantoprazole 40 mg once a day  She should limit her NSAID use such as Celebrex, and she certainly should not combine this with any other NSAIDs such as Motrin, Advil, ibuprofen etc  Lifestyle modifications for gastroesophageal reflux disease were discussed and include limiting fried and fatty foods, mints, chocolates, carbonated and caffeinated beverages , and alcohol, etc   Avoid lying down for 2-3 hours after meals  If you have nighttime symptoms consider raising the head of the bed up on 4-6 inch blocks  Pillows typically are not useful  If you are overweight, weight loss will be helpful

## 2020-02-26 NOTE — PROGRESS NOTES
[address] Gastroenterology  Gastroenterology Outpatient Consultation  Patient Charles Stephens   Age 35 y o  Gender female   MRN: [id number]  [facility] [id number]     ASSESSMENT AND PLAN:   Problem List Items Addressed This Visit        Digestive    GERD (gastroesophageal reflux disease)     Patient does have reflux dating back a couple years  She was using Nexium on an intermittent basis, currently on pantoprazole 40 mg once a day  I am not sure that reflux is playing a role in her current symptoms of upper abdominal pain in the feeling of fullness  Continue pantoprazole 40 mg once a day  She should limit her NSAID use such as Celebrex, and she certainly should not combine this with any other NSAIDs such as Motrin, Advil, ibuprofen etc  Lifestyle modifications for gastroesophageal reflux disease were discussed and include limiting fried and fatty foods, mints, chocolates, carbonated and caffeinated beverages , and alcohol, etc   Avoid lying down for 2-3 hours after meals  If you have nighttime symptoms consider raising the head of the bed up on 4-6 inch blocks  Pillows typically are not useful  If you are overweight, weight loss will be helpful  Relevant Medications    famotidine (PEPCID) 40 MG tablet    Other Relevant Orders    EGD       Other    Epigastric abdominal pain - Primary     Patient is experiencing upper abdominal pain at least 3 times per week no associated nausea or vomiting however  Mylanta may help  Burping may help as well  Rule out gallbladder disease, although liver enzymes are normal   Esophageal spasm must be considered as well  At 1 point during the interview it sounded like she was experiencing some dysphagia as well  Not entirely clear even with a careful history  Check ultrasound of the abdomen  Schedule upper endoscopy    I explained to the patient the procedure of upper endoscopy as well as its potential risk which are approximately 1 in 1000 chance of bleeding, infection, and perforation  Given that Mylanta helps her symptom, I will add famotidine 40 mg to be taken 2 hours prior to bed         Relevant Medications    famotidine (PEPCID) 40 MG tablet    Other Relevant Orders    US abdomen complete    EGD         _____________________________________________________________    HPI: Mrs Mini Sagastume is a delightful 40-year-old woman who I had the opportunity see my office in consultation for evaluation of upper abdominal pain  Patient has been getting pain on an intermittent basis probably for over a year  She did have to go the ER once for this and was on January 27, 2020  She reports that certain foods may trigger this but she cannot be any more specific  She had eaten a bagel on the day she went to the ER  She states that she would feel like there is something getting stuck in her esophagus  She would feel very full and developed right upper quadrant pain  She would have to rub her right side for relief  She will also take Mylanta which tends to help  She will also have a lot of eructation when this occurs  It is not entirely clear she is having actual esophageal dysphagia or not  She does report having reflux dating back a couple years for which he would intermittently use Nexium  She is on Celebrex and also takes ibuprofen from time to time, thus her primary care doctor started her on pantoprazole 40 mg in October  This seems to control her heartburn but is not less than these episodes of right upper quadrant pain  She will get these episodes of pain probably 3 times per week  Maybe that would last 10-15 minutes  There is no associated nausea or vomiting  No associated diarrhea or constipation  There has been no rectal bleeding or black stools  Her bowel habits for the most part have been fairly regular without any change in the size she per frequency although yesterday after eating some vague CT she had frequent bowel movements  This is not typical for her    As mentioned above she is on Celebrex on a daily basis but will intermittently use Motrin for back and neck pain  (I did advise her not to use these 2 medications together)  She has been using the Celebrex for about a month now  She did try to cut out fried foods, she seems to get the symptoms less frequently, but there still occurring about 3 times per week  I did also review the emergency room record from January 27, 2020  She was complaining of burning epigastric pain with radiation to her chest and into her head at that time  She was also in the emergency room on January 2nd with similar upper abdominal symptoms  On January 27th her chemistry panel was normal except for a potassium of 3 2  Her liver enzymes and normal AST 12 ALT 13 lipase normal at 181  White count 4 11 hemoglobin 13 9 hematocrit 42 8 platelet count was 155,297  She has never had a prior endoscopy or colonoscopy  There is no family history colon cancer colon polyps  She smokes anywhere from 1/2 to 1 full pack of cigarettes per day    She does not drink alcohol      Allergies   Allergen Reactions    No Known Allergies      Other reaction(s): Unknown     Current Outpatient Medications   Medication Sig Dispense Refill    albuterol (PROAIR HFA) 90 mcg/act inhaler Inhale 2 puffs every 6 (six) hours as needed for wheezing 8 5 g 0    celecoxib (CeleBREX) 200 mg capsule Take 200 mg by mouth 2 (two) times a day as needed      ibuprofen (MOTRIN) 400 mg tablet Take 1 tablet (400 mg total) by mouth every 6 (six) hours as needed for mild pain 30 tablet 0    Levonorgestrel 19 5 MCG/DAY IUD 1 each by Intrauterine route      methocarbamol (ROBAXIN) 500 mg tablet Take 1 tablet (500 mg total) by mouth 2 (two) times a day as needed for muscle spasms 20 tablet 0    pantoprazole (PROTONIX) 40 mg tablet Take 40 mg by mouth daily      famotidine (PEPCID) 40 MG tablet Take 1 tablet (40 mg total) by mouth daily at bedtime 30 tablet 3     No current facility-administered medications for this visit  MEDICAL HISTORY:  Past Medical History:   Diagnosis Date    Allergic     Asthma     Cervical disc disorder with radiculopathy of mid-cervical region 2020    Cervical radiculopathy 2020    GERD (gastroesophageal reflux disease)     Herniated disc, cervical     Pinched nerve in shoulder, right     Thyroid nodule      Past Surgical History:   Procedure Laterality Date     SECTION       Social History     Substance and Sexual Activity   Alcohol Use No     Social History     Substance and Sexual Activity   Drug Use Not Currently     Social History     Tobacco Use   Smoking Status Current Every Day Smoker    Packs/day: 1 00    Types: Cigarettes   Smokeless Tobacco Never Used     Family History   Problem Relation Age of Onset    Diabetes Mother     Heart disease Mother     Bone cancer Father     Diabetes Brother     No Known Problems Daughter     No Known Problems Maternal Grandmother     No Known Problems Paternal Grandmother     No Known Problems Maternal Aunt     No Known Problems Paternal Aunt     No Known Problems Paternal Aunt        REVIEW OF SYSTEMS:  CONSTITUTIONAL: Denies any fever, chills, rigors, and weight loss  HEENT: No earache or tinnitus  Denies hearing loss or visual disturbances  CARDIOVASCULAR: No chest pain or palpitations  RESPIRATORY: Denies any cough, hemoptysis, shortness of breath or dyspnea on exertion  GASTROINTESTINAL: As noted in the History of Present Illness  GENITOURINARY: No problems with urination  Denies any hematuria or dysuria  NEUROLOGIC: No dizziness or vertigo, denies headaches  MUSCULOSKELETAL:  Patient does report a lot of neck and back pain  SKIN: Denies skin rashes or itching  ENDOCRINE: Denies excessive thirst  Denies intolerance to heat or cold    PSYCHOSOCIAL:  She does report some anxiety but no depression  Objective   Blood pressure 122/80, pulse 87, temperature 98 8 °F (37 1 °C), temperature source Tympanic, resp  rate 16, height 5' 1" (1 549 m), weight 69 9 kg (154 lb), SpO2 98 %  Body mass index is 29 1 kg/m²  PHYSICAL EXAM:   General Appearance: Alert, cooperative, no distress  HEENT: Normocephalic, atraumatic, anicteric  Neck: Supple, symmetrical, trachea midline  Lungs: Clear to auscultation bilaterally; no rales, rhonchi or wheezing; respirations unlabored   Heart: Regular rate and rhythm; no murmur, rub, or gallop  Abdomen: Soft, bowel sounds normal, non-tender, non-distended; no masses, there is no hepatosplenomegaly   No spider angiomas  Genitalia: Deferred   Rectal: Deferred   Extremities: No cyanosis, clubbing or edema   Skin: No jaundice, rashes, or lesions   Lymph nodes: No palpable cervical lymphadenopathy   Lab Results:   Admission on 01/27/2020, Discharged on 01/27/2020   Component Date Value    WBC 01/27/2020 4 11*    RBC 01/27/2020 4 48     Hemoglobin 01/27/2020 13 9     Hematocrit 01/27/2020 42 8     MCV 01/27/2020 96     MCH 01/27/2020 31 0     MCHC 01/27/2020 32 5     RDW 01/27/2020 12 3     MPV 01/27/2020 11 6     Platelets 48/04/4130 202     nRBC 01/27/2020 0     Neutrophils Relative 01/27/2020 53     Immat GRANS % 01/27/2020 0     Lymphocytes Relative 01/27/2020 35     Monocytes Relative 01/27/2020 8     Eosinophils Relative 01/27/2020 3     Basophils Relative 01/27/2020 1     Neutrophils Absolute 01/27/2020 2 17     Immature Grans Absolute 01/27/2020 0 01     Lymphocytes Absolute 01/27/2020 1 43     Monocytes Absolute 01/27/2020 0 33     Eosinophils Absolute 01/27/2020 0 13     Basophils Absolute 01/27/2020 0 04     Troponin I 01/27/2020 <0 02     NT-proBNP 01/27/2020 8     Sodium 01/27/2020 140     Potassium 01/27/2020 3 2*    Chloride 01/27/2020 103     CO2 01/27/2020 29     ANION GAP 01/27/2020 8     BUN 01/27/2020 9     Creatinine 01/27/2020 0 73     Glucose 01/27/2020 103     Calcium 01/27/2020 9 0     AST 01/27/2020 12     ALT 01/27/2020 13     Alkaline Phosphatase 01/27/2020 60     Total Protein 01/27/2020 7 6     Albumin 01/27/2020 4 0     Total Bilirubin 01/27/2020 0 30     eGFR 01/27/2020 119     Lipase 01/27/2020 181     Magnesium 01/27/2020 2 1     Protime 01/27/2020 12 8     INR 01/27/2020 0 96     PTT 01/27/2020 28     Total CK 01/27/2020 68     EXT PREG TEST UR (Ref: N* 01/27/2020 negative     Control 01/27/2020 valid     Color, UA 01/27/2020 Yellow     Clarity, UA 01/27/2020 Slightly Cloudy     Specific Gravity, UA 01/27/2020 <=1 005     pH, UA 01/27/2020 6 5     Leukocytes, UA 01/27/2020 Negative     Nitrite, UA 01/27/2020 Negative     Protein, UA 01/27/2020 Negative     Glucose, UA 01/27/2020 Negative     Ketones, UA 01/27/2020 Negative     Urobilinogen, UA 01/27/2020 0 2     Bilirubin, UA 01/27/2020 Negative     Blood, UA 01/27/2020 Trace-Intact*    RBC, UA 01/27/2020 1-2*    WBC, UA 01/27/2020 0-1*    Epithelial Cells 01/27/2020 Moderate*    Bacteria, UA 01/27/2020 Occasional     MUCUS THREADS 01/27/2020 Occasional*    Ventricular Rate 01/27/2020 75     Atrial Rate 01/27/2020 75     SD Interval 01/27/2020 164     QRSD Interval 01/27/2020 86     QT Interval 01/27/2020 382     QTC Interval 01/27/2020 426     P Axis 01/27/2020 30     QRS Axis 01/27/2020 46     T Wave [place] 01/27/2020 22    Admission on 01/21/2020, Discharged on 01/21/2020   Component Date Value    Sodium 01/21/2020 139     Potassium 01/21/2020 3 4*    Chloride 01/21/2020 105     CO2 01/21/2020 28     ANION GAP 01/21/2020 6     BUN 01/21/2020 10     Creatinine 01/21/2020 0 78     Glucose 01/21/2020 120*    Calcium 01/21/2020 9 2     AST 01/21/2020 11*    ALT 01/21/2020 7     Alkaline Phosphatase 01/21/2020 50     Total Protein 01/21/2020 6 8     Albumin 01/21/2020 4 2     Total Bilirubin 01/21/2020 0 50     eGFR 01/21/2020 110     TSH 3RD GENERATON 01/21/2020 1 720     Magnesium 01/21/2020 2 0     Total CK 01/21/2020 70     Troponin I 01/21/2020 <0 03     Protime 01/21/2020 12 2     INR 01/21/2020 1 05     PTT 01/21/2020 31     WBC 01/21/2020 6 20     RBC 01/21/2020 4 32     Hemoglobin 01/21/2020 13 4     Hematocrit 01/21/2020 40 4*    MCV 01/21/2020 94     MCH 01/21/2020 31 2     MCHC 01/21/2020 33 3     RDW 01/21/2020 13 0     MPV 01/21/2020 10 2     Platelets 77/65/5516 176     Neutrophils Relative 01/21/2020 53     Lymphocytes Relative 01/21/2020 38     Monocytes Relative 01/21/2020 7     Eosinophils Relative 01/21/2020 2     Basophils Relative 01/21/2020 1     Neutrophils Absolute 01/21/2020 3 20     Lymphocytes Absolute 01/21/2020 2 30     Monocytes Absolute 01/21/2020 0 40     Eosinophils Absolute 01/21/2020 0 10     Basophils Absolute 01/21/2020 0 10     Preg, Serum 01/21/2020 Negative    Admission on 01/02/2020, Discharged on 01/02/2020   Component Date Value    WBC 01/02/2020 5 70     RBC 01/02/2020 4 35     Hemoglobin 01/02/2020 13 6     Hematocrit 01/02/2020 40 8*    MCV 01/02/2020 94     MCH 01/02/2020 31 1     MCHC 01/02/2020 33 2     RDW 01/02/2020 13 4     MPV 01/02/2020 10 1     Platelets 28/22/9809 182     Neutrophils Relative 01/02/2020 53     Lymphocytes Relative 01/02/2020 36     Monocytes Relative 01/02/2020 8     Eosinophils Relative 01/02/2020 2     Basophils Relative 01/02/2020 1     Neutrophils Absolute 01/02/2020 3 10     Lymphocytes Absolute 01/02/2020 2 10     Monocytes Absolute 01/02/2020 0 40     Eosinophils Absolute 01/02/2020 0 10     Basophils Absolute 01/02/2020 0 10     Sodium 01/02/2020 140     Potassium 01/02/2020 3 3*    Chloride 01/02/2020 105     CO2 01/02/2020 28     ANION GAP 01/02/2020 7     BUN 01/02/2020 14     Creatinine 01/02/2020 0 75     Glucose 01/02/2020 95     Calcium 01/02/2020 9 3     AST 01/02/2020 11*    ALT 01/02/2020 9     Alkaline Phosphatase 01/02/2020 46     Total Protein 01/02/2020 7 1     Albumin 01/02/2020 4 3     Total Bilirubin 01/02/2020 0 60     eGFR 01/02/2020 115     Lipase 01/02/2020 25     Ventricular Rate 01/02/2020 68     Atrial Rate 01/02/2020 68     UT Interval 01/02/2020 158     QRSD Interval 01/02/2020 88     QT Interval 01/02/2020 406     QTC Interval 01/02/2020 431     P Axis 01/02/2020 37     QRS Axis 01/02/2020 82     T Wave Axis 01/02/2020 35      Radiology Results:   [facility] [place], DO   02/26/20   Cc:

## 2020-02-28 PROBLEM — M54.12 CERVICAL RADICULOPATHY: Status: ACTIVE | Noted: 2020-02-06

## 2020-03-03 NOTE — PROGRESS NOTES
Patient stopped coming to physical therapy,on 1/30 measurements are from the last patient progress note and were unable to be updated  As a result, patient is discharged from physical therapy   Patient is not present at time of D/C

## 2020-03-04 ENCOUNTER — APPOINTMENT (OUTPATIENT)
Dept: RADIOLOGY | Facility: HOSPITAL | Age: 40
End: 2020-03-04
Payer: COMMERCIAL

## 2020-03-04 ENCOUNTER — HOSPITAL ENCOUNTER (OUTPATIENT)
Facility: HOSPITAL | Age: 40
Setting detail: OUTPATIENT SURGERY
Discharge: HOME | End: 2020-03-04
Attending: ANESTHESIOLOGY | Admitting: ANESTHESIOLOGY
Payer: COMMERCIAL

## 2020-03-04 VITALS
TEMPERATURE: 99 F | RESPIRATION RATE: 18 BRPM | HEART RATE: 91 BPM | DIASTOLIC BLOOD PRESSURE: 75 MMHG | HEIGHT: 61 IN | OXYGEN SATURATION: 99 % | SYSTOLIC BLOOD PRESSURE: 137 MMHG | BODY MASS INDEX: 29.07 KG/M2 | WEIGHT: 154 LBS

## 2020-03-04 PROCEDURE — 77002 NEEDLE LOCALIZATION BY XRAY: CPT

## 2020-03-04 PROCEDURE — 62321 NJX INTERLAMINAR CRV/THRC: CPT | Performed by: ANESTHESIOLOGY

## 2020-03-04 RX ORDER — DEXAMETHASONE SODIUM PHOSPHATE 10 MG/ML
INJECTION, SOLUTION INTRAMUSCULAR; INTRAVENOUS AS NEEDED
Status: DISCONTINUED | OUTPATIENT
Start: 2020-03-04 | End: 2020-03-04 | Stop reason: HOSPADM

## 2020-03-04 RX ORDER — LIDOCAINE HYDROCHLORIDE 10 MG/ML
INJECTION, SOLUTION EPIDURAL; INFILTRATION; INTRACAUDAL; PERINEURAL AS NEEDED
Status: DISCONTINUED | OUTPATIENT
Start: 2020-03-04 | End: 2020-03-04 | Stop reason: HOSPADM

## 2020-03-04 NOTE — DISCHARGE INSTRUCTIONS
Cigarette Smoking and Your Health   AMBULATORY CARE:   Risks to your health if you smoke:  Nicotine and other chemicals found in tobacco damage every cell in your body  Even if you are a light smoker, you have an increased risk for cancer, heart disease, and lung disease  If you are pregnant or have diabetes, smoking increases your risk for complications  Benefits to your health if you stop smoking:   · You decrease respiratory symptoms such as coughing, wheezing, and shortness of breath  · You reduce your risk for cancers of the lung, mouth, throat, kidney, bladder, pancreas, stomach, and cervix  If you already have cancer, you increase the benefits of chemotherapy  You also reduce your risk for cancer returning or a second cancer from developing  · You reduce your risk for heart disease, blood clots, heart attack, and stroke  · You reduce your risk for lung infections, and diseases such as pneumonia, asthma, chronic bronchitis, and emphysema  · Your circulation improves  More oxygen can be delivered to your body  If you have diabetes, you lower your risk for complications, such as kidney, artery, and eye diseases  You also lower your risk for nerve damage  Nerve damage can lead to amputations, poor vision, and blindness  · You improve your body's ability to heal and to fight infections  Benefits to the health of others if you stop smoking:  Tobacco is harmful to nonsmokers who breathe in your secondhand smoke  The following are ways the health of others around you may improve when you stop smoking:  · You lower the risks for lung cancer and heart disease in nonsmoking adults  · If you are pregnant, you lower the risk for miscarriage, early delivery, low birth weight, and stillbirth  You also lower your baby's risk for SIDS, obesity, developmental delay, and neurobehavioral problems, such as ADHD       · If you have children, you lower their risk for ear infections, colds, pneumonia, bronchitis, and asthma  For more information and support to stop smoking:   · Smokefree  gov  Phone: 2- 866 - 637-4011  Web Address: www smokefree  [employer]  Follow up with your healthcare provider as directed:  Write down your questions so you remember to ask them during your visits  © 2017 [employer] [employer] [employer] Information is for End User's use only and may not be sold, redistributed or otherwise used for commercial purposes  All illustrations and images included in CareNotes® are the copyrighted property of [employer]  or [employer]  The above information is an  only  It is not intended as medical advice for individual conditions or treatments  Talk to your doctor, nurse or pharmacist before following any medical regimen to see if it is safe and effective for you  Epidural Steroid Injection   WHAT YOU NEED TO KNOW:   An epidural steroid injection (EIZO) is a procedure to inject steroid medicine into the epidural space  The epidural space is between your spinal cord and vertebrae  Steroids reduce inflammation and fluid buildup in your spine that may be causing pain  You may be given pain medicine along with the steroids  ACTIVITY  · Do not drive or operate machinery today  · No strenuous activity today - bending, lifting, etc   · You may resume normal activites starting tomorrow - start slowly and as tolerated  · You may shower today, but no tub baths or hot tubs  · You may have numbness for several hours from the local anesthetic  Please use caution and common sense, especially with weight-bearing activities  CARE OF THE INJECTION SITE  · If you have soreness or pain, apply ice to the area today (20 minutes on/20 minutes off)  · Starting tomorrow, you may use warm, moist heat or ice if needed  · You may have an increase or change in your discomfort for 36-48 hours after your treatment    · Apply ice and continue with any pain medication you have been prescribed  · Notify [facility] if you have any of the following: redness, drainage, swelling, headache, stiff neck or fever above 100°F     SPECIAL INSTRUCTIONS  · Our office will contact you in approximately 7 days for a progress report  MEDICATIONS  · Continue to take all routine medications  · Our office may have instructed you to hold some medications  If you have a problem specifically related to your procedure, please call our office at [phone]  Problems not related to your procedure should be directed to your primary care physician

## 2020-03-04 NOTE — OP NOTE
OPERATIVE REPORT  PATIENT NAME: Rayna Dinh    :  1980  MRN: [id number]  Pt Location: [facility] OR ROOM 01    SURGERY DATE: 3/4/2020    Surgeon(s) and Role:     * Mark Greenberg MD - Primary    Preop Diagnosis:  Cervical radiculopathy [M54 12]    Post-Op Diagnosis Codes:     * Cervical radiculopathy [M54 12]    Procedure(s) (LRB):  C7-T1 Interlaminar Epidural Steroid Injection (61575) (N/A)    Specimen(s):  * No specimens in log *    Estimated Blood Loss:   Minimal    Drains:  * No LDAs found *    Anesthesia Type:   Local    Operative Indications:  Cervical radiculopathy [M54 12]      Operative Findings:  same    Complications:   None    Procedure and Technique:  Fluoroscopically-guided cervical Interlaminar Epidural Steroid Injection     Indication:  Cervical pain  Preoperative diagnosis:  Cervical degenerative disc disease  Postoperative diagnosis:  Cervical degenerative disc disease    Procedure: Fluoroscopically-guided  C7-T1 interlaminar epidural steroid injection under fluoroscopy      After discussing the risks, benefits, and alternatives to the procedure, the patient expressed understanding and wished to proceed  The patient was brought to the fluoroscopy suite and placed in the prone position  A procedural pause was conducted to verify:  correct patient identity, procedure to be performed and as applicable, correct side and site, correct patient position, and availability of implants, special equipment and special requirements  After identifying the C7-T1 space fluoroscopically, the skin was sterilely prepped and draped in the usual fashion using Chloraprep skin prep  The skin and subcutaneous tissue were anesthetized with 0 5 % lidocaine  Utilizing a loss of resistance technique and intermittent fluoroscopic guidance, a 3 5 20 gauge Tuohy needle was advanced into the epidural space  Proper needle positioning was confirmed using multiple fluoroscopic views    After negative aspiration, Omnipaque 300 contrast was injected confirming epidural spread without evidence of intravascular or intrathecal spread  A 4 ml solution consisting of 10 mg of dexamethasone in sterile saline was injected slowly and incrementally into the epidural space  Following the injection the needle was withdrawn slightly and flushed with 0 5 % lidocaine as it was fully extracted  The patient tolerated the procedure well and there were no apparent complications  After appropriate observation, the patient was dismissed from the clinic in good condition under their own power       I was present for the entire procedure    Patient Disposition:  hemodynamically stable    SIGNATURE: Ayanna Gleason MD  DATE: March 4, 2020  TIME: 1:09 PM

## 2020-03-04 NOTE — H&P
Assessment:  1  Cervical radiculopathy    2  Neck pain    3  Cervical disc disorder with radiculopathy of mid-cervical region    4  Acquired spondylolisthesis of cervical vertebra          Plan:  Patient is a 79-year-old female with complaints of neck pain and left-sided arm pain with history significant for cervical spondylolisthesis with slight C4-C5 instability, C5-C6 left paramedian disc protrusion with impingement on the C6 nerve root presents to surgical center for procedure  1  We will forward C7-T1 interlaminar epidural steroid injection     [place] Prescription Drug Monitoring Program report was reviewed and was appropriate      Complete risks and benefits including bleeding, infection, tissue reaction, nerve injury and allergic reaction were discussed  The approach was demonstrated using models and literature was provided  Verbal and written consent was obtained         History of Present Illness: The patient is a 35 y o  female who presents for consultation in regards to Neck Pain; Shoulder Pain; and Arm Pain  Symptoms have been present for 4 months  Symptoms began without any precipitating injury or trauma  Pain is reported to be 8 on the numeric rating scale  Symptoms are felt nearly constantly and worst in the morning, nighttime  Symptoms are characterized as burning, sharp, dull/aching, numbing and tingling  Symptoms are associated with no weakness  Aggravating factors include lying down, standing, sitting and exercise  Relieving factors include nothing  No change in symptoms with nothing  Treatments that have been helpful include physical therapy  Medications to relieve symptoms include ibuprofen      Review of Systems:     Review of Systems   Musculoskeletal: Positive for arthralgias     All other systems reviewed and are negative            Medical History        Past Medical History:   Diagnosis Date    Allergic      Asthma      Herniated disc, cervical      Pinched nerve in shoulder, right              Surgical History         Past Surgical History:   Procedure Laterality Date     SECTION                      Family History   Problem Relation Age of Onset    Diabetes Mother      Bone cancer Father      Diabetes Sister           Social History            Occupational History    Not on file   Tobacco Use    Smoking status: Current Every Day Smoker       Packs/day: 0 50       Types: Cigarettes    Smokeless tobacco: Never Used   Substance and Sexual Activity    Alcohol use: No    Drug use: Not Currently       Types: Marijuana    Sexual activity: Not on file            Current Outpatient Medications:     famotidine (PEPCID) 40 MG tablet, Take 40 mg by mouth daily, Disp: , Rfl:     ibuprofen (MOTRIN) 400 mg tablet, Take 1 tablet (400 mg total) by mouth every 6 (six) hours as needed for mild pain, Disp: 30 tablet, Rfl: 0    Levonorgestrel 19 5 MCG/DAY IUD, 1 each by Intrauterine route, Disp: , Rfl:     methocarbamol (ROBAXIN) 500 mg tablet, Take 1 tablet (500 mg total) by mouth 2 (two) times a day as needed for muscle spasms (Patient not taking: Reported on 2020), Disp: 20 tablet, Rfl: 0           Allergies   Allergen Reactions    No Known Allergies         Other reaction(s): Unknown         Physical Exam:     Vitals:    20 1240   BP: 139/86   Pulse: 83   Resp: 18   Temp: 99 °F (37 2 °C)   SpO2: 99%          Constitutional: normal, well developed, well nourished, alert, in no distress and non-toxic and no overt pain behavior  and overweight  Eyes: anicteric  HEENT: grossly intact  Neck: supple, symmetric, trachea midline and no masses   Pulmonary:even and unlabored  Cardiovascular:No edema or pitting edema present  Skin:Normal without rashes or lesions and well hydrated  Psychiatric:Mood and affect appropriate  Neurologic:Cranial Nerves II-XII grossly intact  Musculoskeletal:normal      Cervical Spine examination demonstrates   Decreased ROM secondary to pain with lateral rotation to the left/right and bending to the left/right, in addition to neck flexion  5/5 upper extremity strength in all muscle groups bilaterally  Negative Spurling's maneuver to the b/l Ue, sensitivity to light touch intact b/l Ue          Imaging  CERVICAL SPINE     INDICATION:   M50 20: Other cervical disc displacement, unspecified cervical region      COMPARISON:  None     VIEWS:  XR SPINE CERVICAL 2 OR 3 VW INJURY         FINDINGS:     No evidence of fracture       Anatomic alignment with cervical lordosis straightening      C4-C5 slight spondylolisthesis with flexion and C4-C5 slight retrolisthesis with extension      The intervertebral disc spaces are preserved      The prevertebral soft tissues are within normal limits        The lung apices are clear      IMPRESSION:     Cervical lordosis straightening      Slight C4-C5 instability      No acute osseous abnormality         MRI of the cervical spine   Comparison: None    Findings:     Alignment: The cervical vertebrae are normal in alignment  Marrow: There is no abnormal marrow signal on the STIR images  Vertebral bodies: Unremarkable    Disk Spaces: Degenerative disc disease at C5-C6    Cord: The spinal cord is normal in volume and signal intensity       C2-C3: No neuroforaminal or spinal canal stenosis  C3-C4: Mild facet degeneration with mild neuroforaminal narrowing  No  significant spinal canal narrowing  C4-C5:Mild facet degeneration  No significant neuroforaminal narrowing or spinal  canal stenosis  C5-C6:There is a 1 1 x 0 6 x 0 4 centimeter (CC x AP x TR) left paramedian disc  extrusion which likely impinges the exiting C6 nerve root  There is also mild  ventral cord flattening on the left  Mild to moderate right neuroforaminal  stenosis is also present  C6-C7:No neuroforaminal or spinal canal stenosis  C7-T1: No neuroforaminal or spinal canal stenosis        Posterior Fossa: Unremarkable    Flow Voids: Unremarkable    Additional Findings: There is a probable exophytic left thyroid lobe cystic  lesion        Impression: At C5-C6 there is a 1 1 cm left paramedian disc extrusion which likely impinges  the exiting C6 nerve root  There is also mild ventral cord flattening at this  level on the left   Surgical consultation is advised

## 2020-03-05 ENCOUNTER — HOSPITAL ENCOUNTER (OUTPATIENT)
Dept: ULTRASOUND IMAGING | Facility: HOSPITAL | Age: 40
Discharge: HOME | End: 2020-03-05
Attending: INTERNAL MEDICINE
Payer: COMMERCIAL

## 2020-03-05 DIAGNOSIS — R10.13 EPIGASTRIC ABDOMINAL PAIN: ICD-10-CM

## 2020-03-05 PROCEDURE — 76700 US EXAM ABDOM COMPLETE: CPT

## 2020-03-10 ENCOUNTER — TELEPHONE (OUTPATIENT)
Dept: GASTROENTEROLOGY | Facility: CLINIC | Age: 40
End: 2020-03-10

## 2020-03-11 ENCOUNTER — TELEPHONE (OUTPATIENT)
Dept: PAIN MEDICINE | Facility: CLINIC | Age: 40
End: 2020-03-11

## 2020-03-17 ENCOUNTER — HOSPITAL ENCOUNTER (OUTPATIENT)
Dept: GASTROENTEROLOGY | Facility: HOSPITAL | Age: 40
Setting detail: OUTPATIENT SURGERY
Discharge: HOME | End: 2020-03-17
Attending: INTERNAL MEDICINE

## 2020-03-25 ENCOUNTER — APPOINTMENT (EMERGENCY)
Dept: CT IMAGING | Facility: HOSPITAL | Age: 40
End: 2020-03-25
Payer: COMMERCIAL

## 2020-03-25 ENCOUNTER — HOSPITAL ENCOUNTER (EMERGENCY)
Facility: HOSPITAL | Age: 40
Discharge: HOME | End: 2020-03-25
Attending: EMERGENCY MEDICINE | Admitting: EMERGENCY MEDICINE
Payer: COMMERCIAL

## 2020-03-25 VITALS
BODY MASS INDEX: 28.32 KG/M2 | TEMPERATURE: 97.9 F | HEART RATE: 92 BPM | RESPIRATION RATE: 18 BRPM | SYSTOLIC BLOOD PRESSURE: 152 MMHG | OXYGEN SATURATION: 100 % | HEIGHT: 61 IN | WEIGHT: 150 LBS | DIASTOLIC BLOOD PRESSURE: 81 MMHG

## 2020-03-25 DIAGNOSIS — R51.9 HEADACHE: Primary | ICD-10-CM

## 2020-03-25 PROCEDURE — 96372 THER/PROPH/DIAG INJ SC/IM: CPT

## 2020-03-25 PROCEDURE — 70450 CT HEAD/BRAIN W/O DYE: CPT

## 2020-03-25 PROCEDURE — 99283 EMERGENCY DEPT VISIT LOW MDM: CPT

## 2020-03-25 PROCEDURE — 99283 EMERGENCY DEPT VISIT LOW MDM: CPT | Performed by: EMERGENCY MEDICINE

## 2020-03-25 RX ORDER — KETOROLAC TROMETHAMINE 30 MG/ML
60 INJECTION, SOLUTION INTRAMUSCULAR; INTRAVENOUS ONCE
Status: COMPLETED | OUTPATIENT
Start: 2020-03-25 | End: 2020-03-25

## 2020-03-25 RX ADMIN — KETOROLAC TROMETHAMINE 60 MG: 30 INJECTION, SOLUTION INTRAMUSCULAR; INTRAVENOUS at 17:51

## 2020-03-25 NOTE — ED PROVIDER NOTES
History  Chief Complaint   Patient presents with    Headache     states she always has a headache, due to pinched nerve in neck  denies N,V     Headache, throbbing / pressure like , 24 hours, slow onset, no neck stuffiness or fever  Does not get freq headaches  Pain is reported as moderate  She denies trauma, focal neuro sx  She states she is unsure if this is sinus related or possibly from her neck as she has a pinched nerve  No weakness reported, no reticular sx reproted and she did not report neck pain  Prior to Admission Medications   Prescriptions Last Dose Informant Patient Reported? Taking? Levonorgestrel 19 5 MCG/DAY IUD  Self Yes No   Si each by Intrauterine route   albuterol (PROAIR HFA) 90 mcg/act inhaler   No No   Sig: Inhale 2 puffs every 6 (six) hours as needed for wheezing   celecoxib (CeleBREX) 200 mg capsule   Yes No   Sig: Take 200 mg by mouth 2 (two) times a day as needed   famotidine (PEPCID) 40 MG tablet   No No   Sig: Take 1 tablet (40 mg total) by mouth daily at bedtime   ibuprofen (MOTRIN) 400 mg tablet  Self No No   Sig: Take 1 tablet (400 mg total) by mouth every 6 (six) hours as needed for mild pain   methocarbamol (ROBAXIN) 500 mg tablet  Self No No   Sig: Take 1 tablet (500 mg total) by mouth 2 (two) times a day as needed for muscle spasms   pantoprazole (PROTONIX) 40 mg tablet   Yes No   Sig: Take 40 mg by mouth daily      Facility-Administered Medications: None       Past Medical History:   Diagnosis Date    Allergic     Asthma     Cervical disc disorder with radiculopathy of mid-cervical region 2020    Cervical radiculopathy 2020    GERD (gastroesophageal reflux disease)     Herniated disc, cervical     Pinched nerve in shoulder, right     Thyroid nodule        Past Surgical History:   Procedure Laterality Date     SECTION      EPIDURAL BLOCK INJECTION N/A 3/4/2020    Procedure: C7-T1 Interlaminar Epidural Steroid Injection (91017);   Surgeon: Zoe Eastman MD;  Location: [facility];  Service: Pain Management     FL GUIDED NEEDLE PLAC BX/ASP/INJ  3/4/2020       Family History   Problem Relation Age of Onset    Diabetes Mother     Heart disease Mother     Bone cancer Father     Diabetes Brother     No Known Problems Daughter     No Known Problems Maternal Grandmother     No Known Problems Paternal Grandmother     No Known Problems Maternal Aunt     No Known Problems Paternal Aunt     No Known Problems Paternal Aunt      I have reviewed and agree with the history as documented  E-Cigarette/Vaping    E-Cigarette Use Never User      E-Cigarette/Vaping Substances    CBD No      Social History     Tobacco Use    Smoking status: Current Every Day Smoker     Packs/day: 1 00     Types: Cigarettes    Smokeless tobacco: Never Used   Substance Use Topics    Alcohol use: No    Drug use: Not Currently       Review of Systems   All other systems reviewed and are negative  Physical Exam  Physical Exam   Constitutional: She is oriented to person, place, and time  She appears well-developed and well-nourished  HENT:   Head: Normocephalic and atraumatic  Right Ear: External ear normal    Left Ear: External ear normal    Eyes: Pupils are equal, round, and reactive to light  Right eye exhibits no discharge  Left eye exhibits no discharge  Neck: Normal range of motion  Neck supple  No JVD present  Cardiovascular: Normal rate, regular rhythm and normal heart sounds  Exam reveals no gallop and no friction rub  No murmur heard  Pulmonary/Chest: Effort normal and breath sounds normal  No stridor  No respiratory distress  She has no wheezes  She has no rales  She exhibits no tenderness  Abdominal: She exhibits no distension and no mass  There is no tenderness  There is no rebound and no guarding  No hernia  Musculoskeletal: Normal range of motion  She exhibits no edema, tenderness or deformity     Neurological: She is alert and oriented to person, place, and time  She displays normal reflexes  No cranial nerve deficit or sensory deficit  She exhibits normal muscle tone  Coordination normal    No meningeal signs  No spinal tenderness, strong motor    Skin: Skin is warm  Capillary refill takes less than 2 seconds  No rash noted  No erythema  Psychiatric: She has a normal mood and affect  Vital Signs  ED Triage Vitals [03/25/20 1717]   Temperature Pulse Respirations Blood Pressure SpO2   97 9 °F (36 6 °C) (!) 106 20 158/88 100 %      Temp Source Heart Rate Source Patient Position - Orthostatic VS BP Location FiO2 (%)   Temporal Monitor Sitting Left arm --      Pain Score       2           Vitals:    03/25/20 1717 03/25/20 1830   BP: 158/88 152/81   Pulse: (!) 106 92   Patient Position - Orthostatic VS: Sitting          Visual Acuity      ED Medications  Medications   ketorolac (TORADOL) injection 60 mg (60 mg Intramuscular Given 3/25/20 1751)       Diagnostic Studies  Results Reviewed     None                 CT head without contrast   Final Result by Felecia Oneil DO (03/25 1753)   No acute intracranial abnormality  Workstation performed: [id number]                    Procedures  Procedures         ED Course                                 MDM  Number of Diagnoses or Management Options  Diagnosis management comments: Headache for the last 24 hours, mild intensity, grad onset, no red flag sx reported  Does not usually get headache,s post location and this lead to ct which is neg acute  She is feeling much better sp toradol, headache has resolved  I have discussed all red flags and return to er instruction which including spine and headache and she voiced  understanding for when to return, need for fu  She is request dc, she remains fully neuro intact  She will be sent home per her request as she is feeling so much better as per pt           Disposition  Final diagnoses:   Headache     Time reflects when diagnosis was documented in both MDM as applicable and the Disposition within this note     Time User Action Codes Description Comment    3/25/2020  6:25 PM Kathy Mckeon Add [R51] Headache       ED Disposition     ED Disposition Condition Date/Time Comment    Discharge Stable Wed Mar 25, 2020  6:26 PM [place] Benjamin Donald discharge to home/self care  Follow-up Information     Follow up With Specialties Details Why [address], MD Internal Medicine Schedule an appointment as soon as possible for a visit in 3 days  [address] [place] [address]  [phone]            Discharge Medication List as of 3/25/2020  6:26 PM      CONTINUE these medications which have NOT CHANGED    Details   albuterol (PROAIR HFA) 90 mcg/act inhaler Inhale 2 puffs every 6 (six) hours as needed for wheezing, Starting Sun 2/9/2020, Normal      celecoxib (CeleBREX) 200 mg capsule Take 200 mg by mouth 2 (two) times a day as needed, Starting Mon 2/17/2020, Until Tue 2/16/2021, Historical Med      famotidine (PEPCID) 40 MG tablet Take 1 tablet (40 mg total) by mouth daily at bedtime, Starting Wed 2/26/2020, Normal      ibuprofen (MOTRIN) 400 mg tablet Take 1 tablet (400 mg total) by mouth every 6 (six) hours as needed for mild pain, Starting Tue 12/10/2019, Print      Levonorgestrel 19 5 MCG/DAY IUD 1 each by Intrauterine route, Historical Med      methocarbamol (ROBAXIN) 500 mg tablet Take 1 tablet (500 mg total) by mouth 2 (two) times a day as needed for muscle spasms, Starting Sun 11/24/2019, Normal      pantoprazole (PROTONIX) 40 mg tablet Take 40 mg by mouth daily, Starting Mon 1/20/2020, Historical Med           No discharge procedures on file      PDMP Review     None          ED Provider  Electronically Signed by           Chu Verma MD  03/28/20 2518

## 2020-03-25 NOTE — DISCHARGE INSTRUCTIONS
Return to er with return of your pain, weakness to the arms, sensation changes, fever or neck stiffness  See pcp for repeat eval this week

## 2020-03-26 ENCOUNTER — OFFICE VISIT (OUTPATIENT)
Dept: URGENT CARE | Facility: CLINIC | Age: 40
End: 2020-03-26
Payer: COMMERCIAL

## 2020-03-26 VITALS
OXYGEN SATURATION: 100 % | HEIGHT: 61 IN | HEART RATE: 98 BPM | WEIGHT: 155.2 LBS | DIASTOLIC BLOOD PRESSURE: 92 MMHG | RESPIRATION RATE: 16 BRPM | SYSTOLIC BLOOD PRESSURE: 145 MMHG | BODY MASS INDEX: 29.3 KG/M2 | TEMPERATURE: 98.5 F

## 2020-03-26 DIAGNOSIS — R07.89 CHEST WALL PAIN: Primary | ICD-10-CM

## 2020-03-26 PROCEDURE — 99213 OFFICE O/P EST LOW 20 MIN: CPT | Performed by: PHYSICIAN ASSISTANT

## 2020-03-26 PROCEDURE — S9088 SERVICES PROVIDED IN URGENT: HCPCS | Performed by: PHYSICIAN ASSISTANT

## 2020-03-26 NOTE — PROGRESS NOTES
[facility]        NAME: Cayden Connelly is a 35 y o  female  : 1980    MRN: [id number]  DATE: 2020  TIME: 7:45 PM    Assessment and Plan   Chest wall pain [R07 89]  1  Chest wall pain           Patient Instructions     Watch the chest wall for any signs of a rash if a rash should start at the area re-evaluated as this could be shingles  Follow up with PCP in 3-5 days  Proceed to  ER if symptoms worsen  Chief Complaint     Chief Complaint   Patient presents with    Chest Pain     pt states she is having pain in her ribs and breasts   Numbness     pt states she had numbness in her right 2nd toe, is now gone  History of Present Illness       Patient presents with right sided chest pain which radiates into her right breast   Patient states this started 2 days ago  The pain lasts several seconds  She denies any shortness of breath rashes fever chills cough  Patient was seen in the emergency room last evening for a headache and had negative CT scan of the head  She also has known as cervical spine disc issues and follows with pain management  Patient had a mammogram in 2020 which was negative  Review of Systems   Review of Systems   Constitutional: Negative for chills and fever  Respiratory: Negative for cough, chest tightness and shortness of breath  Cardiovascular: Positive for chest pain  Gastrointestinal: Negative for nausea and vomiting  Musculoskeletal: Positive for myalgias  Skin: Negative for rash  Neurological: Negative for weakness and light-headedness  Hematological: Negative for adenopathy           Current Medications       Current Outpatient Medications:     albuterol (PROAIR HFA) 90 mcg/act inhaler, Inhale 2 puffs every 6 (six) hours as needed for wheezing, Disp: 8 5 g, Rfl: 0    celecoxib (CeleBREX) 200 mg capsule, Take 200 mg by mouth 2 (two) times a day as needed, Disp: , Rfl:     diclofenac sodium (VOLTAREN) 1 %, Apply 1 application topically 4 (four) times a day as needed, Disp: , Rfl:     famotidine (PEPCID) 40 MG tablet, Take 1 tablet (40 mg total) by mouth daily at bedtime, Disp: 30 tablet, Rfl: 3    ibuprofen (MOTRIN) 400 mg tablet, Take 1 tablet (400 mg total) by mouth every 6 (six) hours as needed for mild pain, Disp: 30 tablet, Rfl: 0    Levonorgestrel 19 5 MCG/DAY IUD, 1 each by Intrauterine route, Disp: , Rfl:     methocarbamol (ROBAXIN) 500 mg tablet, Take 1 tablet (500 mg total) by mouth 2 (two) times a day as needed for muscle spasms, Disp: 20 tablet, Rfl: 0    pantoprazole (PROTONIX) 40 mg tablet, Take 40 mg by mouth daily, Disp: , Rfl:     Current Allergies     Allergies as of 2020 - Reviewed 2020   Allergen Reaction Noted    No known allergies  2016            The following portions of the patient's history were reviewed and updated as appropriate: allergies, current medications, past family history, past medical history, past social history, past surgical history and problem list      Past Medical History:   Diagnosis Date    Allergic     Asthma     Cervical disc disorder with radiculopathy of mid-cervical region 2020    Cervical radiculopathy 2020    GERD (gastroesophageal reflux disease)     Herniated disc, cervical     Pinched nerve in shoulder, right     Thyroid nodule        Past Surgical History:   Procedure Laterality Date     SECTION      EPIDURAL BLOCK INJECTION N/A 3/4/2020    Procedure: C7-T1 Interlaminar Epidural Steroid Injection (05704);   Surgeon: Eleazar Man MD;  Location: [facility];  Service: Pain Management     FL GUIDED NEEDLE PLAC BX/ASP/INJ  3/4/2020       Family History   Problem Relation Age of Onset    Diabetes Mother     Heart disease Mother     Bone cancer Father     Diabetes Brother     No Known Problems Daughter     No Known Problems Maternal Grandmother     No Known Problems Paternal Grandmother     No Known Problems Maternal Aunt     No Known Problems Paternal Aunt     No Known Problems Paternal Aunt          Medications have been verified  Objective   /92   Pulse 98   Temp 98 5 °F (36 9 °C) (Tympanic)   Resp 16   Ht 5' 1" (1 549 m)   Wt 70 4 kg (155 lb 3 2 oz)   LMP 03/22/2020 (Approximate)   SpO2 100%   BMI 29 32 kg/m²        Physical Exam     Physical Exam   Constitutional: She is oriented to person, place, and time  She appears well-developed and well-nourished  HENT:   Head: Normocephalic and atraumatic  Neck: Neck supple  Cardiovascular: Normal rate and regular rhythm  Pulmonary/Chest: Effort normal and breath sounds normal    No rashes of the chest wall  There is no pain on palpation of the chest wall  Breast exam was negative  Musculoskeletal: Normal range of motion  Neurological: She is alert and oriented to person, place, and time  Skin: Skin is warm and dry  Psychiatric: She has a normal mood and affect  Her behavior is normal    Nursing note and vitals reviewed

## 2020-03-26 NOTE — PATIENT INSTRUCTIONS
Watch the chest wall for any signs of a rash if a rash should start at the area re-evaluated as this could be shingles  Chest Wall Pain   AMBULATORY CARE:   Chest wall pain  may be caused by problems with the muscles, cartilage, or bones of the chest wall  Chest wall pain may also be caused by pain that spreads to your chest from another part of your body  The pain may be aching, severe, dull, or sharp  It may come and go, or it may be constant  The pain may be worse when you move in certain ways, breathe deeply, or cough  Call 911 if:   · You have any of the following signs of a heart attack:      ¨ Squeezing, pressure, or pain in your chest that lasts longer than 5 minutes or returns    ¨ Discomfort or pain in your back, neck, jaw, stomach, or arm     ¨ Trouble breathing    ¨ Nausea or vomiting    ¨ Lightheadedness or a sudden cold sweat, especially with chest pain or trouble breathing    Seek care immediately if:   · You have severe pain  Contact your healthcare provider if:   · You develop a rash  · You have other new symptoms  · Your pain does not improve, even with treatment  · You have questions or concerns about your condition or care  Treatment  depends on the cause of your chest wall pain  You may need any of the following to treat or manage your pain:  · NSAIDs , such as ibuprofen, help decrease swelling, pain, and fever  This medicine is available with or without a doctor's order  NSAIDs can cause stomach bleeding or kidney problems in certain people  If you take blood thinner medicine, always ask your healthcare provider if NSAIDs are safe for you  Always read the medicine label and follow directions  · Acetaminophen  decreases pain  It is available without a doctor's order  Ask how much to take and how often to take it  Follow directions  Acetaminophen can cause liver damage if not taken correctly  · A cream  may be applied to your chest to decrease pain       · Rest  as needed  Avoid activities that make your chest wall pain worse  · Apply heat  on your chest for 20 to 30 minutes every 2 hours for as many days as directed  Heat helps decrease pain and muscle spasms  · Apply ice  on your chest for 15 to 20 minutes every hour or as directed  Use an ice pack, or put crushed ice in a plastic bag  Cover it with a towel  Ice helps prevent tissue damage and decreases swelling and pain  Follow up with your healthcare provider as directed:  Write down your questions so you remember to ask them during your visits  © 2017 [facility] Information is for End User's use only and may not be sold, redistributed or otherwise used for commercial purposes  All illustrations and images included in CareNotes® are the copyrighted property of A [employer] M [employer]  or Sherwin Cramer  The above information is an  only  It is not intended as medical advice for individual conditions or treatments  Talk to your doctor, nurse or pharmacist before following any medical regimen to see if it is safe and effective for you

## 2020-03-30 ENCOUNTER — OFFICE VISIT (OUTPATIENT)
Dept: URGENT CARE | Facility: CLINIC | Age: 40
End: 2020-03-30
Payer: COMMERCIAL

## 2020-03-30 VITALS
SYSTOLIC BLOOD PRESSURE: 134 MMHG | DIASTOLIC BLOOD PRESSURE: 97 MMHG | BODY MASS INDEX: 28.7 KG/M2 | TEMPERATURE: 98.2 F | OXYGEN SATURATION: 99 % | WEIGHT: 152 LBS | HEIGHT: 61 IN | HEART RATE: 80 BPM

## 2020-03-30 DIAGNOSIS — F41.9 ANXIETY: ICD-10-CM

## 2020-03-30 DIAGNOSIS — F41.0 PANIC ATTACK AS REACTION TO STRESS: Primary | ICD-10-CM

## 2020-03-30 DIAGNOSIS — R07.9 CHEST PAIN, UNSPECIFIED TYPE: ICD-10-CM

## 2020-03-30 DIAGNOSIS — F43.0 PANIC ATTACK AS REACTION TO STRESS: Primary | ICD-10-CM

## 2020-03-30 PROCEDURE — S9088 SERVICES PROVIDED IN URGENT: HCPCS | Performed by: NURSE PRACTITIONER

## 2020-03-30 PROCEDURE — 93005 ELECTROCARDIOGRAM TRACING: CPT | Performed by: NURSE PRACTITIONER

## 2020-03-30 PROCEDURE — 99213 OFFICE O/P EST LOW 20 MIN: CPT | Performed by: NURSE PRACTITIONER

## 2020-03-30 RX ORDER — HYDROXYZINE HYDROCHLORIDE 25 MG/1
25 TABLET, FILM COATED ORAL EVERY 6 HOURS PRN
Qty: 60 TABLET | Refills: 0 | Status: ON HOLD | OUTPATIENT
Start: 2020-03-30 | End: 2020-06-26

## 2020-03-30 NOTE — PROGRESS NOTES
[facility]        NAME: Judi Daniels is a 35 y o  female  : 1980    MRN: [id number]  DATE: 2020  TIME: 7:40 PM    Assessment and Plan   Panic attack as reaction to stress [F41 0, F43 0]  1  Panic attack as reaction to stress  hydrOXYzine HCL (ATARAX) 25 mg tablet   2  Chest pain, unspecified type  ECG 12 lead   3  Anxiety  hydrOXYzine HCL (ATARAX) 25 mg tablet         Patient Instructions     Patient Instructions   Follow-up with your PCP to discuss possibly starting a maintenance medication for anxiety; if severe, of if suicidal or homicidal ideation or intent, then you should be seen in the ER  Use the atarax every 6 hours as needed, this may cause sedation  Call the phone # on the back of your insurance card for a list of in network therapists  Many PCPs and therapists are still doing phone and/or video visits  If you have chest pain that lasts more than 20 min and is unrelieved by your inhaler as well, then you should be seen in the ER  Your EKG is normal, and your physical exam is normal/healthy  Panic Attack   AMBULATORY CARE:   A panic attack  is a sudden, strong feeling of fear even though you are not in danger  You also have physical symptoms such as rapid breathing or heavy sweating  Symptoms are usually worst about 10 minutes after they start and can last up to 20 minutes  You may feel like you are having a heart attack  You may have a panic attack before an event, such as a public speech you have to give  A panic attack can also happen for no clear reason  Frequent panic attacks may be a sign of a panic disorder that needs long-term treatment     Common signs and symptoms of a panic attack:   · Chest pain    · Sweating or trembling    · Fast or irregular heartbeats    · Hyperventilation (breathing so quickly you become dizzy, lightheaded, or faint)    · Shortness of breath, trouble breathing, or a feeling that you are choking or smothering    · Lightheadedness or fainting    · Pale or cold skin, chills, or hot flashes    · Nausea, vomiting, or abdominal pain    · A feeling that you are separate from your body  Seek care immediately if:   · You have severe chest pain, shortness of breath, or irregular heartbeats  · You have thoughts of harming yourself or another person  Contact your healthcare provider if:   · You have new or worsening panic attacks after treatment  · You have questions or concerns about your condition or care  Treatment  may include any of the following:  · Medicines  may be given to make you feel more relaxed or to reduce anxiety that causes a panic attack  Some medicines are taken only when you are having a panic attack  Other medicines can be taken to prevent panic attacks  · A behavior therapist  can help you learn to control how your body responds to stressful situations  He may also teach you ways to relax your muscles and slow your breathing during a panic attack  He may teach you ways to assure yourself that the panic attack will not get worse  You may also learn ways to prevent or stop hyperventilation  · Exposure therapy  is used to help you change your reaction to triggers  You are exposed to your panic attack triggers in small amounts  The amount of exposure is slowly increased until it no longer triggers a panic attack  Manage or prevent a panic attack:   · Manage stress  Stress can trigger a panic attack  Yoga and meditation are good ways to help manage stress  It might be helpful to talk to someone about the stress in your life  · Exercise as directed  Exercise can reduce stress and help you sleep better  Your healthcare provider can help you create an exercise plan  · Set a sleep schedule  Too little sleep can increase anxiety  Go to bed at the same time each night and wake up at the same time each morning  Keep your room quiet and free from distractions, such as a television or computer      · Limit alcohol and caffeine  Alcohol and caffeine can both increase anxiety and make it difficult for you to sleep well  Limit alcohol to 2 drinks a day if you are a man, or 1 drink a day of you are a woman  A drink of alcohol is 12 ounces of beer, 5 ounces of wine, or 1½ ounces of liquor  · Eat a variety of healthy foods  Healthy foods include fruits, vegetables, low-fat dairy products, lean meats, fish, and beans  Limit sugar  Sugar can increase your symptoms  · Do not smoke  Nicotine and other chemicals in cigarettes and cigars can increase anxiety and also cause lung damage  Ask your healthcare provider for information if you currently smoke and need help to quit  E-cigarettes or smokeless tobacco still contain nicotine  Talk to your healthcare provider before you use these products  Follow up with your healthcare provider as directed:  Write down your questions so you remember to ask them during your visits  © 2017 [facility] Information is for End User's use only and may not be sold, redistributed or otherwise used for commercial purposes  All illustrations and images included in CareNotes® are the copyrighted property of [employer]  or [employer]  The above information is an  only  It is not intended as medical advice for individual conditions or treatments  Talk to your doctor, nurse or pharmacist before following any medical regimen to see if it is safe and effective for you  Anxiety   AMBULATORY CARE:   Anxiety  is a condition that causes you to feel extremely worried or nervous  The feelings are so strong that they can cause problems with your daily activities or sleep  Anxiety may be triggered by something you fear, or it may happen without a cause  Family or work stress, smoking, caffeine, and alcohol can increase your risk for anxiety  Certain medicines or health conditions can also increase your risk   Anxiety can become a long-term condition if it is not managed or treated  Common signs and symptoms that may occur with anxiety:   · Fatigue or muscle tightness     · Shaking, restlessness, or irritability     · Problems focusing     · Trouble sleeping     · Feeling jumpy, easily startled, or dizzy     · Rapid heartbeat or shortness of breath  Call 911 if:   · You have chest pain, tightness, or heaviness that may spread to your shoulders, arms, jaw, neck, or back  · You feel like hurting yourself or someone else  Contact your healthcare provider if:   · Your symptoms get worse or do not get better with treatment  · You think your medicine may be causing side effects  · Your anxiety keeps you from doing your regular daily activities  · You have new symptoms since your last visit  · You have questions or concerns about your condition or care  Treatment for anxiety  may include medicines to help you feel calm and relaxed, and decrease your symptoms  Medicines are usually given together with therapy or other treatments  Manage anxiety:   · Talk to someone about your anxiety  Your healthcare provider may suggest counseling  Cognitive behavioral therapy can help you understand and change how you react to events that trigger your symptoms  You might feel more comfortable talking with a friend or family member about your anxiety  Choose someone you know will be supportive and encouraging  · Find ways to relax  Activities such as exercise, meditation, or listening to music can help you relax  Spend time with friends, or do things you enjoy  · Practice deep breathing  Deep breathing can help you relax when you feel anxious  Focus on taking slow, deep breaths several times a day, or during an anxiety attack  Breathe in through your nose and out through your mouth  · Create a regular sleep routine  Regular sleep can help you feel calmer during the day  Go to sleep and wake up at the same times every day   Do not watch television or use the computer right before bed  Your room should be comfortable, dark, and quiet  · Eat a variety of healthy foods  Healthy foods include fruits, vegetables, low-fat dairy products, lean meats, fish, whole-grain breads, and cooked beans  Healthy foods can help you feel less anxious and have more energy  · Exercise regularly  Exercise can increase your energy level  Exercise may also lift your mood and help you sleep better  Your healthcare provider can help you create an exercise plan  · Do not smoke  Nicotine and other chemicals in cigarettes and cigars can increase anxiety  Ask your healthcare provider for information if you currently smoke and need help to quit  E-cigarettes or smokeless tobacco still contain nicotine  Talk to your healthcare provider before you use these products  · Do not have caffeine  Caffeine can make your symptoms worse  Do not have foods or drinks that are meant to increase your energy level  · Limit or do not drink alcohol  Ask your healthcare provider if alcohol is safe for you  You may not be able to drink alcohol if you take certain anxiety or depression medicines  Limit alcohol to 1 drink per day if you are a woman  Limit alcohol to 2 drinks per day if you are a man  A drink of alcohol is 12 ounces of beer, 5 ounces of wine, or 1½ ounces of liquor  · Do not use drugs  Drugs can make your anxiety worse  It can also make anxiety hard to manage  Talk to your healthcare provider if you use drugs and want help to quit  Follow up with your healthcare provider as directed:  Write down your questions so you remember to ask them during your visits  © 2017 [address] [employer] Information is for End User's use only and may not be sold, redistributed or otherwise used for commercial purposes  All illustrations and images included in CareNotes® are the copyrighted property of [employer]  or [employer]    The above information is an  only  It is not intended as medical advice for individual conditions or treatments  Talk to your doctor, nurse or pharmacist before following any medical regimen to see if it is safe and effective for you  Chest Pain   AMBULATORY CARE:   Chest pain  can be caused by a range of conditions, from not serious to life-threatening  It may be caused by a heart attack or a blood clot in your lungs  Sometimes chest pain or pressure is caused by poor blood flow to your heart (angina)  Infection, inflammation, or a fracture in the bones or cartilage in your chest can cause pain or discomfort  Chest pain can also be a symptom of a digestive problem, such as acid reflux or a stomach ulcer  An anxiety attack or a strong emotion such as anger can also cause chest pain  It is important to follow up with your healthcare provider to find the cause of your chest pain  Common symptoms you may have with chest pain:   · Fever or sweating     · Nausea or vomiting     · Shortness of breath     · Discomfort or pressure that spreads from your chest to your back, jaw, or arm     · A racing or slow heartbeat     · Feeling weak, tired, or faint  Call 911 if:   · You have any of the following signs of a heart attack:      ¨ Squeezing, pressure, or pain in your chest that lasts longer than 5 minutes or returns    ¨ Discomfort or pain in your back, neck, jaw, stomach, or arm     ¨ Trouble breathing    ¨ Nausea or vomiting    ¨ Lightheadedness or a sudden cold sweat, especially with chest pain or trouble breathing    Seek care immediately if:   · You have chest discomfort that gets worse, even with medicine  · You cough or vomit blood  · Your bowel movements are black or bloody  · You cannot stop vomiting, or it hurts to swallow  Contact your healthcare provider if:   · You have questions or concerns about your condition or care      Treatment for chest pain  may include medicine to treat your symptoms while your healthcare provider finds the cause of your chest pain  · Medicines  may be given to treat the cause of your chest pain  Examples include pain medicine, anxiety medicine, or medicines to increase blood flow to your heart  · Do not take certain medicines without asking your healthcare provider first   These include NSAIDs, herbal or vitamin supplements, or hormones (estrogen or progestin)  Follow up with your healthcare provider within 72 hours, or as directed: You may need to return for more tests to find the cause of your chest pain  You may be referred to a specialist, such as a cardiologist or gastroenterologist  Write down your questions so you remember to ask them during your visits  Healthy living tips: The following are general healthy guidelines  If your chest pain is caused by a heart problem, your healthcare provider will give you specific guidelines to follow  · Do not smoke  Nicotine and other chemicals in cigarettes and cigars can cause lung and heart damage  Ask your healthcare provider for information if you currently smoke and need help to quit  E-cigarettes or smokeless tobacco still contain nicotine  Talk to your healthcare provider before you use these products  · Eat a variety of healthy, low-fat foods  Healthy foods include fruits, vegetables, whole-grain breads, low-fat dairy products, beans, lean meats, and fish  Ask for more information about a heart healthy diet  · Ask about activity  Your healthcare provider will tell you which activities to limit or avoid  Ask when you can drive, return to work, and have sex  Ask about the best exercise plan for you  · Maintain a healthy weight  Ask your healthcare provider how much you should weigh  Ask him or her to help you create a weight loss plan if you are overweight  · Get the flu and pneumonia vaccines  All adults should get the influenza (flu) vaccine  Get it every year as soon as it becomes available   The pneumococcal vaccine is given to adults aged 72 years or older  The vaccine is given every 5 years to prevent pneumococcal disease, such as pneumonia  © 2017 [address] [employer] Information is for End User's use only and may not be sold, redistributed or otherwise used for commercial purposes  All illustrations and images included in CareNotes® are the copyrighted property of [employer]  or [employer]  The above information is an  only  It is not intended as medical advice for individual conditions or treatments  Talk to your doctor, nurse or pharmacist before following any medical regimen to see if it is safe and effective for you  Follow up with PCP in 3-5 days  Proceed to  ER if symptoms worsen  Chief Complaint     Chief Complaint   Patient presents with    Chest Pain         History of Present Illness       Patient relays recurrent chest pain over the last few days that will come on quickly, causes chest tightness/pain and shortness of breath, then resolves on its own, and under 20 minutes  She states that sometimes it will radiate across her chest, but she denies jaw pain, shoulder pain, pain down her arm  She states sometimes it feels like her heart is beating fast during these episodes, but when the episode resolved, her heart rate feels like it is back to normal   She states that she has tried using her inhaler during these episodes, but it does not help  She relays some increased stress  With the corona pandemic, the kids are home from school  She has a child with some special needs  Her partner is currently in the hospital where he has been diagnosed with kidney failure and started on hemodialysis  She is very concerned about the possibility of herself or family member catching the corona virus, etc       Review of Systems   Review of Systems   Respiratory: Positive for chest tightness  Cardiovascular: Positive for chest pain and palpitations  Psychiatric/Behavioral: The patient is nervous/anxious  All other systems reviewed and are negative          Current Medications       Current Outpatient Medications:     albuterol (PROAIR HFA) 90 mcg/act inhaler, Inhale 2 puffs every 6 (six) hours as needed for wheezing, Disp: 8 5 g, Rfl: 0    diclofenac sodium (VOLTAREN) 1 %, Apply 1 application topically 4 (four) times a day as needed, Disp: , Rfl:     famotidine (PEPCID) 40 MG tablet, Take 1 tablet (40 mg total) by mouth daily at bedtime, Disp: 30 tablet, Rfl: 3    Levonorgestrel 19 5 MCG/DAY IUD, 1 each by Intrauterine route, Disp: , Rfl:     methocarbamol (ROBAXIN) 500 mg tablet, Take 1 tablet (500 mg total) by mouth 2 (two) times a day as needed for muscle spasms, Disp: 20 tablet, Rfl: 0    pantoprazole (PROTONIX) 40 mg tablet, Take 40 mg by mouth daily, Disp: , Rfl:     celecoxib (CeleBREX) 200 mg capsule, Take 200 mg by mouth 2 (two) times a day as needed, Disp: , Rfl:     hydrOXYzine HCL (ATARAX) 25 mg tablet, Take 1 tablet (25 mg total) by mouth every 6 (six) hours as needed for anxiety, Disp: 60 tablet, Rfl: 0    ibuprofen (MOTRIN) 400 mg tablet, Take 1 tablet (400 mg total) by mouth every 6 (six) hours as needed for mild pain (Patient not taking: Reported on 3/30/2020), Disp: 30 tablet, Rfl: 0    Current Allergies     Allergies as of 03/30/2020 - Reviewed 03/30/2020   Allergen Reaction Noted    No known allergies  12/16/2016            The following portions of the patient's history were reviewed and updated as appropriate: allergies, current medications, past family history, past medical history, past social history, past surgical history and problem list      Past Medical History:   Diagnosis Date    Allergic     Asthma     Cervical disc disorder with radiculopathy of mid-cervical region 1/6/2020    Cervical radiculopathy 1/6/2020    GERD (gastroesophageal reflux disease)     Herniated disc, cervical     Pinched nerve in shoulder, right     Thyroid nodule        Past Surgical History:   Procedure Laterality Date     SECTION      EPIDURAL BLOCK INJECTION N/A 3/4/2020    Procedure: C7-T1 Interlaminar Epidural Steroid Injection (28069); Surgeon: Maile Nageotte, MD;  Location: [facility];  Service: Pain Management     FL GUIDED NEEDLE PLAC BX/ASP/INJ  3/4/2020       Family History   Problem Relation Age of Onset    Diabetes Mother     Heart disease Mother     Bone cancer Father     Diabetes Brother     No Known Problems Daughter     No Known Problems Maternal Grandmother     No Known Problems Paternal Grandmother     No Known Problems Maternal Aunt     No Known Problems Paternal Aunt     No Known Problems Paternal Aunt          Medications have been verified  Objective   /97   Pulse 80   Temp 98 2 °F (36 8 °C)   Ht 5' 1" (1 549 m)   Wt 68 9 kg (152 lb)   LMP 2020 (Approximate)   SpO2 99%   BMI 28 72 kg/m²        Physical Exam     Physical Exam   Constitutional: She is oriented to person, place, and time  She appears well-developed and well-nourished  No distress  HENT:   Head: Normocephalic and atraumatic  Right Ear: Hearing and external ear normal    Left Ear: Hearing and external ear normal    Nose: Nose normal    Mouth/Throat: Mucous membranes are normal    Eyes: Pupils are equal, round, and reactive to light  Neck: Normal range of motion  Neck supple  Cardiovascular: Normal rate, regular rhythm, S1 normal, S2 normal and normal heart sounds  No extrasystoles are present  Exam reveals no gallop and no friction rub  No murmur heard  Pulses:       Carotid pulses are 2+ on the right side, and 2+ on the left side  Radial pulses are 2+ on the right side, and 2+ on the left side  Pulmonary/Chest: Effort normal and breath sounds normal  No accessory muscle usage or stridor  No apnea, no tachypnea and no bradypnea  No respiratory distress  She has no decreased breath sounds   She has no wheezes  She has no rhonchi  She has no rales  Abdominal: Soft  Bowel sounds are normal  She exhibits no distension  There is no tenderness  Musculoskeletal: Normal range of motion  Neurological: She is alert and oriented to person, place, and time  Skin: Skin is warm and dry  Capillary refill takes less than 2 seconds  She is not diaphoretic  Psychiatric: Her speech is normal and behavior is normal  Judgment and thought content normal  Her mood appears anxious  Cognition and memory are normal  She expresses no homicidal and no suicidal ideation  She expresses no suicidal plans and no homicidal plans  Nursing note and vitals reviewed

## 2020-03-30 NOTE — PATIENT INSTRUCTIONS
Follow-up with your PCP to discuss possibly starting a maintenance medication for anxiety; if severe, of if suicidal or homicidal ideation or intent, then you should be seen in the ER  Use the atarax every 6 hours as needed, this may cause sedation  Call the phone # on the back of your insurance card for a list of in network therapists  Many PCPs and therapists are still doing phone and/or video visits  If you have chest pain that lasts more than 20 min and is unrelieved by your inhaler as well, then you should be seen in the ER  Your EKG is normal, and your physical exam is normal/healthy  Panic Attack   AMBULATORY CARE:   A panic attack  is a sudden, strong feeling of fear even though you are not in danger  You also have physical symptoms such as rapid breathing or heavy sweating  Symptoms are usually worst about 10 minutes after they start and can last up to 20 minutes  You may feel like you are having a heart attack  You may have a panic attack before an event, such as a public speech you have to give  A panic attack can also happen for no clear reason  Frequent panic attacks may be a sign of a panic disorder that needs long-term treatment  Common signs and symptoms of a panic attack:   · Chest pain    · Sweating or trembling    · Fast or irregular heartbeats    · Hyperventilation (breathing so quickly you become dizzy, lightheaded, or faint)    · Shortness of breath, trouble breathing, or a feeling that you are choking or smothering    · Lightheadedness or fainting    · Pale or cold skin, chills, or hot flashes    · Nausea, vomiting, or abdominal pain    · A feeling that you are separate from your body  Seek care immediately if:   · You have severe chest pain, shortness of breath, or irregular heartbeats  · You have thoughts of harming yourself or another person  Contact your healthcare provider if:   · You have new or worsening panic attacks after treatment      · You have questions or concerns about your condition or care  Treatment  may include any of the following:  · Medicines  may be given to make you feel more relaxed or to reduce anxiety that causes a panic attack  Some medicines are taken only when you are having a panic attack  Other medicines can be taken to prevent panic attacks  · A behavior therapist  can help you learn to control how your body responds to stressful situations  He may also teach you ways to relax your muscles and slow your breathing during a panic attack  He may teach you ways to assure yourself that the panic attack will not get worse  You may also learn ways to prevent or stop hyperventilation  · Exposure therapy  is used to help you change your reaction to triggers  You are exposed to your panic attack triggers in small amounts  The amount of exposure is slowly increased until it no longer triggers a panic attack  Manage or prevent a panic attack:   · Manage stress  Stress can trigger a panic attack  Yoga and meditation are good ways to help manage stress  It might be helpful to talk to someone about the stress in your life  · Exercise as directed  Exercise can reduce stress and help you sleep better  Your healthcare provider can help you create an exercise plan  · Set a sleep schedule  Too little sleep can increase anxiety  Go to bed at the same time each night and wake up at the same time each morning  Keep your room quiet and free from distractions, such as a television or computer  · Limit alcohol and caffeine  Alcohol and caffeine can both increase anxiety and make it difficult for you to sleep well  Limit alcohol to 2 drinks a day if you are a man, or 1 drink a day of you are a woman  A drink of alcohol is 12 ounces of beer, 5 ounces of wine, or 1½ ounces of liquor  · Eat a variety of healthy foods  Healthy foods include fruits, vegetables, low-fat dairy products, lean meats, fish, and beans  Limit sugar   Sugar can increase your symptoms  · Do not smoke  Nicotine and other chemicals in cigarettes and cigars can increase anxiety and also cause lung damage  Ask your healthcare provider for information if you currently smoke and need help to quit  E-cigarettes or smokeless tobacco still contain nicotine  Talk to your healthcare provider before you use these products  Follow up with your healthcare provider as directed:  Write down your questions so you remember to ask them during your visits  © 2017 [employer] Information is for End User's use only and may not be sold, redistributed or otherwise used for commercial purposes  All illustrations and images included in CareNotes® are the copyrighted property of [employer]  or [employer]  The above information is an  only  It is not intended as medical advice for individual conditions or treatments  Talk to your doctor, nurse or pharmacist before following any medical regimen to see if it is safe and effective for you  Anxiety   AMBULATORY CARE:   Anxiety  is a condition that causes you to feel extremely worried or nervous  The feelings are so strong that they can cause problems with your daily activities or sleep  Anxiety may be triggered by something you fear, or it may happen without a cause  Family or work stress, smoking, caffeine, and alcohol can increase your risk for anxiety  Certain medicines or health conditions can also increase your risk  Anxiety can become a long-term condition if it is not managed or treated  Common signs and symptoms that may occur with anxiety:   · Fatigue or muscle tightness     · Shaking, restlessness, or irritability     · Problems focusing     · Trouble sleeping     · Feeling jumpy, easily startled, or dizzy     · Rapid heartbeat or shortness of breath  Call 911 if:   · You have chest pain, tightness, or heaviness that may spread to your shoulders, arms, jaw, neck, or back      · You feel like hurting yourself or someone else  Contact your healthcare provider if:   · Your symptoms get worse or do not get better with treatment  · You think your medicine may be causing side effects  · Your anxiety keeps you from doing your regular daily activities  · You have new symptoms since your last visit  · You have questions or concerns about your condition or care  Treatment for anxiety  may include medicines to help you feel calm and relaxed, and decrease your symptoms  Medicines are usually given together with therapy or other treatments  Manage anxiety:   · Talk to someone about your anxiety  Your healthcare provider may suggest counseling  Cognitive behavioral therapy can help you understand and change how you react to events that trigger your symptoms  You might feel more comfortable talking with a friend or family member about your anxiety  Choose someone you know will be supportive and encouraging  · Find ways to relax  Activities such as exercise, meditation, or listening to music can help you relax  Spend time with friends, or do things you enjoy  · Practice deep breathing  Deep breathing can help you relax when you feel anxious  Focus on taking slow, deep breaths several times a day, or during an anxiety attack  Breathe in through your nose and out through your mouth  · Create a regular sleep routine  Regular sleep can help you feel calmer during the day  Go to sleep and wake up at the same times every day  Do not watch television or use the computer right before bed  Your room should be comfortable, dark, and quiet  · Eat a variety of healthy foods  Healthy foods include fruits, vegetables, low-fat dairy products, lean meats, fish, whole-grain breads, and cooked beans  Healthy foods can help you feel less anxious and have more energy  · Exercise regularly  Exercise can increase your energy level  Exercise may also lift your mood and help you sleep better   Your healthcare provider can help you create an exercise plan  · Do not smoke  Nicotine and other chemicals in cigarettes and cigars can increase anxiety  Ask your healthcare provider for information if you currently smoke and need help to quit  E-cigarettes or smokeless tobacco still contain nicotine  Talk to your healthcare provider before you use these products  · Do not have caffeine  Caffeine can make your symptoms worse  Do not have foods or drinks that are meant to increase your energy level  · Limit or do not drink alcohol  Ask your healthcare provider if alcohol is safe for you  You may not be able to drink alcohol if you take certain anxiety or depression medicines  Limit alcohol to 1 drink per day if you are a woman  Limit alcohol to 2 drinks per day if you are a man  A drink of alcohol is 12 ounces of beer, 5 ounces of wine, or 1½ ounces of liquor  · Do not use drugs  Drugs can make your anxiety worse  It can also make anxiety hard to manage  Talk to your healthcare provider if you use drugs and want help to quit  Follow up with your healthcare provider as directed:  Write down your questions so you remember to ask them during your visits  © 2017 [employer] Information is for End User's use only and may not be sold, redistributed or otherwise used for commercial purposes  All illustrations and images included in CareNotes® are the copyrighted property of [employer]  or Sherwin Shoaib  The above information is an  only  It is not intended as medical advice for individual conditions or treatments  Talk to your doctor, nurse or pharmacist before following any medical regimen to see if it is safe and effective for you  Chest Pain   AMBULATORY CARE:   Chest pain  can be caused by a range of conditions, from not serious to life-threatening  It may be caused by a heart attack or a blood clot in your lungs   Sometimes chest pain or pressure is caused by poor blood flow to your heart (angina)  Infection, inflammation, or a fracture in the bones or cartilage in your chest can cause pain or discomfort  Chest pain can also be a symptom of a digestive problem, such as acid reflux or a stomach ulcer  An anxiety attack or a strong emotion such as anger can also cause chest pain  It is important to follow up with your healthcare provider to find the cause of your chest pain  Common symptoms you may have with chest pain:   · Fever or sweating     · Nausea or vomiting     · Shortness of breath     · Discomfort or pressure that spreads from your chest to your back, jaw, or arm     · A racing or slow heartbeat     · Feeling weak, tired, or faint  Call 911 if:   · You have any of the following signs of a heart attack:      ¨ Squeezing, pressure, or pain in your chest that lasts longer than 5 minutes or returns    ¨ Discomfort or pain in your back, neck, jaw, stomach, or arm     ¨ Trouble breathing    ¨ Nausea or vomiting    ¨ Lightheadedness or a sudden cold sweat, especially with chest pain or trouble breathing    Seek care immediately if:   · You have chest discomfort that gets worse, even with medicine  · You cough or vomit blood  · Your bowel movements are black or bloody  · You cannot stop vomiting, or it hurts to swallow  Contact your healthcare provider if:   · You have questions or concerns about your condition or care  Treatment for chest pain  may include medicine to treat your symptoms while your healthcare provider finds the cause of your chest pain  · Medicines  may be given to treat the cause of your chest pain  Examples include pain medicine, anxiety medicine, or medicines to increase blood flow to your heart  · Do not take certain medicines without asking your healthcare provider first   These include NSAIDs, herbal or vitamin supplements, or hormones (estrogen or progestin)    Follow up with your healthcare provider within 72 hours, or as directed: You may need to return for more tests to find the cause of your chest pain  You may be referred to a specialist, such as a cardiologist or gastroenterologist  Write down your questions so you remember to ask them during your visits  Healthy living tips: The following are general healthy guidelines  If your chest pain is caused by a heart problem, your healthcare provider will give you specific guidelines to follow  · Do not smoke  Nicotine and other chemicals in cigarettes and cigars can cause lung and heart damage  Ask your healthcare provider for information if you currently smoke and need help to quit  E-cigarettes or smokeless tobacco still contain nicotine  Talk to your healthcare provider before you use these products  · Eat a variety of healthy, low-fat foods  Healthy foods include fruits, vegetables, whole-grain breads, low-fat dairy products, beans, lean meats, and fish  Ask for more information about a heart healthy diet  · Ask about activity  Your healthcare provider will tell you which activities to limit or avoid  Ask when you can drive, return to work, and have sex  Ask about the best exercise plan for you  · Maintain a healthy weight  Ask your healthcare provider how much you should weigh  Ask him or her to help you create a weight loss plan if you are overweight  · Get the flu and pneumonia vaccines  All adults should get the influenza (flu) vaccine  Get it every year as soon as it becomes available  The pneumococcal vaccine is given to adults aged 72 years or older  The vaccine is given every 5 years to prevent pneumococcal disease, such as pneumonia  © 2017 [address] [employer] Information is for End User's use only and may not be sold, redistributed or otherwise used for commercial purposes  All illustrations and images included in CareNotes® are the copyrighted property of [employer]  or Sherwin Cramer    The above information is an educational aid only  It is not intended as medical advice for individual conditions or treatments  Talk to your doctor, nurse or pharmacist before following any medical regimen to see if it is safe and effective for you

## 2020-03-31 LAB
ATRIAL RATE: 89 BPM
P AXIS: 29 DEGREES
PR INTERVAL: 154 MS
QRS AXIS: 50 DEGREES
QRSD INTERVAL: 84 MS
QT INTERVAL: 372 MS
QTC INTERVAL: 452 MS
T WAVE AXIS: 17 DEGREES
VENTRICULAR RATE: 89 BPM

## 2020-03-31 PROCEDURE — 93010 ELECTROCARDIOGRAM REPORT: CPT | Performed by: INTERNAL MEDICINE

## 2020-04-23 ENCOUNTER — TELEMEDICINE (OUTPATIENT)
Dept: PAIN MEDICINE | Facility: CLINIC | Age: 40
End: 2020-04-23
Payer: COMMERCIAL

## 2020-04-23 DIAGNOSIS — M43.12 ACQUIRED SPONDYLOLISTHESIS OF CERVICAL VERTEBRA: ICD-10-CM

## 2020-04-23 DIAGNOSIS — M54.12 CERVICAL RADICULOPATHY: Primary | ICD-10-CM

## 2020-04-23 DIAGNOSIS — M54.2 NECK PAIN: ICD-10-CM

## 2020-04-23 PROCEDURE — 99213 OFFICE O/P EST LOW 20 MIN: CPT | Performed by: ANESTHESIOLOGY

## 2020-05-12 ENCOUNTER — APPOINTMENT (OUTPATIENT)
Dept: LAB | Facility: CLINIC | Age: 40
End: 2020-05-12
Payer: COMMERCIAL

## 2020-05-12 ENCOUNTER — TRANSCRIBE ORDERS (OUTPATIENT)
Dept: URGENT CARE | Facility: CLINIC | Age: 40
End: 2020-05-12

## 2020-05-12 DIAGNOSIS — M50.20 DISPLACEMENT OF CERVICAL INTERVERTEBRAL DISC WITHOUT MYELOPATHY: Primary | ICD-10-CM

## 2020-05-12 LAB
ALBUMIN SERPL BCP-MCNC: 3.6 G/DL (ref 3.5–5)
ALP SERPL-CCNC: 60 U/L (ref 46–116)
ALT SERPL W P-5'-P-CCNC: 18 U/L (ref 12–78)
ANION GAP SERPL CALCULATED.3IONS-SCNC: 4 MMOL/L (ref 4–13)
AST SERPL W P-5'-P-CCNC: 11 U/L (ref 5–45)
BACTERIA UR QL AUTO: ABNORMAL /HPF
BASOPHILS # BLD AUTO: 0.06 THOUSANDS/ΜL (ref 0–0.1)
BASOPHILS NFR BLD AUTO: 1 % (ref 0–1)
BILIRUB SERPL-MCNC: 0.31 MG/DL (ref 0.2–1)
BILIRUB UR QL STRIP: NEGATIVE
BUN SERPL-MCNC: 8 MG/DL (ref 5–25)
CALCIUM SERPL-MCNC: 8.7 MG/DL (ref 8.3–10.1)
CHLORIDE SERPL-SCNC: 109 MMOL/L (ref 100–108)
CLARITY UR: ABNORMAL
CO2 SERPL-SCNC: 28 MMOL/L (ref 21–32)
COLOR UR: YELLOW
CREAT SERPL-MCNC: 0.73 MG/DL (ref 0.6–1.3)
EOSINOPHIL # BLD AUTO: 0.13 THOUSAND/ΜL (ref 0–0.61)
EOSINOPHIL NFR BLD AUTO: 3 % (ref 0–6)
ERYTHROCYTE [DISTWIDTH] IN BLOOD BY AUTOMATED COUNT: 12.4 % (ref 11.6–15.1)
ERYTHROCYTE [SEDIMENTATION RATE] IN BLOOD: 7 MM/HOUR (ref 0–20)
GFR SERPL CREATININE-BSD FRML MDRD: 119 ML/MIN/1.73SQ M
GLUCOSE P FAST SERPL-MCNC: 95 MG/DL (ref 65–99)
GLUCOSE UR STRIP-MCNC: NEGATIVE MG/DL
HCT VFR BLD AUTO: 38.9 % (ref 34.8–46.1)
HGB BLD-MCNC: 12.5 G/DL (ref 11.5–15.4)
HGB UR QL STRIP.AUTO: ABNORMAL
HYALINE CASTS #/AREA URNS LPF: ABNORMAL /LPF
IMM GRANULOCYTES # BLD AUTO: 0.01 THOUSAND/UL (ref 0–0.2)
IMM GRANULOCYTES NFR BLD AUTO: 0 % (ref 0–2)
KETONES UR STRIP-MCNC: NEGATIVE MG/DL
LEUKOCYTE ESTERASE UR QL STRIP: NEGATIVE
LYMPHOCYTES # BLD AUTO: 1.6 THOUSANDS/ΜL (ref 0.6–4.47)
LYMPHOCYTES NFR BLD AUTO: 37 % (ref 14–44)
MCH RBC QN AUTO: 30.7 PG (ref 26.8–34.3)
MCHC RBC AUTO-ENTMCNC: 32.1 G/DL (ref 31.4–37.4)
MCV RBC AUTO: 96 FL (ref 82–98)
MONOCYTES # BLD AUTO: 0.4 THOUSAND/ΜL (ref 0.17–1.22)
MONOCYTES NFR BLD AUTO: 9 % (ref 4–12)
NEUTROPHILS # BLD AUTO: 2.14 THOUSANDS/ΜL (ref 1.85–7.62)
NEUTS SEG NFR BLD AUTO: 50 % (ref 43–75)
NITRITE UR QL STRIP: NEGATIVE
NON-SQ EPI CELLS URNS QL MICRO: ABNORMAL /HPF
NRBC BLD AUTO-RTO: 0 /100 WBCS
PH UR STRIP.AUTO: 7.5 [PH]
PLATELET # BLD AUTO: 197 THOUSANDS/UL (ref 149–390)
PMV BLD AUTO: 12.9 FL (ref 8.9–12.7)
POTASSIUM SERPL-SCNC: 3.8 MMOL/L (ref 3.5–5.3)
PROT SERPL-MCNC: 6.7 G/DL (ref 6.4–8.2)
PROT UR STRIP-MCNC: NEGATIVE MG/DL
RBC # BLD AUTO: 4.07 MILLION/UL (ref 3.81–5.12)
RBC #/AREA URNS AUTO: ABNORMAL /HPF
SODIUM SERPL-SCNC: 141 MMOL/L (ref 136–145)
SP GR UR STRIP.AUTO: 1.02 (ref 1–1.03)
T4 FREE SERPL-MCNC: 0.94 NG/DL (ref 0.76–1.46)
TSH SERPL DL<=0.05 MIU/L-ACNC: 1.53 UIU/ML (ref 0.36–3.74)
UROBILINOGEN UR QL STRIP.AUTO: 0.2 E.U./DL
WBC # BLD AUTO: 4.34 THOUSAND/UL (ref 4.31–10.16)
WBC #/AREA URNS AUTO: ABNORMAL /HPF

## 2020-05-12 PROCEDURE — 85025 COMPLETE CBC W/AUTO DIFF WBC: CPT

## 2020-05-12 PROCEDURE — 36415 COLL VENOUS BLD VENIPUNCTURE: CPT

## 2020-05-12 PROCEDURE — 80053 COMPREHEN METABOLIC PANEL: CPT

## 2020-05-12 PROCEDURE — 84439 ASSAY OF FREE THYROXINE: CPT

## 2020-05-12 PROCEDURE — 84443 ASSAY THYROID STIM HORMONE: CPT

## 2020-05-12 PROCEDURE — 81001 URINALYSIS AUTO W/SCOPE: CPT

## 2020-05-12 PROCEDURE — 85652 RBC SED RATE AUTOMATED: CPT

## 2020-05-18 ENCOUNTER — EVALUATION (OUTPATIENT)
Dept: PHYSICAL THERAPY | Facility: CLINIC | Age: 40
End: 2020-05-18
Payer: COMMERCIAL

## 2020-05-18 DIAGNOSIS — M50.20 HERNIATED CERVICAL INTERVERTEBRAL DISC: Primary | ICD-10-CM

## 2020-05-18 DIAGNOSIS — M54.2 NECK PAIN: ICD-10-CM

## 2020-05-18 PROCEDURE — 97162 PT EVAL MOD COMPLEX 30 MIN: CPT | Performed by: PHYSICAL THERAPIST

## 2020-05-19 ENCOUNTER — TRANSCRIBE ORDERS (OUTPATIENT)
Dept: PHYSICAL THERAPY | Facility: CLINIC | Age: 40
End: 2020-05-19

## 2020-05-19 DIAGNOSIS — M50.20 DISPLACEMENT OF CERVICAL INTERVERTEBRAL DISC WITHOUT MYELOPATHY: Primary | ICD-10-CM

## 2020-05-20 ENCOUNTER — OFFICE VISIT (OUTPATIENT)
Dept: PHYSICAL THERAPY | Facility: CLINIC | Age: 40
End: 2020-05-20
Payer: COMMERCIAL

## 2020-05-20 DIAGNOSIS — M54.2 NECK PAIN: ICD-10-CM

## 2020-05-20 DIAGNOSIS — M50.20 HERNIATED CERVICAL INTERVERTEBRAL DISC: Primary | ICD-10-CM

## 2020-05-20 PROCEDURE — 97110 THERAPEUTIC EXERCISES: CPT | Performed by: PHYSICAL THERAPIST

## 2020-05-20 PROCEDURE — 97140 MANUAL THERAPY 1/> REGIONS: CPT | Performed by: PHYSICAL THERAPIST

## 2020-05-21 ENCOUNTER — APPOINTMENT (OUTPATIENT)
Dept: PHYSICAL THERAPY | Facility: CLINIC | Age: 40
End: 2020-05-21
Payer: COMMERCIAL

## 2020-05-27 ENCOUNTER — OFFICE VISIT (OUTPATIENT)
Dept: PHYSICAL THERAPY | Facility: CLINIC | Age: 40
End: 2020-05-27
Payer: COMMERCIAL

## 2020-05-27 DIAGNOSIS — M54.2 NECK PAIN: ICD-10-CM

## 2020-05-27 DIAGNOSIS — M50.20 HERNIATED CERVICAL INTERVERTEBRAL DISC: Primary | ICD-10-CM

## 2020-05-27 PROCEDURE — 97110 THERAPEUTIC EXERCISES: CPT

## 2020-05-27 PROCEDURE — 97140 MANUAL THERAPY 1/> REGIONS: CPT

## 2020-05-29 ENCOUNTER — OFFICE VISIT (OUTPATIENT)
Dept: PHYSICAL THERAPY | Facility: CLINIC | Age: 40
End: 2020-05-29
Payer: COMMERCIAL

## 2020-05-29 DIAGNOSIS — M50.20 HERNIATED CERVICAL INTERVERTEBRAL DISC: Primary | ICD-10-CM

## 2020-05-29 DIAGNOSIS — M54.2 NECK PAIN: ICD-10-CM

## 2020-05-29 PROCEDURE — 97110 THERAPEUTIC EXERCISES: CPT | Performed by: PHYSICAL THERAPIST

## 2020-05-29 PROCEDURE — 97140 MANUAL THERAPY 1/> REGIONS: CPT | Performed by: PHYSICAL THERAPIST

## 2020-06-01 ENCOUNTER — APPOINTMENT (OUTPATIENT)
Dept: PHYSICAL THERAPY | Facility: CLINIC | Age: 40
End: 2020-06-01
Payer: COMMERCIAL

## 2020-06-03 ENCOUNTER — OFFICE VISIT (OUTPATIENT)
Dept: PHYSICAL THERAPY | Facility: CLINIC | Age: 40
End: 2020-06-03
Payer: COMMERCIAL

## 2020-06-03 DIAGNOSIS — M54.2 NECK PAIN: ICD-10-CM

## 2020-06-03 DIAGNOSIS — M50.20 HERNIATED CERVICAL INTERVERTEBRAL DISC: Primary | ICD-10-CM

## 2020-06-03 PROCEDURE — 97110 THERAPEUTIC EXERCISES: CPT

## 2020-06-03 PROCEDURE — 97140 MANUAL THERAPY 1/> REGIONS: CPT

## 2020-06-05 ENCOUNTER — OFFICE VISIT (OUTPATIENT)
Dept: PHYSICAL THERAPY | Facility: CLINIC | Age: 40
End: 2020-06-05
Payer: COMMERCIAL

## 2020-06-05 DIAGNOSIS — M54.2 NECK PAIN: ICD-10-CM

## 2020-06-05 DIAGNOSIS — M50.20 HERNIATED CERVICAL INTERVERTEBRAL DISC: Primary | ICD-10-CM

## 2020-06-05 PROCEDURE — 97140 MANUAL THERAPY 1/> REGIONS: CPT | Performed by: PHYSICAL THERAPIST

## 2020-06-05 PROCEDURE — 97110 THERAPEUTIC EXERCISES: CPT | Performed by: PHYSICAL THERAPIST

## 2020-06-08 ENCOUNTER — OFFICE VISIT (OUTPATIENT)
Dept: PHYSICAL THERAPY | Facility: CLINIC | Age: 40
End: 2020-06-08
Payer: COMMERCIAL

## 2020-06-08 DIAGNOSIS — M50.20 HERNIATED CERVICAL INTERVERTEBRAL DISC: Primary | ICD-10-CM

## 2020-06-08 PROCEDURE — 97140 MANUAL THERAPY 1/> REGIONS: CPT

## 2020-06-08 PROCEDURE — 97110 THERAPEUTIC EXERCISES: CPT

## 2020-06-10 ENCOUNTER — OFFICE VISIT (OUTPATIENT)
Dept: PHYSICAL THERAPY | Facility: CLINIC | Age: 40
End: 2020-06-10
Payer: COMMERCIAL

## 2020-06-10 DIAGNOSIS — M54.2 NECK PAIN: ICD-10-CM

## 2020-06-10 DIAGNOSIS — M50.20 HERNIATED CERVICAL INTERVERTEBRAL DISC: Primary | ICD-10-CM

## 2020-06-10 PROCEDURE — 97140 MANUAL THERAPY 1/> REGIONS: CPT | Performed by: PHYSICAL THERAPIST

## 2020-06-10 PROCEDURE — 97110 THERAPEUTIC EXERCISES: CPT | Performed by: PHYSICAL THERAPIST

## 2020-06-15 ENCOUNTER — OFFICE VISIT (OUTPATIENT)
Dept: PHYSICAL THERAPY | Facility: CLINIC | Age: 40
End: 2020-06-15
Payer: COMMERCIAL

## 2020-06-15 DIAGNOSIS — M50.20 HERNIATED CERVICAL INTERVERTEBRAL DISC: Primary | ICD-10-CM

## 2020-06-15 DIAGNOSIS — M54.2 NECK PAIN: ICD-10-CM

## 2020-06-15 PROCEDURE — 97140 MANUAL THERAPY 1/> REGIONS: CPT

## 2020-06-15 PROCEDURE — 97110 THERAPEUTIC EXERCISES: CPT

## 2020-06-17 ENCOUNTER — OFFICE VISIT (OUTPATIENT)
Dept: PHYSICAL THERAPY | Facility: CLINIC | Age: 40
End: 2020-06-17
Payer: COMMERCIAL

## 2020-06-17 DIAGNOSIS — M50.20 HERNIATED CERVICAL INTERVERTEBRAL DISC: Primary | ICD-10-CM

## 2020-06-17 DIAGNOSIS — M54.2 NECK PAIN: ICD-10-CM

## 2020-06-17 PROCEDURE — 97140 MANUAL THERAPY 1/> REGIONS: CPT

## 2020-06-17 PROCEDURE — 97110 THERAPEUTIC EXERCISES: CPT

## 2020-06-19 ENCOUNTER — OFFICE VISIT (OUTPATIENT)
Dept: PHYSICAL THERAPY | Facility: CLINIC | Age: 40
End: 2020-06-19
Payer: COMMERCIAL

## 2020-06-19 DIAGNOSIS — M54.2 NECK PAIN: ICD-10-CM

## 2020-06-19 DIAGNOSIS — M50.20 HERNIATED CERVICAL INTERVERTEBRAL DISC: Primary | ICD-10-CM

## 2020-06-19 PROCEDURE — 97110 THERAPEUTIC EXERCISES: CPT | Performed by: PHYSICAL THERAPIST

## 2020-06-19 PROCEDURE — 97140 MANUAL THERAPY 1/> REGIONS: CPT | Performed by: PHYSICAL THERAPIST

## 2020-06-23 ENCOUNTER — OFFICE VISIT (OUTPATIENT)
Dept: PHYSICAL THERAPY | Facility: CLINIC | Age: 40
End: 2020-06-23
Payer: COMMERCIAL

## 2020-06-23 DIAGNOSIS — M50.20 HERNIATED CERVICAL INTERVERTEBRAL DISC: Primary | ICD-10-CM

## 2020-06-23 DIAGNOSIS — Z01.818 PREOP TESTING: ICD-10-CM

## 2020-06-23 DIAGNOSIS — M54.2 NECK PAIN: ICD-10-CM

## 2020-06-23 PROCEDURE — 97140 MANUAL THERAPY 1/> REGIONS: CPT

## 2020-06-23 PROCEDURE — U0003 INFECTIOUS AGENT DETECTION BY NUCLEIC ACID (DNA OR RNA); SEVERE ACUTE RESPIRATORY SYNDROME CORONAVIRUS 2 (SARS-COV-2) (CORONAVIRUS DISEASE [COVID-19]), AMPLIFIED PROBE TECHNIQUE, MAKING USE OF HIGH THROUGHPUT TECHNOLOGIES AS DESCRIBED BY CMS-2020-01-R: HCPCS

## 2020-06-23 PROCEDURE — 97110 THERAPEUTIC EXERCISES: CPT

## 2020-06-24 LAB — SARS-COV-2 RNA SPEC QL NAA+PROBE: NOT DETECTED

## 2020-06-26 ENCOUNTER — OFFICE VISIT (OUTPATIENT)
Dept: PHYSICAL THERAPY | Facility: CLINIC | Age: 40
End: 2020-06-26
Payer: COMMERCIAL

## 2020-06-26 ENCOUNTER — APPOINTMENT (OUTPATIENT)
Dept: RADIOLOGY | Facility: HOSPITAL | Age: 40
End: 2020-06-26
Payer: COMMERCIAL

## 2020-06-26 ENCOUNTER — HOSPITAL ENCOUNTER (OUTPATIENT)
Facility: HOSPITAL | Age: 40
Setting detail: OUTPATIENT SURGERY
Discharge: HOME | End: 2020-06-26
Attending: ANESTHESIOLOGY | Admitting: ANESTHESIOLOGY
Payer: COMMERCIAL

## 2020-06-26 VITALS
TEMPERATURE: 99 F | OXYGEN SATURATION: 98 % | DIASTOLIC BLOOD PRESSURE: 68 MMHG | SYSTOLIC BLOOD PRESSURE: 144 MMHG | HEART RATE: 71 BPM | RESPIRATION RATE: 18 BRPM

## 2020-06-26 DIAGNOSIS — M50.20 HERNIATED CERVICAL INTERVERTEBRAL DISC: Primary | ICD-10-CM

## 2020-06-26 DIAGNOSIS — M54.2 NECK PAIN: ICD-10-CM

## 2020-06-26 PROCEDURE — 97140 MANUAL THERAPY 1/> REGIONS: CPT

## 2020-06-26 PROCEDURE — 97110 THERAPEUTIC EXERCISES: CPT

## 2020-06-26 PROCEDURE — 62321 NJX INTERLAMINAR CRV/THRC: CPT | Performed by: ANESTHESIOLOGY

## 2020-06-26 RX ORDER — LIDOCAINE HYDROCHLORIDE 10 MG/ML
INJECTION, SOLUTION EPIDURAL; INFILTRATION; INTRACAUDAL; PERINEURAL AS NEEDED
Status: DISCONTINUED | OUTPATIENT
Start: 2020-06-26 | End: 2020-06-26 | Stop reason: HOSPADM

## 2020-06-26 RX ORDER — DEXAMETHASONE SODIUM PHOSPHATE 10 MG/ML
INJECTION, SOLUTION INTRAMUSCULAR; INTRAVENOUS AS NEEDED
Status: DISCONTINUED | OUTPATIENT
Start: 2020-06-26 | End: 2020-06-26 | Stop reason: HOSPADM

## 2020-06-29 ENCOUNTER — OFFICE VISIT (OUTPATIENT)
Dept: PHYSICAL THERAPY | Facility: CLINIC | Age: 40
End: 2020-06-29
Payer: COMMERCIAL

## 2020-06-29 DIAGNOSIS — M54.2 NECK PAIN: ICD-10-CM

## 2020-06-29 DIAGNOSIS — M50.20 HERNIATED CERVICAL INTERVERTEBRAL DISC: Primary | ICD-10-CM

## 2020-06-29 PROCEDURE — 97140 MANUAL THERAPY 1/> REGIONS: CPT

## 2020-06-29 PROCEDURE — 97110 THERAPEUTIC EXERCISES: CPT

## 2020-07-01 ENCOUNTER — OFFICE VISIT (OUTPATIENT)
Dept: PHYSICAL THERAPY | Facility: CLINIC | Age: 40
End: 2020-07-01
Payer: COMMERCIAL

## 2020-07-01 DIAGNOSIS — M54.2 NECK PAIN: ICD-10-CM

## 2020-07-01 DIAGNOSIS — M50.20 HERNIATED CERVICAL INTERVERTEBRAL DISC: Primary | ICD-10-CM

## 2020-07-01 PROCEDURE — 97110 THERAPEUTIC EXERCISES: CPT | Performed by: PHYSICAL THERAPIST

## 2020-07-01 PROCEDURE — 97140 MANUAL THERAPY 1/> REGIONS: CPT | Performed by: PHYSICAL THERAPIST

## 2020-07-01 NOTE — PROGRESS NOTES
Daily Note     Today's date: 2020  Patient name: Kathy Florence  : 1980  MRN: [id number]  Referring provider: Renée Malave MD  Dx:   Encounter Diagnosis     ICD-10-CM    1  Herniated cervical intervertebral disc M50 20    2  Neck pain M54 2                   Subjective: " I am good, I got the injection in my neck "       Objective: See treatment diary below      Assessment: Tolerated treatment well  Patient exhibited good technique with therapeutic exercises and would benefit from continued PT      Plan: Continue per plan of care        Precautions: None   Manuals     B/L UT  Performed  Performed  Performed performed Performed    B/L Levator  Performed  Performed  Performed performed Performed    SOR  Performed  Performed  Performed performed Performed            Neuro Re-Ed                        Ther Ex        UBE  [de-identified] RPM 5' forward/5' retro  80 RPM 5' forward/5' retro  100 rpm 5'fwd/5' retro 90 rpm  5'fwd/5'retro 80RPM 5' forward 5' retro    TB ER B/L, scap retraction against wall Blue TB   20x  B/L, scap retraction against wall Blue TB   20x B/L, scap retraction against wall Blue TB   20x Green   TB x20 Blue TB x 20    Chin Tuck  5''20x 5" x20 5" x20 5" x20 5''x20   Doorway Stretch  30''3x 30" x3 30"x3 30" x3 30''x3   Thoracic ext over chair   5''20x 5" x20 5" x20 5" x 20 5''x20    Prone Flex/ext/abd        Bent row         Seated Row         Lat Pulldown         Cervical rotation/fexion /extension  NP --- 5"x10 ea 5" x10 ea 5''10x each    ITY on Pball Kneeling on foam, 3" x10 ea    Thumb up I,  Palm down T and Y Kneeling on foam, 3" 2x10 ea    Thumb up I,  Palm down T and Y Kneeling on foam, 3" 2x10 ea    Thumb up I,  Palm down T and Y             Ther Activity                        Gait Training                        Modalities        Mechanical Traction

## 2020-07-02 ENCOUNTER — APPOINTMENT (OUTPATIENT)
Dept: PHYSICAL THERAPY | Facility: CLINIC | Age: 40
End: 2020-07-02
Payer: COMMERCIAL

## 2020-07-06 ENCOUNTER — TELEPHONE (OUTPATIENT)
Dept: PAIN MEDICINE | Facility: CLINIC | Age: 40
End: 2020-07-06

## 2020-07-10 ENCOUNTER — APPOINTMENT (OUTPATIENT)
Dept: PHYSICAL THERAPY | Facility: CLINIC | Age: 40
End: 2020-07-10
Payer: COMMERCIAL

## 2020-07-14 ENCOUNTER — HOSPITAL ENCOUNTER (EMERGENCY)
Facility: HOSPITAL | Age: 40
Discharge: HOME | End: 2020-07-14
Attending: EMERGENCY MEDICINE | Admitting: EMERGENCY MEDICINE
Payer: COMMERCIAL

## 2020-07-14 ENCOUNTER — APPOINTMENT (EMERGENCY)
Dept: RADIOLOGY | Facility: HOSPITAL | Age: 40
End: 2020-07-14
Payer: COMMERCIAL

## 2020-07-14 VITALS
BODY MASS INDEX: 29.29 KG/M2 | OXYGEN SATURATION: 100 % | DIASTOLIC BLOOD PRESSURE: 83 MMHG | RESPIRATION RATE: 18 BRPM | HEART RATE: 106 BPM | SYSTOLIC BLOOD PRESSURE: 139 MMHG | TEMPERATURE: 98 F | WEIGHT: 155 LBS

## 2020-07-14 DIAGNOSIS — F12.90 MARIJUANA USE: ICD-10-CM

## 2020-07-14 DIAGNOSIS — K21.00 REFLUX ESOPHAGITIS: ICD-10-CM

## 2020-07-14 DIAGNOSIS — R00.2 PALPITATIONS: Primary | ICD-10-CM

## 2020-07-14 DIAGNOSIS — R00.0 TACHYCARDIA: ICD-10-CM

## 2020-07-14 DIAGNOSIS — R45.89 FEELING ANXIOUS: ICD-10-CM

## 2020-07-14 LAB
ALBUMIN SERPL BCP-MCNC: 4.2 G/DL (ref 3.5–5.7)
ALP SERPL-CCNC: 53 U/L (ref 40–150)
ALT SERPL W P-5'-P-CCNC: 11 U/L (ref 7–52)
ANION GAP SERPL CALCULATED.3IONS-SCNC: 11 MMOL/L (ref 4–13)
AST SERPL W P-5'-P-CCNC: 14 U/L (ref 13–39)
BACTERIA UR QL AUTO: ABNORMAL /HPF
BASOPHILS # BLD AUTO: 0.1 THOUSANDS/ΜL (ref 0–0.1)
BASOPHILS NFR BLD AUTO: 1 % (ref 0–2)
BILIRUB SERPL-MCNC: 0.3 MG/DL (ref 0.2–1)
BILIRUB UR QL STRIP: NEGATIVE
BUN SERPL-MCNC: 10 MG/DL (ref 7–25)
CALCIUM SERPL-MCNC: 8.9 MG/DL (ref 8.6–10.5)
CHLORIDE SERPL-SCNC: 107 MMOL/L (ref 98–107)
CLARITY UR: CLEAR
CO2 SERPL-SCNC: 23 MMOL/L (ref 21–31)
COLOR UR: YELLOW
CREAT SERPL-MCNC: 0.76 MG/DL (ref 0.6–1.2)
EOSINOPHIL # BLD AUTO: 0.2 THOUSAND/ΜL (ref 0–0.61)
EOSINOPHIL NFR BLD AUTO: 2 % (ref 0–5)
ERYTHROCYTE [DISTWIDTH] IN BLOOD BY AUTOMATED COUNT: 13.1 % (ref 11.5–14.5)
EXT PREG TEST URINE: NEGATIVE
EXT. CONTROL ED NAV: NORMAL
GFR SERPL CREATININE-BSD FRML MDRD: 113 ML/MIN/1.73SQ M
GLUCOSE SERPL-MCNC: 155 MG/DL (ref 65–99)
GLUCOSE UR STRIP-MCNC: NEGATIVE MG/DL
HCT VFR BLD AUTO: 39.6 % (ref 42–47)
HGB BLD-MCNC: 13.4 G/DL (ref 12–16)
HGB UR QL STRIP.AUTO: ABNORMAL
KETONES UR STRIP-MCNC: NEGATIVE MG/DL
LEUKOCYTE ESTERASE UR QL STRIP: NEGATIVE
LIPASE SERPL-CCNC: 47 U/L (ref 11–82)
LYMPHOCYTES # BLD AUTO: 2.9 THOUSANDS/ΜL (ref 0.6–4.47)
LYMPHOCYTES NFR BLD AUTO: 36 % (ref 21–51)
MAGNESIUM SERPL-MCNC: 1.7 MG/DL (ref 1.9–2.7)
MCH RBC QN AUTO: 31.4 PG (ref 26–34)
MCHC RBC AUTO-ENTMCNC: 33.8 G/DL (ref 31–37)
MCV RBC AUTO: 93 FL (ref 81–99)
MONOCYTES # BLD AUTO: 0.6 THOUSAND/ΜL (ref 0.17–1.22)
MONOCYTES NFR BLD AUTO: 8 % (ref 2–12)
NEUTROPHILS # BLD AUTO: 4.1 THOUSANDS/ΜL (ref 1.4–6.5)
NEUTS SEG NFR BLD AUTO: 52 % (ref 42–75)
NITRITE UR QL STRIP: NEGATIVE
NON-SQ EPI CELLS URNS QL MICRO: ABNORMAL /HPF
PH UR STRIP.AUTO: 6 [PH]
PLATELET # BLD AUTO: 216 THOUSANDS/UL (ref 149–390)
PMV BLD AUTO: 10.7 FL (ref 8.6–11.7)
POTASSIUM SERPL-SCNC: 3.1 MMOL/L (ref 3.5–5.5)
PROT SERPL-MCNC: 7 G/DL (ref 6.4–8.9)
PROT UR STRIP-MCNC: NEGATIVE MG/DL
RBC # BLD AUTO: 4.27 MILLION/UL (ref 3.9–5.2)
RBC #/AREA URNS AUTO: ABNORMAL /HPF
SODIUM SERPL-SCNC: 141 MMOL/L (ref 134–143)
SP GR UR STRIP.AUTO: <=1.005 (ref 1–1.03)
TROPONIN I SERPL-MCNC: <0.03 NG/ML
TSH SERPL DL<=0.05 MIU/L-ACNC: 0.61 UIU/ML (ref 0.45–5.33)
UROBILINOGEN UR QL STRIP.AUTO: 0.2 E.U./DL
WBC # BLD AUTO: 7.9 THOUSAND/UL (ref 4.8–10.8)
WBC #/AREA URNS AUTO: ABNORMAL /HPF

## 2020-07-14 PROCEDURE — 83735 ASSAY OF MAGNESIUM: CPT | Performed by: EMERGENCY MEDICINE

## 2020-07-14 PROCEDURE — 99285 EMERGENCY DEPT VISIT HI MDM: CPT

## 2020-07-14 PROCEDURE — 71045 X-RAY EXAM CHEST 1 VIEW: CPT

## 2020-07-14 PROCEDURE — 84443 ASSAY THYROID STIM HORMONE: CPT | Performed by: EMERGENCY MEDICINE

## 2020-07-14 PROCEDURE — 83690 ASSAY OF LIPASE: CPT | Performed by: EMERGENCY MEDICINE

## 2020-07-14 PROCEDURE — 81025 URINE PREGNANCY TEST: CPT | Performed by: EMERGENCY MEDICINE

## 2020-07-14 PROCEDURE — 96360 HYDRATION IV INFUSION INIT: CPT

## 2020-07-14 PROCEDURE — 85025 COMPLETE CBC W/AUTO DIFF WBC: CPT | Performed by: EMERGENCY MEDICINE

## 2020-07-14 PROCEDURE — 80053 COMPREHEN METABOLIC PANEL: CPT | Performed by: EMERGENCY MEDICINE

## 2020-07-14 PROCEDURE — 84484 ASSAY OF TROPONIN QUANT: CPT | Performed by: EMERGENCY MEDICINE

## 2020-07-14 PROCEDURE — 81001 URINALYSIS AUTO W/SCOPE: CPT | Performed by: EMERGENCY MEDICINE

## 2020-07-14 PROCEDURE — 36415 COLL VENOUS BLD VENIPUNCTURE: CPT | Performed by: EMERGENCY MEDICINE

## 2020-07-14 PROCEDURE — 81003 URINALYSIS AUTO W/O SCOPE: CPT | Performed by: EMERGENCY MEDICINE

## 2020-07-14 PROCEDURE — 99285 EMERGENCY DEPT VISIT HI MDM: CPT | Performed by: EMERGENCY MEDICINE

## 2020-07-14 PROCEDURE — 93005 ELECTROCARDIOGRAM TRACING: CPT

## 2020-07-14 RX ORDER — FAMOTIDINE 20 MG/1
20 TABLET, FILM COATED ORAL ONCE
Status: COMPLETED | OUTPATIENT
Start: 2020-07-14 | End: 2020-07-14

## 2020-07-14 RX ORDER — METHOCARBAMOL 500 MG/1
500 TABLET, FILM COATED ORAL ONCE
Status: COMPLETED | OUTPATIENT
Start: 2020-07-14 | End: 2020-07-14

## 2020-07-14 RX ORDER — MAGNESIUM HYDROXIDE/ALUMINUM HYDROXICE/SIMETHICONE 120; 1200; 1200 MG/30ML; MG/30ML; MG/30ML
30 SUSPENSION ORAL ONCE
Status: COMPLETED | OUTPATIENT
Start: 2020-07-14 | End: 2020-07-14

## 2020-07-14 RX ORDER — LIDOCAINE HYDROCHLORIDE 20 MG/ML
15 SOLUTION OROPHARYNGEAL ONCE
Status: COMPLETED | OUTPATIENT
Start: 2020-07-14 | End: 2020-07-14

## 2020-07-14 RX ADMIN — METHOCARBAMOL 500 MG: 500 TABLET, FILM COATED ORAL at 17:52

## 2020-07-14 RX ADMIN — SODIUM CHLORIDE 1000 ML: 0.9 INJECTION, SOLUTION INTRAVENOUS at 16:51

## 2020-07-14 RX ADMIN — FAMOTIDINE 20 MG: 20 TABLET, FILM COATED ORAL at 16:51

## 2020-07-14 RX ADMIN — ALUMINUM HYDROXIDE, MAGNESIUM HYDROXIDE, AND SIMETHICONE 30 ML: 200; 200; 20 SUSPENSION ORAL at 16:51

## 2020-07-14 RX ADMIN — LIDOCAINE HYDROCHLORIDE 15 ML: 20 SOLUTION ORAL; TOPICAL at 16:51

## 2020-07-14 NOTE — ED PROVIDER NOTES
History  Chief Complaint   Patient presents with    Rapid Heart Rate    Difficulty Swallowing     HPI    35 y o  F w/ hx of mild intermittent asthma, GERD, herniated discs on steroids, presenting to the ED for evaluation of rapid heart rate  Patient has had intermittent palpitations for the past three months  When her heart rate goes up, she also gets a globus sensation in her throat  She also describes this burning sensation as well  This has been present for the past few months  No pain or difficulty swallowing  She denies any chest pain chest tightness or sob  Patient did smoke marijuana (vaping) three hours prior to arrival  One hour ago, patient had just finished cleaning her home, and that's when she noticed her rapid heart rate  On record review , the patient approximately 1 year ago in 2019, smoked marijuana and also developed rapid heart rate then with palpitations and similar symptoms  She today had some associated abdominal pain  Onset: one hour ago  Duration: constant  Pain currently: no  Pain meds taken: no  Prior similar pain: yes, has indigestion and GERD, feels similar  Where: epigastric  Radiation: no  Associated N/V: no  Associated with food: sometimes  Emesis: no  Last BM: normal today  Urinary complaints:  no      LMP 4 weeks ago  Vaginal Discharge no  Sexual activity yes, not using protection    Prior Surgeries none    Previous Imaging   none    Last Colonsocopy/Endoscopy/GI visit  no    She does have a thyroid nodule, has an appointment upcoming for follow up regarding her thyroid nodule 3 month follow up  States she has had an ultrasound in march of this year already  Prior to Admission Medications   Prescriptions Last Dose Informant Patient Reported? Taking?    Levonorgestrel 19 5 MCG/DAY IUD  Self Yes No   Si each by Intrauterine route   albuterol (PROAIR HFA) 90 mcg/act inhaler   No No   Sig: Inhale 2 puffs every 6 (six) hours as needed for wheezing   celecoxib (CeleBREX) 200 mg capsule   Yes No   Sig: Take 200 mg by mouth 2 (two) times a day as needed   diclofenac sodium (VOLTAREN) 1 %   Yes No   Sig: Apply 1 application topically 4 (four) times a day as needed   famotidine (PEPCID) 40 MG tablet   No No   Sig: Take 1 tablet (40 mg total) by mouth daily at bedtime   methocarbamol (ROBAXIN) 500 mg tablet  Self No No   Sig: Take 1 tablet (500 mg total) by mouth 2 (two) times a day as needed for muscle spasms   pantoprazole (PROTONIX) 40 mg tablet   Yes No   Sig: Take 40 mg by mouth daily      Facility-Administered Medications: None       Past Medical History:   Diagnosis Date    Allergic     Asthma     Cervical disc disorder with radiculopathy of mid-cervical region 2020    Cervical radiculopathy 2020    GERD (gastroesophageal reflux disease)     Herniated disc, cervical     Pinched nerve in shoulder, right     Thyroid nodule        Past Surgical History:   Procedure Laterality Date     SECTION      EPIDURAL BLOCK INJECTION N/A 3/4/2020    Procedure: C7-T1 Interlaminar Epidural Steroid Injection (05080); Surgeon: Angel Adair MD;  Location: [facility];  Service: Pain Management     EPIDURAL BLOCK INJECTION N/A 2020    Procedure: C7-T1 interlaminar epidural steroid injection;  Surgeon: Angel Adair MD;  Location: [facility];  Service: Pain Management     FL GUIDED NEEDLE PLAC BX/ASP/INJ  3/4/2020    FL GUIDED NEEDLE PLAC BX/ASP/INJ  2020       Family History   Problem Relation Age of Onset    Diabetes Mother     Heart disease Mother     Bone cancer Father     Diabetes Brother     No Known Problems Daughter     No Known Problems Maternal Grandmother     No Known Problems Paternal Grandmother     No Known Problems Maternal Aunt     No Known Problems Paternal Aunt     No Known Problems Paternal Aunt      I have reviewed and agree with the history as documented      E-Cigarette/Vaping    E-Cigarette Use Never User E-Cigarette/Vaping Substances    CBD No      Social History     Tobacco Use    Smoking status: Current Every Day Smoker     Packs/day: 1 00     Types: Cigarettes    Smokeless tobacco: Never Used   Substance Use Topics    Alcohol use: No    Drug use: Yes     Types: Marijuana       Review of Systems   Constitutional: Negative for chills, fatigue and fever  HENT: Negative for sore throat  Eyes: Negative for redness and visual disturbance  Respiratory: Negative for cough, choking and shortness of breath  Cardiovascular: Positive for palpitations  Negative for chest pain  Gastrointestinal: Positive for abdominal pain  Negative for diarrhea, nausea and vomiting  Genitourinary: Negative for difficulty urinating, dysuria and pelvic pain  Musculoskeletal: Positive for back pain  Chronic back pain   Skin: Negative for rash  Neurological: Positive for tremors  Negative for syncope, weakness and headaches  Psychiatric/Behavioral: The patient is nervous/anxious  All other systems reviewed and are negative  Physical Exam  Physical Exam   Constitutional: She is oriented to person, place, and time  She appears well-developed and well-nourished  No distress  HENT:   Head: Normocephalic and atraumatic  Eyes: Conjunctivae are normal    Neck: Normal range of motion  Cardiovascular: Regular rhythm and normal heart sounds  Tachycardic, heart rate 118 at time examination   Pulmonary/Chest: Effort normal and breath sounds normal  No respiratory distress  Abdominal: Soft  Bowel sounds are normal  There is no tenderness  Musculoskeletal: Normal range of motion  Neurological: She is alert and oriented to person, place, and time  No cranial nerve deficit or sensory deficit  She exhibits normal muscle tone  Coordination normal    Skin: Skin is warm and dry  No rash noted  Psychiatric: She has a normal mood and affect  Nursing note and vitals reviewed        Vital Signs  ED Triage Vitals [07/14/20 1620]   Temperature Pulse Respirations Blood Pressure SpO2   98 °F (36 7 °C) (!) 150 18 155/92 99 %      Temp src Heart Rate Source Patient Position - Orthostatic VS BP Location FiO2 (%)   -- Monitor -- Left arm --      Pain Score       --           Vitals:    07/14/20 1736 07/14/20 1800 07/14/20 1802 07/14/20 1806   BP:  139/83     Pulse: (!) 107 (!) 109 (!) 106 (!) 106         Visual Acuity      ED Medications  Medications   aluminum-magnesium hydroxide-simethicone (MYLANTA) 200-200-20 mg/5 mL oral suspension 30 mL (30 mL Oral Given 7/14/20 1651)   famotidine (PEPCID) tablet 20 mg (20 mg Oral Given 7/14/20 1651)   Lidocaine Viscous HCl (XYLOCAINE) 2 % mucosal solution 15 mL (15 mL Swish & Swallow Given 7/14/20 1651)   sodium chloride 0 9 % bolus 1,000 mL (0 mL Intravenous Stopped 7/14/20 1809)   methocarbamol (ROBAXIN) tablet 500 mg (500 mg Oral Given 7/14/20 1752)       Diagnostic Studies  Results Reviewed     Procedure Component Value Units Date/Time    TSH [[id number]]  (Normal) Collected:  07/14/20 1644    Lab Status:  Final result Specimen:  Blood from Arm, Right Updated:  07/14/20 1738     TSH 3RD GENERATON 0 610 uIU/mL     Narrative:       Patients undergoing fluorescein dye angiography may retain small amounts of fluorescein in the body for 48-72 hours post procedure  Samples containing fluorescein can produce falsely depressed TSH values  If the patient had this procedure,a specimen should be resubmitted post fluorescein clearance        Magnesium [[id number]]  (Abnormal) Collected:  07/14/20 1644    Lab Status:  Final result Specimen:  Blood from Arm, Right Updated:  07/14/20 1722     Magnesium 1 7 mg/dL     Lipase [[id number]]  (Normal) Collected:  07/14/20 1644    Lab Status:  Final result Specimen:  Blood from Arm, Right Updated:  07/14/20 1722     Lipase 47 u/L     Comprehensive metabolic panel [[id number]]  (Abnormal) Collected:  07/14/20 1644    Lab Status:  Final result Specimen: Blood from Arm, Right Updated:  07/14/20 1721     Sodium 141 mmol/L      Potassium 3 1 mmol/L      Chloride 107 mmol/L      CO2 23 mmol/L      ANION GAP 11 mmol/L      BUN 10 mg/dL      Creatinine 0 76 mg/dL      Glucose 155 mg/dL      Calcium 8 9 mg/dL      AST 14 U/L      ALT 11 U/L      Alkaline Phosphatase 53 U/L      Total Protein 7 0 g/dL      Albumin 4 2 g/dL      Total Bilirubin 0 30 mg/dL      eGFR 113 ml/min/1 73sq m     Narrative:       National Kidney Disease Foundation guidelines for Chronic Kidney Disease (CKD):     Stage 1 with normal or high GFR (GFR > 90 mL/min/1 73 square meters)    Stage 2 Mild CKD (GFR = 60-89 mL/min/1 73 square meters)    Stage 3A Moderate CKD (GFR = 45-59 mL/min/1 73 square meters)    Stage 3B Moderate CKD (GFR = 30-44 mL/min/1 73 square meters)    Stage 4 Severe CKD (GFR = 15-29 mL/min/1 73 square meters)    Stage 5 End Stage CKD (GFR <15 mL/min/1 73 square meters)  Note: GFR calculation is accurate only with a steady state creatinine    Troponin I [[id number]]  (Normal) Collected:  07/14/20 1644    Lab Status:  Final result Specimen:  Blood from Arm, Right Updated:  07/14/20 1720     Troponin I <0 03 ng/mL     Urine Microscopic [[id number]]  (Abnormal) Collected:  07/14/20 1703    Lab Status:  Final result Specimen:  Urine, Clean Catch Updated:  07/14/20 1714     RBC, UA 1-2 /hpf      WBC, UA 0-1 /hpf      Epithelial Cells Occasional /hpf      Bacteria, UA None Seen /hpf     UA w Reflex to Microscopic w Reflex to Culture [[id number]]  (Abnormal) Collected:  07/14/20 1703    Lab Status:  Final result Specimen:  Urine, Clean Catch Updated:  07/14/20 1711     Color, UA Yellow     Clarity, UA Clear     Specific Gravity, UA <=1 005     pH, UA 6 0     Leukocytes, UA Negative     Nitrite, UA Negative     Protein, UA Negative mg/dl      Glucose, UA Negative mg/dl      Ketones, UA Negative mg/dl      Urobilinogen, UA 0 2 E U /dl      Bilirubin, UA Negative     Blood, UA 1+    POCT pregnancy, urine [[id number]]  (Normal) Resulted:  07/14/20 1703    Lab Status:  Final result Updated:  07/14/20 1704     EXT PREG TEST UR (Ref: Negative) negative     Control valid    CBC and differential [[id number]]  (Abnormal) Collected:  07/14/20 1644    Lab Status:  Final result Specimen:  Blood from Arm, Right Updated:  07/14/20 1651     WBC 7 90 Thousand/uL      RBC 4 27 Million/uL      Hemoglobin 13 4 g/dL      Hematocrit 39 6 %      MCV 93 fL      MCH 31 4 pg      MCHC 33 8 g/dL      RDW 13 1 %      MPV 10 7 fL      Platelets 415 Thousands/uL      Neutrophils Relative 52 %      Lymphocytes Relative 36 %      Monocytes Relative 8 %      Eosinophils Relative 2 %      Basophils Relative 1 %      Neutrophils Absolute 4 10 Thousands/µL      Lymphocytes Absolute 2 90 Thousands/µL      Monocytes Absolute 0 60 Thousand/µL      Eosinophils Absolute 0 20 Thousand/µL      Basophils Absolute 0 10 Thousands/µL                  XR chest 1 view portable   ED Interpretation by Jeremiah Hodgkin, MD (07/14 1727)   No acute cardiopulmonary pathology      Final Result by Deloris Reynolds MD (07/14 1832)      No acute cardiopulmonary disease  Workstation performed: [id number]                    Procedures  Procedures         ED Course  ED Course as of Jul 14 1845   Tue Jul 14, 2020   1645 Will need GI for hx of Reflux and Holter monitor for tachycardia      1646 ECG 12 Lead Documentation  Date/Time: today/date: 7/14/2020  Performed by: Randee Mena  Authorized by:  Randee Mena    ECG reviewed by me, the ED Provider: yes    Patient location:  ED   Previous ECG:  none  Rate:  ECG rate assessment: 144    Rhythm: sinus rhythm    Ectopy:  : none    QRS axis:  Normal  Intervals: normal   Q waves: None   ST segments:  no acute changes  T waves: inverted v1 avR      Impression: Normal EKG          1708 PREGNANCY TEST URINE: negative   1750 TSH 3RD GENERATON: 0 610       US AUDIT      Most Recent Value   Initial Alcohol Screen: US AUDIT-C    1  How often do you have a drink containing alcohol?  0 Filed at: 07/14/2020 1626   Audit-C Score  0 Filed at: 07/14/2020 1626                  ROCHELLE/DAST-10      Most Recent Value   How many times in the past year have you    Used an illegal drug or used a prescription medication for non-medical reasons? Never Filed at: 07/14/2020 1626                    [facility]  50-year-old female who presents to the ED for evaluation of palpitations setting of smoking marijuana  The patient did have a similar presentation 1 year ago  Will check labs including electrolytes, and TSH looking for any acute etiology the patient's symptoms  The patient also some abdominal pain, will check LFTs and lipase  Also check a urinalysis  EKG sinus tachycardia    Patient's labs are unremarkable, mild hypomagnesemia, hypokalemia  Patient will go home and eat bananas  The patient did feel some tremors, her heart rate continued to come down throughout her ED visit  Requesting her home Robaxin which she was given with some relief  The patient did drive here, states she does feel anxious, because she drove herself, will avoid giving benzos  She also had some relief of her burning sensation with a GI cocktail  I suspect the patient may have had intermittent episode of SVT prior to arrival given her heart rate was in the 150s on arrival   Resolved on its own Heart rate 105 prior to discharge  The patient was given a Holter monitor, follow-up with Cardiology  Will also follow-up with GI for her reflux  The patient was instructed to follow up as documented  Strict return precautions were discussed with the patient and the patient was instructed to return to the emergency department immediately if symptoms worsen  The patient/patient family member acknowledged and were in agreement with plan         Disposition  Final diagnoses:   Palpitations   Tachycardia   Feeling anxious   Reflux esophagitis   Marijuana use     Time reflects when diagnosis was documented in both MDM as applicable and the Disposition within this note     Time User Action Codes Description Comment    7/14/2020  5:54 PM Janece Hashimoto Add [R00 2] Palpitations     7/14/2020  5:54 PM Roetta Mace [R00 0] Tachycardia     7/14/2020  5:54 PM Roetta Mace [R45 89] Feeling anxious     7/14/2020  5:54 PM Janece Hashimoto Add [K21 0] Reflux esophagitis     7/14/2020  5:54 PM Janeri Hashimoto Add [F12 90] Marijuana use       ED Disposition     ED Disposition Condition Date/Time Comment    Discharge Stable Tue Jul 14, 2020  5:54 PM Park Osorio Confer discharge to home/self care              Follow-up Information     Follow up With Specialties Details Why Contact Info Additional Information    [facility] Schedule an appointment as soon as possible for a visit in 1 week For follow up regarding your symptoms and recheck after your holter monitor [address] [place] [id number]  [address] [place] [address] Cardiology [address], [facility], [place], [address]    [facility] Schedule an appointment as soon as possible for a visit in 1 week For follow up regarding your throat burning sensation [address] [facility]  Emergency Department Emergency Medicine Go to  If symptoms worsen [address] [facility] [id number] [phone]           Discharge Medication List as of 7/14/2020  5:57 PM      CONTINUE these medications which have NOT CHANGED    Details   albuterol (PROAIR HFA) 90 mcg/act inhaler Inhale 2 puffs every 6 (six) hours as needed for wheezing, Starting Sun 2/9/2020, Normal      celecoxib (CeleBREX) 200 mg capsule Take 200 mg by mouth 2 (two) times a day as needed, Starting Mon 2/17/2020, Until Tue 2/16/2021, Historical Med      diclofenac sodium (VOLTAREN) 1 % Apply 1 application topically 4 (four) times a day as needed, Starting Mon 3/2/2020, Until Tue 3/2/2021, Historical Med      famotidine (PEPCID) 40 MG tablet Take 1 tablet (40 mg total) by mouth daily at bedtime, Starting Wed 2/26/2020, Normal      Levonorgestrel 19 5 MCG/DAY IUD 1 each by Intrauterine route, Historical Med      methocarbamol (ROBAXIN) 500 mg tablet Take 1 tablet (500 mg total) by mouth 2 (two) times a day as needed for muscle spasms, Starting Sun 11/24/2019, Normal      pantoprazole (PROTONIX) 40 mg tablet Take 40 mg by mouth daily, Starting Mon 1/20/2020, Historical Med           Outpatient Discharge Orders   Holter monitor - 24 hour   Standing Status: Future Standing Exp   Date: 07/14/24       PDMP Review     None          ED Provider  Electronically Signed by           Alessia Calabrese MD  07/14/20 8444

## 2020-07-16 LAB
ATRIAL RATE: 144 BPM
PR INTERVAL: 112 MS
QRS AXIS: 57 DEGREES
QRSD INTERVAL: 86 MS
QT INTERVAL: 356 MS
QTC INTERVAL: 551 MS
T WAVE AXIS: 21 DEGREES
VENTRICULAR RATE: 144 BPM

## 2020-07-16 PROCEDURE — 93010 ELECTROCARDIOGRAM REPORT: CPT | Performed by: INTERNAL MEDICINE

## 2020-08-04 NOTE — PROGRESS NOTES
Patient stopped coming to physical therapy,on 7/1  measurements are from the last patient progress note and were unable to be updated  As a result, patient is discharged from physical therapy

## 2020-08-17 ENCOUNTER — APPOINTMENT (OUTPATIENT)
Dept: PHYSICAL THERAPY | Facility: CLINIC | Age: 40
End: 2020-08-17
Payer: COMMERCIAL

## 2020-08-24 ENCOUNTER — TRANSCRIBE ORDERS (OUTPATIENT)
Dept: PHYSICAL THERAPY | Facility: CLINIC | Age: 40
End: 2020-08-24

## 2020-08-24 ENCOUNTER — EVALUATION (OUTPATIENT)
Dept: PHYSICAL THERAPY | Facility: CLINIC | Age: 40
End: 2020-08-24
Payer: COMMERCIAL

## 2020-08-24 DIAGNOSIS — M54.2 CERVICALGIA: Primary | ICD-10-CM

## 2020-08-24 DIAGNOSIS — M54.2 NECK PAIN: Primary | ICD-10-CM

## 2020-08-24 PROCEDURE — 97162 PT EVAL MOD COMPLEX 30 MIN: CPT | Performed by: PHYSICAL THERAPIST

## 2020-08-24 PROCEDURE — 97140 MANUAL THERAPY 1/> REGIONS: CPT | Performed by: PHYSICAL THERAPIST

## 2020-08-24 NOTE — PROGRESS NOTES
PT Evaluation     Today's date: 2020  Patient name: Bard Carrera  : 1980  MRN: [id number]  Referring provider: Slime Leonard MD  Dx:   Encounter Diagnosis     ICD-10-CM    1  Neck pain  M54 2                   Assessment  Assessment details: Bard Carrera is a 35 y o  female who presents to outpatient PT with a  Neck pain  (primary encounter diagnosis)  No further referral appears necessary at this time based upon examination results  Pt presents with decreased strength, ROM, balance, functional activity tolerance, and pain with movement in her cervical spine,  which is  limiting her ability to perform the aforementioned functional activities  Etiologic factors include repetitive poor body mechanics  Prognosis is good given HEP compliance and PT 2/wk  Please contact me if you have any questions or recommendations  Thank you for the opportunity to share in  Park's care  Impairments: abnormal muscle firing, abnormal muscle tone, abnormal or restricted ROM, abnormal movement, activity intolerance, impaired physical strength and poor posture   Functional limitations: Difficulty, return to work, recreational activites, lifting,ADL's, IADLS  Symptom irritability: moderateUnderstanding of Dx/Px/POC: good   Prognosis: good    Goals  STG to be achieved in 4 weeks     1  Pt will reduce subjective pain rating by at least 50 percent the help facilitate return to PLOF  2  Pt will improve Cervical ROM by at least 10 degrees to help promote improved functional activity tolerance   3  Pt will Meet predicted FOTO score, indicating improved functional activity tolerance  LTG to be achieved in 6-8 weeks   1  Pt will be complaint with HEP   2  Pt will improve ROM to [facility], to help facilitate independence with ADL's, IADL's, and functional activities   3  Pt will improve Strength to [facility] to help facilitate independence with ADL's, IADL's, and functional activities   4   Pt will have no limitations with sleeping to help facilitate return to PLOF    5  Pt will have no limitations with lifting a 10 Lb object to help facilitate return to PLOF         Plan  Patient would benefit from: skilled physical therapy  Planned therapy interventions: ADL training, balance/weight bearing training, balance, flexibility, functional ROM exercises, gait training, graded activity, graded exercise, graded motor, home exercise program, joint mobilization, manual therapy, patient education, neuromuscular re-education, strengthening, stretching, therapeutic activities and therapeutic exercise  Frequency: 2x week  Plan of Care beginning date: 2020  Plan of Care expiration date: 2020  Treatment plan discussed with: patient, PTA and referring physician        Subjective Evaluation    History of Present Illness  Mechanism of injury: The patient is a 35year old female who presents to outpatient PT with a chief complaint of neck pain  She recently received an injection in her cervical spine, and reports that it helped with the neck pain  However, She now reports pain with active cervical flexion/extensio Pain is localized to the SO region  Reports occasional radicular sx into her B/L UE's  Reports occasional headaches, also reports radiating "Hot" pain across her upper thoracic area  Pain  Current pain ratin  At best pain ratin  At worst pain ratin  Quality: tight  Relieving factors: relaxation, rest, support, ice, heat, change in position and medications  Progression: improved    Social Support    Employment status: not working (Works for [employer] )  [employer] dominance: right    Treatments  Previous treatment: physical therapy and injection treatment  Current treatment: physical therapy  Patient Goals  Patient goals for therapy: increased strength, decreased pain and increased motion  Patient goal: To feel better  Objective     Concurrent Complaints  Positive for disturbed sleep   Negative for night pain, dizziness, faints, headaches, nausea/motion sickness, tinnitus, trouble swallowing, difficulty breathing, shortness of breath, respiratory pain and visual change    Active Range of Motion   Cervical/Thoracic Spine       Cervical    Flexion: 35 degrees   Extension: 55 degrees      Left lateral flexion: 30 degrees      Right lateral flexion: 30 degrees      Left rotation: 55 degrees  Right rotation: 65 degrees             Tests   Cervical   Negative vertical compression, lumbar distraction and Sharp-Nya test      Left   Negative Spurling's Test A  Right   Negative Spurling's Test A  Lumbar   Negative vertical compression     Neuro Exam:     Headaches   Patient reports headaches: No        Flowsheet Rows      Most Recent Value   PT/OT G-Codes   Current Score  38   Projected Score  52                Precautions: None     Manuals 8/24             SOR  Performed             B/L UT Levator  Performed                           10 min             Neuro Re-Ed                                                                                                        Ther Ex             UBE              MTP/LTP             Cervical ROM all planes              Cervical Isometrics              Thoracic extension over chair              Chin Tucks              ITY              Tricep Ext             Bicep Curls                                                                              Ther Activity                                       Gait Training                                       Modalities             [facility]

## 2020-08-24 NOTE — LETTER
2020    Massiel Cook MD  [address]    Patient: Aleja Ramos   YOB: 1980   Date of Visit: 2020     Encounter Diagnosis     ICD-10-CM    1  Neck pain  M54 2        Dear Dr Idania Solorzano:    Thank you for your recent referral of Aleja Ramos  Please review the attached evaluation summary from Park's recent visit  Please verify that you agree with the plan of care by signing the attached order  If you have any questions or concerns, please do not hesitate to call  I sincerely appreciate the opportunity to share in the care of one of your patients and hope to have another opportunity to work with you in the near future  Sincerely,    Sherra Ahumada, PT      Referring Provider:      I certify that I have read the below Plan of Care and certify the need for these services furnished under this plan of treatment while under my care  Massiel Cook MD  [address]: [phone]          PT Evaluation     Today's date: 2020  Patient name: Aleja Ramos  : 1980  MRN: [id number]  Referring provider: Jamir Randhawa MD  Dx:   Encounter Diagnosis     ICD-10-CM    1  Neck pain  M54 2                   Assessment  Assessment details: Aleja Ramos is a 35 y o  female who presents to outpatient PT with a  Neck pain  (primary encounter diagnosis)  No further referral appears necessary at this time based upon examination results  Pt presents with decreased strength, ROM, balance, functional activity tolerance, and pain with movement in her cervical spine,  which is  limiting her ability to perform the aforementioned functional activities  Etiologic factors include repetitive poor body mechanics  Prognosis is good given HEP compliance and PT 2/wk  Please contact me if you have any questions or recommendations  Thank you for the opportunity to share in  Park's care  Impairments: abnormal muscle firing, abnormal muscle tone, abnormal or restricted ROM, abnormal movement, activity intolerance, impaired physical strength and poor posture   Functional limitations: Difficulty, return to work, recreational activites, lifting,ADL's, IADLS  Symptom irritability: moderateUnderstanding of Dx/Px/POC: good   Prognosis: good    Goals  STG to be achieved in 4 weeks     1  Pt will reduce subjective pain rating by at least 50 percent the help facilitate return to PLOF  2  Pt will improve Cervical ROM by at least 10 degrees to help promote improved functional activity tolerance   3  Pt will Meet predicted FOTO score, indicating improved functional activity tolerance  LTG to be achieved in 6-8 weeks   1  Pt will be complaint with HEP   2  Pt will improve ROM to [facility], to help facilitate independence with ADL's, IADL's, and functional activities   3  Pt will improve Strength to [facility] to help facilitate independence with ADL's, IADL's, and functional activities   4  Pt will have no limitations with sleeping to help facilitate return to PLOF    5   Pt will have no limitations with lifting a 10 Lb object to help facilitate return to PLOF         Plan  Patient would benefit from: skilled physical therapy  Planned therapy interventions: ADL training, balance/weight bearing training, balance, flexibility, functional ROM exercises, gait training, graded activity, graded exercise, graded motor, home exercise program, joint mobilization, manual therapy, patient education, neuromuscular re-education, strengthening, stretching, therapeutic activities and therapeutic exercise  Frequency: 2x week  Plan of Care beginning date: 8/24/2020  Plan of Care expiration date: 11/30/2020  Treatment plan discussed with: patient, PTA and referring physician        Subjective Evaluation    History of Present Illness  Mechanism of injury: The patient is a 35year old female who presents to outpatient PT with a chief complaint of neck pain  She recently received an injection in her cervical spine, and reports that it helped with the neck pain  However, She now reports pain with active cervical flexion/extensio Pain is localized to the SO region  Reports occasional radicular sx into her B/L UE's  Reports occasional headaches, also reports radiating "Hot" pain across her upper thoracic area  Pain  Current pain ratin  At best pain ratin  At worst pain ratin  Quality: tight  Relieving factors: relaxation, rest, support, ice, heat, change in position and medications  Progression: improved    Social Support    Employment status: not working (Works for [employer] )  [employer] dominance: right    Treatments  Previous treatment: physical therapy and injection treatment  Current treatment: physical therapy  Patient Goals  Patient goals for therapy: increased strength, decreased pain and increased motion  Patient goal: To feel better  Objective     Concurrent Complaints  Positive for disturbed sleep  Negative for night pain, dizziness, faints, headaches, nausea/motion sickness, tinnitus, trouble swallowing, difficulty breathing, shortness of breath, respiratory pain and visual change    Active Range of Motion   Cervical/Thoracic Spine       Cervical    Flexion: 35 degrees   Extension: 55 degrees      Left lateral flexion: 30 degrees      Right lateral flexion: 30 degrees      Left rotation: 55 degrees  Right rotation: 65 degrees             Tests   Cervical   Negative vertical compression, lumbar distraction and Sharp-Nya test      Left   Negative Spurling's Test A  Right   Negative Spurling's Test A  Lumbar   Negative vertical compression     Neuro Exam:     Headaches   Patient reports headaches: No        Flowsheet Rows      Most Recent Value   PT/OT G-Codes   Current Score  38   Projected Score  52                Precautions: None     Manuals              SOR  Performed             B/L UT Levator Performed                           10 min             Neuro Re-Ed                                                                                                        Ther Ex             UBE              MTP/LTP             Cervical ROM all planes              Cervical Isometrics              Thoracic extension over chair              Chin Tucks              ITY              Tricep Ext             Bicep Curls                                                                              Ther Activity                                       Gait Training                                       Modalities             P

## 2020-08-26 ENCOUNTER — LAB (OUTPATIENT)
Dept: LAB | Facility: CLINIC | Age: 40
End: 2020-08-26
Payer: COMMERCIAL

## 2020-08-26 ENCOUNTER — TRANSCRIBE ORDERS (OUTPATIENT)
Dept: LAB | Facility: CLINIC | Age: 40
End: 2020-08-26

## 2020-08-26 DIAGNOSIS — M25.50 ARTHRALGIA, UNSPECIFIED JOINT: Primary | ICD-10-CM

## 2020-08-26 DIAGNOSIS — E55.9 VITAMIN D DEFICIENCY: ICD-10-CM

## 2020-08-26 DIAGNOSIS — R20.2 PARESTHESIA: ICD-10-CM

## 2020-08-26 DIAGNOSIS — E01.0 THYROMEGALY: ICD-10-CM

## 2020-08-26 LAB
25(OH)D3 SERPL-MCNC: 30 NG/ML (ref 30–100)
ALBUMIN SERPL BCP-MCNC: 3.9 G/DL (ref 3.5–5)
ALP SERPL-CCNC: 74 U/L (ref 46–116)
ALT SERPL W P-5'-P-CCNC: 15 U/L (ref 12–78)
ANION GAP SERPL CALCULATED.3IONS-SCNC: 4 MMOL/L (ref 4–13)
AST SERPL W P-5'-P-CCNC: 15 U/L (ref 5–45)
BACTERIA UR QL AUTO: NORMAL /HPF
BASOPHILS # BLD AUTO: 0.05 THOUSANDS/ΜL (ref 0–0.1)
BASOPHILS NFR BLD AUTO: 1 % (ref 0–1)
BILIRUB SERPL-MCNC: 0.48 MG/DL (ref 0.2–1)
BILIRUB UR QL STRIP: NEGATIVE
BUN SERPL-MCNC: 8 MG/DL (ref 5–25)
CALCIUM SERPL-MCNC: 8.9 MG/DL (ref 8.3–10.1)
CHLORIDE SERPL-SCNC: 108 MMOL/L (ref 100–108)
CLARITY UR: CLEAR
CO2 SERPL-SCNC: 28 MMOL/L (ref 21–32)
COLOR UR: YELLOW
CREAT SERPL-MCNC: 0.75 MG/DL (ref 0.6–1.3)
EOSINOPHIL # BLD AUTO: 0.11 THOUSAND/ΜL (ref 0–0.61)
EOSINOPHIL NFR BLD AUTO: 2 % (ref 0–6)
ERYTHROCYTE [DISTWIDTH] IN BLOOD BY AUTOMATED COUNT: 12.3 % (ref 11.6–15.1)
ERYTHROCYTE [SEDIMENTATION RATE] IN BLOOD: 17 MM/HOUR (ref 0–19)
GFR SERPL CREATININE-BSD FRML MDRD: 115 ML/MIN/1.73SQ M
GLUCOSE P FAST SERPL-MCNC: 104 MG/DL (ref 65–99)
GLUCOSE UR STRIP-MCNC: NEGATIVE MG/DL
HCT VFR BLD AUTO: 42.7 % (ref 34.8–46.1)
HGB BLD-MCNC: 13.9 G/DL (ref 11.5–15.4)
HGB UR QL STRIP.AUTO: NEGATIVE
HYALINE CASTS #/AREA URNS LPF: NORMAL /LPF
IMM GRANULOCYTES # BLD AUTO: 0.02 THOUSAND/UL (ref 0–0.2)
IMM GRANULOCYTES NFR BLD AUTO: 0 % (ref 0–2)
KETONES UR STRIP-MCNC: NEGATIVE MG/DL
LEUKOCYTE ESTERASE UR QL STRIP: NEGATIVE
LYMPHOCYTES # BLD AUTO: 1.58 THOUSANDS/ΜL (ref 0.6–4.47)
LYMPHOCYTES NFR BLD AUTO: 31 % (ref 14–44)
MCH RBC QN AUTO: 30.7 PG (ref 26.8–34.3)
MCHC RBC AUTO-ENTMCNC: 32.6 G/DL (ref 31.4–37.4)
MCV RBC AUTO: 94 FL (ref 82–98)
MONOCYTES # BLD AUTO: 0.38 THOUSAND/ΜL (ref 0.17–1.22)
MONOCYTES NFR BLD AUTO: 7 % (ref 4–12)
NEUTROPHILS # BLD AUTO: 3.01 THOUSANDS/ΜL (ref 1.85–7.62)
NEUTS SEG NFR BLD AUTO: 59 % (ref 43–75)
NITRITE UR QL STRIP: NEGATIVE
NON-SQ EPI CELLS URNS QL MICRO: NORMAL /HPF
NRBC BLD AUTO-RTO: 0 /100 WBCS
PH UR STRIP.AUTO: 7 [PH]
PLATELET # BLD AUTO: 187 THOUSANDS/UL (ref 149–390)
PMV BLD AUTO: 12.6 FL (ref 8.9–12.7)
POTASSIUM SERPL-SCNC: 3.9 MMOL/L (ref 3.5–5.3)
PROT SERPL-MCNC: 7.6 G/DL (ref 6.4–8.2)
PROT UR STRIP-MCNC: NEGATIVE MG/DL
RBC # BLD AUTO: 4.53 MILLION/UL (ref 3.81–5.12)
RBC #/AREA URNS AUTO: NORMAL /HPF
SODIUM SERPL-SCNC: 140 MMOL/L (ref 136–145)
SP GR UR STRIP.AUTO: 1.01 (ref 1–1.03)
T4 FREE SERPL-MCNC: 1.05 NG/DL (ref 0.76–1.46)
TSH SERPL DL<=0.05 MIU/L-ACNC: 0.95 UIU/ML (ref 0.36–3.74)
UROBILINOGEN UR QL STRIP.AUTO: 0.2 E.U./DL
VIT B12 SERPL-MCNC: 395 PG/ML (ref 100–900)
WBC # BLD AUTO: 5.15 THOUSAND/UL (ref 4.31–10.16)
WBC #/AREA URNS AUTO: NORMAL /HPF

## 2020-08-26 PROCEDURE — 82306 VITAMIN D 25 HYDROXY: CPT

## 2020-08-26 PROCEDURE — 86430 RHEUMATOID FACTOR TEST QUAL: CPT

## 2020-08-26 PROCEDURE — 85652 RBC SED RATE AUTOMATED: CPT

## 2020-08-26 PROCEDURE — 84439 ASSAY OF FREE THYROXINE: CPT

## 2020-08-26 PROCEDURE — 86376 MICROSOMAL ANTIBODY EACH: CPT

## 2020-08-26 PROCEDURE — 81001 URINALYSIS AUTO W/SCOPE: CPT

## 2020-08-26 PROCEDURE — 80053 COMPREHEN METABOLIC PANEL: CPT

## 2020-08-26 PROCEDURE — 82607 VITAMIN B-12: CPT

## 2020-08-26 PROCEDURE — 85025 COMPLETE CBC W/AUTO DIFF WBC: CPT

## 2020-08-26 PROCEDURE — 86800 THYROGLOBULIN ANTIBODY: CPT

## 2020-08-26 PROCEDURE — 84443 ASSAY THYROID STIM HORMONE: CPT

## 2020-08-26 PROCEDURE — 86038 ANTINUCLEAR ANTIBODIES: CPT

## 2020-08-26 PROCEDURE — 36415 COLL VENOUS BLD VENIPUNCTURE: CPT

## 2020-08-26 PROCEDURE — 86618 LYME DISEASE ANTIBODY: CPT

## 2020-08-27 LAB
RHEUMATOID FACT SER QL LA: NEGATIVE
THYROGLOB AB SERPL-ACNC: 8.6 IU/ML (ref 0–0.9)
THYROPEROXIDASE AB SERPL-ACNC: 149 IU/ML (ref 0–34)

## 2020-08-28 LAB
B BURGDOR IGG+IGM SER-ACNC: <0.91 ISR (ref 0–0.9)
RYE IGE QN: NEGATIVE

## 2020-09-01 ENCOUNTER — OFFICE VISIT (OUTPATIENT)
Dept: PHYSICAL THERAPY | Facility: CLINIC | Age: 40
End: 2020-09-01
Payer: COMMERCIAL

## 2020-09-01 DIAGNOSIS — M54.2 NECK PAIN: Primary | ICD-10-CM

## 2020-09-01 PROCEDURE — 97110 THERAPEUTIC EXERCISES: CPT

## 2020-09-01 PROCEDURE — 97140 MANUAL THERAPY 1/> REGIONS: CPT

## 2020-09-01 NOTE — PROGRESS NOTES
Daily Note     Today's date: 2020  Patient name: Paula Gregory  : 1980  MRN: [id number]  Referring provider: Irving Honeycutt MD  Dx:   Encounter Diagnosis     ICD-10-CM    1  Neck pain  M54 2                   Subjective: Pt reports that she is "carrying a lot of stress " She is sore and it having trouble managing her pain  Objective: See treatment diary below      Assessment: Pt arrives to therapy 25 min late  Focused on manual therapy this date  Pt has no adverse affect to MHP prior to manual therapy  Tolerated treatment well  Patient demonstrated fatigue post treatment and would benefit from continued PT  Plan: Continue per plan of care  Initiate Therapeutic Ex NV       Precautions: None     Manuals       SOR  Performed  x      B/L UT Levator  Performed  x        MHP prior to manuals        10 min  x10 min      Neuro Re-Ed                                                                Ther Ex        UBE   120 rpm 5' fwd/ 5' retro       MTP/LTP        Cervical ROM all planes         Cervical Isometrics         Thoracic extension over chair         Chin Tucks         ITY         Tricep Ext        Bicep Curls                                                Ther Activity                        Gait Training                        Modalities        MHP   Cervical MHP x10 min, supine prior to manuals

## 2020-09-02 ENCOUNTER — OFFICE VISIT (OUTPATIENT)
Dept: PHYSICAL THERAPY | Facility: CLINIC | Age: 40
End: 2020-09-02
Payer: COMMERCIAL

## 2020-09-02 DIAGNOSIS — M54.2 NECK PAIN: Primary | ICD-10-CM

## 2020-09-02 PROCEDURE — 97140 MANUAL THERAPY 1/> REGIONS: CPT | Performed by: PHYSICAL THERAPIST

## 2020-09-02 PROCEDURE — 97110 THERAPEUTIC EXERCISES: CPT | Performed by: PHYSICAL THERAPIST

## 2020-09-02 NOTE — PROGRESS NOTES
Daily Note     Today's date: 2020  Patient name: Kristina Cheatham  : 1980  MRN: [id number]  Referring provider: Verito Hong MD  Dx:   Encounter Diagnosis     ICD-10-CM    1  Neck pain  M54 2                   Subjective: " My neck feels better, I am able to look down  "      Objective: See treatment diary below      Assessment: Tolerated treatment well  Patient exhibited good technique with therapeutic exercises and would benefit from continued PT      Plan: Continue per plan of care        Precautions: None     Manuals      SOR  Performed  x Performed      B/L UT Levator  Performed  x Performed        MHP prior to manuals        10 min  x10 min 10min     Neuro Re-Ed                                                                Ther Ex        UBE   120 rpm 5' fwd/ 5' retro  120 RPM 5' forward, 5' retro     MTP/LTP   Red 5''20x      Cervical ROM all planes    5''10x flexion/ extension, rotation      Cervical Isometrics         Thoracic extension over chair    5''20x      Chin Tucks    5''20x     ITY         Tricep Ext        Bicep Curls                                                Ther Activity                        Gait Training                        Modalities        MHP   Cervical MHP x10 min, supine prior to manuals  Patient defers

## 2020-09-10 ENCOUNTER — OFFICE VISIT (OUTPATIENT)
Dept: PHYSICAL THERAPY | Facility: CLINIC | Age: 40
End: 2020-09-10
Payer: COMMERCIAL

## 2020-09-10 DIAGNOSIS — M54.2 NECK PAIN: Primary | ICD-10-CM

## 2020-09-10 PROCEDURE — 97140 MANUAL THERAPY 1/> REGIONS: CPT

## 2020-09-10 PROCEDURE — 97110 THERAPEUTIC EXERCISES: CPT

## 2020-09-10 NOTE — PROGRESS NOTES
Daily Note     Today's date: 9/10/2020  Patient name: Angeles Dexter  : 1980  MRN: [id number]  Referring provider: Michael Jasso MD  Dx:   Encounter Diagnosis     ICD-10-CM    1  Neck pain  M54 2                   Subjective: Pt reports she feels therapy is helping, decreased neck pain  States she had cervical MRI earlier today  Objective: See treatment diary below      Assessment: Tolerated treatment well  Pt arrived late for PT session today  Patient exhibited good technique with therapeutic exercises and would benefit from continued PT      Plan: Continue per plan of care        Precautions: None     Manuals 8/24  9/1 9/2 9/10    SOR  Performed  x Performed  performed    B/L UT Levator  Performed  x Performed  performed      MHP prior to manuals        10 min  x10 min 10min 10 min    Neuro Re-Ed                                                                Ther Ex        UBE   120 rpm 5' fwd/ 5' retro  120 RPM 5' forward, 5' retro 120 rpm  5'fwd/  5'retro    MTP/LTP   Red 5''20x  Red 5" x20    Cervical ROM all planes    5''10x flexion/ extension, rotation  5" x10 ea  Flex/ext /rotation    Cervical Isometrics         Thoracic extension over chair    5''20x  5" x20    Chin Tucks    5''20x 5" x20    ITY         Tricep Ext        Bicep Curls                                                Ther Activity                        Gait Training                        Modalities        MHP   Cervical MHP x10 min, supine prior to manuals  Patient defers  defers

## 2020-09-11 ENCOUNTER — APPOINTMENT (OUTPATIENT)
Dept: PHYSICAL THERAPY | Facility: CLINIC | Age: 40
End: 2020-09-11
Payer: COMMERCIAL

## 2020-09-14 ENCOUNTER — APPOINTMENT (OUTPATIENT)
Dept: PHYSICAL THERAPY | Facility: CLINIC | Age: 40
End: 2020-09-14
Payer: COMMERCIAL

## 2020-09-17 ENCOUNTER — OFFICE VISIT (OUTPATIENT)
Dept: PHYSICAL THERAPY | Facility: CLINIC | Age: 40
End: 2020-09-17
Payer: COMMERCIAL

## 2020-09-17 DIAGNOSIS — M54.2 NECK PAIN: Primary | ICD-10-CM

## 2020-09-17 PROCEDURE — 97110 THERAPEUTIC EXERCISES: CPT

## 2020-09-17 PROCEDURE — 97140 MANUAL THERAPY 1/> REGIONS: CPT

## 2020-09-17 NOTE — PROGRESS NOTES
Daily Note     Today's date: 2020  Patient name: Aleja Ramos  : 1980  MRN: [id number]  Referring provider: Jamir Randhawa MD  Dx:   Encounter Diagnosis     ICD-10-CM    1  Neck pain  M54 2                   Subjective: Pt reports that she received an MRI earlier this week  She has a F/U with her MD later today to review the results  Objective: See treatment diary below      Assessment: Pt is having a decrease in neck pain after manuals  Tolerated treatment well  Pt has no exacerbation of symptoms this date  Patient demonstrated fatigue post treatment and would benefit from continued PT      Plan: Continue per plan of care        Precautions: None     Manuals 8/24  9/1 9/2 9/10 9/17   SOR  Performed  x Performed  performed perfomred   B/L UT Levator  Performed  x Performed  performed performed     MHP prior to manuals        10 min  x10 min 10min 10 min 10 min   Neuro Re-Ed                                                                Ther Ex        UBE   120 rpm 5' fwd/ 5' retro  120 RPM 5' forward, 5' retro 120 rpm  5'fwd/  5'retro 120 rpm  5'fwd/  5'retro   MTP/LTP   Red 5''20x  Red 5" x20 Red 5" x20   Cervical ROM all planes    5''10x flexion/ extension, rotation  5" x10 ea  Flex/ext /rotation 5" x10 ea  Flex/ext /rotation   Cervical Isometrics         Thoracic extension over chair    5''20x  5" x20 5" x20   Chin Tucks    5''20x 5" x20 5" x20   ITY         Tricep Ext        Bicep Curls                                                Ther Activity                        Gait Training                        Modalities        MHP   Cervical MHP x10 min, supine prior to manuals  Patient defers  defers Pt defers

## 2020-09-23 ENCOUNTER — EVALUATION (OUTPATIENT)
Dept: PHYSICAL THERAPY | Facility: CLINIC | Age: 40
End: 2020-09-23
Payer: COMMERCIAL

## 2020-09-23 DIAGNOSIS — M54.2 NECK PAIN: Primary | ICD-10-CM

## 2020-09-23 PROCEDURE — 97140 MANUAL THERAPY 1/> REGIONS: CPT | Performed by: PHYSICAL THERAPIST

## 2020-09-23 PROCEDURE — 97110 THERAPEUTIC EXERCISES: CPT | Performed by: PHYSICAL THERAPIST

## 2020-09-23 NOTE — PROGRESS NOTES
PT Progress Note      Today's date: 2020  Patient name: Ceasar Scott  : 1980  MRN: [id number]  Referring provider: Felicia Wilder MD  Dx:   Encounter Diagnosis     ICD-10-CM    1  Neck pain  M54 2                   Assessment  Assessment details: Ceasar Scott is a 35 y o  female who presents to outpatient PT with a  Neck pain  (primary encounter diagnosis)  No further referral appears necessary at this time based upon examination results  Pt presents with decreased strength, ROM, balance, functional activity tolerance, and pain with movement in her cervical spine,  which is  limiting her ability to perform the aforementioned functional activities  Etiologic factors include repetitive poor body mechanics  Prognosis is good given HEP compliance and PT 2/wk  Please contact me if you have any questions or recommendations  Thank you for the opportunity to share in  Park's care  RA      Parkmaximo Dang Aureliano has demonstrated decreased pain, increased strength, increased range of motion, and increased activity tolerance since starting physical therapy services  She reports an overall improvement of 80% thus far  She continues to present with pain, decreased strength, decreased range of motion, and decreased activity tolerance and would benefit from additional skilled physical therapy interventions to address impairments and maximize function  Impairments: abnormal muscle firing, abnormal muscle tone, abnormal or restricted ROM, abnormal movement, activity intolerance, impaired physical strength and poor posture   Functional limitations: Difficulty, return to work, recreational activites, lifting,ADL's, IADLS  Symptom irritability: moderateUnderstanding of Dx/Px/POC: good   Prognosis: good    Goals  STG to be achieved in 4 weeks     1  Pt will reduce subjective pain rating by at least 50 percent the help facilitate return to PLOF  Met   2   Pt will improve Cervical ROM by at least 10 degrees to help promote improved functional activity tolerance   Met   3  Pt will Meet predicted FOTO score, indicating improved functional activity tolerance  LTG to be achieved in 6-8 weeks   1  Pt will be complaint with HEP   Met   2  Pt will improve ROM to [facility], to help facilitate independence with ADL's, IADL's, and functional activities   Met   3  Pt will improve Strength to [facility] to help facilitate independence with ADL's, IADL's, and functional activities   Met   4  Pt will have no limitations with sleeping to help facilitate return to PLOF    Progressing   5  Pt will have no limitations with lifting a 10 Lb object to help facilitate return to [address] [place] [address]  Patient would benefit from: skilled physical therapy  Planned therapy interventions: ADL training, balance/weight bearing training, balance, flexibility, functional ROM exercises, gait training, graded activity, graded exercise, graded motor, home exercise program, joint mobilization, manual therapy, patient education, neuromuscular re-education, strengthening, stretching, therapeutic activities and therapeutic exercise  Frequency: 2x week  Plan of Care beginning date: 2020  Plan of Care expiration date: 2020  Treatment plan discussed with: patient, PTA and referring physician        Subjective Evaluation    History of Present Illness  Mechanism of injury: The patient is a 35year old female who presents to outpatient PT with a chief complaint of neck pain  She recently received an injection in her cervical spine, and reports that it helped with the neck pain  However, She now reports pain with active cervical flexion/extensio Pain is localized to the SO region  Reports occasional radicular sx into her B/L UE's  Reports occasional headaches, also reports radiating "Hot" pain across her upper thoracic area     Pain    RA    Current pain ratin  0  At best pain ratin  0  At worst pain ratin  0  Quality: tight  Relieving factors: relaxation, rest, support, ice, heat, change in position and medications  Progression: improved    Social Support    Employment status: not working (Works for [employer] )  [employer] dominance: right    Treatments  Previous treatment: physical therapy and injection treatment  Current treatment: physical therapy  Patient Goals  Patient goals for therapy: increased strength, decreased pain and increased motion  Patient goal: To feel better  Objective     Concurrent Complaints  Positive for disturbed sleep  Negative for night pain, dizziness, faints, headaches, nausea/motion sickness, tinnitus, trouble swallowing, difficulty breathing, shortness of breath, respiratory pain and visual change    Active Range of Motion   Cervical/Thoracic Spine       Cervical       RA 9/23   Flexion: 35 degrees    WFL    Extension: 55 degrees   WFL     Left lateral flexion: 30 degrees  WFL     Right lateral flexion: 30 degrees  WFL     Left rotation: 55 degrees  WFL  Right rotation: 65 degrees   WFL             Tests   Cervical   Negative vertical compression, lumbar distraction and Sharp-Nya test      Left   Negative Spurling's Test A  Right   Negative Spurling's Test A  Lumbar   Negative vertical compression     Neuro Exam:     Headaches   Patient reports headaches: No         Precautions: None     Manuals 9/23 9/1 9/2 9/10 9/17   SOR  Performed  x Performed  performed perfomred   B/L UT Levator  Performed  x Performed  performed performed     MHP prior to manuals        10 min  x10 min 10min 10 min 10 min   Neuro Re-Ed                                                                Ther Ex        UBE  120 RPM 5' forward 5' retro  120 rpm 5' fwd/ 5' retro  120 RPM 5' forward, 5' retro 120 rpm  5'fwd/  5'retro 120 rpm  5'fwd/  5'retro   MTP/LTP Green 5''20x  Red 5''20x  Red 5" x20 Red 5" x20   Cervical ROM all planes  5''10x flexion/extenison rotation   5''10x flexion/ extension, rotation  5" x10 ea  Flex/ext /rotation 5" x10 ea  Flex/ext /rotation   Cervical Isometrics         Thoracic extension over chair  5''20x  5''20x  5" x20 5" x20   Chin Tucks  5''20x  5''20x 5" x20 5" x20   ITY         Tricep Ext        Bicep Curls                                                Ther Activity                        Gait Training                        Modalities        MHP  Cervical MHP x10 min, supine prior to manuals  Cervical MHP x10 min, supine prior to manuals  Patient defers  defers Pt defers

## 2020-09-25 ENCOUNTER — OFFICE VISIT (OUTPATIENT)
Dept: OTOLARYNGOLOGY | Facility: CLINIC | Age: 40
End: 2020-09-25
Payer: COMMERCIAL

## 2020-09-25 VITALS
HEIGHT: 61 IN | OXYGEN SATURATION: 94 % | HEART RATE: 99 BPM | SYSTOLIC BLOOD PRESSURE: 124 MMHG | TEMPERATURE: 97.5 F | BODY MASS INDEX: 32.85 KG/M2 | DIASTOLIC BLOOD PRESSURE: 70 MMHG | WEIGHT: 174 LBS

## 2020-09-25 DIAGNOSIS — E04.1 THYROID NODULE: Primary | ICD-10-CM

## 2020-09-25 PROCEDURE — 99213 OFFICE O/P EST LOW 20 MIN: CPT | Performed by: OTOLARYNGOLOGY

## 2020-09-25 NOTE — PROGRESS NOTES
Liana Barajas is a 38 y  o female who presents for re-evaluation of thyroid nodule  She underwent ultrasound which demonstrated a left 3 5 cm thyroid nodule with benign findings and a right 1 2 cm thyroid nodule TI-RADS 4   Neither med criteria for biopsy  She denies any significant dysphagia  No neck masses  No weight loss  Past Medical History:   Diagnosis Date    Allergic     Asthma     Cervical disc disorder with radiculopathy of mid-cervical region 1/6/2020    Cervical radiculopathy 1/6/2020    GERD (gastroesophageal reflux disease)     Herniated disc, cervical     Pinched nerve in shoulder, right     Thyroid nodule        /70 (BP Location: Left arm, Cuff Size: Standard)   Pulse 99   Temp 97 5 °F (36 4 °C) (Tympanic)   Ht 5' 1" (1 549 m)   Wt 78 9 kg (174 lb)   SpO2 94%   BMI 32 88 kg/m²       Physical Exam   Constitutional: Oriented to person, place, and time  Well-developed and well-nourished, no apparent distress, non-toxic appearance  Cooperative, able to hear and answer questions without difficulty  Voice: Normal voice quality  Head: Normocephalic, atraumatic  No scars, masses or lesions  Face: Symmetric, no edema, no sinus tenderness  Eyes: Vision grossly intact, extra-ocular movement intact  Ears: External ear normal   Bilateral tympanic membranes are intact with intact normal landmarks  No post-auricular erythema or tenderness  Nose: Septum midline, nares clear  Mucosa moist, turbinates well appearing  No crusting, polyps or discharge evident  Oral cavity: Dentition intact  Mucosa moist, lips normal   Tongue mobile, floor of mouth normal   Hard palate unremarkable  No masses or lesions  Oropharynx: Uvula is midline, soft palate normal   Unremarkable oropharyngeal inlet  Tonsils unremarkable  Posterior pharyngeal wall clear  No masses or lesions  Salivary glands:  Parotid glands and submandibular glands symmetric, no enlargement or tenderness    Neck: Normal laryngeal elevation with swallow  Trachea midline  No masses or lesions  No palpable adenopathy  Thyroid: Left palpable thyroid nodule approximately 3 cm  Pulmonary/Chest: Normal effort and rate  No respiratory distress  Musculoskeletal: Normal range of motion  Neurological: Cranial nerves 2-12 intact  Skin: Skin is warm and dry  Psychiatric: Normal mood and affect  A/P: thyroid nodule: TSH and T4 were well within normal ranges  No need for biopsy at this time  Will see her back in 1 year with repeat ultrasound

## 2020-09-29 ENCOUNTER — APPOINTMENT (OUTPATIENT)
Dept: PHYSICAL THERAPY | Facility: CLINIC | Age: 40
End: 2020-09-29
Payer: COMMERCIAL

## 2020-10-01 ENCOUNTER — OFFICE VISIT (OUTPATIENT)
Dept: PHYSICAL THERAPY | Facility: CLINIC | Age: 40
End: 2020-10-01
Payer: COMMERCIAL

## 2020-10-01 DIAGNOSIS — M54.2 NECK PAIN: Primary | ICD-10-CM

## 2020-10-01 PROCEDURE — 97140 MANUAL THERAPY 1/> REGIONS: CPT | Performed by: PHYSICAL THERAPIST

## 2020-10-01 PROCEDURE — 97750 PHYSICAL PERFORMANCE TEST: CPT | Performed by: PHYSICAL THERAPIST

## 2020-10-01 PROCEDURE — 97110 THERAPEUTIC EXERCISES: CPT | Performed by: PHYSICAL THERAPIST

## 2020-10-05 ENCOUNTER — APPOINTMENT (OUTPATIENT)
Dept: PHYSICAL THERAPY | Facility: CLINIC | Age: 40
End: 2020-10-05
Payer: COMMERCIAL

## 2020-10-08 ENCOUNTER — OFFICE VISIT (OUTPATIENT)
Dept: PHYSICAL THERAPY | Facility: CLINIC | Age: 40
End: 2020-10-08
Payer: COMMERCIAL

## 2020-10-08 DIAGNOSIS — M54.2 NECK PAIN: Primary | ICD-10-CM

## 2020-10-08 PROCEDURE — 97110 THERAPEUTIC EXERCISES: CPT | Performed by: PHYSICAL THERAPIST

## 2020-10-08 PROCEDURE — 97140 MANUAL THERAPY 1/> REGIONS: CPT | Performed by: PHYSICAL THERAPIST

## 2020-10-16 ENCOUNTER — HOSPITAL ENCOUNTER (EMERGENCY)
Facility: HOSPITAL | Age: 40
Discharge: HOME | End: 2020-10-16
Attending: EMERGENCY MEDICINE | Admitting: EMERGENCY MEDICINE
Payer: COMMERCIAL

## 2020-10-16 VITALS
TEMPERATURE: 97.1 F | WEIGHT: 170 LBS | HEIGHT: 61 IN | OXYGEN SATURATION: 100 % | SYSTOLIC BLOOD PRESSURE: 150 MMHG | HEART RATE: 90 BPM | DIASTOLIC BLOOD PRESSURE: 87 MMHG | BODY MASS INDEX: 32.1 KG/M2 | RESPIRATION RATE: 16 BRPM

## 2020-10-16 DIAGNOSIS — R20.2 FACIAL TINGLING: Primary | ICD-10-CM

## 2020-10-16 DIAGNOSIS — F41.9 ANXIETY: ICD-10-CM

## 2020-10-16 PROCEDURE — 99283 EMERGENCY DEPT VISIT LOW MDM: CPT

## 2020-10-16 PROCEDURE — 99284 EMERGENCY DEPT VISIT MOD MDM: CPT | Performed by: PHYSICIAN ASSISTANT

## 2020-10-16 RX ORDER — HYDROXYZINE HYDROCHLORIDE 25 MG/1
25 TABLET, FILM COATED ORAL EVERY 6 HOURS
Qty: 12 TABLET | Refills: 0 | Status: SHIPPED | OUTPATIENT
Start: 2020-10-16

## 2020-11-30 ENCOUNTER — OFFICE VISIT (OUTPATIENT)
Dept: URGENT CARE | Facility: CLINIC | Age: 40
End: 2020-11-30
Payer: COMMERCIAL

## 2020-11-30 VITALS
HEIGHT: 61 IN | DIASTOLIC BLOOD PRESSURE: 80 MMHG | WEIGHT: 170 LBS | TEMPERATURE: 97.8 F | SYSTOLIC BLOOD PRESSURE: 128 MMHG | HEART RATE: 68 BPM | RESPIRATION RATE: 18 BRPM | BODY MASS INDEX: 32.1 KG/M2 | OXYGEN SATURATION: 99 %

## 2020-11-30 DIAGNOSIS — K08.89 PAIN, DENTAL: Primary | ICD-10-CM

## 2020-11-30 PROCEDURE — S9088 SERVICES PROVIDED IN URGENT: HCPCS | Performed by: NURSE PRACTITIONER

## 2020-11-30 PROCEDURE — 99215 OFFICE O/P EST HI 40 MIN: CPT | Performed by: NURSE PRACTITIONER

## 2020-11-30 RX ORDER — NAPROXEN 500 MG/1
500 TABLET ORAL 2 TIMES DAILY WITH MEALS
Qty: 30 TABLET | Refills: 0 | Status: SHIPPED | OUTPATIENT
Start: 2020-11-30 | End: 2021-08-13

## 2020-11-30 RX ORDER — AMOXICILLIN 500 MG/1
500 CAPSULE ORAL EVERY 8 HOURS SCHEDULED
Qty: 30 CAPSULE | Refills: 0 | Status: SHIPPED | OUTPATIENT
Start: 2020-11-30 | End: 2020-12-10

## 2021-01-18 ENCOUNTER — ANNUAL EXAM (OUTPATIENT)
Dept: OBGYN CLINIC | Facility: MEDICAL CENTER | Age: 41
End: 2021-01-18
Payer: COMMERCIAL

## 2021-01-18 VITALS
SYSTOLIC BLOOD PRESSURE: 110 MMHG | BODY MASS INDEX: 32.28 KG/M2 | WEIGHT: 171 LBS | DIASTOLIC BLOOD PRESSURE: 82 MMHG | HEIGHT: 61 IN

## 2021-01-18 DIAGNOSIS — Z12.31 ENCOUNTER FOR SCREENING MAMMOGRAM FOR MALIGNANT NEOPLASM OF BREAST: ICD-10-CM

## 2021-01-18 DIAGNOSIS — Z01.419 ENCNTR FOR GYN EXAM (GENERAL) (ROUTINE) W/O ABN FINDINGS: Primary | ICD-10-CM

## 2021-01-18 PROCEDURE — 99396 PREV VISIT EST AGE 40-64: CPT | Performed by: NURSE PRACTITIONER

## 2021-01-18 NOTE — PROGRESS NOTES
ASSESSMENT & PLAN: Meghan Vann is a 38 y o  [id number] with normal gynecologic exam     1   Routine well woman exam done today  2  Pap and HPV:  The patient's last pap and hpv was 2018  It was normal     Pap and cotesting was not done today  Current ASCCP Guidelines reviewed  3   Mammogram ordered  4  The following were reviewed in today's visit: breast self exam, mammography screening ordered, adequate intake of calcium and vitamin D, exercise and healthy diet  5  rto yearly gyn exam plans to call prior ob/gyn in [facility] to see what iud she has and when it is due for removal     CC:  Annual Gynecologic Examination    HPI: Meghan Vann is a 38 y o  [id number] who presents for annual gynecologic examination  She has the following concerns: none    Health Maintenance:    She wears her seatbelt routinely  She does perform regular monthly self breast exams  She feels safe at home  Patient Active Problem List   Diagnosis    Acquired spondylolisthesis of cervical vertebra    Neck pain    Thyroid nodule    GERD with esophagitis    Herniated cervical disc    GERD (gastroesophageal reflux disease)    Epigastric abdominal pain    Cervical radiculopathy       Past Medical History:   Diagnosis Date    Allergic     Asthma     Cervical disc disorder with radiculopathy of mid-cervical region 2020    Cervical radiculopathy 2020    GERD (gastroesophageal reflux disease)     Herniated disc, cervical     Pinched nerve in shoulder, right     Thyroid nodule        Past Surgical History:   Procedure Laterality Date     SECTION      EPIDURAL BLOCK INJECTION N/A 3/4/2020    Procedure: C7-T1 Interlaminar Epidural Steroid Injection (87482);   Surgeon: Jimi Collazo MD;  [facility];  Service: Pain Management     EPIDURAL BLOCK INJECTION N/A 2020    Procedure: C7-T1 interlaminar epidural steroid injection;  Surgeon: Jimi Collazo MD;  [facility]: [facility]; Service: Pain Management     FL GUIDED NEEDLE PLAC BX/ASP/INJ  3/4/2020    FL GUIDED NEEDLE PLAC BX/ASP/INJ  2020       Past OB/Gyn History:  OB History        2    Para   2    Term   2            AB        Living   2       SAB        TAB        Ectopic        Multiple        Live Births   2                  Pt does not have menstrual issues     History of sexually transmitted infection: No   History of abnormal pap smears: No      Patient is currently sexually active  heterosexual  The current method of family planning is IUD  Family History   Problem Relation Age of Onset    Diabetes Mother     Heart disease Mother     Bone cancer Father     Diabetes Brother     No Known Problems Daughter     No Known Problems Maternal Grandmother     No Known Problems Paternal Grandmother     No Known Problems Maternal Aunt     No Known Problems Paternal Aunt     No Known Problems Paternal Aunt        Social History:  Social History     Socioeconomic History    Marital status: Single     Spouse name: Not on file    Number of children: 2    Years of education: Not on file    Highest education level: Not on file   Occupational History     Comment: currently not working / FMLA   Social Needs    Financial resource strain: Not on file    Food insecurity     Worry: Not on file     Inability: Not on file   [employer] needs     Medical: Not on file     Non-medical: Not on file   Tobacco Use    Smoking status: Current Every Day Smoker     Packs/day: 1 00     Types: Cigarettes    Smokeless tobacco: Never Used   Substance and Sexual Activity    Alcohol use: No    Drug use: Not Currently     Types: Marijuana    Sexual activity: Yes     Partners: Male     Birth control/protection: I U D     Lifestyle    Physical activity     Days per week: Not on file     Minutes per session: Not on file    Stress: Not on file   Relationships    Social connections     Talks on phone: Not on file     Gets together: Not on file     Attends Buddhism service: Not on file     Active member of club or organization: Not on file     Attends meetings of clubs or organizations: Not on file     Relationship status: Not on file    Intimate partner violence     Fear of current or ex partner: Not on file     Emotionally abused: Not on file     Physically abused: Not on file     Forced sexual activity: Not on file   Other Topics Concern    Not on file   Social History Narrative    Not on file     Presently lives with her children and her elderly mother     Patient is single    Patient is currently unemployed   Allergies   Allergen Reactions    No Known Allergies      Other reaction(s): Unknown       Current Outpatient Medications:     albuterol (PROAIR HFA) 90 mcg/act inhaler, Inhale 2 puffs every 6 (six) hours as needed for wheezing, Disp: 8 5 g, Rfl: 0    celecoxib (CeleBREX) 200 mg capsule, Take 200 mg by mouth 2 (two) times a day as needed, Disp: , Rfl:     famotidine (PEPCID) 40 MG tablet, Take 1 tablet (40 mg total) by mouth daily at bedtime, Disp: 30 tablet, Rfl: 3    Levonorgestrel 19 5 MCG/DAY IUD, 1 each by Intrauterine route, Disp: , Rfl:     methocarbamol (ROBAXIN) 500 mg tablet, Take 1 tablet (500 mg total) by mouth 2 (two) times a day as needed for muscle spasms, Disp: 20 tablet, Rfl: 0    naproxen (NAPROSYN) 500 mg tablet, Take 1 tablet (500 mg total) by mouth 2 (two) times a day with meals, Disp: 30 tablet, Rfl: 0    pantoprazole (PROTONIX) 40 mg tablet, Take 40 mg by mouth daily, Disp: , Rfl:     diclofenac sodium (VOLTAREN) 1 %, Apply 1 application topically 4 (four) times a day as needed, Disp: , Rfl:     hydrOXYzine HCL (ATARAX) 25 mg tablet, Take 1 tablet (25 mg total) by mouth every 6 (six) hours, Disp: 12 tablet, Rfl: 0    Review of Systems:    Review of Systems  Constitutional :no fever, feels well, no tiredness, no recent weight gain or loss  ENT: no ear ache, no loss of hearing, no nosebleeds or nasal discharge, no sore throat or hoarseness  Cardiovascular: no complaints of slow or fast heart beat, no chest pain, no palpitations, no leg claudication or lower extremity edema  Respiratory: no complaints of shortness of shortness of breath, no PARNELL  Breasts:no complaints of breast pain, breast lump, or nipple discharge  Gastrointestinal: no complaints of abdominal pain, constipation, nausea, vomiting, or diarrhea or bloody stools  Genitourinary : no complaints of dysuria, incontinence, pelvic pain, no dysmenorrhea, vaginal discharge or abnormal vaginal bleeding and as noted in HPI  Musculoskeletal: no complaints of arthralgia, no myalgia, no joint swelling or stiffness, no limb pain or swelling  Integumentary: no complaints of skin rash or lesion, itching or dry skin  Neurological: no complaints of headache, no confusion, no numbness or tingling, no dizziness or fainting    Objective      /82   Ht 5' 1" (1 549 m)   Wt 77 6 kg (171 lb)   LMP 01/04/2021 (Approximate)   BMI 32 31 kg/m²     General:   appears stated age, cooperative, alert normal mood and affect   Neck: normal, supple,trachea midline, no masses   Heart: regular rate and rhythm, S1, S2 normal, no murmur, click, rub or gallop   Lungs: clear to auscultation bilaterally   Breasts: normal appearance, no masses or tenderness   Abdomen: soft, non-tender, without masses or organomegaly   Vulva: normal   Vagina: normal vagina   Urethra: normal   Cervix: Normal, no discharge  Uterus: normal size, contour, position, consistency, mobility, non-tender   Adnexa: no mass, fullness, tenderness   Lymphatic palpation of lymph nodes in neck, axilla, groin and/or other locations: no lymphadenopathy or masses noted   Skin normal skin turgor and no rashes     Psychiatric orientation to person, place, and time: normal  mood and affect: normal

## 2021-03-05 ENCOUNTER — TRANSCRIBE ORDERS (OUTPATIENT)
Dept: ADMINISTRATIVE | Facility: HOSPITAL | Age: 41
End: 2021-03-05

## 2021-03-05 DIAGNOSIS — G51.0 FACIAL PARALYSIS: Primary | ICD-10-CM

## 2021-03-05 DIAGNOSIS — R20.2 PARESTHESIA OF SKIN: ICD-10-CM

## 2021-03-10 ENCOUNTER — TELEPHONE (OUTPATIENT)
Dept: GASTROENTEROLOGY | Facility: CLINIC | Age: 41
End: 2021-03-10

## 2021-03-12 NOTE — TELEPHONE ENCOUNTER
Hi Dr Glasgow,    Is it ok to schedule procedure or are you needing to see pt in the office? Pt no showed procedure on 3/17/2020  Please Advise  Thanks!

## 2021-03-15 ENCOUNTER — PREP FOR PROCEDURE (OUTPATIENT)
Dept: GASTROENTEROLOGY | Facility: CLINIC | Age: 41
End: 2021-03-15

## 2021-03-15 DIAGNOSIS — R10.13 EPIGASTRIC ABDOMINAL PAIN: Primary | ICD-10-CM

## 2021-03-15 NOTE — TELEPHONE ENCOUNTER
EGD scheduled on 5/10/21 with Dr Glasgow at [facility]  I gave pt verbal instructions/mailed

## 2021-03-16 ENCOUNTER — HOSPITAL ENCOUNTER (OUTPATIENT)
Dept: MAMMOGRAPHY | Facility: HOSPITAL | Age: 41
Discharge: HOME | End: 2021-03-16
Payer: COMMERCIAL

## 2021-03-16 VITALS — HEIGHT: 61 IN | BODY MASS INDEX: 32.28 KG/M2 | WEIGHT: 171 LBS

## 2021-03-16 DIAGNOSIS — Z12.31 ENCOUNTER FOR SCREENING MAMMOGRAM FOR MALIGNANT NEOPLASM OF BREAST: ICD-10-CM

## 2021-03-16 PROCEDURE — 77067 SCR MAMMO BI INCL CAD: CPT

## 2021-03-16 PROCEDURE — 77063 BREAST TOMOSYNTHESIS BI: CPT

## 2021-03-17 ENCOUNTER — HOSPITAL ENCOUNTER (OUTPATIENT)
Dept: NON INVASIVE DIAGNOSTICS | Facility: HOSPITAL | Age: 41
Discharge: HOME | End: 2021-03-17
Payer: COMMERCIAL

## 2021-03-17 ENCOUNTER — HOSPITAL ENCOUNTER (OUTPATIENT)
Dept: MRI IMAGING | Facility: HOSPITAL | Age: 41
Discharge: HOME | End: 2021-03-17
Payer: COMMERCIAL

## 2021-03-17 DIAGNOSIS — R20.2 PARESTHESIA OF SKIN: ICD-10-CM

## 2021-03-17 PROCEDURE — G1004 CDSM NDSC: HCPCS

## 2021-03-17 PROCEDURE — 93880 EXTRACRANIAL BILAT STUDY: CPT

## 2021-03-17 PROCEDURE — 70551 MRI BRAIN STEM W/O DYE: CPT

## 2021-03-17 PROCEDURE — 93880 EXTRACRANIAL BILAT STUDY: CPT | Performed by: SURGERY

## 2021-03-19 ENCOUNTER — HOSPITAL ENCOUNTER (OUTPATIENT)
Dept: MAMMOGRAPHY | Facility: HOSPITAL | Age: 41
Discharge: HOME | End: 2021-03-19
Payer: COMMERCIAL

## 2021-03-19 ENCOUNTER — HOSPITAL ENCOUNTER (OUTPATIENT)
Dept: ULTRASOUND IMAGING | Facility: HOSPITAL | Age: 41
Discharge: HOME | End: 2021-03-19
Payer: COMMERCIAL

## 2021-03-19 DIAGNOSIS — R92.8 ABNORMAL MAMMOGRAM: ICD-10-CM

## 2021-03-19 PROCEDURE — 77065 DX MAMMO INCL CAD UNI: CPT

## 2021-03-19 PROCEDURE — 76642 ULTRASOUND BREAST LIMITED: CPT

## 2021-03-19 PROCEDURE — G0279 TOMOSYNTHESIS, MAMMO: HCPCS

## 2021-04-20 DIAGNOSIS — F41.9 ANXIETY: ICD-10-CM

## 2021-04-26 ENCOUNTER — TRANSCRIBE ORDERS (OUTPATIENT)
Dept: LAB | Facility: CLINIC | Age: 41
End: 2021-04-26

## 2021-04-26 ENCOUNTER — APPOINTMENT (OUTPATIENT)
Dept: LAB | Facility: CLINIC | Age: 41
End: 2021-04-26
Payer: COMMERCIAL

## 2021-04-26 DIAGNOSIS — K21.00 GASTROESOPHAGEAL REFLUX DISEASE WITH ESOPHAGITIS, UNSPECIFIED WHETHER HEMORRHAGE: Primary | ICD-10-CM

## 2021-04-26 DIAGNOSIS — E55.9 VITAMIN D DEFICIENCY: ICD-10-CM

## 2021-04-26 DIAGNOSIS — E04.1 THYROID NODULE: ICD-10-CM

## 2021-04-26 DIAGNOSIS — E55.9 VITAMIN D DEFICIENCY DISEASE: ICD-10-CM

## 2021-04-26 DIAGNOSIS — M25.50 ARTHRALGIA, UNSPECIFIED JOINT: ICD-10-CM

## 2021-04-26 LAB
25(OH)D3 SERPL-MCNC: 10.3 NG/ML (ref 30–100)
ALBUMIN SERPL BCP-MCNC: 3.8 G/DL (ref 3.5–5)
ALP SERPL-CCNC: 79 U/L (ref 46–116)
ALT SERPL W P-5'-P-CCNC: 17 U/L (ref 12–78)
ANION GAP SERPL CALCULATED.3IONS-SCNC: 7 MMOL/L (ref 4–13)
AST SERPL W P-5'-P-CCNC: 9 U/L (ref 5–45)
BACTERIA UR QL AUTO: ABNORMAL /HPF
BASOPHILS # BLD AUTO: 0.04 THOUSANDS/ΜL (ref 0–0.1)
BASOPHILS NFR BLD AUTO: 1 % (ref 0–1)
BILIRUB SERPL-MCNC: 0.28 MG/DL (ref 0.2–1)
BILIRUB UR QL STRIP: NEGATIVE
BUN SERPL-MCNC: 11 MG/DL (ref 5–25)
CALCIUM SERPL-MCNC: 9 MG/DL (ref 8.3–10.1)
CAOX CRY URNS QL MICRO: ABNORMAL /HPF
CHLORIDE SERPL-SCNC: 107 MMOL/L (ref 100–108)
CLARITY UR: CLEAR
CO2 SERPL-SCNC: 27 MMOL/L (ref 21–32)
COLOR UR: YELLOW
CREAT SERPL-MCNC: 0.79 MG/DL (ref 0.6–1.3)
EOSINOPHIL # BLD AUTO: 0.16 THOUSAND/ΜL (ref 0–0.61)
EOSINOPHIL NFR BLD AUTO: 3 % (ref 0–6)
ERYTHROCYTE [DISTWIDTH] IN BLOOD BY AUTOMATED COUNT: 13.1 % (ref 11.6–15.1)
ERYTHROCYTE [SEDIMENTATION RATE] IN BLOOD: 20 MM/HOUR (ref 0–19)
GFR SERPL CREATININE-BSD FRML MDRD: 108 ML/MIN/1.73SQ M
GLUCOSE P FAST SERPL-MCNC: 107 MG/DL (ref 65–99)
GLUCOSE UR STRIP-MCNC: NEGATIVE MG/DL
HCT VFR BLD AUTO: 42.7 % (ref 34.8–46.1)
HGB BLD-MCNC: 13.3 G/DL (ref 11.5–15.4)
HGB UR QL STRIP.AUTO: NEGATIVE
IMM GRANULOCYTES # BLD AUTO: 0.01 THOUSAND/UL (ref 0–0.2)
IMM GRANULOCYTES NFR BLD AUTO: 0 % (ref 0–2)
KETONES UR STRIP-MCNC: NEGATIVE MG/DL
LEUKOCYTE ESTERASE UR QL STRIP: ABNORMAL
LYMPHOCYTES # BLD AUTO: 1.67 THOUSANDS/ΜL (ref 0.6–4.47)
LYMPHOCYTES NFR BLD AUTO: 29 % (ref 14–44)
MCH RBC QN AUTO: 29 PG (ref 26.8–34.3)
MCHC RBC AUTO-ENTMCNC: 31.1 G/DL (ref 31.4–37.4)
MCV RBC AUTO: 93 FL (ref 82–98)
MONOCYTES # BLD AUTO: 0.38 THOUSAND/ΜL (ref 0.17–1.22)
MONOCYTES NFR BLD AUTO: 7 % (ref 4–12)
NEUTROPHILS # BLD AUTO: 3.49 THOUSANDS/ΜL (ref 1.85–7.62)
NEUTS SEG NFR BLD AUTO: 60 % (ref 43–75)
NITRITE UR QL STRIP: NEGATIVE
NON-SQ EPI CELLS URNS QL MICRO: ABNORMAL /HPF
NRBC BLD AUTO-RTO: 0 /100 WBCS
PH UR STRIP.AUTO: 5.5 [PH]
PLATELET # BLD AUTO: 200 THOUSANDS/UL (ref 149–390)
PMV BLD AUTO: 12.4 FL (ref 8.9–12.7)
POTASSIUM SERPL-SCNC: 4.3 MMOL/L (ref 3.5–5.3)
PROT SERPL-MCNC: 7.3 G/DL (ref 6.4–8.2)
PROT UR STRIP-MCNC: NEGATIVE MG/DL
RBC # BLD AUTO: 4.58 MILLION/UL (ref 3.81–5.12)
RBC #/AREA URNS AUTO: ABNORMAL /HPF
SODIUM SERPL-SCNC: 141 MMOL/L (ref 136–145)
SP GR UR STRIP.AUTO: 1.02 (ref 1–1.03)
TSH SERPL DL<=0.05 MIU/L-ACNC: 4.15 UIU/ML (ref 0.36–3.74)
UROBILINOGEN UR QL STRIP.AUTO: 0.2 E.U./DL
WBC # BLD AUTO: 5.75 THOUSAND/UL (ref 4.31–10.16)
WBC #/AREA URNS AUTO: ABNORMAL /HPF

## 2021-04-26 PROCEDURE — 82306 VITAMIN D 25 HYDROXY: CPT

## 2021-04-26 PROCEDURE — 36415 COLL VENOUS BLD VENIPUNCTURE: CPT

## 2021-04-26 PROCEDURE — 85025 COMPLETE CBC W/AUTO DIFF WBC: CPT

## 2021-04-26 PROCEDURE — 80053 COMPREHEN METABOLIC PANEL: CPT

## 2021-04-26 PROCEDURE — 84443 ASSAY THYROID STIM HORMONE: CPT

## 2021-04-26 PROCEDURE — 86430 RHEUMATOID FACTOR TEST QUAL: CPT

## 2021-04-26 PROCEDURE — 81001 URINALYSIS AUTO W/SCOPE: CPT

## 2021-04-26 PROCEDURE — 85652 RBC SED RATE AUTOMATED: CPT

## 2021-04-27 LAB — RHEUMATOID FACT SER QL LA: NEGATIVE

## 2021-04-27 RX ORDER — HYDROXYZINE HYDROCHLORIDE 25 MG/1
TABLET, FILM COATED ORAL
Qty: 60 TABLET | OUTPATIENT
Start: 2021-04-27

## 2021-05-10 ENCOUNTER — ANESTHESIA EVENT (OUTPATIENT)
Dept: GASTROENTEROLOGY | Facility: HOSPITAL | Age: 41
End: 2021-05-10

## 2021-05-10 ENCOUNTER — ANESTHESIA (OUTPATIENT)
Dept: GASTROENTEROLOGY | Facility: HOSPITAL | Age: 41
End: 2021-05-10

## 2021-05-10 ENCOUNTER — HOSPITAL ENCOUNTER (OUTPATIENT)
Dept: GASTROENTEROLOGY | Facility: HOSPITAL | Age: 41
Setting detail: OUTPATIENT SURGERY
Discharge: HOME | End: 2021-05-10
Attending: INTERNAL MEDICINE | Admitting: INTERNAL MEDICINE
Payer: COMMERCIAL

## 2021-05-10 VITALS
RESPIRATION RATE: 16 BRPM | SYSTOLIC BLOOD PRESSURE: 117 MMHG | DIASTOLIC BLOOD PRESSURE: 74 MMHG | TEMPERATURE: 97.6 F | OXYGEN SATURATION: 96 % | HEART RATE: 97 BPM | WEIGHT: 175 LBS | HEIGHT: 61 IN | BODY MASS INDEX: 33.04 KG/M2

## 2021-05-10 DIAGNOSIS — R10.13 EPIGASTRIC ABDOMINAL PAIN: ICD-10-CM

## 2021-05-10 LAB
EXT PREGNANCY TEST URINE: NEGATIVE
EXT. CONTROL: NORMAL

## 2021-05-10 PROCEDURE — 81025 URINE PREGNANCY TEST: CPT | Performed by: STUDENT IN AN ORGANIZED HEALTH CARE EDUCATION/TRAINING PROGRAM

## 2021-05-10 PROCEDURE — 43239 EGD BIOPSY SINGLE/MULTIPLE: CPT | Performed by: INTERNAL MEDICINE

## 2021-05-10 PROCEDURE — 88313 SPECIAL STAINS GROUP 2: CPT | Performed by: PATHOLOGY

## 2021-05-10 PROCEDURE — 88305 TISSUE EXAM BY PATHOLOGIST: CPT | Performed by: PATHOLOGY

## 2021-05-10 RX ORDER — SODIUM CHLORIDE, SODIUM LACTATE, POTASSIUM CHLORIDE, CALCIUM CHLORIDE 600; 310; 30; 20 MG/100ML; MG/100ML; MG/100ML; MG/100ML
100 INJECTION, SOLUTION INTRAVENOUS CONTINUOUS
Status: DISCONTINUED | OUTPATIENT
Start: 2021-05-10 | End: 2021-05-14 | Stop reason: HOSPADM

## 2021-05-10 RX ORDER — PROPOFOL 10 MG/ML
INJECTION, EMULSION INTRAVENOUS AS NEEDED
Status: DISCONTINUED | OUTPATIENT
Start: 2021-05-10 | End: 2021-05-10

## 2021-05-10 RX ADMIN — PROPOFOL 30 MG: 10 INJECTION, EMULSION INTRAVENOUS at 11:17

## 2021-05-10 RX ADMIN — LIDOCAINE HYDROCHLORIDE 15 ML: 20 SOLUTION ORAL; TOPICAL at 11:09

## 2021-05-10 RX ADMIN — PROPOFOL 50 MG: 10 INJECTION, EMULSION INTRAVENOUS at 11:11

## 2021-05-10 RX ADMIN — SODIUM CHLORIDE, SODIUM LACTATE, POTASSIUM CHLORIDE, AND CALCIUM CHLORIDE 100 ML/HR: .6; .31; .03; .02 INJECTION, SOLUTION INTRAVENOUS at 10:01

## 2021-05-10 RX ADMIN — PROPOFOL 50 MG: 10 INJECTION, EMULSION INTRAVENOUS at 11:14

## 2021-05-10 RX ADMIN — PROPOFOL 100 MG: 10 INJECTION, EMULSION INTRAVENOUS at 11:09

## 2021-05-10 NOTE — ANESTHESIA PREPROCEDURE EVALUATION
Medical History    History Comments   Allergic    Asthma    Herniated disc, cervical    Pinched nerve in shoulder, right    Thyroid nodule    Cervical radiculopathy    Cervical disc disorder with radiculopathy of mid-cervical region    GERD (gastroesophageal reflux disease)      Procedure:  EGD    Relevant Problems   ANESTHESIA (within normal limits)      GI/HEPATIC   (+) GERD (gastroesophageal reflux disease)        Physical Exam    Airway    Mallampati score: II  TM Distance: >3 FB  Neck ROM: limited     Dental   No notable dental hx     Cardiovascular      Pulmonary      Other Findings        Anesthesia Plan  ASA Score- 2     Anesthesia Type- IV sedation with anesthesia with ASA Monitors  Additional Monitors:   Airway Plan:     Comment: Per patient, appropriately NPO, denies active CP/SOB/wheezing/symptoms related to heartburn/nausea/vomiting  Plan Factors-Exercise tolerance (METS): >4 METS  Chart reviewed  Patient summary reviewed  Patient is a current smoker  Patient instructed to abstain from smoking on day of procedure  Patient did not smoke on day of surgery  Induction- intravenous  Postoperative Plan-     Informed Consent- Anesthetic plan and risks discussed with patient  I personally reviewed this patient with the CRNA  Discussed and agreed on the Anesthesia Plan with the CRNA  Cristina Osborne

## 2021-05-10 NOTE — H&P
History and Physical - [facility]  Park Carpenter 38 y o  female MRN: [id number]                  HPI: Kathy Florence is a 38y o  year old female who presents for EGD  She has not been seen since 2020  Patient reported that time she was having a lot of bloating  She was also experience epigastric abdominal pain  There for added famotidine 40 mg in the evening  She states that was told by her primary care doctor not to take this combination  He does not have much lot of heartburn indigestion however she does complain a lot of upper abdominal bloating  This happens with fried foods and dairy products  May last until she has a bowel movement  Bowel habits are very regular, denies any change in bowel pattern  Denies any constipation diarrhea bleeding or melena  She does take NSAIDs alternating with Tylenol  She may take 100 mg of Motrin a couple times a week  In reviewing her office note from February does not appear that she is experiencing the amount of pain in her abdomen that she had back then  There is no family history colon cancer colon polyps  Does smoke 1/2 to 1 full packet 3 per day  She does not drink alcohol  2020 had ultrasound abdomen was unremarkable  REVIEW OF SYSTEMS: Per the HPI, and otherwise unremarkable  Historical Information   Past Medical History:   Diagnosis Date    Allergic     Asthma     Cervical disc disorder with radiculopathy of mid-cervical region 2020    Cervical radiculopathy 2020    GERD (gastroesophageal reflux disease)     Herniated disc, cervical     Pinched nerve in shoulder, right     Thyroid nodule      Past Surgical History:   Procedure Laterality Date     SECTION      EPIDURAL BLOCK INJECTION N/A 3/4/2020    Procedure: C7-T1 Interlaminar Epidural Steroid Injection (73355);   Surgeon: Zane Tang MD;  [facility];  Service: Pain Management     EPIDURAL BLOCK INJECTION N/A 6/26/2020    Procedure: C7-T1 interlaminar epidural steroid injection;  Surgeon: Zane Tang MD;  [facility];  Service: Pain Management     FL GUIDED NEEDLE PLAC BX/ASP/INJ  3/4/2020    FL GUIDED NEEDLE PLAC BX/ASP/INJ  6/26/2020     Social History   Social History     Substance and Sexual Activity   Alcohol Use No     Social History     Substance and Sexual Activity   Drug Use Not Currently    Types: Marijuana     Social History     Tobacco Use   Smoking Status Current Every Day Smoker    Packs/day: 1 00    Types: Cigarettes   Smokeless Tobacco Never Used     Family History   Problem Relation Age of Onset    Diabetes Mother     Heart disease Mother     Bone cancer Father     Diabetes Brother     No Known Problems Daughter     No Known Problems Maternal Grandmother     No Known Problems Paternal Grandmother     No Known Problems Maternal Aunt     No Known Problems Paternal Aunt     No Known Problems Paternal Aunt     Breast cancer Cousin        Meds/Allergies       Current Outpatient Medications:     albuterol (PROAIR HFA) 90 mcg/act inhaler    methocarbamol (ROBAXIN) 500 mg tablet    naproxen (NAPROSYN) 500 mg tablet    pantoprazole (PROTONIX) 40 mg tablet    diclofenac sodium (VOLTAREN) 1 %    famotidine (PEPCID) 40 MG tablet    hydrOXYzine HCL (ATARAX) 25 mg tablet    Levonorgestrel 19 5 MCG/DAY IUD    Current Facility-Administered Medications:     lactated ringers infusion, 100 mL/hr, Intravenous, Continuous, Continue from Pre-op at 05/10/21 1041    Allergies   Allergen Reactions    No Known Allergies      Other reaction(s): Unknown       Objective     /86   Pulse 98   Temp 97 6 °F (36 4 °C) (Tympanic)   Resp 16   Ht 5' 1" (1 549 m)   Wt 79 4 kg (175 lb)   LMP 05/06/2021   SpO2 99%   BMI 33 07 kg/m²       PHYSICAL EXAM    Gen: NAD  Head: NCAT  CV: RRR  CHEST: Clear  ABD: soft, NT/ND  EXT: no edema      ASSESSMENT/PLAN:  This is a 38 y o  year old female here for EGD, and she is stable and optimized for her procedure

## 2021-05-11 NOTE — RESULT ENCOUNTER NOTE
Please inform patient that biopsies of the stomach were benign and negative for bacteria called H pylori  Biopsies of the ulcer were benign as well  Biopsy the distal esophagus and middle esophagus negative for eosinophilic esophagitis  Please arrange for follow-up office visit with me for about 3-4 months    Thank you

## 2021-05-14 RX ORDER — LIDOCAINE HYDROCHLORIDE 20 MG/ML
SOLUTION OROPHARYNGEAL CODE/TRAUMA/SEDATION MEDICATION
Status: SHIPPED | OUTPATIENT
Start: 2021-05-10

## 2021-05-25 ENCOUNTER — OFFICE VISIT (OUTPATIENT)
Dept: URGENT CARE | Facility: CLINIC | Age: 41
End: 2021-05-25
Payer: COMMERCIAL

## 2021-05-25 VITALS
RESPIRATION RATE: 18 BRPM | TEMPERATURE: 98 F | OXYGEN SATURATION: 98 % | HEIGHT: 61 IN | SYSTOLIC BLOOD PRESSURE: 155 MMHG | DIASTOLIC BLOOD PRESSURE: 91 MMHG | WEIGHT: 175 LBS | HEART RATE: 78 BPM | BODY MASS INDEX: 33.04 KG/M2

## 2021-05-25 DIAGNOSIS — M54.14 THORACIC RADICULOPATHY: Primary | ICD-10-CM

## 2021-05-25 PROCEDURE — 99213 OFFICE O/P EST LOW 20 MIN: CPT | Performed by: PHYSICIAN ASSISTANT

## 2021-05-25 PROCEDURE — S9088 SERVICES PROVIDED IN URGENT: HCPCS | Performed by: PHYSICIAN ASSISTANT

## 2021-05-25 RX ORDER — PREDNISONE 10 MG/1
TABLET ORAL
Qty: 21 TABLET | Refills: 0 | Status: SHIPPED | OUTPATIENT
Start: 2021-05-25 | End: 2021-08-24 | Stop reason: ALTCHOICE

## 2021-05-25 NOTE — PROGRESS NOTES
[facility]        NAME: Jaret Henderson is a 38 y o  female  : 1980    MRN: [id number]  DATE: May 25, 2021  TIME: 6:14 PM    Assessment and Plan   Thoracic radiculopathy [M54 14]  1  Thoracic radiculopathy  predniSONE 10 mg tablet         Patient Instructions     Prednisone as prescribed  Do not take NSAIDs while taking prednisone  You may take tylenol as needed for pain  Over the counter lidocaine patches  Monitor area for any rashes  If a rash develops, see a healthcare provider ASAP  Do not take Follow up with PCP in 3-5 days  Proceed to  ER if symptoms worsen  Chief Complaint     Chief Complaint   Patient presents with    Back Pain     pain in mid back for 2 days         History of Present Illness       Patient has two herniated discs  No history of DM  Back Pain  This is a new problem  Episode onset: 2d  The problem has been gradually worsening since onset  The pain is present in the thoracic spine  The quality of the pain is described as shooting  Radiates to: R chest and across back, occasionally R leg  The pain is moderate  The symptoms are aggravated by position, bending and lying down  Pertinent negatives include no abdominal pain, chest pain, dysuria, fever, headaches, numbness or weakness  Treatments tried: celebrex; tylenol, robaxin  Review of Systems   Review of Systems   Constitutional: Negative for chills and fever  Respiratory: Negative for cough and shortness of breath  Cardiovascular: Negative for chest pain and palpitations  Gastrointestinal: Negative for abdominal pain, anal bleeding, blood in stool, constipation, diarrhea and nausea  Genitourinary: Negative  Negative for dysuria  Musculoskeletal: Positive for back pain  Negative for arthralgias, joint swelling and myalgias  Skin: Negative for color change, rash and wound  Neurological: Negative for weakness, light-headedness, numbness and headaches           Current Medications       Current Outpatient Medications:     methocarbamol (ROBAXIN) 500 mg tablet, Take 1 tablet (500 mg total) by mouth 2 (two) times a day as needed for muscle spasms, Disp: 20 tablet, Rfl: 0    naproxen (NAPROSYN) 500 mg tablet, Take 1 tablet (500 mg total) by mouth 2 (two) times a day with meals, Disp: 30 tablet, Rfl: 0    pantoprazole (PROTONIX) 40 mg tablet, Take 40 mg by mouth daily, Disp: , Rfl:     albuterol (PROAIR HFA) 90 mcg/act inhaler, Inhale 2 puffs every 6 (six) hours as needed for wheezing (Patient not taking: Reported on 5/25/2021), Disp: 8 5 g, Rfl: 0    diclofenac sodium (VOLTAREN) 1 %, Apply 1 application topically 4 (four) times a day as needed, Disp: , Rfl:     famotidine (PEPCID) 40 MG tablet, Take 1 tablet (40 mg total) by mouth daily at bedtime (Patient not taking: Reported on 5/25/2021), Disp: 30 tablet, Rfl: 3    hydrOXYzine HCL (ATARAX) 25 mg tablet, Take 1 tablet (25 mg total) by mouth every 6 (six) hours (Patient not taking: Reported on 5/25/2021), Disp: 12 tablet, Rfl: 0    Levonorgestrel 19 5 MCG/DAY IUD, 1 each by Intrauterine route, Disp: , Rfl:     predniSONE 10 mg tablet, Take 6 pills day one, 5 pills day 2, 4 pills day 3, 3 pills day 4, 2 pills day 5, and 1 pill day 6 , Disp: 21 tablet, Rfl: 0  No current facility-administered medications for this visit       Facility-Administered Medications Ordered in Other Visits:     Lidocaine Viscous HCl (XYLOCAINE) 2 % mucosal solution, , , Code/Trauma/Sedation Med, Santiago Glasgow DO, 15 mL at 05/10/21 1109    Current Allergies     Allergies as of 05/25/2021 - Reviewed 05/25/2021   Allergen Reaction Noted    No known allergies  12/16/2016            The following portions of the patient's history were reviewed and updated as appropriate: allergies, current medications, past family history, past medical history, past social history, past surgical history and problem list      Past Medical History:   Diagnosis Date    Allergic     Asthma     Cervical disc disorder with radiculopathy of mid-cervical region 2020    Cervical radiculopathy 2020    GERD (gastroesophageal reflux disease)     Herniated disc, cervical     Pinched nerve in shoulder, right     Thyroid nodule        Past Surgical History:   Procedure Laterality Date     SECTION      EPIDURAL BLOCK INJECTION N/A 3/4/2020    Procedure: C7-T1 Interlaminar Epidural Steroid Injection (10403); Surgeon: Bianca Hitchcock MD;  [facility];  Service: Pain Management     EPIDURAL BLOCK INJECTION N/A 2020    Procedure: C7-T1 interlaminar epidural steroid injection;  Surgeon: Bianca Hitchcock MD;  [facility];  Service: Pain Management     FL GUIDED NEEDLE PLAC BX/ASP/INJ  3/4/2020    FL GUIDED NEEDLE PLAC BX/ASP/INJ  2020       Family History   Problem Relation Age of Onset    Diabetes Mother     Heart disease Mother     Bone cancer Father     Diabetes Brother     No Known Problems Daughter     No Known Problems Maternal Grandmother     No Known Problems Paternal Grandmother     No Known Problems Maternal Aunt     No Known Problems Paternal Aunt     No Known Problems Paternal Aunt     Breast cancer Cousin          Medications have been verified  Objective   /91   Pulse 78   Temp 98 °F (36 7 °C) (Temporal)   Resp 18   Ht 5' 1" (1 549 m)   Wt 79 4 kg (175 lb)   LMP 2021   SpO2 98%   BMI 33 07 kg/m²   Patient's last menstrual period was 2021  Physical Exam     Physical Exam  Vitals signs reviewed  Constitutional:       General: She is not in acute distress  Appearance: She is well-developed  Cardiovascular:      Rate and Rhythm: Normal rate and regular rhythm  Heart sounds: Normal heart sounds  No murmur  No friction rub  No gallop  Pulmonary:      Effort: Pulmonary effort is normal  No respiratory distress  Breath sounds: Normal breath sounds  No wheezing or rales     Chest: Chest wall: No tenderness  Abdominal:      Palpations: Abdomen is soft  Tenderness: There is no abdominal tenderness  Musculoskeletal:         General: No tenderness or deformity  Skin:     General: Skin is warm  Findings: No erythema or rash  Neurological:      Mental Status: She is alert and oriented to person, place, and time  Coordination: Coordination normal       Deep Tendon Reflexes: Reflexes are normal and symmetric  Reflexes normal    Psychiatric:         Behavior: Behavior normal          Thought Content:  Thought content normal          Judgment: Judgment normal

## 2021-06-08 ENCOUNTER — TELEPHONE (OUTPATIENT)
Dept: PAIN MEDICINE | Facility: CLINIC | Age: 41
End: 2021-06-08

## 2021-06-08 NOTE — TELEPHONE ENCOUNTER
Lmom to get a new [address] [place] [address] Referral for office visit  Old referral has

## 2021-06-11 ENCOUNTER — TELEPHONE (OUTPATIENT)
Dept: PAIN MEDICINE | Facility: CLINIC | Age: 41
End: 2021-06-11

## 2021-06-11 PROBLEM — G89.4 CHRONIC PAIN SYNDROME: Status: ACTIVE | Noted: 2021-06-11

## 2021-06-11 NOTE — TELEPHONE ENCOUNTER
Patient called upset asking why does she needs an insurance referral for an office visit she has been to this office before  6/11/21 10:30 am please call her back       Please advise    Thank you     [phone]

## 2021-07-02 ENCOUNTER — APPOINTMENT (OUTPATIENT)
Dept: LAB | Facility: CLINIC | Age: 41
End: 2021-07-02
Payer: COMMERCIAL

## 2021-08-04 ENCOUNTER — TELEPHONE (OUTPATIENT)
Dept: PALLIATIVE MEDICINE | Facility: CLINIC | Age: 41
End: 2021-08-04

## 2021-08-04 NOTE — TELEPHONE ENCOUNTER
Patient would like for you to take a look at her thyroid US that she had done  I scanned that in under media  It was recently done on 7/21/21  She has an up coming appointment in October  She didn't know if she had to come in sooner or not

## 2021-08-06 ENCOUNTER — OFFICE VISIT (OUTPATIENT)
Dept: URGENT CARE | Facility: CLINIC | Age: 41
End: 2021-08-06
Payer: COMMERCIAL

## 2021-08-06 VITALS
SYSTOLIC BLOOD PRESSURE: 121 MMHG | BODY MASS INDEX: 33.07 KG/M2 | WEIGHT: 175 LBS | HEART RATE: 89 BPM | RESPIRATION RATE: 20 BRPM | DIASTOLIC BLOOD PRESSURE: 85 MMHG | TEMPERATURE: 97.8 F | OXYGEN SATURATION: 98 %

## 2021-08-06 DIAGNOSIS — L03.319 CELLULITIS OF TRUNK, UNSPECIFIED SITE OF TRUNK: Primary | ICD-10-CM

## 2021-08-06 PROCEDURE — S9088 SERVICES PROVIDED IN URGENT: HCPCS | Performed by: PHYSICIAN ASSISTANT

## 2021-08-06 PROCEDURE — 99213 OFFICE O/P EST LOW 20 MIN: CPT | Performed by: PHYSICIAN ASSISTANT

## 2021-08-06 RX ORDER — CEPHALEXIN 500 MG/1
500 CAPSULE ORAL EVERY 8 HOURS SCHEDULED
Qty: 21 CAPSULE | Refills: 0 | Status: SHIPPED | OUTPATIENT
Start: 2021-08-06 | End: 2021-08-13

## 2021-08-06 NOTE — PROGRESS NOTES
[facility]        NAME: Lissa Gutierrez is a 38 y o  female  : 1980    MRN: [id number]  DATE: 2021  TIME: 3:54 PM    Assessment and Plan   Cellulitis of trunk, unspecified site of trunk [L03 319]  1  Cellulitis of trunk, unspecified site of trunk  cephalexin (KEFLEX) 500 mg capsule         Patient Instructions       Follow up with PCP in 3-5 days  Proceed to  ER if symptoms worsen  Chief Complaint     Chief Complaint   Patient presents with    Mass     Right breast , redness, tenderness         History of Present Illness         Patient slept with a heating pad on it and ended up burning her right breast while sleeping  She now is having increased redness and pain  Denies fever or chills  Review of Systems   Review of Systems   Constitutional: Negative for chills and fever  Skin: Positive for wound  Hematological: Negative for adenopathy           Current Medications       Current Outpatient Medications:     Levonorgestrel 19 5 MCG/DAY IUD, 1 each by Intrauterine route, Disp: , Rfl:     pantoprazole (PROTONIX) 40 mg tablet, Take 40 mg by mouth daily, Disp: , Rfl:     albuterol (PROAIR HFA) 90 mcg/act inhaler, Inhale 2 puffs every 6 (six) hours as needed for wheezing (Patient not taking: Reported on 2021), Disp: 8 5 g, Rfl: 0    cephalexin (KEFLEX) 500 mg capsule, Take 1 capsule (500 mg total) by mouth every 8 (eight) hours for 7 days, Disp: 21 capsule, Rfl: 0    diclofenac sodium (VOLTAREN) 1 %, Apply 1 application topically 4 (four) times a day as needed, Disp: , Rfl:     famotidine (PEPCID) 40 MG tablet, Take 1 tablet (40 mg total) by mouth daily at bedtime (Patient not taking: Reported on 2021), Disp: 30 tablet, Rfl: 3    hydrOXYzine HCL (ATARAX) 25 mg tablet, Take 1 tablet (25 mg total) by mouth every 6 (six) hours (Patient not taking: Reported on 2021), Disp: 12 tablet, Rfl: 0    methocarbamol (ROBAXIN) 500 mg tablet, Take 1 tablet (500 mg total) by mouth 2 (two) times a day as needed for muscle spasms (Patient not taking: Reported on 2021), Disp: 20 tablet, Rfl: 0    naproxen (NAPROSYN) 500 mg tablet, Take 1 tablet (500 mg total) by mouth 2 (two) times a day with meals (Patient not taking: Reported on 2021), Disp: 30 tablet, Rfl: 0    predniSONE 10 mg tablet, Take 6 pills day one, 5 pills day 2, 4 pills day 3, 3 pills day 4, 2 pills day 5, and 1 pill day 6  (Patient not taking: Reported on 2021), Disp: 21 tablet, Rfl: 0  No current facility-administered medications for this visit  Facility-Administered Medications Ordered in Other Visits:     Lidocaine Viscous HCl (XYLOCAINE) 2 % mucosal solution, , , Code/Trauma/Sedation Med, Santiago Glasgow DO, 15 mL at 05/10/21 1109    Current Allergies     Allergies as of 2021 - Reviewed 2021   Allergen Reaction Noted    No known allergies  2016            The following portions of the patient's history were reviewed and updated as appropriate: allergies, current medications, past family history, past medical history, past social history, past surgical history and problem list      Past Medical History:   Diagnosis Date    Allergic     Asthma     Cervical disc disorder with radiculopathy of mid-cervical region 2020    Cervical radiculopathy 2020    GERD (gastroesophageal reflux disease)     Herniated disc, cervical     Pinched nerve in shoulder, right     Thyroid nodule        Past Surgical History:   Procedure Laterality Date     SECTION      EPIDURAL BLOCK INJECTION N/A 3/4/2020    Procedure: C7-T1 Interlaminar Epidural Steroid Injection (44491);   Surgeon: Yvonne Ward MD;  Location: [facility];  Service: Pain Management     EPIDURAL BLOCK INJECTION N/A 2020    Procedure: C7-T1 interlaminar epidural steroid injection;  Surgeon: Yvonne Ward MD;  Location: [facility];  Service: Pain Management     FL GUIDED NEEDLE PLAC BX/ASP/INJ 3/4/2020    FL GUIDED NEEDLE PLAC BX/ASP/INJ  6/26/2020       Family History   Problem Relation Age of Onset    Diabetes Mother     Heart disease Mother     Bone cancer Father     Diabetes Brother     No Known Problems Daughter     No Known Problems Maternal Grandmother     No Known Problems Paternal Grandmother     No Known Problems Maternal Aunt     No Known Problems Paternal Aunt     No Known Problems Paternal Aunt     Breast cancer Cousin          Medications have been verified  Objective   /85   Pulse 89   Temp 97 8 °F (36 6 °C)   Resp 20   Wt 79 4 kg (175 lb)   SpO2 98%   BMI 33 07 kg/m²   No LMP recorded  (Menstrual status: Birth Control)  Physical Exam     Physical Exam  Vitals and nursing note reviewed  Constitutional:       Appearance: Normal appearance  HENT:      Head: Normocephalic and atraumatic  Cardiovascular:      Rate and Rhythm: Normal rate and regular rhythm  Pulmonary:      Effort: Pulmonary effort is normal    Chest:       Skin:     General: Skin is warm  Neurological:      Mental Status: She is alert

## 2021-08-06 NOTE — PATIENT INSTRUCTIONS
Cellulitis   AMBULATORY CARE:   Cellulitis  is a skin infection caused by bacteria  Cellulitis is common and can become severe  Cellulitis usually appears on the lower legs  It can also appear on the arms, face, and other areas  Cellulitis develops when bacteria enter a crack or break in your skin, such as a scratch, bite, or cut  Common signs and symptoms:  Signs and symptoms usually appear on one side of your body  You may have any of the following:  · A fever    · A red, warm, swollen area on your skin    · Pain when the area is touched    · Red spots, bumps, or blisters that may drain pus    · Bumpy, raised skin that feels like an orange peel    Seek care immediately if:   · Your wound gets larger and more painful  · You feel a crackling under your skin when you touch it  · You have purple dots or bumps on your skin, or you see bleeding under your skin  · You see red streaks coming from the infected area  Call your doctor if:   · The red, warm, swollen area gets larger  · Your fever or pain does not go away or gets worse  · The area does not get smaller after 3 days of antibiotics  · You have questions or concerns about your condition or care  Treatment:  You should start to see improvement in 3 days  If your cellulitis is severe, you may need IV antibiotics in the hospital  If cellulitis is not treated, the infection can spread through your body and become life-threatening  You may need any of the following medicines:  · Antibiotics  help treat the bacterial infection  · Acetaminophen  decreases pain and fever  It is available without a doctor's order  Ask how much to take and how often to take it  Follow directions  Read the labels of all other medicines you are using to see if they also contain acetaminophen, or ask your doctor or pharmacist  Acetaminophen can cause liver damage if not taken correctly   Do not use more than 4 grams (4,000 milligrams) total of acetaminophen in one day      · NSAIDs , such as ibuprofen, help decrease swelling, pain, and fever  This medicine is available with or without a doctor's order  NSAIDs can cause stomach bleeding or kidney problems in certain people  If you take blood thinner medicine, always ask your healthcare provider if NSAIDs are safe for you  Always read the medicine label and follow directions  · Take your medicine as directed  Contact your healthcare provider if you think your medicine is not helping or if you have side effects  Tell him or her if you are allergic to any medicine  Keep a list of the medicines, vitamins, and herbs you take  Include the amounts, and when and why you take them  Bring the list or the pill bottles to follow-up visits  Carry your medicine list with you in case of an emergency  Self-care:   · Wash the area with soap and water every day  Gently pat dry  Use bandages if directed by your healthcare provider  · Elevate the area above the level of your heart  as often as you can  This will help decrease swelling and pain  Prop the area on pillows or blankets to keep it elevated comfortably  · Place a cool, damp cloth on the area  Use clean cloths and clean water  You can do this as often as you need to  Cool, damp cloths may help decrease pain  · Apply cream or ointment as directed  These help protect the area  Most over-the-counter products, such as petroleum jelly, are good to use  Ask your healthcare provider about specific creams or ointments you should use  Prevent cellulitis:   · Do not scratch bug bites or areas of injury  You increase your risk for cellulitis by scratching these areas  · Do not share personal items, such as towels, clothing, and razors  · Clean exercise equipment  with germ-killing  before and after you use it  · Treat athlete's foot  This can help prevent the spread of a bacterial skin infection  · Wash your hands often  Use soap and water   Wash your hands after you use the bathroom, change a child's diapers, or sneeze  Wash your hands before you prepare or eat food  Use lotion to prevent dry, cracked skin  Follow up with your doctor within 3 days, or as directed:  He or she will check if your cellulitis is getting better  Write down your questions so you remember to ask them during your visits  © Copyright [employer] 2021 Information is for End User's use only and may not be sold, redistributed or otherwise used for commercial purposes  All illustrations and images included in CareNotes® are the copyrighted property of [employer]  or [facility] Tanesha Lombardi   The above information is an  only  It is not intended as medical advice for individual conditions or treatments  Talk to your doctor, nurse or pharmacist before following any medical regimen to see if it is safe and effective for you

## 2021-08-08 ENCOUNTER — HOSPITAL ENCOUNTER (EMERGENCY)
Facility: HOSPITAL | Age: 41
Discharge: HOME | End: 2021-08-08
Attending: EMERGENCY MEDICINE | Admitting: EMERGENCY MEDICINE
Payer: COMMERCIAL

## 2021-08-08 VITALS
BODY MASS INDEX: 33.44 KG/M2 | HEART RATE: 68 BPM | RESPIRATION RATE: 16 BRPM | WEIGHT: 177 LBS | SYSTOLIC BLOOD PRESSURE: 134 MMHG | OXYGEN SATURATION: 100 % | DIASTOLIC BLOOD PRESSURE: 87 MMHG | TEMPERATURE: 98.6 F

## 2021-08-08 DIAGNOSIS — L08.9 INFECTED CYST OF SKIN: Primary | ICD-10-CM

## 2021-08-08 DIAGNOSIS — L72.9 INFECTED CYST OF SKIN: Primary | ICD-10-CM

## 2021-08-08 PROCEDURE — 99284 EMERGENCY DEPT VISIT MOD MDM: CPT | Performed by: EMERGENCY MEDICINE

## 2021-08-08 PROCEDURE — 10061 I&D ABSCESS COMP/MULTIPLE: CPT | Performed by: EMERGENCY MEDICINE

## 2021-08-08 PROCEDURE — 99283 EMERGENCY DEPT VISIT LOW MDM: CPT

## 2021-08-08 RX ORDER — SULFAMETHOXAZOLE AND TRIMETHOPRIM 800; 160 MG/1; MG/1
1 TABLET ORAL 2 TIMES DAILY
Qty: 14 TABLET | Refills: 0 | Status: SHIPPED | OUTPATIENT
Start: 2021-08-08 | End: 2021-08-15

## 2021-08-08 RX ORDER — SULFAMETHOXAZOLE AND TRIMETHOPRIM 800; 160 MG/1; MG/1
1 TABLET ORAL ONCE
Status: COMPLETED | OUTPATIENT
Start: 2021-08-08 | End: 2021-08-08

## 2021-08-08 RX ADMIN — SULFAMETHOXAZOLE AND TRIMETHOPRIM 1 TABLET: 800; 160 TABLET ORAL at 21:44

## 2021-08-09 NOTE — ED PROVIDER NOTES
History  Chief Complaint   Patient presents with    Cellulitis     R breast     This is a 41-year-old female complains of right breast pain pain, swelling and drainage  Patient states she has had a cyst-like object on the right breast for many years  States it has been red and painful for the past few days  Was seen by urgent care where it was found to be infected and she was started on Keflex  Patient has previously had imaging of this breast including the last 6 months this part of routine screening  Patient states that it is relatively minimal pain unless there is direct palpation over the area or heavy movement  Denies any fevers or chills  Notes she has had some discharge for the last 1 day  Prior to Admission Medications   Prescriptions Last Dose Informant Patient Reported? Taking?    Levonorgestrel 19 5 MCG/DAY IUD  Self Yes Yes   Si each by Intrauterine route   albuterol (PROAIR HFA) 90 mcg/act inhaler  Self No No   Sig: Inhale 2 puffs every 6 (six) hours as needed for wheezing   Patient not taking: Reported on 2021   cephalexin (KEFLEX) 500 mg capsule   No Yes   Sig: Take 1 capsule (500 mg total) by mouth every 8 (eight) hours for 7 days   diclofenac sodium (VOLTAREN) 1 %  Self Yes No   Sig: Apply 1 application topically 4 (four) times a day as needed   famotidine (PEPCID) 40 MG tablet  Self No No   Sig: Take 1 tablet (40 mg total) by mouth daily at bedtime   Patient not taking: Reported on 2021   hydrOXYzine HCL (ATARAX) 25 mg tablet   No No   Sig: Take 1 tablet (25 mg total) by mouth every 6 (six) hours   Patient not taking: Reported on 2021   methocarbamol (ROBAXIN) 500 mg tablet  Self No No   Sig: Take 1 tablet (500 mg total) by mouth 2 (two) times a day as needed for muscle spasms   Patient not taking: Reported on 2021   naproxen (NAPROSYN) 500 mg tablet   No No   Sig: Take 1 tablet (500 mg total) by mouth 2 (two) times a day with meals   Patient not taking: Reported on 2021   pantoprazole (PROTONIX) 40 mg tablet  Self Yes No   Sig: Take 40 mg by mouth daily   predniSONE 10 mg tablet   No No   Sig: Take 6 pills day one, 5 pills day 2, 4 pills day 3, 3 pills day 4, 2 pills day 5, and 1 pill day 6  Patient not taking: Reported on 2021      Facility-Administered Medications: None       Past Medical History:   Diagnosis Date    Allergic     Asthma     Cervical disc disorder with radiculopathy of mid-cervical region 2020    Cervical radiculopathy 2020    GERD (gastroesophageal reflux disease)     Herniated disc, cervical     Pinched nerve in shoulder, right     Thyroid nodule        Past Surgical History:   Procedure Laterality Date     SECTION      EPIDURAL BLOCK INJECTION N/A 3/4/2020    Procedure: C7-T1 Interlaminar Epidural Steroid Injection (14975); Surgeon: Rita Vance MD;  Location: [facility];  Service: Pain Management     EPIDURAL BLOCK INJECTION N/A 2020    Procedure: C7-T1 interlaminar epidural steroid injection;  Surgeon: Rita Vance MD;  Location: [facility];  Service: Pain Management     FL GUIDED NEEDLE PLAC BX/ASP/INJ  3/4/2020    FL GUIDED NEEDLE PLAC BX/ASP/INJ  2020       Family History   Problem Relation Age of Onset    Diabetes Mother     Heart disease Mother     Bone cancer Father     Diabetes Brother     No Known Problems Daughter     No Known Problems Maternal Grandmother     No Known Problems Paternal Grandmother     No Known Problems Maternal Aunt     No Known Problems Paternal Aunt     No Known Problems Paternal Aunt     Breast cancer Cousin      I have reviewed and agree with the history as documented      E-Cigarette/Vaping    E-Cigarette Use Never User      E-Cigarette/Vaping Substances    CBD No      Social History     Tobacco Use    Smoking status: Current Every Day Smoker     Packs/day: 1 00     Types: Cigarettes    Smokeless tobacco: Never Used   Vaping Use    Vaping Use: Never used Substance Use Topics    Alcohol use: No    Drug use: Not Currently     Types: Marijuana       Review of Systems   Constitutional: Negative for activity change, chills, fatigue and fever  HENT: Negative for congestion  Eyes: Negative for visual disturbance  Respiratory: Negative for cough, chest tightness and shortness of breath  Cardiovascular: Negative for chest pain  Gastrointestinal: Negative for abdominal pain, diarrhea and vomiting  Genitourinary: Negative for dysuria  Neurological: Negative for dizziness, weakness and numbness  Physical Exam  Physical Exam  Constitutional:       General: She is not in acute distress  Appearance: She is well-developed  She is not ill-appearing, toxic-appearing or diaphoretic  HENT:      Head: Normocephalic and atraumatic  Right Ear: External ear normal       Left Ear: External ear normal    Eyes:      Conjunctiva/sclera: Conjunctivae normal       Pupils: Pupils are equal, round, and reactive to light  Cardiovascular:      Rate and Rhythm: Normal rate and regular rhythm  Heart sounds: Normal heart sounds  Pulmonary:      Effort: Pulmonary effort is normal  No respiratory distress  Breath sounds: Normal breath sounds  Abdominal:      General: Bowel sounds are normal       Palpations: Abdomen is soft  Genitourinary:     Comments: Chaperone present for entirety of exam  Musculoskeletal:         General: Normal range of motion  Cervical back: Normal range of motion and neck supple  Skin:     General: Skin is warm  Capillary Refill: Capillary refill takes less than 2 seconds  Comments: Infected cyst already draining on the right breast at around 7:00 o clock  Minimal induration surrounding the draining cyst    Neurological:      Mental Status: She is alert and oriented to person, place, and time     Psychiatric:         Behavior: Behavior normal          Vital Signs  ED Triage Vitals [08/08/21 2116]   Temperature Pulse Respirations Blood Pressure SpO2   98 6 °F (37 °C) 69 16 136/87 100 %      Temp src Heart Rate Source Patient Position - Orthostatic VS BP Location FiO2 (%)   -- -- -- -- --      Pain Score       4           Vitals:    08/08/21 2116 08/08/21 2154   BP: 136/87 134/87   Pulse: 69 68         Visual Acuity       ED Medications  Medications   sulfamethoxazole-trimethoprim (BACTRIM DS) 800-160 mg per tablet 1 tablet (1 tablet Oral Given 8/8/21 2144)       Diagnostic Studies  Results Reviewed       None                   No orders to display              Procedures  Incision and drain    Date/Time: 8/9/2021 5:00 AM  Performed by: Margaret Salazar MD  Authorized by: Margaret Salazar MD   Universal Protocol:  Consent given by: patient  Patient understanding: patient states understanding of the procedure being performed  Patient identity confirmed: verbally with patient and arm band      Location:     Type:  Cyst    Location:  Trunk    Trunk location:  R breast  Pre-procedure details:     Skin preparation:  Betadine  Anesthesia (see MAR for exact dosages): Anesthesia method:  None  Procedure details:     Wound management:  Probed and deloculated    Drainage:  Purulent    Drainage amount: Moderate    Wound treatment:  Packing placed    Packing materials:  1/4 in gauze  Post-procedure details:     Patient tolerance of procedure: Tolerated well, no immediate complications              ED Course                                         SBIRT 22yo+        Most Recent Value   SBIRT (24 yo +)   In order to provide better care to our patients, we are screening all of our patients for alcohol and drug use  Would it be okay to ask you these screening questions? Yes Filed at: 08/08/2021 2120   Initial Alcohol Screen: US AUDIT-C    1  How often do you have a drink containing alcohol?  0 Filed at: 08/08/2021 2120   2  How many drinks containing alcohol do you have on a typical day you are drinking?    0 Filed at: 08/08/2021 2120   3a  Male UNDER 65: How often do you have five or more drinks on one occasion? 0 Filed at: 08/08/2021 2120   3b  FEMALE Any Age, or MALE 65+: How often do you have 4 or more drinks on one occassion? 0 Filed at: 08/08/2021 2120   Audit-C Score  0 Filed at: 08/08/2021 2120   ROCHELLE: How many times in the past year have you    Used an illegal drug or used a prescription medication for non-medical reasons? Never Filed at: 08/08/2021 2120                         MDM  Number of Diagnoses or Management Options  Infected cyst of skin: new and requires workup  Diagnosis management comments: This 43-year-old female with infected cyst of the skin on the right breast   Wound expressed a large amount of purulence was drained  Patient given packing which she tolerated well  Stressed the importance of close follow-up to rule out any form a malignancy  Patient appeared clinically well  Started additional antibiotics  Discussed warning signs and symptoms with the patient as well as when to return to the emergency department versus follow up with [facility]  Patient states understanding and agreement with the plan  This note was completed using dictation software  Amount and/or Complexity of Data Reviewed  Review and summarize past medical records: yes        Disposition  Final diagnoses:   Infected cyst of skin     Time reflects when diagnosis was documented in both MDM as applicable and the Disposition within this note       Time User Action Codes Description Comment    8/8/2021  9:40 PM Chai Epps Add [L72 9,  L08 9] Infected cyst of skin           ED Disposition       ED Disposition Condition Date/Time Comment    Discharge Stable Sun Aug 8, 2021  9:40 PM Raquel Camilo Bamberger discharge to home/self care                  Follow-up Information       Follow up With Specialties Details Why [address], MD Internal Medicine In 1 day  [address]  [phone] Discharge Medication List as of 8/8/2021  9:46 PM        START taking these medications    Details   sulfamethoxazole-trimethoprim (BACTRIM DS) 800-160 mg per tablet Take 1 tablet by mouth 2 (two) times a day for 7 days smx-tmp DS (BACTRIM) 800-160 mg tabs (1tab q12 D10), Starting Sun 8/8/2021, Until Sun 8/15/2021, Normal           CONTINUE these medications which have NOT CHANGED    Details   cephalexin (KEFLEX) 500 mg capsule Take 1 capsule (500 mg total) by mouth every 8 (eight) hours for 7 days, Starting Fri 8/6/2021, Until Fri 8/13/2021, Normal      Levonorgestrel 19 5 MCG/DAY IUD 1 each by Intrauterine route, Historical Med      albuterol (PROAIR HFA) 90 mcg/act inhaler Inhale 2 puffs every 6 (six) hours as needed for wheezing, Starting Sun 2/9/2020, Normal      diclofenac sodium (VOLTAREN) 1 % Apply 1 application topically 4 (four) times a day as needed, Starting Mon 3/2/2020, Until Tue 3/2/2021, Historical Med      famotidine (PEPCID) 40 MG tablet Take 1 tablet (40 mg total) by mouth daily at bedtime, Starting Wed 2/26/2020, Normal      hydrOXYzine HCL (ATARAX) 25 mg tablet Take 1 tablet (25 mg total) by mouth every 6 (six) hours, Starting Fri 10/16/2020, Normal      methocarbamol (ROBAXIN) 500 mg tablet Take 1 tablet (500 mg total) by mouth 2 (two) times a day as needed for muscle spasms, Starting Sun 11/24/2019, Normal      naproxen (NAPROSYN) 500 mg tablet Take 1 tablet (500 mg total) by mouth 2 (two) times a day with meals, Starting Mon 11/30/2020, Normal      pantoprazole (PROTONIX) 40 mg tablet Take 40 mg by mouth daily, Starting Mon 1/20/2020, Historical Med      predniSONE 10 mg tablet Take 6 pills day one, 5 pills day 2, 4 pills day 3, 3 pills day 4, 2 pills day 5, and 1 pill day 6 , Normal           No discharge procedures on file      PDMP Review       None            ED Provider  Electronically Signed by           Garret Aden MD  08/09/21 0502  Patient seen/ procedure completed on 8/8/21, not the 9th as previously noted       Isra Menard MD  08/24/21 1217

## 2021-08-12 ENCOUNTER — OFFICE VISIT (OUTPATIENT)
Dept: OBGYN CLINIC | Facility: MEDICAL CENTER | Age: 41
End: 2021-08-12
Payer: COMMERCIAL

## 2021-08-12 VITALS
WEIGHT: 175 LBS | BODY MASS INDEX: 33.04 KG/M2 | SYSTOLIC BLOOD PRESSURE: 124 MMHG | HEIGHT: 61 IN | DIASTOLIC BLOOD PRESSURE: 82 MMHG

## 2021-08-12 DIAGNOSIS — N61.1 ABSCESS OF BREAST, RIGHT: ICD-10-CM

## 2021-08-12 DIAGNOSIS — Z09 ENCOUNTER FOR RECHECK OF ABSCESS FOLLOWING INCISION AND DRAINAGE: Primary | ICD-10-CM

## 2021-08-12 PROCEDURE — 99213 OFFICE O/P EST LOW 20 MIN: CPT | Performed by: ADVANCED PRACTICE MIDWIFE

## 2021-08-12 NOTE — PROGRESS NOTES
[facility] [address] [place] [address]    Assessment/Plan:  No problem-specific Assessment & Plan notes found for this encounter  Diagnoses and all orders for this visit:    Encounter for recheck of abscess following incision and drainage    Abscess of breast, right    - wound packing removed and repacked  - will need packing changed tomorrow  - to call if increase in pain, fever, chills, and/or redness around wound area  - to complete entire course of both antibiotics  - RTO tomorrow    Subjective:   Stacey Christine is a 38 y o   female  CC: change of wound packing    HPI: Park is here in follow-up from PCP visit 21 and ED visit at [facility] on 2021  She was seen there because of an abscess of the right breast which was drained  and  packed with 1/4 inch iodophorm and covered with sterile gauze  She was given cephalexin and Bactrim for 7 days  Initially she took the cephalexin wrong only twice a day so now she has a lot more leftover so her PCP told her  to take 3 a day until she finishes up and gave her an addition 3 days of the Bactrim DS to her regimen so she has the full 10 days of Bactrim and what ever cephalexin to finish the amounts using it 3 times a day  She states that she was told by PCP to follow-up with gyn and therefore is here today  Denies fever or chills  Pain level decreased significantly since , states that it only hurts when she leans against it and notes that it is starting to itch  She had a mammogram and breast ultrasound 3/2021 which was normal        ROS: Review of Systems   Constitutional: Negative for activity change, appetite change, chills, fatigue and fever  Respiratory: Negative for shortness of breath  Cardiovascular: Negative for chest pain, palpitations and leg swelling  Psychiatric/Behavioral: The patient is not nervous/anxious          PFSH: The following portions of the patient's history were reviewed and updated as appropriate: allergies, current medications, past family history, past medical history, obstetric history, gynecologic history, past social history, past surgical history and problem list        Objective:  /82   Ht 5' 1" (1 549 m)   Wt 79 4 kg (175 lb)   LMP 07/18/2021 (Approximate)   BMI 33 07 kg/m²    Physical Exam  Vitals reviewed  Constitutional:       Appearance: Normal appearance  Cardiovascular:      Rate and Rhythm: Normal rate and regular rhythm  Pulses: Normal pulses  Pulmonary:      Effort: Pulmonary effort is normal    Chest:      Breasts:         Right: Skin change and tenderness present  Comments: 1 cm open wound, with 7 cm of iodoform packing in place when dressing removed  2x2 gauze dressing with small amount of serosanguinous drainage removed- negative odor noted  Packing removed- saturated with blood tinged drainage  Negative pus noted , granulation tissue forming  Maintaining sterile technique, the wound was packed with approximately 5 cm of iodoform  Wound track is lateral to the left of the I&D  Telfa placed over wound and taped in place  Lymphadenopathy:      Upper Body:      Right upper body: No supraclavicular or axillary adenopathy  Neurological:      Mental Status: She is alert and oriented to person, place, and time     Psychiatric:         Mood and Affect: Mood normal          Behavior: Behavior normal          Judgment: Judgment normal

## 2021-08-12 NOTE — PROGRESS NOTES
Assessment/Plan:      Diagnoses and all orders for this visit:    Breast abrasion, right, subsequent encounter        - encouraged to keep area dry  Encouraged patient to try to limit being outside sweating  - encouraged to continue antibiotics as ordered  - reviewed  patient can change   Outer dressing  Telfa provided at today's visit Reviewed with patient if packing falls out it is okay but she should not remove the packing  Patient will return to office on Monday for wound care  - signs and symptoms to report reviewed including worsening symptoms, increased redness, increased pain    RTO Monday for wound check     Subjective:     Patient ID: Govind Munoz is a 38 y o  female  HPI   here for follow-up breast cyst/ abscess  Patient was seen in emergency department 21 with complaints of redness, pain, swelling and draining of right breast   I&D'd during visit to ER  Patient is currently on Keflex and  Bactrim  Patient was seen in office yesterday for packing removal  Wound was repacked  Denies new symptoms  last Pap smear 18 resulted NILM/ HR HPV(-)  Last mammogram 3/19/21 resulted BiRads 1    Review of Systems   Constitutional: Negative for chills, fatigue and fever  Respiratory: Negative  Cardiovascular: Negative  Skin: Positive for wound  Neurological: Negative for headaches  Objective:  /80   Ht 5' 1" (1 549 m)   Wt 79 8 kg (176 lb)   LMP 08/10/2021   BMI 33 25 kg/m²      Physical Exam  Constitutional:       Appearance: Normal appearance  Cardiovascular:      Rate and Rhythm: Normal rate and regular rhythm  Pulmonary:      Effort: Pulmonary effort is normal       Breath sounds: Normal breath sounds  Chest:          Comments:  Packing removed serosanguineous drainage  Outer bandage without drainage  Slight erythema about 3-4 cm surrounding wound opening  Soft to the touch and no palpable masses       Right breast cleaned with soap and water, dried thoroughly  Wound repacked with 4-5 cm of quarter-inch iodofom packing  Covered with Telfa bandage and secured with paper tape  No abnormal drainage or odor noted  Patient tolerated well  Neurological:      Mental Status: She is alert and oriented to person, place, and time     Psychiatric:         Mood and Affect: Mood normal          Behavior: Behavior normal

## 2021-08-13 ENCOUNTER — OFFICE VISIT (OUTPATIENT)
Dept: OBGYN CLINIC | Facility: MEDICAL CENTER | Age: 41
End: 2021-08-13
Payer: COMMERCIAL

## 2021-08-13 VITALS
DIASTOLIC BLOOD PRESSURE: 80 MMHG | BODY MASS INDEX: 33.23 KG/M2 | SYSTOLIC BLOOD PRESSURE: 128 MMHG | WEIGHT: 176 LBS | HEIGHT: 61 IN

## 2021-08-13 DIAGNOSIS — S20.111D: Primary | ICD-10-CM

## 2021-08-13 PROCEDURE — 99213 OFFICE O/P EST LOW 20 MIN: CPT | Performed by: NURSE PRACTITIONER

## 2021-08-16 ENCOUNTER — OFFICE VISIT (OUTPATIENT)
Dept: OBGYN CLINIC | Facility: MEDICAL CENTER | Age: 41
End: 2021-08-16
Payer: COMMERCIAL

## 2021-08-16 VITALS — DIASTOLIC BLOOD PRESSURE: 70 MMHG | SYSTOLIC BLOOD PRESSURE: 122 MMHG | WEIGHT: 172 LBS | BODY MASS INDEX: 32.5 KG/M2

## 2021-08-16 DIAGNOSIS — N61.1 BREAST ABSCESS: Primary | ICD-10-CM

## 2021-08-16 PROCEDURE — 99213 OFFICE O/P EST LOW 20 MIN: CPT | Performed by: NURSE PRACTITIONER

## 2021-08-16 NOTE — PROGRESS NOTES
Assessment/Plan:      Diagnoses and all orders for this visit:    Breast abscess      - reviewed with patient tunneling has healed no longer needs packing  - encouraged to continue keeping area clean and dry  - encouraged to continue antibiotics as ordered  - patient will continue changing dressing daily  Will follow up in office later this week for evaluation of wound  - signs and symptoms report including worsening symptoms, increasing redness, increasing pain, increasing swelling reviewed in detail    RTO  Thursday/ Friday for wound care  Consider US/ mammogram once healed   Subjective:     Patient ID: Minh He is a 38 y o  female  HPI   here to follow-up breast cyst/ abscess  Patient was seen in emergency department 21 for I&D  Patient has been following up in our office  Wound was packed 21  Patient had instructions to change outer bandage at home daily  Patient states packing fell out last night with dressing change  Denies odor, erythema, increase in discharge, increase in pain  Is tolerating antibiotics well  Has been able to wear a bra      last Pap smear 18 resulted NILM/ HR HPV(-)   last mammogram 3/19/21  Resulted BI-RADS 1    Review of Systems   Constitutional: Negative for chills, fatigue and fever  Respiratory: Negative  Cardiovascular: Negative  Genitourinary: Negative  Skin: Positive for wound  Objective:  /70   Wt 78 kg (172 lb)   LMP 08/10/2021   BMI 32 50 kg/m²      Physical Exam  Vitals reviewed  Constitutional:       Appearance: Normal appearance  Chest:          Comments: Right breast cleaned with soap and water, dried thoroughly  Covered with Telfa bandage and secured with paper tape  Skin:     General: Skin is warm and dry  Neurological:      Mental Status: She is alert and oriented to person, place, and time     Psychiatric:         Mood and Affect: Mood normal          Behavior: Behavior normal

## 2021-08-17 ENCOUNTER — TELEPHONE (OUTPATIENT)
Dept: GASTROENTEROLOGY | Facility: CLINIC | Age: 41
End: 2021-08-17

## 2021-08-17 NOTE — TELEPHONE ENCOUNTER
Placed call to patient and left a voicemail to return our call, she needs an insurance referral for her upcoming appointment with our office on Thursday  Also placed call to patient's family doctor and requested an insurance referral, they stated they would be faxing it over to our office

## 2021-08-18 NOTE — PROGRESS NOTES
[facility] SPECIALTY [facility] Gastroenterology  Gastroenterology Outpatient Follow up  Patient Kandace Yanez   Age 38 y o  Gender female   MRN: [id number]  [facility] [id number]     ASSESSMENT AND PLAN:   Problem List Items Addressed This Visit        Digestive    GERD (gastroesophageal reflux disease) - Primary     Park reports that her reflux is under better control with pantoprazole 40 milligrams in the morning in addition to famotidine 40 milligrams 2 hours before bed  She should continue this regimen  Lifestyle modifications for gastroesophageal reflux disease were discussed and include limiting fried and fatty foods, mints, chocolates, carbonated and caffeinated beverages , and alcohol, etc   Avoid lying down for 2-3 hours after meals  If you have nighttime symptoms consider raising the head of the bed up on 4-6 inch blocks  Pillows typically are not useful  If you are overweight, weight loss will be helpful  Relevant Medications    famotidine (PEPCID) 40 MG tablet    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     Patient was noted to have antral erosions  She had been using Naprosyn at time of endoscopy  Suspect erosions are related to NSAID use  Continue PPI  Continue to limit NSAID use  She may want to consider quitting smoking as this can also lead to ulcers and gastric erosions  Relevant Medications    famotidine (PEPCID) 40 MG tablet       Other    Epigastric abdominal pain     Patient was experiencing epigastric abdominal pain  The exact cause for this is not clear  I suspected was related to reflux and NSAID use  Stop using all NSAIDs including Motrin, Advil, Aleve, ibuprofen Excedrin/etc          Relevant Medications    famotidine (PEPCID) 40 MG tablet    Flatus     Patient does report having a lot of gas and flatus at times  I did suggested she try a lactose-free diet for couple weeks and see how that works out for her    Avoid all artificial sweeteners or anything that is a diet type of beverage  Avoid all soda or anything that contains high fructose corn syrup including iced tea such as Snapple or [place] tea               _____________________________________________________________    HPI:   Park is 38years of age  She presents for follow-up of epigastric abdominal pain  She did undergo an upper endoscopy on May 10, 2021  She was noted to have antral erosions  Since her endoscopy she stop taking Naprosyn and other NSAIDs  She is currently using pantoprazole 40 milligrams once a day and famotidine 40 milligrams 2 hours before bed  This combination has helped her  She has less epigastric pain  Her symptoms are triggered by fried foods or various sauces  She denies any nausea vomiting or early satiety  There has been no unintentional weight loss, she would like to try to lose some weight  She does report lot of flatus and gas  She does drink milk  She will drink soda on occasion  She may also drink [place] tea  Bowel habits are regular denies any diarrhea constipation bleeding or black stools  There is no family history colon cancer colon polyps  She does smoke half a pack of cigarettes per day  She does not drink alcohol  Records reviewed for 10 minutes prior to office visit  07/02/2021 laboratory data  Glucose 109  AST 10  ALT 15  Alk-phos 75  Vitamin-D 31 6 improved from 10 3  CBC normal  TSH 2 17 improved from 4 15  05/10/2021 EGD  Normal duodenum the 2nd portion  Patchy antral erythema, biopsy negative for H pylori  Small 4 mm erosion pre-pyloric region biopsy revealed focal active inflammation and regenerative epithelial changes  Scattered erosions in antrum likely NSAID related  Normal Z-line at 35 cm  Fine series of rings from 25-35 cm status post biopsy distal 3rd and middle 3rd esophagus rule out eosinophilic esophagitis     Biopsies negative for EOE    03/05/2020 ultrasound abdomen,   liver normal   Gallbladder normal       Allergies   Allergen Reactions  No Known Allergies      Other reaction(s): Unknown     Current Outpatient Medications   Medication Sig Dispense Refill    albuterol (PROAIR HFA) 90 mcg/act inhaler Inhale 2 puffs every 6 (six) hours as needed for wheezing 8 5 g 0    diclofenac sodium (VOLTAREN) 1 % Apply 1 application topically 4 (four) times a day as needed      famotidine (PEPCID) 40 MG tablet Take 1 tablet (40 mg total) by mouth daily at bedtime Take 2 hours prior to bed 30 tablet 3    hydrOXYzine HCL (ATARAX) 25 mg tablet Take 1 tablet (25 mg total) by mouth every 6 (six) hours 12 tablet 0    Levonorgestrel 19 5 MCG/DAY IUD 1 each by Intrauterine route      methocarbamol (ROBAXIN) 500 mg tablet Take 1 tablet (500 mg total) by mouth 2 (two) times a day as needed for muscle spasms 20 tablet 0    pantoprazole (PROTONIX) 40 mg tablet Take 40 mg by mouth daily      predniSONE 10 mg tablet Take 6 pills day one, 5 pills day 2, 4 pills day 3, 3 pills day 4, 2 pills day 5, and 1 pill day 6  (Patient not taking: Reported on 2021) 21 tablet 0    sulfamethoxazole-trimethoprim (BACTRIM DS) 800-160 mg per tablet take 1 tablet by mouth twice a day for 3 days (Patient not taking: Reported on 2021)       No current facility-administered medications for this visit       Facility-Administered Medications Ordered in Other Visits   Medication Dose Route Frequency Provider Last Rate Last Admin    Lidocaine Viscous HCl (XYLOCAINE) 2 % mucosal solution    Code/Trauma/Sedation Med Eduardo Carrion, DO   15 mL at 05/10/21 1109     MEDICAL HISTORY:  Past Medical History:   Diagnosis Date    Allergic     Asthma     Cervical disc disorder with radiculopathy of mid-cervical region 2020    Cervical radiculopathy 2020    GERD (gastroesophageal reflux disease)     Herniated disc, cervical     Pinched nerve in shoulder, right     Thyroid nodule      Past Surgical History:   Procedure Laterality Date     SECTION      EPIDURAL BLOCK INJECTION N/A 3/4/2020    Procedure: C7-T1 Interlaminar Epidural Steroid Injection (09619); Surgeon: Rita Vance MD;  Location: [facility];  Service: Pain Management     EPIDURAL BLOCK INJECTION N/A 6/26/2020    Procedure: C7-T1 interlaminar epidural steroid injection;  Surgeon: Rita Vance MD;  Location: [facility];  Service: Pain Management     FL GUIDED NEEDLE PLAC BX/ASP/INJ  3/4/2020    FL GUIDED NEEDLE PLAC BX/ASP/INJ  6/26/2020     Social History     Substance and Sexual Activity   Alcohol Use No     Social History     Substance and Sexual Activity   Drug Use Not Currently    Types: Marijuana     Social History     Tobacco Use   Smoking Status Current Every Day Smoker    Packs/day: 1 00    Types: Cigarettes   Smokeless Tobacco Never Used     Family History   Problem Relation Age of Onset    Diabetes Mother     Heart disease Mother     Bone cancer Father     Diabetes Brother     No Known Problems Daughter     No Known Problems Maternal Grandmother     No Known Problems Paternal Grandmother     No Known Problems Maternal Aunt     No Known Problems Paternal Aunt     No Known Problems Paternal Aunt     Breast cancer Cousin        Objective   Blood pressure 124/80, temperature (!) 97 2 °F (36 2 °C), temperature source Tympanic, resp  rate 16, height 5' 1" (1 549 m), weight 79 3 kg (174 lb 12 8 oz), last menstrual period 08/10/2021  Body mass index is 33 03 kg/m²  PHYSICAL EXAM:   General Appearance: Alert, cooperative, no distress  HEENT: Normocephalic, atraumatic, anicteric  Neck: Supple, symmetrical, trachea midline  Lungs: Clear to auscultation bilaterally; no rales, rhonchi or wheezing; respirations unlabored   Heart: Regular rate and rhythm; no murmur, rub, or gallop  Abdomen: Soft, bowel sounds normal, non-tender, non-distended; no masses, there is no hepatosplenomegaly   No spider angiomas  Genitalia: Deferred   Rectal: Deferred   Extremities: No cyanosis, clubbing or edema   Skin: No jaundice, rashes, or lesions   Lymph nodes: No palpable cervical lymphadenopathy   Lab Results:   Transcribe Orders on 07/02/2021   Component Date Value    WBC 07/02/2021 5 52     RBC 07/02/2021 4 25     Hemoglobin 07/02/2021 13 0     Hematocrit 07/02/2021 39 1     MCV 07/02/2021 92     MCH 07/02/2021 30 6     MCHC 07/02/2021 33 2     RDW 07/02/2021 13 2     MPV 07/02/2021 12 9*    Platelets 41/93/7256 199     nRBC 07/02/2021 0     Neutrophils Relative 07/02/2021 54     Immat GRANS % 07/02/2021 0     Lymphocytes Relative 07/02/2021 35     Monocytes Relative 07/02/2021 8     Eosinophils Relative 07/02/2021 2     Basophils Relative 07/02/2021 1     Neutrophils Absolute 07/02/2021 2 98     Immature Grans Absolute 07/02/2021 0 01     Lymphocytes Absolute 07/02/2021 1 91     Monocytes Absolute 07/02/2021 0 45     Eosinophils Absolute 07/02/2021 0 12     Basophils Absolute 07/02/2021 0 05     Sodium 07/02/2021 139     Potassium 07/02/2021 3 6     Chloride 07/02/2021 110*    CO2 07/02/2021 22     ANION GAP 07/02/2021 7     BUN 07/02/2021 9     Creatinine 07/02/2021 0 61     Glucose, Fasting 07/02/2021 109*    Calcium 07/02/2021 8 8     AST 07/02/2021 10     ALT 07/02/2021 15     Alkaline Phosphatase 07/02/2021 75     Total Protein 07/02/2021 7 3     Albumin 07/02/2021 3 5     Total Bilirubin 07/02/2021 0 39     eGFR 07/02/2021 130     Hemoglobin A1C 07/02/2021 5 5     EAG 07/02/2021 111     TSH 3RD GENERATON 07/02/2021 2 170     Free T4 07/02/2021 0 84     Rheumatoid Factor 07/02/2021 Negative     Clarity, UA 07/02/2021 Clear     Color, UA 07/02/2021 Yellow     Specific Gravity, UA 07/02/2021 1 019     pH, UA 07/02/2021 7 0     Glucose, UA 07/02/2021 Negative     Ketones, UA 07/02/2021 Negative     Blood, UA 07/02/2021 Trace*    Protein, UA 07/02/2021 Negative     Nitrite, UA 07/02/2021 Negative     Bilirubin, UA 07/02/2021 Negative     Urobilinogen, UA 07/02/2021 1 0     Leukocytes, UA 07/02/2021 Negative     WBC, UA 07/02/2021 None Seen     RBC, UA 07/02/2021 4-10*    Hyaline Casts, UA 07/02/2021 None Seen     Bacteria, UA 07/02/2021 None Seen     Epithelial Cells 07/02/2021 Occasional     Vit D, 25-Hydroxy 07/02/2021 31 6     Cholesterol 07/02/2021 164     Triglycerides 07/02/2021 156*    HDL, Direct 07/02/2021 25*    LDL Calculated 07/02/2021 108*    Non-HDL-Chol (CHOL-HDL) 07/02/2021 139      Radiology Results:   No results found    One Jim White DO   08/19/21   Cc:

## 2021-08-19 ENCOUNTER — OFFICE VISIT (OUTPATIENT)
Dept: GASTROENTEROLOGY | Facility: CLINIC | Age: 41
End: 2021-08-19
Payer: COMMERCIAL

## 2021-08-19 ENCOUNTER — TELEPHONE (OUTPATIENT)
Dept: SURGERY | Facility: CLINIC | Age: 41
End: 2021-08-19

## 2021-08-19 VITALS
TEMPERATURE: 97.2 F | WEIGHT: 174.8 LBS | SYSTOLIC BLOOD PRESSURE: 124 MMHG | BODY MASS INDEX: 33 KG/M2 | DIASTOLIC BLOOD PRESSURE: 80 MMHG | HEIGHT: 61 IN | RESPIRATION RATE: 16 BRPM

## 2021-08-19 DIAGNOSIS — K21.9 GASTROESOPHAGEAL REFLUX DISEASE WITHOUT ESOPHAGITIS: Primary | ICD-10-CM

## 2021-08-19 DIAGNOSIS — R14.3 FLATUS: ICD-10-CM

## 2021-08-19 DIAGNOSIS — R10.13 EPIGASTRIC ABDOMINAL PAIN: ICD-10-CM

## 2021-08-19 DIAGNOSIS — K25.9 GASTRIC EROSION DETERMINED BY ENDOSCOPY: ICD-10-CM

## 2021-08-19 PROBLEM — K21.00 GERD WITH ESOPHAGITIS: Status: RESOLVED | Noted: 2020-01-27 | Resolved: 2021-08-19

## 2021-08-19 PROCEDURE — 99214 OFFICE O/P EST MOD 30 MIN: CPT | Performed by: INTERNAL MEDICINE

## 2021-08-19 RX ORDER — FAMOTIDINE 40 MG/1
40 TABLET, FILM COATED ORAL
Qty: 30 TABLET | Refills: 3 | Status: SHIPPED | OUTPATIENT
Start: 2021-08-19

## 2021-08-19 RX ORDER — SULFAMETHOXAZOLE AND TRIMETHOPRIM 800; 160 MG/1; MG/1
TABLET ORAL
COMMUNITY
Start: 2021-08-16 | End: 2021-08-24 | Stop reason: ALTCHOICE

## 2021-08-19 NOTE — ASSESSMENT & PLAN NOTE
Patient was noted to have antral erosions  She had been using Naprosyn at time of endoscopy  Suspect erosions are related to NSAID use  Continue PPI  Continue to limit NSAID use  She may want to consider quitting smoking as this can also lead to ulcers and gastric erosions

## 2021-08-19 NOTE — TELEPHONE ENCOUNTER
While rooming the patient, once seated she began to explain that "the person up front the big one was disgusting " I redirected her statement by moving on to reason for visit, which is when she stated "a follow up, which is the reason I do not know why I need an insurance referral as I have been here before  This should be a doctor to doctor thing, not me having to get it " Advised her as I was the one who called her 2 days prior to advise her of the insurance referral, that I did receive it from her PCP but in the future she is responsible for getting it as they  after a year  She stated "well the lady up front could have said it without screaming at the top of her lungs at me  She does not know who she is talking to  She does not know the kind of day I could be having or does not know me " Which is when I apologized for any confusion or anything she felt was taken a different way   I then redirected her by moving on to her vitals, medication, etc

## 2021-08-19 NOTE — ASSESSMENT & PLAN NOTE
Patient was experiencing epigastric abdominal pain  The exact cause for this is not clear  I suspected was related to reflux and NSAID use    Stop using all NSAIDs including Motrin, Advil, Aleve, ibuprofen Excedrin/etc

## 2021-08-19 NOTE — PATIENT INSTRUCTIONS
GERD (gastroesophageal reflux disease)  Park reports that her reflux is under better control with pantoprazole 40 milligrams in the morning in addition to famotidine 40 milligrams 2 hours before bed  She should continue this regimen  Lifestyle modifications for gastroesophageal reflux disease were discussed and include limiting fried and fatty foods, mints, chocolates, carbonated and caffeinated beverages , and alcohol, etc   Avoid lying down for 2-3 hours after meals  If you have nighttime symptoms consider raising the head of the bed up on 4-6 inch blocks  Pillows typically are not useful  If you are overweight, weight loss will be helpful  Epigastric abdominal pain  Patient was experiencing epigastric abdominal pain  The exact cause for this is not clear  I suspected was related to reflux and NSAID use  Stop using all NSAIDs including Motrin, Advil, Aleve, ibuprofen Excedrin/etc     Flatus  Patient does report having a lot of gas and flatus at times  I did suggested she try a lactose-free diet for couple weeks and see how that works out for her  Avoid all artificial sweeteners or anything that is a diet type of beverage  Avoid all soda or anything that contains high fructose corn syrup including iced tea such as Snapple or [place] tea  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  Patient was noted to have antral erosions  She had been using Naprosyn at time of endoscopy  Suspect erosions are related to NSAID use  Continue PPI  Continue to limit NSAID use  She may want to consider quitting smoking as this can also lead to ulcers and gastric erosions

## 2021-08-19 NOTE — TELEPHONE ENCOUNTER
I go over to help Pierce Camilo enter the Referral and it is hard to read, so I ask who her PCP is  The patient gets angry and screaming stating it is not her f**ramesh job to obtain referrals  I let the pt know we are just trying to enter the referral in the system and cannot read her PCP's name  The patient continues to scream stating we should have given her a sooner appt and she would not need a new referral  I let the pt know we obtain insurance referrals as a courtesy to the pt but in the future she should make sure she obtains one for every specialty she goes to or the insurance will not pay  The patient stated she doesn't have to do sh*t she will go to another **ramesh office

## 2021-08-19 NOTE — ASSESSMENT & PLAN NOTE
Park reports that her reflux is under better control with pantoprazole 40 milligrams in the morning in addition to famotidine 40 milligrams 2 hours before bed  She should continue this regimen  Lifestyle modifications for gastroesophageal reflux disease were discussed and include limiting fried and fatty foods, mints, chocolates, carbonated and caffeinated beverages , and alcohol, etc   Avoid lying down for 2-3 hours after meals  If you have nighttime symptoms consider raising the head of the bed up on 4-6 inch blocks  Pillows typically are not useful  If you are overweight, weight loss will be helpful

## 2021-08-19 NOTE — TELEPHONE ENCOUNTER
While checking in patient I asked patient if she had her referral and who her Doctor was? She replied back to me in a loud voice  " It's not my job to get a referral it's the [address] [place] [address] Street " I asked Barby to come over and help

## 2021-08-19 NOTE — ASSESSMENT & PLAN NOTE
Patient does report having a lot of gas and flatus at times  I did suggested she try a lactose-free diet for couple weeks and see how that works out for her  Avoid all artificial sweeteners or anything that is a diet type of beverage  Avoid all soda or anything that contains high fructose corn syrup including iced tea such as Snapple or [place] tea

## 2021-08-19 NOTE — TELEPHONE ENCOUNTER
Placed call to PCP again as we did not receive it, they stated they would fax it right now to our direct line

## 2021-08-20 ENCOUNTER — OFFICE VISIT (OUTPATIENT)
Dept: OBGYN CLINIC | Facility: MEDICAL CENTER | Age: 41
End: 2021-08-20
Payer: COMMERCIAL

## 2021-08-20 VITALS
HEIGHT: 61 IN | SYSTOLIC BLOOD PRESSURE: 118 MMHG | WEIGHT: 176.6 LBS | BODY MASS INDEX: 33.34 KG/M2 | DIASTOLIC BLOOD PRESSURE: 78 MMHG

## 2021-08-20 DIAGNOSIS — Z09 ENCOUNTER FOR RECHECK OF ABSCESS FOLLOWING INCISION AND DRAINAGE: Primary | ICD-10-CM

## 2021-08-20 DIAGNOSIS — N61.1 ABSCESS OF BREAST, RIGHT: ICD-10-CM

## 2021-08-20 PROCEDURE — 99213 OFFICE O/P EST LOW 20 MIN: CPT | Performed by: ADVANCED PRACTICE MIDWIFE

## 2021-08-23 NOTE — PROGRESS NOTES
[facility] [address] [place] [address]    Assessment/Plan:  No problem-specific Assessment & Plan notes found for this encounter  Diagnoses and all orders for this visit:    Encounter for recheck of abscess following incision and drainage    Abscess of breast, right    - keep area clean and dry  - can shower, dry area, apply Bacitracin and cover with band-aid daily  - RTO Tuesday 8/24    Subjective:   Stacey Christine is a 38 y o  [place] female  CC: wound care    HPI: Park is here for wound care  She has been changing the dressing daily and keeping it clean and dry  She has completed course of 2 antibiotics and is without fever or symptoms of infection  Area is itchy from the tape, but Park denies pain, tenderness, or warmth at wound site  ROS: Review of Systems   Constitutional: Negative for activity change, appetite change, fatigue and fever  Respiratory: Negative for cough and shortness of breath  Cardiovascular: Negative for chest pain, palpitations and leg swelling  Gastrointestinal: Negative for constipation and diarrhea  Genitourinary: Negative for difficulty urinating, frequency, vaginal bleeding, vaginal discharge and vaginal pain  Skin: Positive for wound  Negative for rash  Neurological: Negative for light-headedness and headaches  Psychiatric/Behavioral: The patient is not nervous/anxious  PFSH: The following portions of the patient's history were reviewed and updated as appropriate: allergies, current medications, past family history, past medical history, obstetric history, gynecologic history, past social history, past surgical history and problem list        Objective:  /78 (BP Location: Left arm, Patient Position: Sitting, Cuff Size: Large)   Ht 5' 1" (1 549 m)   Wt 80 1 kg (176 lb 9 6 oz)   LMP 08/10/2021   BMI 33 37 kg/m²    Physical Exam  Constitutional:       Appearance: Normal appearance     Cardiovascular: Rate and Rhythm: Normal rate  Pulses: Normal pulses  Pulmonary:      Effort: Pulmonary effort is normal    Chest:      Breasts:         Right: No swelling, bleeding or mass  Comments: S/P I&D of breast abscess on 8/8  Wound without drainage, redness, or tenderness  It is 3-4 mm in diameter and a depth of 1-2 mm  Granulation tissue filling in and depth decreased  Area around wound cleaned with warm water, tape residue removed  Bacitracin and sterile Telfa applied  Neurological:      Mental Status: She is alert and oriented to person, place, and time     Psychiatric:         Mood and Affect: Mood normal          Behavior: Behavior normal

## 2021-08-24 ENCOUNTER — OFFICE VISIT (OUTPATIENT)
Dept: OBGYN CLINIC | Facility: MEDICAL CENTER | Age: 41
End: 2021-08-24
Payer: COMMERCIAL

## 2021-08-24 VITALS
DIASTOLIC BLOOD PRESSURE: 74 MMHG | SYSTOLIC BLOOD PRESSURE: 122 MMHG | BODY MASS INDEX: 33.66 KG/M2 | HEIGHT: 61 IN | WEIGHT: 178.3 LBS

## 2021-08-24 DIAGNOSIS — N61.1 ABSCESS OF BREAST, RIGHT: Primary | ICD-10-CM

## 2021-08-24 PROBLEM — E66.09 CLASS 1 OBESITY DUE TO EXCESS CALORIES WITHOUT SERIOUS COMORBIDITY WITH BODY MASS INDEX (BMI) OF 33.0 TO 33.9 IN ADULT: Status: ACTIVE | Noted: 2021-08-11

## 2021-08-24 PROBLEM — I10 HYPERTENSION: Status: ACTIVE | Noted: 2021-08-11

## 2021-08-24 PROBLEM — E66.811 CLASS 1 OBESITY DUE TO EXCESS CALORIES WITHOUT SERIOUS COMORBIDITY WITH BODY MASS INDEX (BMI) OF 33.0 TO 33.9 IN ADULT: Status: ACTIVE | Noted: 2021-08-11

## 2021-08-24 PROCEDURE — 99212 OFFICE O/P EST SF 10 MIN: CPT | Performed by: ADVANCED PRACTICE MIDWIFE

## 2021-08-25 NOTE — PROGRESS NOTES
[facility] [address] [place] [address]    Assessment/Plan:  No problem-specific Assessment & Plan notes found for this encounter  Diagnoses and all orders for this visit:    Abscess of breast, right    - resolution of right breast abscess  - may return to routine hygiene and activities  - RTO prn or yearly exam    Subjective:   Oliver Arredondo is a 38 y o   female  CC: follow-up breast I&D    HPI: Park presents today for wound care follow-up  She has been using a band-aid over site and applying Bacitracin 2 times daily  States that she does not have any pain  Feels great  Itching has resolved  Negative fever, negative breast drainage, and negative redness or warmth  ROS: Review of Systems   Constitutional: Negative for activity change, appetite change, chills, fatigue and fever  Respiratory: Negative for cough and shortness of breath  PFSH: The following portions of the patient's history were reviewed and updated as appropriate: allergies, current medications, past family history, past medical history, obstetric history, gynecologic history, past social history, past surgical history and problem list        Objective:  /74 (BP Location: Left arm, Patient Position: Sitting, Cuff Size: Large)   Ht 5' 1" (1 549 m)   Wt 80 9 kg (178 lb 4 8 oz)   LMP 08/10/2021 (Exact Date)   BMI 33 69 kg/m²    Physical Exam  Vitals reviewed  Constitutional:       Appearance: Normal appearance  Cardiovascular:      Rate and Rhythm: Normal rate  Pulses: Normal pulses  Pulmonary:      Effort: Pulmonary effort is normal    Chest:      Breasts:         Right: Normal  No swelling, mass, nipple discharge, skin change or tenderness  Comments: Clean and dry  Negative redness, induration, or depth to wound  Well healed  Neurological:      Mental Status: She is alert and oriented to person, place, and time     Psychiatric:         Mood and Affect: Mood normal          Behavior: Behavior normal

## 2021-10-01 ENCOUNTER — OFFICE VISIT (OUTPATIENT)
Dept: OTOLARYNGOLOGY | Facility: CLINIC | Age: 41
End: 2021-10-01
Payer: COMMERCIAL

## 2021-10-01 VITALS
OXYGEN SATURATION: 95 % | HEIGHT: 61 IN | DIASTOLIC BLOOD PRESSURE: 80 MMHG | TEMPERATURE: 97.6 F | SYSTOLIC BLOOD PRESSURE: 124 MMHG | HEART RATE: 62 BPM | WEIGHT: 179 LBS | BODY MASS INDEX: 33.79 KG/M2

## 2021-10-01 DIAGNOSIS — E04.1 THYROID NODULE: Primary | ICD-10-CM

## 2021-10-01 DIAGNOSIS — E04.2 MULTINODULAR GOITER: ICD-10-CM

## 2021-10-01 PROCEDURE — 99213 OFFICE O/P EST LOW 20 MIN: CPT | Performed by: OTOLARYNGOLOGY

## 2021-11-30 ENCOUNTER — OFFICE VISIT (OUTPATIENT)
Dept: URGENT CARE | Facility: CLINIC | Age: 41
End: 2021-11-30
Payer: COMMERCIAL

## 2021-11-30 VITALS
HEART RATE: 111 BPM | TEMPERATURE: 98 F | OXYGEN SATURATION: 100 % | DIASTOLIC BLOOD PRESSURE: 64 MMHG | SYSTOLIC BLOOD PRESSURE: 108 MMHG

## 2021-11-30 DIAGNOSIS — R05.9 COUGH: ICD-10-CM

## 2021-11-30 DIAGNOSIS — R00.0 TACHYCARDIA: ICD-10-CM

## 2021-11-30 DIAGNOSIS — J06.9 VIRAL URI: Primary | ICD-10-CM

## 2021-11-30 LAB — S PYO AG THROAT QL: NEGATIVE

## 2021-11-30 PROCEDURE — 99213 OFFICE O/P EST LOW 20 MIN: CPT | Performed by: PHYSICIAN ASSISTANT

## 2021-11-30 PROCEDURE — 0241U HB NFCT DS VIR RESP RNA 4 TRGT: CPT | Performed by: PHYSICIAN ASSISTANT

## 2021-11-30 PROCEDURE — S9088 SERVICES PROVIDED IN URGENT: HCPCS | Performed by: PHYSICIAN ASSISTANT

## 2021-11-30 PROCEDURE — 87880 STREP A ASSAY W/OPTIC: CPT | Performed by: PHYSICIAN ASSISTANT

## 2021-11-30 PROCEDURE — 93005 ELECTROCARDIOGRAM TRACING: CPT | Performed by: PHYSICIAN ASSISTANT

## 2021-11-30 PROCEDURE — 87070 CULTURE OTHR SPECIMN AEROBIC: CPT | Performed by: PHYSICIAN ASSISTANT

## 2021-11-30 RX ORDER — PREDNISONE 10 MG/1
TABLET ORAL
Qty: 26 TABLET | Refills: 0 | Status: SHIPPED | OUTPATIENT
Start: 2021-11-30 | End: 2022-04-19

## 2021-12-01 LAB
ATRIAL RATE: 105 BPM
FLUAV RNA RESP QL NAA+PROBE: NEGATIVE
FLUBV RNA RESP QL NAA+PROBE: NEGATIVE
P AXIS: 23 DEGREES
PR INTERVAL: 148 MS
QRS AXIS: 42 DEGREES
QRSD INTERVAL: 84 MS
QT INTERVAL: 344 MS
QTC INTERVAL: 454 MS
RSV RNA RESP QL NAA+PROBE: NEGATIVE
SARS-COV-2 RNA RESP QL NAA+PROBE: NEGATIVE
T WAVE AXIS: 7 DEGREES
VENTRICULAR RATE: 105 BPM

## 2021-12-01 PROCEDURE — 93010 ELECTROCARDIOGRAM REPORT: CPT | Performed by: INTERNAL MEDICINE

## 2021-12-02 LAB — BACTERIA THROAT CULT: NORMAL

## 2022-01-18 NOTE — PROGRESS NOTES
[facility] [address] [place] [address]    ASSESSMENT/PLAN: Clark Ramos is a 42 y o  [id number] who presents for annual gynecologic exam     Encounter for routine gynecologic examination  - Routine well woman exam completed today  - Cervical Cancer Screening: Current ASCCP Guidelines reviewed  Last Pap:2018  Next Pap Due: today  - HPV Vaccination status: Not immunized  - STI screening offered including HIV: not indicated based on hx or requested at time of visit  - Contraceptive counseling discussed  Current contraception: Has Ranny Arts IUD inserted on 1/2017   - Breast Cancer Screening: Last Mammogram 03/19/2021, Script given      Additional problems addressed at this visit:  1  Encntr for gyn exam (general) (routine) w/o abn findings  -     Liquid-based pap, screening  -     Mammo screening bilateral w 3d & cad; Future; Expected date: 01/20/2022    2  Encounter for screening mammogram for malignant neoplasm of breast  -     Mammo screening bilateral w 3d & cad; Future; Expected date: 01/20/2022    3  Screening for malignant neoplasm of cervix  -     Liquid-based pap, screening          CC: Annual Gynecologic Examination    HPI: Clark Ramos is a 42 y o  [id number] who presents for annual gynecologic examination  Park presents for gyn exam today  1/1/2022 LMP  Menses is light to moderate regular monthly menses  Using IUD as birth control method  Happy with method  Last pap smear No hx abnormal pap smear  3/2021 mammogram- had follow-up ultrasound- wnl  8-9 hrs sleep per day  2-3 servings of calcium rich food per day  No routine exercise per week  1-2 servings of caffeine per day  Does not perform SBE monthly  Concerns IUD change  Currently has a Liletta that has been in place since around 1/2017  Reviewed current guidelines for Katalina Glass now approved for 6 yrs  We reviewed all birth control options and at this time is considering having another IUD   Information on Mirena given for her to review  If she wants a Liletta she will contact office and request  Had right breast abscess that required packing- last year  Denies pain, tenderness,or discomfort  Needs to schedule mammogram       The following portions of the patient's history were reviewed and updated as appropriate: allergies, current medications, past family history, past medical history, obstetric history, gynecologic history, past social history, past surgical history and problem list     Review of Systems   Constitutional: Negative for activity change, appetite change, fatigue and fever  Respiratory: Negative for cough and shortness of breath  Cardiovascular: Negative for chest pain, palpitations and leg swelling  Gastrointestinal: Negative for constipation and diarrhea  Genitourinary: Negative for difficulty urinating, frequency, menstrual problem, vaginal bleeding, vaginal discharge and vaginal pain  Neurological: Negative for light-headedness and headaches  Psychiatric/Behavioral: The patient is not nervous/anxious  Objective:  /78 (BP Location: Right arm, Patient Position: Sitting, Cuff Size: Large)   Ht 5' 1" (1 549 m)   Wt 81 6 kg (179 lb 12 8 oz)   LMP 01/01/2022 (Approximate)   BMI 33 97 kg/m²    Physical Exam  Vitals reviewed  Constitutional:       Appearance: Normal appearance  HENT:      Head: Normocephalic  Neck:      Thyroid: No thyroid mass or thyroid tenderness  Cardiovascular:      Rate and Rhythm: Normal rate and regular rhythm  Heart sounds: Normal heart sounds  Pulmonary:      Effort: Pulmonary effort is normal       Breath sounds: Normal breath sounds  Chest:   Breasts:      Right: No mass, nipple discharge, skin change, tenderness or axillary adenopathy  Left: No mass, nipple discharge, skin change, tenderness or axillary adenopathy  Comments: Area of abscess, healed  Negative mass or firmness   Questionable onset of inclusion cyst starting next to scar area  Abdominal:      General: There is no distension  Palpations: There is no mass  Tenderness: There is no abdominal tenderness  There is no guarding  Genitourinary:     General: Normal vulva  Exam position: Lithotomy position  Labia:         Right: No tenderness or lesion  Left: No tenderness or lesion  Vagina: No vaginal discharge, tenderness, bleeding or lesions  Cervix: No discharge, lesion, erythema or cervical bleeding  Uterus: Normal  Not enlarged and not tender  Adnexa:         Right: No mass, tenderness or fullness  Left: No mass, tenderness or fullness  Comments: IUD string visible  Musculoskeletal:      Cervical back: Normal range of motion  Lymphadenopathy:      Upper Body:      Right upper body: No axillary adenopathy  Left upper body: No axillary adenopathy  Skin:     General: Skin is warm and dry  Neurological:      Mental Status: She is alert     Psychiatric:         Mood and Affect: Mood normal          Behavior: Behavior normal          Judgment: Judgment normal              Eryn Singer CNM  [facility]  01/20/22 1:33 PM

## 2022-01-20 ENCOUNTER — ANNUAL EXAM (OUTPATIENT)
Dept: OBGYN CLINIC | Facility: MEDICAL CENTER | Age: 42
End: 2022-01-20
Payer: COMMERCIAL

## 2022-01-20 VITALS
DIASTOLIC BLOOD PRESSURE: 78 MMHG | HEIGHT: 61 IN | SYSTOLIC BLOOD PRESSURE: 132 MMHG | BODY MASS INDEX: 33.95 KG/M2 | WEIGHT: 179.8 LBS

## 2022-01-20 DIAGNOSIS — Z12.31 ENCOUNTER FOR SCREENING MAMMOGRAM FOR MALIGNANT NEOPLASM OF BREAST: ICD-10-CM

## 2022-01-20 DIAGNOSIS — Z01.419 ENCNTR FOR GYN EXAM (GENERAL) (ROUTINE) W/O ABN FINDINGS: Primary | ICD-10-CM

## 2022-01-20 DIAGNOSIS — Z12.4 SCREENING FOR MALIGNANT NEOPLASM OF CERVIX: ICD-10-CM

## 2022-01-20 PROCEDURE — G0145 SCR C/V CYTO,THINLAYER,RESCR: HCPCS | Performed by: ADVANCED PRACTICE MIDWIFE

## 2022-01-20 PROCEDURE — 99396 PREV VISIT EST AGE 40-64: CPT | Performed by: ADVANCED PRACTICE MIDWIFE

## 2022-01-20 PROCEDURE — G0476 HPV COMBO ASSAY CA SCREEN: HCPCS | Performed by: ADVANCED PRACTICE MIDWIFE

## 2022-01-20 RX ORDER — CELECOXIB 200 MG/1
CAPSULE ORAL
COMMUNITY
Start: 2021-12-13

## 2022-01-21 ENCOUNTER — TELEPHONE (OUTPATIENT)
Dept: OBGYN CLINIC | Facility: MEDICAL CENTER | Age: 42
End: 2022-01-21

## 2022-01-21 NOTE — TELEPHONE ENCOUNTER
Patient had a yearly appt with Jaiden aCrias on 01/20/22 and they spoke about Samson Mohair IUD and would like to see if we can order the iud in office  Please review when you get a chance  Thank you

## 2022-01-22 LAB
HPV HR 12 DNA CVX QL NAA+PROBE: NEGATIVE
HPV16 DNA CVX QL NAA+PROBE: NEGATIVE
HPV18 DNA CVX QL NAA+PROBE: NEGATIVE

## 2022-01-26 LAB
LAB AP GYN PRIMARY INTERPRETATION: NORMAL
Lab: NORMAL

## 2022-02-01 ENCOUNTER — TELEPHONE (OUTPATIENT)
Dept: OBGYN CLINIC | Facility: MEDICAL CENTER | Age: 42
End: 2022-02-01

## 2022-02-01 NOTE — TELEPHONE ENCOUNTER
Spoke Mathew PARK @ [employer]  PA is not required for insertion or removal of IUD [facility]  Ref# [id number]

## 2022-02-01 NOTE — TELEPHONE ENCOUNTER
----- Message from [facility] sent at 2/1/2022  8:23 AM EST -----  Regarding: IUD Fernando PA Needed  Pt has state ins can you check PA please  Thank you

## 2022-02-07 ENCOUNTER — TELEPHONE (OUTPATIENT)
Dept: SURGERY | Facility: CLINIC | Age: 42
End: 2022-02-07

## 2022-02-07 NOTE — TELEPHONE ENCOUNTER
Placed call to patient and left a voicemail to return our call, patient is due for a follow up visit with Dr Malia Dumont as of 2/16/22

## 2022-02-17 ENCOUNTER — PROCEDURE VISIT (OUTPATIENT)
Dept: OBGYN CLINIC | Facility: MEDICAL CENTER | Age: 42
End: 2022-02-17
Payer: COMMERCIAL

## 2022-02-17 VITALS
BODY MASS INDEX: 34.02 KG/M2 | DIASTOLIC BLOOD PRESSURE: 76 MMHG | HEIGHT: 61 IN | WEIGHT: 180.2 LBS | SYSTOLIC BLOOD PRESSURE: 122 MMHG

## 2022-02-17 DIAGNOSIS — Z30.430 ENCOUNTER FOR IUD INSERTION: ICD-10-CM

## 2022-02-17 DIAGNOSIS — Z30.432 ENCOUNTER FOR IUD REMOVAL: Primary | ICD-10-CM

## 2022-02-17 LAB — SL AMB POCT URINE HCG: NEGATIVE

## 2022-02-17 PROCEDURE — 81025 URINE PREGNANCY TEST: CPT | Performed by: ADVANCED PRACTICE MIDWIFE

## 2022-02-17 RX ORDER — MISOPROSTOL 200 UG/1
TABLET ORAL
Qty: 2 TABLET | Refills: 0 | Status: SHIPPED | OUTPATIENT
Start: 2022-02-17 | End: 2022-04-19

## 2022-02-17 RX ORDER — MISOPROSTOL 100 UG/1
TABLET ORAL
Qty: 2 TABLET | Refills: 0 | Status: SHIPPED | OUTPATIENT
Start: 2022-02-17 | End: 2022-02-17 | Stop reason: DRUGHIGH

## 2022-02-17 NOTE — PROGRESS NOTES
Iud removal    Date/Time: 2/17/2022 8:43 AM  Performed by: Veena Rosas CNM  Authorized by: Veena Rosas CNM   Universal Protocol:  Consent: Written consent obtained  Risks and benefits: risks, benefits and alternatives were discussed  Consent given by: patient  Time out: Immediately prior to procedure a "time out" was called to verify the correct patient, procedure, equipment, support staff and site/side marked as required  Patient understanding: patient states understanding of the procedure being performed  Patient consent: the patient's understanding of the procedure matches consent given  Procedure consent: procedure consent matches procedure scheduled  Test results: test results available and properly labeled  Site marked: the operative site was marked  Required items: required blood products, implants, devices, and special equipment available  Patient identity confirmed: verbally with patient      Procedure:     Removed with no complications: yes (Moderate resistance with removal  )    Comments:      Park is very anxious about removal/insertion and pain of procedure  Counseled on removal and reinsertion, procedure for insertion reviewed  Iud insertions    Date/Time: 2/17/2022 8:43 AM  Performed by: Veena Rosas CNM  Authorized by: Veena Rosas CNM   Universal Protocol:  Consent: Written consent obtained  Risks and benefits: risks, benefits and alternatives were discussed  Consent given by: patient  Time out: Immediately prior to procedure a "time out" was called to verify the correct patient, procedure, equipment, support staff and site/side marked as required    Patient understanding: patient states understanding of the procedure being performed  Patient consent: the patient's understanding of the procedure matches consent given  Procedure consent: procedure consent matches procedure scheduled  Relevant documents: relevant documents present and verified  Test results: test results available and properly labeled  Required items: required blood products, implants, devices, and special equipment available  Patient identity confirmed: verbally with patient        Procedure:     Pelvic exam performed: yes      Negative urine pregnancy test: yes      Cervix cleaned and prepped: yes      Speculum placed in vagina: yes      Tenaculum applied to cervix: yes      IUD inserted with no complications: no (Unable to pass sound past inner os  Able to pass small dilator once thru inner os, next size did not pass with multiple attempts and repositioning of  speculum   )    Comments:      Reviewed options with patient  Park is concerned about pain related to insertion and would like to discuss OCP use  She is undecided  We discussed using cytotec and having her take 1 the night before insertion and 1 the morning of insertion  She will not be sexually active for over a month and does not need birth control in the interium  Will schedule for reinsertion in the near future and will call if she changes her mind

## 2022-03-10 ENCOUNTER — PROCEDURE VISIT (OUTPATIENT)
Dept: OBGYN CLINIC | Facility: MEDICAL CENTER | Age: 42
End: 2022-03-10
Payer: COMMERCIAL

## 2022-03-10 VITALS
BODY MASS INDEX: 33.99 KG/M2 | HEIGHT: 61 IN | DIASTOLIC BLOOD PRESSURE: 80 MMHG | WEIGHT: 180 LBS | SYSTOLIC BLOOD PRESSURE: 135 MMHG

## 2022-03-10 DIAGNOSIS — Z30.430 ENCOUNTER FOR IUD INSERTION: Primary | ICD-10-CM

## 2022-03-10 LAB — SL AMB POCT URINE HCG: NEGATIVE

## 2022-03-10 PROCEDURE — 81025 URINE PREGNANCY TEST: CPT | Performed by: ADVANCED PRACTICE MIDWIFE

## 2022-03-10 PROCEDURE — 58300 INSERT INTRAUTERINE DEVICE: CPT | Performed by: ADVANCED PRACTICE MIDWIFE

## 2022-03-10 NOTE — PROGRESS NOTES
Iud insertions    Date/Time: 3/10/2022 9:10 AM  Performed by: Stanton Estrada CNM  Authorized by: Stanton Estrada CNM   [place] Protocol:  Procedure performed by: (Dr Langley Harada verified placement with ultrasound)  Consent: Written consent obtained  Risks and benefits: risks, benefits and alternatives were discussed  Consent given by: patient  Time out: Immediately prior to procedure a "time out" was called to verify the correct patient, procedure, equipment, support staff and site/side marked as required  Patient understanding: patient states understanding of the procedure being performed  Patient consent: the patient's understanding of the procedure matches consent given  Procedure consent: procedure consent matches procedure scheduled  Relevant documents: relevant documents present and verified  Test results: test results available and properly labeled  Radiology Images displayed and confirmed  If images not available, report reviewed: imaging studies available  Required items: required blood products, implants, devices, and special equipment available  Patient identity confirmed: verbally with patient        Procedure:     Pelvic exam performed: yes      Negative urine pregnancy test: yes      Cervix cleaned and prepped: yes      Speculum placed in vagina: yes      Tenaculum applied to cervix: yes      Uterus sounded: yes      Uterus sound depth (cm):  9    IUD inserted with no complications: yes      IUD type: Cindra Pardon  Strings trimmed: yes    Post-procedure:     Patient tolerated procedure well: yes      Patient will follow up after next period: Patient will follow-up in 1 month  Comments:      Reviewed placement of IUD, risk/benefit and risks of bleeding , perforation, cramping, pain, and infection  Questions asked and answered  Graves speculum inserted and tenaculum placed on cervix, anteverted uterus  3 attempts to dilate cervix made and final attempt successful  Liletta placed and strings trimmed    Shanice Lobe present and confirmed fundal placement with ultrasound

## 2022-03-21 ENCOUNTER — HOSPITAL ENCOUNTER (OUTPATIENT)
Dept: MAMMOGRAPHY | Facility: HOSPITAL | Age: 42
Discharge: HOME | End: 2022-03-21
Payer: COMMERCIAL

## 2022-03-21 VITALS — HEIGHT: 61 IN | WEIGHT: 180 LBS | BODY MASS INDEX: 33.99 KG/M2

## 2022-03-21 DIAGNOSIS — Z12.31 ENCOUNTER FOR SCREENING MAMMOGRAM FOR MALIGNANT NEOPLASM OF BREAST: ICD-10-CM

## 2022-03-21 DIAGNOSIS — Z01.419 ENCNTR FOR GYN EXAM (GENERAL) (ROUTINE) W/O ABN FINDINGS: ICD-10-CM

## 2022-03-21 PROCEDURE — 77067 SCR MAMMO BI INCL CAD: CPT

## 2022-03-21 PROCEDURE — 77063 BREAST TOMOSYNTHESIS BI: CPT

## 2022-04-18 PROBLEM — Z30.431 IUD CHECK UP: Status: ACTIVE | Noted: 2022-04-18

## 2022-04-18 NOTE — PROGRESS NOTES
Assessment/Plan:      Diagnoses and all orders for this visit:    IUD check up    Other orders  -     varenicline (CHANTIX DENIS) 0 5 MG X 11 & 1 MG X 42 tablet; Use as directed  Give with meals and with a full glass of water   -     Levonorgestrel (Liletta, 52 MG,) 19 5 MCG/DAY IUD IUD; 1 each by Intrauterine route once      - reviewed with patient Ceci Siegel is effective for 6 years  Reviewed common side effects including abnormal vaginal bleeding  Written information provided   - signs and symptoms to report reviewed     RTO 2023 for annual exam     Subjective:     Patient ID: Montse Pedersen is a 42 y o  female  HPI  here for IUD string check  Lysle Glenmont IUD removed and reinserted 3/10/22 effective through 3/10/28  LMP 22  Has had irregular spotting  Has not had intercourse  Is satisfied and desires to continue  Last pap 22 NILM/ HR HPV(-)  Last mammogram 3/19/21 birads 1      Review of Systems   Constitutional: Negative for chills, fatigue and fever  Respiratory: Negative  Cardiovascular: Negative  Genitourinary: Positive for vaginal bleeding  Negative for decreased urine volume, difficulty urinating, dyspareunia, dysuria, enuresis, flank pain, frequency, genital sores, hematuria, menstrual problem, pelvic pain, urgency, vaginal discharge and vaginal pain  Neurological: Negative for light-headedness  Objective:  /80   Ht 5' 1" (1 549 m)   Wt 80 7 kg (178 lb)   BMI 33 63 kg/m²      Physical Exam  Vitals reviewed  Constitutional:       Appearance: Normal appearance  Genitourinary:           Comments: IUD string approximately 2 cm   Neurological:      Mental Status: She is alert and oriented to person, place, and time     Psychiatric:         Mood and Affect: Mood normal          Behavior: Behavior normal

## 2022-04-18 NOTE — PATIENT INSTRUCTIONS
Thank you for visiting [facility]  You may be invited to complete a survey about you visit  Your responses will help us improve care we provide  A 10 means the care you received at your visit met your expectations  If you are unable to give a 10 please list reasons so we can work on improving your patient experience  Levonorgestrel (Into the uterus)   Levonorgestrel (szi-zqu-tne-CAMERON-trel)  Prevents pregnancy and treats heavy menstrual bleeding  This is an intrauterine device (IUD), which is a reversible form of birth control  This IUD slowly releases levonorgestrel, a hormone  Brand Name(s): Olivia Pont, Mirena, [place]   There may be other brand names for this medicine  When This Medicine Should Not Be Used: This device is not right for everyone  Do not use it if you had an allergic reaction to levonorgestrel, silicone, polyethylene, silica, barium sulfate or iron oxide, or if you are pregnant  How to Use This Medicine:   Device  · A nurse or other trained health professional will give you this medicine  · The IUD is usually inserted by your doctor during your monthly period  You will need to see your doctor 4 to 6 weeks after the IUD is placed and then once a year  · Your IUD has a string or "tail" that is made of plastic thread  About one or two inches of this string hangs into your vagina  You cannot see this string, and it will not cause problems when you have sex  Check your IUD after each monthly period  You may not be protected against pregnancy if you cannot feel the string or if you feel plastic  Do the following to check the placement of your IUD:  ? Wash your hands with soap and warm water  Dry them with a clean towel  ? Bend your knees and squat low to the ground  ? Gently put your index finger high inside your vagina  The cervix is at the top of the vagina  Find the IUD string coming from your cervix  Never pull on the string   You should not be able to feel the plastic of the IUD itself  Wash your hands after you are done checking your IUD string  · Your doctor will need to remove your IUD after 3 years for Regino, after 5 years for [facility], after 6 years for WIEDEN, or after 7 years for Mirena®  You will also need to have it replaced if it comes out of your uterus  If you are using Mirena® or Liletta® and want to stop, your doctor can remove it at any time  However, you may become pregnant as soon as [place], Mirena®, or Regino is removed or if you have intercourse the week before WIEDEN is removed  Use another form of birth control or have a new IUD inserted to keep from getting pregnant  Drugs and Foods to Avoid:   Ask your doctor or pharmacist before using any other medicine, including over-the-counter medicines, vitamins, and herbal products  · Some medicines can affect how this device works  Tell your doctor if you are using a blood thinner (including warfarin)  Warnings While Using This Medicine:   · Tell your doctor if you have had any problems, infections, or other conditions that affected your reproductive system  There are many problems that could make an IUD a bad choice for you, including if you have fibroids, unexplained bleeding, a uterus that has an unusual shape, a recent infection, a history of pelvic inflammatory disease, an abnormal Pap test, ectopic pregnancy, cancer or suspected cancer, or an existing IUD  · Tell your doctor if you are breastfeeding, or if you had a baby, miscarriage, or  in the past 3 months  Tell your doctor if you have liver disease (including tumor or cancer), heart disease, breast cancer, heart or blood circulation problems, migraine, high blood pressure, or a history of heart valve problems, blood clotting problems, stroke, or heart attack  Tell your doctor if you have problems with your immune system or have had surgery on your female organs (especially fallopian tubes)    · There is a small chance that you could get pregnant when using an IUD, just as there is with any birth control  If you get pregnant, your doctor may remove your IUD to lower the risk of miscarriage or other problems  · This medicine may cause the following problems:  ? Increased risk of ectopic pregnancy (pregnancy outside the uterus)  ? Increased risk of serious infections, including sepsis  ? Increased risk of pelvic inflammatory disease (PID) or endometritis  ? Perforation (hole in the wall of your uterus), which can damage other organs  ? Increased risk for ovarian cysts  ? Increased risk of cancer of the breast, uterus, or cervix  ? Increased risk of high blood pressure, stroke, heart attack, or clotting problems  ? Jaundice (yellow skin or eyes)  · You might have some spotting and cramping during the first weeks after the IUD has been inserted  These symptoms should decrease or go away within a few weeks up to 6 months  · You could have less bleeding or even stop having periods by the end of the first year  Call your doctor if you have a change from your regular bleeding pattern after you have had your IUD for awhile, including more bleeding or if you miss a period (and you were having periods even with your IUD)  · An IUD can slip partly or all the way out of your uterus  If this happens, use condoms or another form of birth control, and call your doctor right away  · This IUD will not protect you from HIV/AIDS or other sexually transmitted diseases  · If you have the [address] [place] [address] or [facility], tell your doctor before you have an MRI test   · Your doctor will check your progress and the effects of this medicine at regular visits  Keep all appointments    Possible Side Effects While Using This Medicine:   Call your doctor right away if you notice any of these side effects:  · Allergic reaction: Itching or hives, swelling in your face or hands, swelling or tingling in your mouth or throat, chest tightness, trouble breathing  · Bloating, stomach or pelvic pain, spasm, tenderness, or cramping that is sudden or severe  · Chest pain, problems with speech or walking, numbness or weakness in your arm or leg or on one side of your body  · Heavy bleeding from your vagina  · Pain during sex, or if your partner feels the hard plastic of the IUD during sex  · Severe headache, vision changes  · Unusual bleeding, bruising, or weakness  · Vaginal discharge that has a bad smell, fever, chills, sores on your genitals  · Yellow skin or eyes  If you notice these less serious side effects, talk with your doctor:   · Acne, dandruff, oily skin or other skin changes  · Breast pain or discomfort  · Change in bleeding pattern after the first few months  · Dizziness or lightheadedness after IUD is placed  · Mild itching around your vagina and genitals  · Weight gain  If you notice other side effects that you think are caused by this medicine, tell your doctor  Call your doctor for medical advice about side effects  You may report side effects to FDA at 9-557-FDA-0692    © Copyright [facility] [facility] 2022 Information is for End User's use only and may not be sold, redistributed or otherwise used for commercial purposes  The above information is an  only  It is not intended as medical advice for individual conditions or treatments  Talk to your doctor, nurse or pharmacist before following any medical regimen to see if it is safe and effective for you

## 2022-04-19 ENCOUNTER — OFFICE VISIT (OUTPATIENT)
Dept: OBGYN CLINIC | Facility: MEDICAL CENTER | Age: 42
End: 2022-04-19
Payer: COMMERCIAL

## 2022-04-19 VITALS
SYSTOLIC BLOOD PRESSURE: 138 MMHG | DIASTOLIC BLOOD PRESSURE: 80 MMHG | HEIGHT: 61 IN | WEIGHT: 178 LBS | BODY MASS INDEX: 33.61 KG/M2

## 2022-04-19 DIAGNOSIS — Z30.431 IUD CHECK UP: Primary | ICD-10-CM

## 2022-04-19 PROBLEM — Z72.0 TOBACCO ABUSE: Status: ACTIVE | Noted: 2018-06-28

## 2022-04-19 PROBLEM — N61.1 BREAST ABSCESS: Status: RESOLVED | Noted: 2021-08-13 | Resolved: 2022-04-19

## 2022-04-19 PROCEDURE — 99212 OFFICE O/P EST SF 10 MIN: CPT | Performed by: NURSE PRACTITIONER

## 2022-04-19 RX ORDER — VARENICLINE TARTRATE 25 MG
KIT ORAL
COMMUNITY
Start: 2022-04-14 | End: 2022-05-14

## 2022-08-29 ENCOUNTER — OFFICE VISIT (OUTPATIENT)
Dept: URGENT CARE | Facility: CLINIC | Age: 42
End: 2022-08-29
Payer: COMMERCIAL

## 2022-08-29 VITALS
HEIGHT: 61 IN | OXYGEN SATURATION: 99 % | SYSTOLIC BLOOD PRESSURE: 143 MMHG | RESPIRATION RATE: 18 BRPM | HEART RATE: 84 BPM | DIASTOLIC BLOOD PRESSURE: 88 MMHG | TEMPERATURE: 97.4 F | BODY MASS INDEX: 32.1 KG/M2 | WEIGHT: 170 LBS

## 2022-08-29 DIAGNOSIS — R05.1 ACUTE COUGH: Primary | ICD-10-CM

## 2022-08-29 DIAGNOSIS — Z87.09 HISTORY OF ASTHMA: ICD-10-CM

## 2022-08-29 PROCEDURE — S9088 SERVICES PROVIDED IN URGENT: HCPCS

## 2022-08-29 PROCEDURE — 99213 OFFICE O/P EST LOW 20 MIN: CPT

## 2022-08-29 RX ORDER — PREDNISONE 50 MG/1
50 TABLET ORAL DAILY
Qty: 5 TABLET | Refills: 0 | Status: SHIPPED | OUTPATIENT
Start: 2022-08-29 | End: 2022-09-03

## 2022-08-29 RX ORDER — BENZONATATE 200 MG/1
200 CAPSULE ORAL 3 TIMES DAILY PRN
Qty: 20 CAPSULE | Refills: 0 | Status: SHIPPED | OUTPATIENT
Start: 2022-08-29

## 2022-08-29 NOTE — PATIENT INSTRUCTIONS
Take steroids as directed  Recommend to take them in the morning and with food  Use benzonatate as directed as needed for cough  Acetaminophen OTC for pain  Continue to use your albuterol as previously prescribed  PCP follow-up in 3-5 days  Proceed to the ER if symptoms worsen

## 2022-08-29 NOTE — PROGRESS NOTES
[facility]        NAME: Candelaria Vivar is a 42 y o  female  : 1980    MRN: [id number]  DATE: 2022  TIME: 8:41 AM      Assessment and Plan     Acute cough [R05 1]  1  Acute cough  benzonatate (TESSALON) 200 MG capsule   2  History of asthma  predniSONE 50 mg tablet    benzonatate (TESSALON) 200 MG capsule     Offered covid PCR- declined  Patient Instructions     Take steroids as directed  Recommend to take them in the morning and with food  Use benzonatate as directed as needed for cough  Acetaminophen OTC for pain  Continue to use your albuterol as previously prescribed  PCP follow-up in 3-5 days  Proceed to the ER if symptoms worsen  Chief Complaint     Chief Complaint   Patient presents with    Cough     X2 days, hx of asthma         History of Present Illness     Patient is a 69-year-old female who presents runny nose and nasal congestion for five days  Reports dry cough that started two days ago  Reports asthma history- states she normally does not have to use her inhaler but has been having to use it the past two days  Denies wheezing at this time  Reports SOB with exertion with intermittent chest tightness  Denies fever or chills  Denies n/v/d  Denies DM history  Denies chance of pregnancy  Denies known sick contacts  States she did do an at home covid test five days ago that was negative  States she does have an inhaler at home to use  Review of Systems     Review of Systems   Constitutional: Negative for chills and fever  HENT: Positive for congestion, rhinorrhea and sore throat (tickle in her throat)  Respiratory: Positive for cough, chest tightness and shortness of breath  Gastrointestinal: Negative for diarrhea, nausea and vomiting  All other systems reviewed and are negative          Current Medications       Current Outpatient Medications:     benzonatate (TESSALON) 200 MG capsule, Take 1 capsule (200 mg total) by mouth 3 (three) times a day as needed for cough, Disp: 20 capsule, Rfl: 0    predniSONE 50 mg tablet, Take 1 tablet (50 mg total) by mouth daily for 5 days, Disp: 5 tablet, Rfl: 0    albuterol (PROAIR HFA) 90 mcg/act inhaler, Inhale 2 puffs every 6 (six) hours as needed for wheezing, Disp: 8 5 g, Rfl: 0    celecoxib (CeleBREX) 200 mg capsule, take 1 capsule by mouth twice a day if needed for mild pain, Disp: , Rfl:     famotidine (PEPCID) 40 MG tablet, Take 1 tablet (40 mg total) by mouth daily at bedtime Take 2 hours prior to bed, Disp: 30 tablet, Rfl: 3    hydrOXYzine HCL (ATARAX) 25 mg tablet, Take 1 tablet (25 mg total) by mouth every 6 (six) hours, Disp: 12 tablet, Rfl: 0    Levonorgestrel (Liletta, 52 MG,) 19 5 MCG/DAY IUD IUD, 1 each by Intrauterine route once, Disp: , Rfl:     methocarbamol (ROBAXIN) 500 mg tablet, Take 1 tablet (500 mg total) by mouth 2 (two) times a day as needed for muscle spasms, Disp: 20 tablet, Rfl: 0    pantoprazole (PROTONIX) 40 mg tablet, Take 40 mg by mouth daily, Disp: , Rfl:     varenicline (CHANTIX DENIS) 0 5 MG X 11 & 1 MG X 42 tablet, Use as directed  Give with meals and with a full glass of water , Disp: , Rfl:   No current facility-administered medications for this visit      Facility-Administered Medications Ordered in Other Visits:     Lidocaine Viscous HCl (XYLOCAINE) 2 % mucosal solution, , , Code/Trauma/Sedation Med, Shayan Glasgow, , 15 mL at 05/10/21 1109    Current Allergies     Allergies as of 08/29/2022 - Reviewed 08/29/2022   Allergen Reaction Noted    No known allergies  12/16/2016              The following portions of the patient's history were reviewed and updated as appropriate: allergies, current medications, past family history, past medical history, past social history, past surgical history and problem list      Past Medical History:   Diagnosis Date    Allergic     Asthma     Cervical disc disorder with radiculopathy of mid-cervical region 1/6/2020    Cervical radiculopathy 2020    GERD (gastroesophageal reflux disease)     Herniated disc, cervical     Pinched nerve in shoulder, right     Thyroid nodule        Past Surgical History:   Procedure Laterality Date     SECTION      EPIDURAL BLOCK INJECTION N/A 3/4/2020    Procedure: C7-T1 Interlaminar Epidural Steroid Injection (70982); Surgeon: Ginna Ambrosio MD;  Location: [facility];  Service: Pain Management     EPIDURAL BLOCK INJECTION N/A 2020    Procedure: C7-T1 interlaminar epidural steroid injection;  Surgeon: Ginna Ambrosio MD;  Location: [facility];  Service: Pain Management     FL GUIDED NEEDLE PLAC BX/ASP/INJ  3/4/2020    FL GUIDED NEEDLE PLAC BX/ASP/INJ  2020       Family History   Problem Relation Age of Onset    Diabetes Mother     Heart disease Mother     Bone cancer Father     Diabetes Brother     No Known Problems Daughter     No Known Problems Maternal Grandmother     No Known Problems Paternal Grandmother     No Known Problems Maternal Aunt     No Known Problems Paternal Aunt     No Known Problems Paternal Aunt     Breast cancer Cousin          Medications have been verified  Objective     /88   Pulse 84   Temp (!) 97 4 °F (36 3 °C)   Resp 18   Ht 5' 1" (1 549 m)   Wt 77 1 kg (170 lb)   SpO2 99%   BMI 32 12 kg/m²   No LMP recorded  Patient has had an implant  Physical Exam     Physical Exam  Vitals and nursing note reviewed  Constitutional:       General: She is awake  She is not in acute distress  Appearance: Normal appearance  She is not ill-appearing, toxic-appearing or diaphoretic  HENT:      Right Ear: Tympanic membrane, ear canal and external ear normal  There is no impacted cerumen  Tympanic membrane is not injected or erythematous  Left Ear: Tympanic membrane, ear canal and external ear normal  There is no impacted cerumen  Tympanic membrane is not injected or erythematous        Nose: No congestion or rhinorrhea  Right Sinus: No maxillary sinus tenderness or frontal sinus tenderness  Left Sinus: No maxillary sinus tenderness or frontal sinus tenderness  Mouth/Throat:      Lips: Pink  Mouth: Mucous membranes are moist       Pharynx: Oropharynx is clear  Uvula midline  No pharyngeal swelling, oropharyngeal exudate, posterior oropharyngeal erythema or uvula swelling  Tonsils: No tonsillar exudate  1+ on the right  1+ on the left  Cardiovascular:      Rate and Rhythm: Normal rate  Pulses: Normal pulses  Heart sounds: Normal heart sounds, S1 normal and S2 normal  No murmur heard  Pulmonary:      Effort: Pulmonary effort is normal  No respiratory distress  Breath sounds: Normal breath sounds and air entry  No stridor  No wheezing, rhonchi or rales  Comments: Dry cough noted throughout examination  Musculoskeletal:      Cervical back: Neck supple  Lymphadenopathy:      Cervical: No cervical adenopathy  Skin:     General: Skin is warm  Capillary Refill: Capillary refill takes less than 2 seconds  Neurological:      Mental Status: She is alert  Psychiatric:         Mood and Affect: Mood normal          Behavior: Behavior normal          Thought Content:  Thought content normal          Judgment: Judgment normal

## 2022-10-03 ENCOUNTER — TELEPHONE (OUTPATIENT)
Dept: OTOLARYNGOLOGY | Facility: CLINIC | Age: 42
End: 2022-10-03

## 2022-10-03 NOTE — TELEPHONE ENCOUNTER
I called and spoke with the patient and explained to her that we have to cancel her appt on Friday due to dr Sarah Urbina not being in the office  You also need to have your US scheduled before we can put you back on his schedule  Patient quite didn't understand why we were cancelling appt  I tried to explain that we need to have US done first and time for radiology to read it  As soon as this is scheduled we can go ahead and schedule follow up appt  I have provided central scheduling phone number to the patient  Patient will schedule US thyroid  Patient had hung the phone up on me

## 2022-10-07 ENCOUNTER — HOSPITAL ENCOUNTER (OUTPATIENT)
Dept: ULTRASOUND IMAGING | Facility: HOSPITAL | Age: 42
Discharge: HOME | End: 2022-10-07
Attending: OTOLARYNGOLOGY
Payer: COMMERCIAL

## 2022-10-07 DIAGNOSIS — E04.1 THYROID NODULE: ICD-10-CM

## 2022-10-07 PROCEDURE — 76536 US EXAM OF HEAD AND NECK: CPT

## 2022-10-18 ENCOUNTER — OFFICE VISIT (OUTPATIENT)
Dept: OTOLARYNGOLOGY | Facility: CLINIC | Age: 42
End: 2022-10-18
Payer: COMMERCIAL

## 2022-10-18 ENCOUNTER — OFFICE VISIT (OUTPATIENT)
Dept: AUDIOLOGY | Facility: CLINIC | Age: 42
End: 2022-10-18
Payer: COMMERCIAL

## 2022-10-18 VITALS
HEIGHT: 61 IN | HEART RATE: 84 BPM | OXYGEN SATURATION: 97 % | DIASTOLIC BLOOD PRESSURE: 82 MMHG | BODY MASS INDEX: 33.95 KG/M2 | TEMPERATURE: 96.8 F | WEIGHT: 179.8 LBS | SYSTOLIC BLOOD PRESSURE: 112 MMHG

## 2022-10-18 DIAGNOSIS — H69.83 DYSFUNCTION OF BOTH EUSTACHIAN TUBES: ICD-10-CM

## 2022-10-18 DIAGNOSIS — E04.1 THYROID NODULE: ICD-10-CM

## 2022-10-18 DIAGNOSIS — H90.11 CONDUCTIVE HEARING LOSS OF RIGHT EAR WITH UNRESTRICTED HEARING OF LEFT EAR: Primary | ICD-10-CM

## 2022-10-18 DIAGNOSIS — E04.2 MULTINODULAR GOITER: ICD-10-CM

## 2022-10-18 DIAGNOSIS — E04.1 THYROID NODULE: Primary | ICD-10-CM

## 2022-10-18 PROCEDURE — 99214 OFFICE O/P EST MOD 30 MIN: CPT | Performed by: OTOLARYNGOLOGY

## 2022-10-18 PROCEDURE — 92557 COMPREHENSIVE HEARING TEST: CPT | Performed by: AUDIOLOGIST-HEARING AID FITTER

## 2022-10-18 PROCEDURE — 92567 TYMPANOMETRY: CPT | Performed by: AUDIOLOGIST-HEARING AID FITTER

## 2022-10-18 NOTE — PROGRESS NOTES
Otolaryngology Clinic Visit  Name:  Clark Ramos  MRN:  [id number]  Date:  10/18/2022 8:50 AM  ________________________________________________________________________       CHIEF COMPLAINT:   Thyroid follow up     HPI:  Clark Ramos is a 36 y o  female with PMH as below who is here today for follow up of thyroid nodules  No changes over the last year  No issues eating drinking swallowing  PMHx:  Past Medical History:   Diagnosis Date   • Allergic    • Asthma    • Cervical disc disorder with radiculopathy of mid-cervical region 2020   • Cervical radiculopathy 2020   • GERD (gastroesophageal reflux disease)    • Herniated disc, cervical    • Pinched nerve in shoulder, right    • Thyroid nodule        PSHx:  Past Surgical History:   Procedure Laterality Date   •  SECTION     • EPIDURAL BLOCK INJECTION N/A 3/4/2020    Procedure: C7-T1 Interlaminar Epidural Steroid Injection (21259);   Surgeon: Mikey Strauss MD;  [facility];  Service: Pain Management    • EPIDURAL BLOCK INJECTION N/A 2020    Procedure: C7-T1 interlaminar epidural steroid injection;  Surgeon: Mikey Strauss MD;  [facility];  Service: Pain Management    • FL GUIDED NEEDLE PLAC BX/ASP/INJ  3/4/2020   • FL GUIDED NEEDLE PLAC BX/ASP/INJ  2020       FAMHx:  Family History   Problem Relation Age of Onset   • Diabetes Mother    • Heart disease Mother    • Bone cancer Father    • Diabetes Brother    • No Known Problems Daughter    • No Known Problems Maternal Grandmother    • No Known Problems Paternal Grandmother    • No Known Problems Maternal Aunt    • No Known Problems Paternal Aunt    • No Known Problems Paternal Aunt    • Breast cancer Cousin        SOCHx:  Social History     Socioeconomic History   • Marital status: Single     Spouse name: None   • Number of children: 2   • Years of education: None   • Highest education level: None   Occupational History     Comment: currently not working / [employer]   Tobacco Use   • Smoking status: Current Every Day Smoker     Packs/day: 1 00     Types: Cigarettes   • Smokeless tobacco: Never Used   Vaping Use   • Vaping Use: Never used   Substance and Sexual Activity   • Alcohol use: No   • Drug use: Not Currently     Types: Marijuana   • Sexual activity: Yes     Partners: Male     Birth control/protection: I U D  Other Topics Concern   • None   Social History Narrative   • None     Social Determinants of Health     Financial Resource Strain: Not on file   Food Insecurity: Not on file   Transportation Needs: Not on file   Physical Activity: Not on file   Stress: Not on file   Social Connections: Not on file   Intimate Partner Violence: Not on file   Housing Stability: Not on file       Allergies: Allergies   Allergen Reactions   • No Known Allergies      Other reaction(s): Unknown        MEDS:  Reviewed    ROS:  As above otherwise:  negative     PHYSICAL EXAM:  /82 (BP Location: Left arm, Cuff Size: Large)   Pulse 84   Temp (!) 96 8 °F (36 °C) (Temporal)   Ht 5' 1" (1 549 m)   Wt 81 6 kg (179 lb 12 8 oz)   SpO2 97%   BMI 33 97 kg/m²   General: NAD, AOx4  Eyes:  EOMI  Ears:  Right: ear canal normal, TM normal apperance, no fluid  Left: ear canal normal, TM normal apperance, no fluid  Nose:  No septal deviation, no inferior turbinate hypertrophy, no mass/lesions  Oral cavity:  No trismus,   Neck: Trachea is midline; bilateral thyroid nodules, Salivary glands symmetrical, no masses/abnormality on palpation  Lymph:  No cervical lymphadenopathy  Skin:  No obvious facial lesions  Neuro: Face symmetrical, no obvious cranial nerve palsy   No focal deficits   Lungs:  Normal work of breathing, symmetrical chest expansion  Vascular: Well perfused    Procedures:  None     Medical Data Reviewed:  Records reviewed and summarized as in EPIC    Radiology:  US reviewed, nodule 1 R lower pole unchanged in size since jan 2020 but more echogenic, nodule 2 L mid- lower pole nodule decreased in size- 2 5x1  4x1 5  Labs:  TFTs wnl     Patient Active Problem List   Diagnosis   • Acquired spondylolisthesis of cervical vertebra   • Neck pain   • Thyroid nodule   • Herniated cervical disc   • GERD (gastroesophageal reflux disease)   • Epigastric abdominal pain   • Cervical radiculopathy   • Chronic pain syndrome   • Flatus   • 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   • Hypertension   • Class 1 obesity due to excess calories without serious comorbidity with body mass index (BMI) of 33 0 to 33 9 in adult   • IUD check up   • Tobacco abuse       ASSESSMENT/PLAN:  Mark Coffman is a 36 y o  female with acute and chronic problems as above who presents with:    1  Thyroid nodule    2  Multinodular goiter        42year old here today for evaluation of thyroid nodule, stable on US  Reviewed imaging and exam and indications for surgery  Will follow for now with repeat ultrasound in one year       US thyroid 1 year    RTC 1 year

## 2022-10-18 NOTE — PROGRESS NOTES
ENT HEARING EVALUATION    Name:  Machelle Tang  :  1980  Age:  36 y o  Date of Evaluation: 10/18/22     History: ENT Audiogram  Reason for visit: Machelle Tang is being seen today at the request of Dr Taylor Reese for an evaluation of hearing as part of their initial ENT visit  EVALUATION:    Tympanometry:   Right: Type C - negative pressure   Left: Type C - negative pressure    Audiogram Results:  Pure tone testing revealed a mild rising to normal conductive hearing loss in the  right ear and normal hearing sensitivity in the left ear  SRT and PTA are in agreement indicating good test reliability  Word recognition scores were excellent bilaterally       *see attached audiogram      RECOMMENDATIONS:  Consult ENT and Return to [facility]  for F/U      Anna Rutledge   Clinical Audiologist

## 2023-01-03 ENCOUNTER — HOSPITAL ENCOUNTER (EMERGENCY)
Facility: HOSPITAL | Age: 43
Discharge: HOME | End: 2023-01-03
Attending: EMERGENCY MEDICINE

## 2023-01-03 VITALS
SYSTOLIC BLOOD PRESSURE: 102 MMHG | OXYGEN SATURATION: 99 % | DIASTOLIC BLOOD PRESSURE: 71 MMHG | TEMPERATURE: 98 F | RESPIRATION RATE: 18 BRPM | HEART RATE: 97 BPM

## 2023-01-03 DIAGNOSIS — M54.10 RADICULOPATHY AFFECTING UPPER EXTREMITY: ICD-10-CM

## 2023-01-03 DIAGNOSIS — M54.2 NECK PAIN: Primary | ICD-10-CM

## 2023-01-03 RX ORDER — KETOROLAC TROMETHAMINE 30 MG/ML
15 INJECTION, SOLUTION INTRAMUSCULAR; INTRAVENOUS ONCE
Status: COMPLETED | OUTPATIENT
Start: 2023-01-03 | End: 2023-01-03

## 2023-01-03 RX ORDER — ACETAMINOPHEN 325 MG/1
975 TABLET ORAL ONCE
Status: COMPLETED | OUTPATIENT
Start: 2023-01-03 | End: 2023-01-03

## 2023-01-03 RX ORDER — LIDOCAINE 50 MG/G
1 PATCH TOPICAL ONCE
Status: DISCONTINUED | OUTPATIENT
Start: 2023-01-03 | End: 2023-01-03 | Stop reason: HOSPADM

## 2023-01-03 RX ADMIN — KETOROLAC TROMETHAMINE 15 MG: 30 INJECTION, SOLUTION INTRAMUSCULAR; INTRAVENOUS at 05:21

## 2023-01-03 RX ADMIN — LIDOCAINE 1 PATCH: 140 PATCH CUTANEOUS at 05:21

## 2023-01-03 RX ADMIN — ACETAMINOPHEN 975 MG: 325 TABLET, FILM COATED ORAL at 05:21

## 2023-01-03 NOTE — ED PROVIDER NOTES
History  Chief Complaint   Patient presents with   • Back Pain     Patient states woke up with lower back pain  with it radiating down her legs     58-year-old female with a past medical history of chronic cervical radiculopathy, who presents for neck pain and radiculopathy symptoms  She reports that she has been having worsening neck pain over the past day or 2  Mostly on the left side of her neck  She denies any numbness, weakness, tingling  She states that she occasionally feels a tightness in her legs although she does not feel that currently  She is describing a tightness in her left shoulder that is bothering her  She states that she gets this from time to time, however this is worse than usual so she decided come in for evaluation  She takes Celebrex at home with minimal relief  No fevers, no IV drug use, no bilateral lower extremity weakness, urinary or fecal incontinence, no saddle anesthesia, no regular steroid use, no trauma  ROS otherwise negative  Prior to Admission Medications   Prescriptions Last Dose Informant Patient Reported? Taking?    Levonorgestrel (Liletta, 52 MG,) 19 5 MCG/DAY IUD IUD Not Taking  Yes No   Si each by Intrauterine route once   Patient not taking: Reported on 1/3/2023   albuterol (PROAIR HFA) 90 mcg/act inhaler Not Taking  No No   Sig: Inhale 2 puffs every 6 (six) hours as needed for wheezing   Patient not taking: Reported on 1/3/2023   benzonatate (TESSALON) 200 MG capsule Not Taking  No No   Sig: Take 1 capsule (200 mg total) by mouth 3 (three) times a day as needed for cough   Patient not taking: Reported on 1/3/2023   celecoxib (CeleBREX) 200 mg capsule Not Taking  Yes No   Sig: take 1 capsule by mouth twice a day if needed for mild pain   Patient not taking: Reported on 1/3/2023   famotidine (PEPCID) 40 MG tablet Not Taking  No No   Sig: Take 1 tablet (40 mg total) by mouth daily at bedtime Take 2 hours prior to bed   Patient not taking: Reported on 1/3/2023   hydrOXYzine HCL (ATARAX) 25 mg tablet Not Taking  No No   Sig: Take 1 tablet (25 mg total) by mouth every 6 (six) hours   Patient not taking: Reported on 1/3/2023   methocarbamol (ROBAXIN) 500 mg tablet Not Taking  No No   Sig: Take 1 tablet (500 mg total) by mouth 2 (two) times a day as needed for muscle spasms   Patient not taking: Reported on 1/3/2023   pantoprazole (PROTONIX) 40 mg tablet Not Taking  Yes No   Sig: Take 40 mg by mouth daily   Patient not taking: Reported on 1/3/2023   varenicline (CHANTIX DENIS) 0 5 MG X 11 & 1 MG X 42 tablet   Yes No   Sig: Use as directed  Give with meals and with a full glass of water  Facility-Administered Medications: None       Past Medical History:   Diagnosis Date   • Allergic    • Asthma    • Cervical disc disorder with radiculopathy of mid-cervical region 2020   • Cervical radiculopathy 2020   • GERD (gastroesophageal reflux disease)    • Herniated disc, cervical    • Pinched nerve in shoulder, right    • Thyroid nodule        Past Surgical History:   Procedure Laterality Date   •  SECTION     • EPIDURAL BLOCK INJECTION N/A 3/4/2020    Procedure: C7-T1 Interlaminar Epidural Steroid Injection (71569);   Surgeon: Nathanel Lanes, MD;  Location: [facility];  Service: Pain Management    • EPIDURAL BLOCK INJECTION N/A 2020    Procedure: C7-T1 interlaminar epidural steroid injection;  Surgeon: Nathanel Lanes, MD;  Location: [facility];  Service: Pain Management    • FL GUIDED NEEDLE PLAC BX/ASP/INJ  3/4/2020   • FL GUIDED NEEDLE PLAC BX/ASP/INJ  2020       Family History   Problem Relation Age of Onset   • Diabetes Mother    • Heart disease Mother    • Bone cancer Father    • Diabetes Brother    • No Known Problems Daughter    • No Known Problems Maternal Grandmother    • No Known Problems Paternal Grandmother    • No Known Problems Maternal Aunt    • No Known Problems Paternal Aunt    • No Known Problems Paternal Aunt    • Breast cancer Cousin      I have reviewed and agree with the history as documented  E-Cigarette/Vaping   • E-Cigarette Use Never User      E-Cigarette/Vaping Substances   • Nicotine No    • THC No    • CBD No    • Flavoring No    • Other No    • Unknown No      Social History     Tobacco Use   • Smoking status: Every Day     Packs/day: 1 00     Types: Cigarettes   • Smokeless tobacco: Never   Vaping Use   • Vaping Use: Never used   Substance Use Topics   • Alcohol use: No   • Drug use: Not Currently     Types: Marijuana       Review of Systems   Constitutional: Negative for chills and fever  HENT: Negative for congestion, rhinorrhea and sore throat  Respiratory: Negative for cough and shortness of breath  Cardiovascular: Negative for chest pain and palpitations  Gastrointestinal: Negative for abdominal pain, constipation, diarrhea, nausea and vomiting  Genitourinary: Negative for difficulty urinating and flank pain  Musculoskeletal: Positive for neck pain  Negative for arthralgias  Neurological: Negative for dizziness, weakness, light-headedness and headaches  Psychiatric/Behavioral: Negative for agitation, behavioral problems and confusion  All other systems reviewed and are negative  Physical Exam  Physical Exam  Constitutional:       Appearance: She is well-developed  HENT:      Head: Normocephalic and atraumatic  Neck:      Comments: No tenderness in neck  No midline tenderness throughout the c-spine and back  Cardiovascular:      Rate and Rhythm: Normal rate and regular rhythm  Heart sounds: Normal heart sounds  No murmur heard  Pulmonary:      Effort: Pulmonary effort is normal  No respiratory distress  Breath sounds: Normal breath sounds  Abdominal:      General: Bowel sounds are normal  There is no distension  Palpations: Abdomen is soft  Tenderness: There is no abdominal tenderness  Musculoskeletal:         General: No deformity     Skin: General: Skin is warm  Findings: No rash  Neurological:      Mental Status: She is alert and oriented to person, place, and time  Sensory: No sensory deficit  Motor: No weakness  Comments: 5/5 strength in all extremities  Normal sensation in all extremities  Normal okay sign, normal  strength, normal thumbs up  In the lower extremities, strength is normal throughout, 5 out of 5  Patient is able to ambulate without difficulty  Psychiatric:         Behavior: Behavior normal          Thought Content: Thought content normal          Judgment: Judgment normal          Vital Signs  ED Triage Vitals [01/03/23 0310]   Temperature Pulse Respirations Blood Pressure SpO2   98 °F (36 7 °C) 97 18 102/71 99 %      Temp Source Heart Rate Source Patient Position - Orthostatic VS BP Location FiO2 (%)   Tympanic Monitor Sitting Right arm --      Pain Score       6           Vitals:    01/03/23 0310   BP: 102/71   Pulse: 97   Patient Position - Orthostatic VS: Sitting         Visual Acuity      ED Medications  Medications   ketorolac (TORADOL) injection 15 mg (15 mg Intramuscular Given 1/3/23 0521)   acetaminophen (TYLENOL) tablet 975 mg (975 mg Oral Given 1/3/23 0521)       Diagnostic Studies  Results Reviewed     None                 No orders to display              Procedures  Procedures         ED Course                                             Medical Decision Making  Patient's presentation is consistent with cervical radiculopathy  Patient has a completely normal neurologic exam   She is well-appearing  She has no red flag features that she is describing to me currently  Given lack of neurologic deficits, normal neuro exam and lack of red flag features at this time, I do not think imaging is necessary at this time  At this time, will give Toradol, Robaxin, lidocaine patch    Was planning to discharge with comprehensive spine follow-up for reevaluation and strict return precautions, however patient left immediately after receiving her medications and did not stay for paperwork  Neck pain: chronic illness or injury with exacerbation, progression, or side effects of treatment     Details: chronic neck pain, radiculopathy  Radiculopathy affecting upper extremity: acute illness or injury  Risk  OTC drugs  Prescription drug management            Disposition  Final diagnoses:   Neck pain   Radiculopathy affecting upper extremity     Time reflects when diagnosis was documented in both MDM as applicable and the Disposition within this note     Time User Action Codes Description Comment    1/3/2023  5:42 AM Eric Grippe Add [M54 2] Neck pain     1/3/2023  5:42 AM Eric Grippe Add [M54 10] Radiculopathy affecting upper extremity       ED Disposition     ED Disposition   Left from Room after Provider Exam    Condition   --    Date/Time   Tue Chukc 3, 2023  5:42 AM    Comment   --         Follow-up Information     Follow up With Specialties Details Why [address], MD Internal Medicine Call   [address]  [phone]            Discharge Medication List as of 1/3/2023  5:47 AM      CONTINUE these medications which have NOT CHANGED    Details   albuterol (PROAIR HFA) 90 mcg/act inhaler Inhale 2 puffs every 6 (six) hours as needed for wheezing, Starting Sun 2/9/2020, Normal      benzonatate (TESSALON) 200 MG capsule Take 1 capsule (200 mg total) by mouth 3 (three) times a day as needed for cough, Starting Mon 8/29/2022, Normal      celecoxib (CeleBREX) 200 mg capsule take 1 capsule by mouth twice a day if needed for mild pain, Historical Med      famotidine (PEPCID) 40 MG tablet Take 1 tablet (40 mg total) by mouth daily at bedtime Take 2 hours prior to bed, Starting Thu 8/19/2021, Normal      hydrOXYzine HCL (ATARAX) 25 mg tablet Take 1 tablet (25 mg total) by mouth every 6 (six) hours, Starting Fri 10/16/2020, Normal      Levonorgestrel (Liljazlyn, 52 MG,) 19 5 MCG/DAY IUD IUD 1 each by Intrauterine route once, Starting u 3/10/2022, Historical Med      methocarbamol (ROBAXIN) 500 mg tablet Take 1 tablet (500 mg total) by mouth 2 (two) times a day as needed for muscle spasms, Starting Sun 11/24/2019, Normal      pantoprazole (PROTONIX) 40 mg tablet Take 40 mg by mouth daily, Starting Mon 1/20/2020, Historical Med      varenicline (CHANTIX DENIS) 0 5 MG X 11 & 1 MG X 42 tablet Use as directed  Give with meals and with a full glass of water , Historical Med             No discharge procedures on file      PDMP Review     None          ED Provider  Electronically Signed by           Isaías Montoya MD  01/03/23 4556

## 2023-01-15 ENCOUNTER — HOSPITAL ENCOUNTER (EMERGENCY)
Facility: HOSPITAL | Age: 43
Discharge: HOME | End: 2023-01-15
Attending: EMERGENCY MEDICINE

## 2023-01-15 VITALS
OXYGEN SATURATION: 99 % | HEIGHT: 61 IN | HEART RATE: 101 BPM | BODY MASS INDEX: 33.04 KG/M2 | WEIGHT: 175 LBS | DIASTOLIC BLOOD PRESSURE: 101 MMHG | TEMPERATURE: 98.7 F | RESPIRATION RATE: 18 BRPM | SYSTOLIC BLOOD PRESSURE: 187 MMHG

## 2023-01-15 DIAGNOSIS — N61.0 CELLULITIS OF RIGHT BREAST: ICD-10-CM

## 2023-01-15 DIAGNOSIS — L74.0 MILIARIA RUBRA: Primary | ICD-10-CM

## 2023-01-15 NOTE — DISCHARGE INSTRUCTIONS
The small red bumps, itching, and irritation on the breast and chest wall are most likely due to blocked skin pores and mechanical irritation of your skin  You should keep this area dry by placing additional dry clothing between your breast and chest wall and/or applying powder to the area 2-3 times per day  This can be a regular powder and does not need to contain any special ingredients  Do this for the next 7 days also  The area of swelling on the underside of your breast is more likely due to an early infection  You will need a topical antibiotic for this  Apply the antibiotic specifically to the area of swelling on the underside of your right breast  Do this three times per day for the next 7 days  If you are not getting better after about 5 days, you should be rechecked in the ER or by your regular doctor

## 2023-01-15 NOTE — ED PROVIDER NOTES
History  Chief Complaint   Patient presents with   • Rash     Red rash, to right breast and right upper abdomen  Complains of itching and burning  States she first noticed the rash on Thursday  Was seen and urgent care for same, was told to put bacitracin on it  States that made it worse      41-year-old woman with noted past medical history presents to the emergency department with rash on the underside of right breast and on chest wall that has been present for perhaps the past 6 days  The area is quite pruritic  There is been no drainage or discharge from the area, but she does state that the skin in the area did slough off at one point  She had applied a topical antibiotic ointment to the area on for several days which did improve symptoms temporarily  She states the rash is now increasing in size and spreading onto the abdominal wall in addition to the area directly underneath the right breast   The area is quite pruritic at times  It does get somewhat sweaty in the area because of overlying breast tissue  She was seen in the [facility] ED on 11 Jan 2023 for this rash and injury to the R hip from a preceding fall; the area of concern in the right breast/chest was not injured during the fall  Bruising/injury on her right hip has since improved  Soft tissue ultrasound of the area underneath the right breast was performed during that ED visit which did not demonstrate any fluid collection  Has had a prior abscess underneath her right breast about 1 year ago that ruptured spontaneously but did require packing for resolution  Has also previously had a burn on the right breast from a hot object that left a small scar  On exam today, the rash is most c/w miliaria rubra  The rash appears only in the area in which she has overlying breast tissue  This area was somewhat damp on exam  It was also mildly excoriated  There is no definitive abscess but there is an area suggestive of a decompressed abscess   There is no active drainage or discharge  There is certainly nothing that needs incision or drainage at this point  Discussed with patient that the best approach would be to maintain the area as dry as possible to avoid further mechanical irritation and skin pore obstruction we will utilize a topical antibiotic with MRSA coverage such as mupirocin  Should be rechecked after 5 days if she is not having obvious improvement  All questions were answered to patient satisfaction prior to discharge  She expressed understanding and agreed to plan  History provided by:  Patient and medical records  Rash  Associated symptoms: no fever        Prior to Admission Medications   Prescriptions Last Dose Informant Patient Reported? Taking?    Levonorgestrel (Liletta, 52 MG,) 19 5 MCG/DAY IUD IUD   Yes No   Si each by Intrauterine route once   Patient not taking: Reported on 1/3/2023   albuterol (PROAIR HFA) 90 mcg/act inhaler   No No   Sig: Inhale 2 puffs every 6 (six) hours as needed for wheezing   Patient not taking: Reported on 1/3/2023   benzonatate (TESSALON) 200 MG capsule   No No   Sig: Take 1 capsule (200 mg total) by mouth 3 (three) times a day as needed for cough   Patient not taking: Reported on 1/3/2023   celecoxib (CeleBREX) 200 mg capsule   Yes No   Sig: take 1 capsule by mouth twice a day if needed for mild pain   Patient not taking: Reported on 1/3/2023   famotidine (PEPCID) 40 MG tablet   No No   Sig: Take 1 tablet (40 mg total) by mouth daily at bedtime Take 2 hours prior to bed   Patient not taking: Reported on 1/3/2023   hydrOXYzine HCL (ATARAX) 25 mg tablet   No No   Sig: Take 1 tablet (25 mg total) by mouth every 6 (six) hours   Patient not taking: Reported on 1/3/2023   methocarbamol (ROBAXIN) 500 mg tablet   No No   Sig: Take 1 tablet (500 mg total) by mouth 2 (two) times a day as needed for muscle spasms   Patient not taking: Reported on 1/3/2023   pantoprazole (PROTONIX) 40 mg tablet   Yes No   Sig: Take 40 mg by mouth daily   Patient not taking: Reported on 1/3/2023   varenicline (CHANTIX DENIS) 0 5 MG X 11 & 1 MG X 42 tablet   Yes No   Sig: Use as directed  Give with meals and with a full glass of water  Facility-Administered Medications: None       Past Medical History:   Diagnosis Date   • Allergic    • Asthma    • Cervical disc disorder with radiculopathy of mid-cervical region 2020   • Cervical radiculopathy 2020   • GERD (gastroesophageal reflux disease)    • Herniated disc, cervical    • Pinched nerve in shoulder, right    • Thyroid nodule        Past Surgical History:   Procedure Laterality Date   •  SECTION     • EPIDURAL BLOCK INJECTION N/A 3/4/2020    Procedure: C7-T1 Interlaminar Epidural Steroid Injection (23622); Surgeon: Criss Primrose, MD;  Location: [facility];  Service: Pain Management    • EPIDURAL BLOCK INJECTION N/A 2020    Procedure: C7-T1 interlaminar epidural steroid injection;  Surgeon: Criss Primrose, MD;  Location: [facility];  Service: Pain Management    • FL GUIDED NEEDLE PLAC BX/ASP/INJ  3/4/2020   • FL GUIDED NEEDLE PLAC BX/ASP/INJ  2020       Family History   Problem Relation Age of Onset   • Diabetes Mother    • Heart disease Mother    • Bone cancer Father    • Diabetes Brother    • No Known Problems Daughter    • No Known Problems Maternal Grandmother    • No Known Problems Paternal Grandmother    • No Known Problems Maternal Aunt    • No Known Problems Paternal Aunt    • No Known Problems Paternal Aunt    • Breast cancer Cousin      I have reviewed and agree with the history as documented      E-Cigarette/Vaping   • E-Cigarette Use Never User      E-Cigarette/Vaping Substances   • Nicotine No    • THC No    • CBD No    • Flavoring No    • Other No    • Unknown No      Social History     Tobacco Use   • Smoking status: Every Day     Packs/day:      Types: Cigarettes   • Smokeless tobacco: Never   Vaping Use   • Vaping Use: Never used Substance Use Topics   • Alcohol use: No   • Drug use: Not Currently     Types: Marijuana       Review of Systems   Constitutional: Negative  Negative for chills and fever  Musculoskeletal: Negative  Skin: Positive for rash  Negative for color change, pallor and wound  Hematological: Negative  Negative for adenopathy  Physical Exam  Physical Exam  Vitals and nursing note reviewed  Constitutional:       General: She is awake  She is not in acute distress  Appearance: Normal appearance  She is well-developed and well-groomed  She is not ill-appearing  HENT:      Head: Normocephalic and atraumatic  Right Ear: Hearing and external ear normal       Left Ear: Hearing and external ear normal    Neck:      Trachea: Trachea and phonation normal    Cardiovascular:      Rate and Rhythm: Regular rhythm  Tachycardia present  Pulses:           Radial pulses are 2+ on the right side and 2+ on the left side  Pulmonary:      Effort: Pulmonary effort is normal  No tachypnea, accessory muscle usage or prolonged expiration  Chest:      Comments: Rash of underside of right breast and chest/abdominal wall overlapped by pendulous breast tissue  This rash consists of discrete erythematous papules 0 1-0 2 cm in diameter on base of normal skin  Appearance most c/w miliaria rubra  There is a single discrete, apparently decompressed pustule 0 35 cm diameter on the underside of the right breast at approximately the MCL  There is no drainage or discharge from the pustule  No surrounding cellulitis  No crepitus or induration  A similar rash is present on the underside of the left breast and chest wall opposite but to a much lesser extent than underneath the right breast     Exam assisted by SANKET Godfrey RN    Lymphadenopathy:      Cervical: No cervical adenopathy  Upper Body:      Right upper body: No supraclavicular, axillary or pectoral adenopathy        Left upper body: No supraclavicular, axillary or pectoral adenopathy  Skin:     General: Skin is warm and dry  Findings: Rash present  Neurological:      Mental Status: She is alert and oriented to person, place, and time  GCS: GCS eye subscore is 4  GCS verbal subscore is 5  GCS motor subscore is 6  Vital Signs  ED Triage Vitals [01/15/23 1249]   Temperature Pulse Respirations Blood Pressure SpO2   98 7 °F (37 1 °C) 101 18 (!) 187/101 99 %      Temp Source Heart Rate Source Patient Position - Orthostatic VS BP Location FiO2 (%)   Temporal Monitor Lying Right arm --      Pain Score       4           Vitals:    01/15/23 1249   BP: (!) 187/101   Pulse: 101   Patient Position - Orthostatic VS: Lying         Visual Acuity      ED Medications  Medications - No data to display    Diagnostic Studies  Results Reviewed     None                 No orders to display              Procedures  Procedures         ED Course           MDM    Disposition  Final diagnoses:   Miliaria rubra   Cellulitis of right breast     Time reflects when diagnosis was documented in both MDM as applicable and the Disposition within this note     Time User Action Codes Description Comment    1/15/2023  1:08 PM Fabio Dumont Add [L74 0] Aubrey Delacruz     1/15/2023  1:08 PM Fabio Dumont Add [N61 0] Cellulitis of right breast       ED Disposition     ED Disposition   Discharge    Condition   Stable    Date/Time   Sun Chuck 15, 2023  1:07 PM    [facility] discharge to home/self care  Follow-up Information    None         Patient's Medications   Discharge Prescriptions    MUPIROCIN (BACTROBAN) 2 % OINTMENT    Apply topically 3 (three) times a day for 7 days Apply three times daily specifically to area of swelling on the right breast for the next 7 days  Start Date: 1/15/2023 End Date: 1/22/2023       Order Dose: --       Quantity: 15 g    Refills: 0       No discharge procedures on file      PDMP Review     None          ED Provider  Electronically Signed by           Amador Seth DO  01/15/23 6373

## 2023-01-17 DIAGNOSIS — R10.13 EPIGASTRIC ABDOMINAL PAIN: ICD-10-CM

## 2023-01-17 DIAGNOSIS — K21.9 GASTROESOPHAGEAL REFLUX DISEASE WITHOUT ESOPHAGITIS: ICD-10-CM

## 2023-01-17 RX ORDER — FAMOTIDINE 40 MG/1
TABLET, FILM COATED ORAL
Qty: 30 TABLET | Refills: 3 | Status: SHIPPED | OUTPATIENT
Start: 2023-01-17

## 2023-01-25 NOTE — PROGRESS NOTES
[facility], [address] [place], [place]    ASSESSMENT/PLAN: Ace Avilez is a 36 y o  [id number] who presents for annual gynecologic exam     Encounter for routine gynecologic examination  - Routine well woman exam completed today  - Cervical Cancer Screening: Current ASCCP Guidelines reviewed  Last Pap: 01/20/2022 normal  Next Pap Due: 1-2  - HPV Vaccination status: Not immunized  - STI screening offered including HIV: not indicated based on hx or requested at time of visit  - Contraceptive counseling discussed  Current contraception: Louis Cordon- 3/2022   - Breast Cancer Screening: Last Mammogram 03/21/2022, script given  To use Hibiclens 2 times per week  - RTO 1 yr    Additional problems addressed at this visit:  1  Encntr for gyn exam (general) (routine) w/o abn findings  -     Mammo screening bilateral w 3d & cad; Future; Expected date: 03/22/2023    2  Encounter for screening mammogram for malignant neoplasm of breast  -     Mammo screening bilateral w 3d & cad; Future; Expected date: 03/22/2023    3  IUD check up          CC: Annual Gynecologic Examination    HPI: Ace Avilez is a 36 y o  [id number] who presents for annual gynecologic examination  Park presents for gyn exam today  N/A LMP  Absent menses  Using Louis Cordon as birth control method  Happy with method  Last pap smear 2022- normal pap smear and HPV  Hx of abnormal pap smear-no  Sexually active- yes  With partner for 10 yrs  Does not desire STI testing   3/2022 mammogram- normal    8-10 hrs sleep per day  Minimal servings of calcium rich food per day   No routine exercise per week  Minimal servings of caffeine per day  Does not perform SBE monthly  Safe at home- yes  Wears seatbelt : yes  Concerns : right breast rash, was seen at [facility] and given a cream and notes improvement           The following portions of the patient's history were reviewed and updated as appropriate: allergies, current medications, past family history, past medical history, obstetric history, gynecologic history, past social history, past surgical history and problem list     Review of Systems   Constitutional: Negative for activity change, appetite change, chills, fatigue and fever  Respiratory: Negative for cough and shortness of breath  Cardiovascular: Negative for chest pain, palpitations and leg swelling  Gastrointestinal: Negative for constipation and diarrhea  Genitourinary: Negative for difficulty urinating, dysuria, frequency, menstrual problem, pelvic pain, urgency, vaginal bleeding, vaginal discharge and vaginal pain  Musculoskeletal: Positive for back pain  Neurological: Negative for light-headedness and headaches  Psychiatric/Behavioral: The patient is nervous/anxious  Objective:  /84   Ht 5' 1" (1 549 m)   Wt 78 kg (172 lb)   LMP  (LMP Unknown) Comment: pt has IUD  BMI 32 50 kg/m²    Physical Exam  Vitals reviewed  Constitutional:       Appearance: Normal appearance  HENT:      Head: Normocephalic  Neck:      Thyroid: No thyroid mass or thyroid tenderness  Cardiovascular:      Rate and Rhythm: Normal rate and regular rhythm  Heart sounds: Normal heart sounds  Pulmonary:      Effort: Pulmonary effort is normal       Breath sounds: Normal breath sounds  Chest:   Breasts:     Right: No mass, nipple discharge, skin change or tenderness  Left: No mass, nipple discharge, skin change or tenderness  Comments: Multiple small inclusion cysts noted, no redness or irritation  Abdominal:      General: There is no distension  Palpations: There is no mass  Tenderness: There is no abdominal tenderness  There is no guarding  Genitourinary:     General: Normal vulva  Exam position: Lithotomy position  Labia:         Right: No tenderness or lesion  Left: No tenderness or lesion  Vagina: No vaginal discharge, tenderness, bleeding or lesions        Cervix: No discharge, lesion, erythema or cervical bleeding  Uterus: Normal  Not enlarged and not tender  Adnexa:         Right: No mass, tenderness or fullness  Left: No mass, tenderness or fullness  Comments: IUD string noted on exam  Musculoskeletal:      Cervical back: Normal range of motion  Lymphadenopathy:      Upper Body:      Right upper body: No axillary adenopathy  Left upper body: No axillary adenopathy  Skin:     General: Skin is warm and dry  Neurological:      Mental Status: She is alert     Psychiatric:         Mood and Affect: Mood normal          Behavior: Behavior normal          Judgment: Judgment normal              Prabha Valentine CNM  [facility]  01/26/23 9:47 AM

## 2023-01-26 ENCOUNTER — ANNUAL EXAM (OUTPATIENT)
Dept: OBGYN CLINIC | Facility: MEDICAL CENTER | Age: 43
End: 2023-01-26

## 2023-01-26 VITALS
BODY MASS INDEX: 32.47 KG/M2 | SYSTOLIC BLOOD PRESSURE: 120 MMHG | DIASTOLIC BLOOD PRESSURE: 84 MMHG | WEIGHT: 172 LBS | HEIGHT: 61 IN

## 2023-01-26 DIAGNOSIS — Z01.419 ENCNTR FOR GYN EXAM (GENERAL) (ROUTINE) W/O ABN FINDINGS: Primary | ICD-10-CM

## 2023-01-26 DIAGNOSIS — Z30.431 IUD CHECK UP: ICD-10-CM

## 2023-01-26 DIAGNOSIS — Z12.31 ENCOUNTER FOR SCREENING MAMMOGRAM FOR MALIGNANT NEOPLASM OF BREAST: ICD-10-CM

## 2023-01-26 RX ORDER — VARENICLINE TARTRATE 1 MG/1
TABLET, FILM COATED ORAL
COMMUNITY
Start: 2023-01-17

## 2023-01-26 RX ORDER — LORATADINE 10 MG/1
10 TABLET ORAL DAILY
COMMUNITY
Start: 2023-01-17

## 2023-03-24 ENCOUNTER — OFFICE VISIT (OUTPATIENT)
Dept: URGENT CARE | Facility: CLINIC | Age: 43
End: 2023-03-24

## 2023-03-24 VITALS
BODY MASS INDEX: 32.47 KG/M2 | HEART RATE: 79 BPM | RESPIRATION RATE: 18 BRPM | WEIGHT: 172 LBS | HEIGHT: 61 IN | TEMPERATURE: 98.1 F | OXYGEN SATURATION: 99 %

## 2023-03-24 DIAGNOSIS — J06.9 ACUTE URI: Primary | ICD-10-CM

## 2023-03-24 RX ORDER — AMOXICILLIN 875 MG/1
875 TABLET, COATED ORAL 2 TIMES DAILY
Qty: 20 TABLET | Refills: 0 | Status: SHIPPED | OUTPATIENT
Start: 2023-03-24 | End: 2023-04-03

## 2023-03-24 NOTE — PROGRESS NOTES
[facility]    NAME: Clark Ramos is a 36 y o  female  : 1980    MRN: [id number]  DATE: 2023  TIME: 2:30 PM    Assessment and Plan   Acute URI [J06 9]  1  Acute URI  amoxicillin (AMOXIL) 875 mg tablet          Patient Instructions   Patient Instructions   I have prescribed an antibiotic for the infection  Please take the antibiotic as prescribed and finish the entire prescription  I recommend that the patient takes an over the counter probiotic or eats yogurt with live cultures in it [place]) to keep good bacteria in the gut and help prevent diarrhea  Wash hands frequently to prevent the spread of infection  Can use over the counter cough and cold medications to help with symptoms  Ibuprofen and/or tylenol as needed for pain or fever  If not improving over the next 7-10 days, follow up with PCP  Chief Complaint     Chief Complaint   Patient presents with   • Cold Like Symptoms     Dry cough, runny nose started  this am       History of Present Illness   44-year-old female here with complaint of cough, runny nose and nasal congestion with slight sore throat that started last night  Son was recently diagnosed with strep  She denies any fever  Review of Systems   Review of Systems   Constitutional: Negative for appetite change, chills and fever  HENT: Positive for congestion, rhinorrhea and sore throat  Negative for ear discharge, ear pain, facial swelling, postnasal drip, sinus pressure and sneezing  Respiratory: Positive for cough  Negative for shortness of breath and wheezing  Neurological: Negative for headaches         Current Medications     Current Outpatient Medications:   •  amoxicillin (AMOXIL) 875 mg tablet, Take 1 tablet (875 mg total) by mouth 2 (two) times a day for 10 days, Disp: 20 tablet, Rfl: 0  •  celecoxib (CeleBREX) 200 mg capsule, , Disp: , Rfl:   •  famotidine (PEPCID) 40 MG tablet, take 1 tablet by mouth at bedtime 2 HOURS BEFORE BED, Disp: 30 tablet, Rfl: 3  •  hydrOXYzine HCL (ATARAX) 25 mg tablet, Take 1 tablet (25 mg total) by mouth every 6 (six) hours, Disp: 12 tablet, Rfl: 0  •  Levonorgestrel (Liletta, 52 MG,) 19 5 MCG/DAY IUD IUD, 1 each by Intrauterine route once, Disp: , Rfl:   •  loratadine (CLARITIN) 10 mg tablet, Take 10 mg by mouth daily, Disp: , Rfl:   •  methocarbamol (ROBAXIN) 500 mg tablet, Take 1 tablet (500 mg total) by mouth 2 (two) times a day as needed for muscle spasms, Disp: 20 tablet, Rfl: 0  •  pantoprazole (PROTONIX) 40 mg tablet, Take 40 mg by mouth daily, Disp: , Rfl:   •  mupirocin (BACTROBAN) 2 % ointment, Apply topically 3 (three) times a day for 7 days Apply three times daily specifically to area of swelling on the right breast for the next 7 days  , Disp: 15 g, Rfl: 0  •  varenicline (CHANTIX DENIS) 0 5 MG X 11 & 1 MG X 42 tablet, Use as directed  Give with meals and with a full glass of water , Disp: , Rfl:   •  varenicline (CHANTIX) 1 mg tablet, , Disp: , Rfl:   No current facility-administered medications for this visit      Facility-Administered Medications Ordered in Other Visits:   •  Lidocaine Viscous HCl (XYLOCAINE) 2 % mucosal solution, , , Code/Trauma/Sedation Med, Al[facility]cconnell [facility], 15 mL at 05/10/21 1109    Current Allergies     Allergies as of 03/24/2023 - Reviewed 01/26/2023   Allergen Reaction Noted   • No known allergies  12/16/2016          The following portions of the patient's history were reviewed and updated as appropriate: allergies, current medications, past family history, past medical history, past social history, past surgical history and problem list    Past Medical History:   Diagnosis Date   • Allergic    • Asthma    • Cervical disc disorder with radiculopathy of mid-cervical region 1/6/2020   • Cervical radiculopathy 1/6/2020   • GERD (gastroesophageal reflux disease)    • Herniated disc, cervical    • Pinched nerve in shoulder, right    • Thyroid nodule      Past Surgical History: Procedure Laterality Date   •  SECTION     • EPIDURAL BLOCK INJECTION N/A 3/4/2020    Procedure: C7-T1 Interlaminar Epidural Steroid Injection (94314); Surgeon: Adriana Messer MD;  Location: [facility];  Service: Pain Management    • EPIDURAL BLOCK INJECTION N/A 2020    Procedure: C7-T1 interlaminar epidural steroid injection;  Surgeon: Adriana Messer MD;  Location: [facility];  Service: Pain Management    • FL GUIDED NEEDLE PLAC BX/ASP/INJ  3/4/2020   • FL GUIDED NEEDLE PLAC BX/ASP/INJ  2020     Family History   Problem Relation Age of Onset   • Diabetes Mother    • Heart disease Mother    • Bone cancer Father    • Diabetes Brother    • No Known Problems Daughter    • No Known Problems Maternal Grandmother    • No Known Problems Paternal Grandmother    • No Known Problems Maternal Aunt    • No Known Problems Paternal Aunt    • No Known Problems Paternal Aunt    • Breast cancer Cousin    • Colon cancer Neg Hx    • Ovarian cancer Neg Hx      Social History     Socioeconomic History   • Marital status: Single     Spouse name: Not on file   • Number of children: 2   • Years of education: Not on file   • Highest education level: Not on file   Occupational History     Comment: currently not working / FMLA   Tobacco Use   • Smoking status: Every Day     Packs/day: 1 00     Types: Cigarettes   • Smokeless tobacco: Never   Vaping Use   • Vaping Use: Never used   Substance and Sexual Activity   • Alcohol use: No   • Drug use: Not Currently     Types: Marijuana   • Sexual activity: Yes     Partners: Male     Birth control/protection: I U D     Other Topics Concern   • Not on file   Social History Narrative   • Not on file     Social Determinants of Health     Financial Resource Strain: Not on file   Food Insecurity: Not on file   Transportation Needs: Not on file   Physical Activity: Not on file   Stress: Not on file   Social Connections: Not on file   Intimate Partner Violence: Not on file Housing Stability: Not on file     Medications have been verified  Objective   Pulse 79   Temp 98 1 °F (36 7 °C) (Temporal)   Resp 18   Ht 5' 1" (1 549 m)   Wt 78 kg (172 lb)   SpO2 99%   BMI 32 50 kg/m²      Physical Exam   Physical Exam  Vitals and nursing note reviewed  Constitutional:       General: She is not in acute distress  Appearance: She is well-developed  HENT:      Head: Normocephalic and atraumatic  Right Ear: Tympanic membrane normal       Left Ear: Tympanic membrane normal       Nose: Nose normal  No mucosal edema or rhinorrhea  Right Sinus: No maxillary sinus tenderness or frontal sinus tenderness  Left Sinus: No maxillary sinus tenderness or frontal sinus tenderness  Mouth/Throat:      Pharynx: Oropharyngeal exudate and posterior oropharyngeal erythema present  Eyes:      Conjunctiva/sclera: Conjunctivae normal    Cardiovascular:      Rate and Rhythm: Normal rate and regular rhythm  Heart sounds: Normal heart sounds  No murmur heard  Pulmonary:      Effort: Pulmonary effort is normal       Breath sounds: Normal breath sounds

## 2023-03-30 ENCOUNTER — HOSPITAL ENCOUNTER (OUTPATIENT)
Dept: MAMMOGRAPHY | Facility: HOSPITAL | Age: 43
End: 2023-03-30

## 2023-03-30 VITALS — WEIGHT: 172 LBS | HEIGHT: 61 IN | BODY MASS INDEX: 32.47 KG/M2

## 2023-03-30 DIAGNOSIS — Z12.31 ENCOUNTER FOR SCREENING MAMMOGRAM FOR MALIGNANT NEOPLASM OF BREAST: ICD-10-CM

## 2023-03-30 DIAGNOSIS — Z01.419 ENCNTR FOR GYN EXAM (GENERAL) (ROUTINE) W/O ABN FINDINGS: ICD-10-CM

## 2023-06-09 ENCOUNTER — APPOINTMENT (EMERGENCY)
Dept: RADIOLOGY | Facility: HOSPITAL | Age: 43
End: 2023-06-09
Payer: COMMERCIAL

## 2023-06-09 ENCOUNTER — HOSPITAL ENCOUNTER (EMERGENCY)
Facility: HOSPITAL | Age: 43
Discharge: HOME | End: 2023-06-09
Attending: EMERGENCY MEDICINE
Payer: COMMERCIAL

## 2023-06-09 ENCOUNTER — OFFICE VISIT (OUTPATIENT)
Dept: URGENT CARE | Facility: MEDICAL CENTER | Age: 43
End: 2023-06-09
Payer: COMMERCIAL

## 2023-06-09 VITALS
HEIGHT: 65 IN | WEIGHT: 170 LBS | BODY MASS INDEX: 28.32 KG/M2 | RESPIRATION RATE: 20 BRPM | HEART RATE: 93 BPM | TEMPERATURE: 97.9 F | OXYGEN SATURATION: 98 %

## 2023-06-09 VITALS
RESPIRATION RATE: 12 BRPM | SYSTOLIC BLOOD PRESSURE: 172 MMHG | WEIGHT: 172.62 LBS | DIASTOLIC BLOOD PRESSURE: 88 MMHG | BODY MASS INDEX: 28.73 KG/M2 | TEMPERATURE: 98.1 F | OXYGEN SATURATION: 98 % | HEART RATE: 73 BPM

## 2023-06-09 DIAGNOSIS — M54.2 NECK PAIN ON LEFT SIDE: Primary | ICD-10-CM

## 2023-06-09 DIAGNOSIS — R10.13 EPIGASTRIC PAIN: ICD-10-CM

## 2023-06-09 DIAGNOSIS — R68.84 JAW PAIN: Primary | ICD-10-CM

## 2023-06-09 DIAGNOSIS — M79.602 LEFT ARM PAIN: ICD-10-CM

## 2023-06-09 LAB
2HR DELTA HS TROPONIN: <0 NG/L
ALBUMIN SERPL BCP-MCNC: 4.5 G/DL (ref 3.5–5)
ALP SERPL-CCNC: 87 U/L (ref 34–104)
ALT SERPL W P-5'-P-CCNC: 11 U/L (ref 7–52)
ANION GAP SERPL CALCULATED.3IONS-SCNC: 8 MMOL/L (ref 4–13)
AST SERPL W P-5'-P-CCNC: 13 U/L (ref 13–39)
ATRIAL RATE: 81 BPM
BASOPHILS # BLD MANUAL: 0.09 THOUSAND/UL (ref 0–0.1)
BASOPHILS NFR MAR MANUAL: 1 % (ref 0–1)
BILIRUB SERPL-MCNC: 0.34 MG/DL (ref 0.2–1)
BUN SERPL-MCNC: 13 MG/DL (ref 5–25)
CALCIUM SERPL-MCNC: 9.4 MG/DL (ref 8.4–10.2)
CARDIAC TROPONIN I PNL SERPL HS: 2 NG/L
CARDIAC TROPONIN I PNL SERPL HS: <2 NG/L
CHLORIDE SERPL-SCNC: 103 MMOL/L (ref 96–108)
CO2 SERPL-SCNC: 28 MMOL/L (ref 21–32)
CREAT SERPL-MCNC: 0.69 MG/DL (ref 0.6–1.3)
EOSINOPHIL # BLD MANUAL: 0.09 THOUSAND/UL (ref 0–0.4)
EOSINOPHIL NFR BLD MANUAL: 1 % (ref 0–6)
ERYTHROCYTE [DISTWIDTH] IN BLOOD BY AUTOMATED COUNT: 12.4 % (ref 11.6–15.1)
GFR SERPL CREATININE-BSD FRML MDRD: 106 ML/MIN/1.73SQ M
GLUCOSE SERPL-MCNC: 102 MG/DL (ref 65–140)
HCT VFR BLD AUTO: 43.3 % (ref 34.8–46.1)
HGB BLD-MCNC: 14.6 G/DL (ref 11.5–15.4)
LG PLATELETS BLD QL SMEAR: PRESENT
LYMPHOCYTES # BLD AUTO: 3.27 THOUSAND/UL (ref 0.6–4.47)
LYMPHOCYTES # BLD AUTO: 36 % (ref 14–44)
MACROCYTES BLD QL AUTO: PRESENT
MCH RBC QN AUTO: 31.7 PG (ref 26.8–34.3)
MCHC RBC AUTO-ENTMCNC: 33.7 G/DL (ref 31.4–37.4)
MCV RBC AUTO: 94 FL (ref 82–98)
MONOCYTES # BLD AUTO: 0.73 THOUSAND/UL (ref 0–1.22)
MONOCYTES NFR BLD: 8 % (ref 4–12)
NEUTROPHILS # BLD MANUAL: 4.9 THOUSAND/UL (ref 1.85–7.62)
NEUTS SEG NFR BLD AUTO: 54 % (ref 43–75)
P AXIS: 36 DEGREES
PLATELET # BLD AUTO: 220 THOUSANDS/UL (ref 149–390)
PLATELET BLD QL SMEAR: ADEQUATE
PMV BLD AUTO: 11.9 FL (ref 8.9–12.7)
POLYCHROMASIA BLD QL SMEAR: PRESENT
POTASSIUM SERPL-SCNC: 3.1 MMOL/L (ref 3.5–5.3)
PR INTERVAL: 166 MS
PROT SERPL-MCNC: 7.2 G/DL (ref 6.4–8.4)
QRS AXIS: 81 DEGREES
QRSD INTERVAL: 86 MS
QT INTERVAL: 396 MS
QTC INTERVAL: 460 MS
RBC # BLD AUTO: 4.6 MILLION/UL (ref 3.81–5.12)
RBC MORPH BLD: PRESENT
SODIUM SERPL-SCNC: 139 MMOL/L (ref 135–147)
T WAVE AXIS: 16 DEGREES
VENTRICULAR RATE: 81 BPM
WBC # BLD AUTO: 9.07 THOUSAND/UL (ref 4.31–10.16)

## 2023-06-09 PROCEDURE — 71045 X-RAY EXAM CHEST 1 VIEW: CPT

## 2023-06-09 PROCEDURE — 99213 OFFICE O/P EST LOW 20 MIN: CPT

## 2023-06-09 PROCEDURE — 80053 COMPREHEN METABOLIC PANEL: CPT | Performed by: EMERGENCY MEDICINE

## 2023-06-09 PROCEDURE — 93005 ELECTROCARDIOGRAM TRACING: CPT

## 2023-06-09 PROCEDURE — 85027 COMPLETE CBC AUTOMATED: CPT | Performed by: EMERGENCY MEDICINE

## 2023-06-09 PROCEDURE — 85007 BL SMEAR W/DIFF WBC COUNT: CPT | Performed by: EMERGENCY MEDICINE

## 2023-06-09 PROCEDURE — 84484 ASSAY OF TROPONIN QUANT: CPT | Performed by: EMERGENCY MEDICINE

## 2023-06-09 PROCEDURE — S9088 SERVICES PROVIDED IN URGENT: HCPCS

## 2023-06-09 PROCEDURE — 36415 COLL VENOUS BLD VENIPUNCTURE: CPT | Performed by: EMERGENCY MEDICINE

## 2023-06-09 PROCEDURE — 99284 EMERGENCY DEPT VISIT MOD MDM: CPT

## 2023-06-09 RX ORDER — POTASSIUM CHLORIDE 20 MEQ/1
40 TABLET, EXTENDED RELEASE ORAL ONCE
Status: COMPLETED | OUTPATIENT
Start: 2023-06-09 | End: 2023-06-09

## 2023-06-09 RX ADMIN — POTASSIUM CHLORIDE 40 MEQ: 1500 TABLET, EXTENDED RELEASE ORAL at 20:38

## 2023-06-09 NOTE — PROGRESS NOTES
[facility]        NAME: Cyril Baron is a 36 y o  female  : 1980    MRN: [id number]  DATE: 2023  TIME: 7:38 PM    Assessment and Plan   Jaw pain [R68 84]  1  Jaw pain  ECG 12 lead    Transfer to other facility      2  Epigastric pain  Transfer to other facility      3  Left arm pain  Transfer to other facility            Patient Instructions       Follow up with PCP in 3-5 days  Proceed to  ER if symptoms worsen  Chief Complaint     Chief Complaint   Patient presents with   • Earache     Left ear pain radiating into jaw, intermittent since 1 month , is not self treating, denies drainage from ear          History of Present Illness       Patient here with pain in her jaw and reports pain is also behind her left ear  She also has pain in her neck/shoulder, and tingling in her jaw  Discomfort is intermittent in nature  She does have a pinched nerve in her neck that she has been atributing the pain to  She has also had epigastric pain, she does have GERD which she is treated for  Symptoms all started intermittently for the past month  When she has the pain it has been lasting about a day to two  This time she states it has been ongoing for 3 days  She has not had any fevers  She has been eating and drinking normal  She has not taken anything for pain during these episode  She does have pain in her upper back behind her left shoulder blade  There is a family history of CAD with her mother needing stent placement  Patient referred to the ER due to her multiple concerns for potential cardiac issues, unable to rule out in the care now setting  Offered to call ambulance for transport- patient prefers to drive self to the ER  I feel this is a viable option given her no acute distress and normal EKG  Earache   There is pain in the left ear  The current episode started more than 1 month ago  There has been no fever  Associated symptoms include abdominal pain, headaches and neck pain  Pertinent negatives include no coughing, diarrhea, hearing loss, rash, rhinorrhea, sore throat or vomiting  Review of Systems   Review of Systems   Constitutional: Negative for appetite change, chills, fatigue and fever  HENT: Positive for ear pain  Negative for dental problem, facial swelling, hearing loss, rhinorrhea, sinus pressure, sinus pain, sore throat and trouble swallowing  Eyes: Negative for pain and visual disturbance  Respiratory: Positive for chest tightness and shortness of breath  Negative for cough  Cardiovascular: Positive for chest pain  Negative for palpitations  Gastrointestinal: Positive for abdominal pain  Negative for constipation, diarrhea, nausea and vomiting  Genitourinary: Negative for dysuria and hematuria  Musculoskeletal: Positive for back pain and neck pain  Negative for arthralgias  Skin: Negative for color change and rash  Neurological: Positive for headaches  Negative for dizziness, seizures, syncope and light-headedness  All other systems reviewed and are negative  Current Medications     No current facility-administered medications for this visit      Current Outpatient Medications:   •  celecoxib (CeleBREX) 200 mg capsule, , Disp: , Rfl:   •  famotidine (PEPCID) 40 MG tablet, take 1 tablet by mouth at bedtime 2 HOURS BEFORE BED, Disp: 30 tablet, Rfl: 3  •  hydrOXYzine HCL (ATARAX) 25 mg tablet, Take 1 tablet (25 mg total) by mouth every 6 (six) hours, Disp: 12 tablet, Rfl: 0  •  Levonorgestrel (Liletta, 52 MG,) 19 5 MCG/DAY IUD IUD, 1 each by Intrauterine route once, Disp: , Rfl:   •  loratadine (CLARITIN) 10 mg tablet, Take 10 mg by mouth daily, Disp: , Rfl:   •  methocarbamol (ROBAXIN) 500 mg tablet, Take 1 tablet (500 mg total) by mouth 2 (two) times a day as needed for muscle spasms, Disp: 20 tablet, Rfl: 0  •  pantoprazole (PROTONIX) 40 mg tablet, Take 40 mg by mouth daily, Disp: , Rfl:   •  mupirocin (BACTROBAN) 2 % ointment, Apply topically 3 (three) times a day for 7 days Apply three times daily specifically to area of swelling on the right breast for the next 7 days  , Disp: 15 g, Rfl: 0  •  varenicline (CHANTIX) 1 mg tablet, , Disp: , Rfl:     Facility-Administered Medications Ordered in Other Visits:   •  Lidocaine Viscous HCl (XYLOCAINE) 2 % mucosal solution, , , Code/Trauma/Sedation Med, Phong Glasgow, , 15 mL at 05/10/21 1109    Current Allergies     Allergies as of 2023 - Reviewed 2023   Allergen Reaction Noted   • No known allergies  2016            The following portions of the patient's history were reviewed and updated as appropriate: allergies, current medications, past family history, past medical history, past social history, past surgical history and problem list      Past Medical History:   Diagnosis Date   • Allergic    • Asthma    • Cervical disc disorder with radiculopathy of mid-cervical region 2020   • Cervical radiculopathy 2020   • GERD (gastroesophageal reflux disease)    • Herniated disc, cervical    • Pinched nerve in shoulder, right    • Thyroid nodule        Past Surgical History:   Procedure Laterality Date   •  SECTION     • EPIDURAL BLOCK INJECTION N/A 3/4/2020    Procedure: C7-T1 Interlaminar Epidural Steroid Injection (42849);   Surgeon: Eryn Rothman MD;  Location: [facility];  Service: Pain Management    • EPIDURAL BLOCK INJECTION N/A 2020    Procedure: C7-T1 interlaminar epidural steroid injection;  Surgeon: Eryn Rothman MD;  Location: [facility];  Service: Pain Management    • FL GUIDED NEEDLE PLAC BX/ASP/INJ  3/4/2020   • FL GUIDED NEEDLE PLAC BX/ASP/INJ  2020       Family History   Problem Relation Age of Onset   • Diabetes Mother    • Heart disease Mother    • Bone cancer Father    • Diabetes Brother    • No Known Problems Daughter    • No Known Problems Maternal Grandmother    • No Known Problems Paternal Grandmother    • No Known "Problems Maternal Aunt    • No Known Problems Paternal Aunt    • No Known Problems Paternal Aunt    • Breast cancer Cousin    • Colon cancer Neg Hx    • Ovarian cancer Neg Hx          Medications have been verified  Objective   Pulse 93   Temp 97 9 °F (36 6 °C)   Resp 20   Ht 5' 5\" (1 651 m)   Wt 77 1 kg (170 lb)   SpO2 98%   BMI 28 29 kg/m²        Physical Exam     Physical Exam  Vitals and nursing note reviewed  Constitutional:       General: She is not in acute distress  Appearance: Normal appearance  She is normal weight  She is not ill-appearing  HENT:      Head: Normocephalic and atraumatic  Right Ear: Tympanic membrane, ear canal and external ear normal       Left Ear: Tympanic membrane, ear canal and external ear normal       Nose: Nose normal  No congestion or rhinorrhea  Mouth/Throat:      Lips: Pink  Mouth: Mucous membranes are moist       Pharynx: Oropharynx is clear  No pharyngeal swelling or posterior oropharyngeal erythema  Tonsils: No tonsillar exudate  Eyes:      Extraocular Movements: Extraocular movements intact  Conjunctiva/sclera: Conjunctivae normal       Pupils: Pupils are equal, round, and reactive to light  Cardiovascular:      Rate and Rhythm: Normal rate and regular rhythm  Pulses: Normal pulses  Heart sounds: Normal heart sounds  Comments: EKG Interpretation:     Rate: 82   Rhythm: normal sinus rhythm   QRS Axis: 57   OR Interval: 160   QRS Duration: 8/8   QTc: 448   Voltages: normal   Conduction Disturbances: none   Other Abnormalities: none     Narrative Interpretation: Normal sinus rhythm     Pulmonary:      Effort: Pulmonary effort is normal       Breath sounds: Normal breath sounds  Abdominal:      General: Abdomen is flat  Bowel sounds are normal       Palpations: Abdomen is soft  Musculoskeletal:         General: Normal range of motion  Cervical back: Normal range of motion and neck supple     Skin:     " General: Skin is warm  Capillary Refill: Capillary refill takes less than 2 seconds  Neurological:      General: No focal deficit present  Mental Status: She is alert and oriented to person, place, and time     Psychiatric:         Mood and Affect: Mood normal          Behavior: Behavior normal

## 2023-06-10 NOTE — ED PROVIDER NOTES
History  Chief Complaint   Patient presents with   • Jaw Pain     Patient complaining of left jaw pain going into left shoulder x 2 days, seen at urgent care and sent here for cardiac eval   Patient has pinched nerve and herniated disc     44-year-old woman presents to the emergency department from urgent care for evaluation of pain that she has been having in the left posterior jaw and left posterior neck for the past 2 days intermittently, lasting 3-10 minutes at a time occurring without any obvious triggers for sx  Pain aching/throbbing with some paresthesias on the superior aspect of the shoulder at times when it radiates  It does not radiate distal to the shoulder  She does not have any weakness or loss of fine motor control in the left hand  Patient attributed sx to disc herniation syndrome that she has in her cervical spine and which she has dealt with for several years, treated by her primary physician with NSAIDs and muscle relaxants as needed  Pain in her neck/jaw is not exertional   Not associated with dyspnea  Not associated with diaphoresis/nausea/vomiting/lightheadedness/presyncope/syncope  No dentalgia/otalgia/decreased hearing from either ear  No preceding URI sx  She does not have any known cardiac disease  She has never had any formal cardiac evaluation previously  No prior cervical spine fracture or surgery  She does not have any retrosternal chest pain at any point  She does note that she has epigastric pain occasionally that radiates into the chest and is associated with an acid/burning sensation  She attributes this to GERD and is typically able to manage the symptoms with Mylanta  This feels no different at present  Left posterior neck pain reproducible on examination with similar symptoms to what patient described in the urgent care earlier today  Low pretest suspicion for ACS  Check ECG/CBC/CMP/troponin    Strongest suspicion that symptoms are related to disc herniation previously identified in the cervical spine  History provided by:  Patient and medical records      Prior to Admission Medications   Prescriptions Last Dose Informant Patient Reported? Taking? Levonorgestrel (Liletta, 52 MG,) 19 5 MCG/DAY IUD IUD  Self Yes No   Si each by Intrauterine route once   celecoxib (CeleBREX) 200 mg capsule  Self Yes No   famotidine (PEPCID) 40 MG tablet   No No   Sig: take 1 tablet by mouth at bedtime 2 HOURS BEFORE BED   hydrOXYzine HCL (ATARAX) 25 mg tablet  Self No No   Sig: Take 1 tablet (25 mg total) by mouth every 6 (six) hours   loratadine (CLARITIN) 10 mg tablet   Yes No   Sig: Take 10 mg by mouth daily   methocarbamol (ROBAXIN) 500 mg tablet  Self No No   Sig: Take 1 tablet (500 mg total) by mouth 2 (two) times a day as needed for muscle spasms   mupirocin (BACTROBAN) 2 % ointment   No No   Sig: Apply topically 3 (three) times a day for 7 days Apply three times daily specifically to area of swelling on the right breast for the next 7 days  pantoprazole (PROTONIX) 40 mg tablet  Self Yes No   Sig: Take 40 mg by mouth daily   varenicline (CHANTIX) 1 mg tablet   Yes No   Patient not taking: Reported on 2023      Facility-Administered Medications: None       Past Medical History:   Diagnosis Date   • Allergic    • Asthma    • Cervical disc disorder with radiculopathy of mid-cervical region 2020   • Cervical radiculopathy 2020   • GERD (gastroesophageal reflux disease)    • Herniated disc, cervical    • Pinched nerve in shoulder, right    • Thyroid nodule        Past Surgical History:   Procedure Laterality Date   •  SECTION     • EPIDURAL BLOCK INJECTION N/A 3/4/2020    Procedure: C7-T1 Interlaminar Epidural Steroid Injection (93804);   Surgeon: Gemini Ramirez MD;  Location: [facility];  Service: Pain Management    • EPIDURAL BLOCK INJECTION N/A 2020    Procedure: C7-T1 interlaminar epidural steroid injection;  Surgeon: Gemini Ramirez MD; Location: [facility];  Service: Pain Management    • FL GUIDED NEEDLE PLAC BX/ASP/INJ  3/4/2020   • FL GUIDED NEEDLE PLAC BX/ASP/INJ  6/26/2020       Family History   Problem Relation Age of Onset   • Diabetes Mother    • Heart disease Mother    • Bone cancer Father    • Diabetes Brother    • No Known Problems Daughter    • No Known Problems Maternal Grandmother    • No Known Problems Paternal Grandmother    • No Known Problems Maternal Aunt    • No Known Problems Paternal Aunt    • No Known Problems Paternal Aunt    • Breast cancer Cousin    • Colon cancer Neg Hx    • Ovarian cancer Neg Hx      I have reviewed and agree with the history as documented  E-Cigarette/Vaping   • E-Cigarette Use Never User      E-Cigarette/Vaping Substances   • Nicotine No    • THC No    • CBD No    • Flavoring No    • Other No    • Unknown No      Social History     Tobacco Use   • Smoking status: Every Day     Packs/day: 1 00     Types: Cigarettes   • Smokeless tobacco: Never   Vaping Use   • Vaping Use: Never used   Substance Use Topics   • Alcohol use: No   • Drug use: Not Currently     Types: Marijuana       Review of Systems   Constitutional: Positive for fatigue  Negative for chills, diaphoresis and fever  HENT:        +Left jaw pain   Respiratory: Negative for cough, chest tightness and shortness of breath  Cardiovascular: Negative for chest pain and palpitations  Gastrointestinal: Negative for abdominal pain, nausea and vomiting  Musculoskeletal: Positive for arthralgias and neck pain  Neurological: Negative for weakness and numbness  All other systems reviewed and are negative  Physical Exam  Physical Exam  Vitals and nursing note reviewed  Constitutional:       General: She is awake  She is not in acute distress  Appearance: Normal appearance  She is well-developed  HENT:      Head: Normocephalic and atraumatic  Comments: No tenderness over either TMJ    No crepitus with normal jaw movements Right Ear: Hearing, tympanic membrane, ear canal and external ear normal  No decreased hearing noted  No mastoid tenderness  Left Ear: Hearing, tympanic membrane, ear canal and external ear normal  No decreased hearing noted  No mastoid tenderness  Nose: Nose normal       Mouth/Throat:      Lips: Pink  Mouth: Mucous membranes are moist       Pharynx: Oropharynx is clear  Uvula midline  Neck:      Thyroid: No thyroid mass, thyromegaly or thyroid tenderness  Trachea: Trachea and phonation normal         Comments: There is left-sided suboccipital hypertonicity and tenderness to palpation which reproduces symptoms extending into the L superior shoulder  The Spurling's maneuver is negative bilaterally  Full normal AROM in all directions  Cardiovascular:      Rate and Rhythm: Normal rate and regular rhythm  Pulses:           Radial pulses are 2+ on the right side and 2+ on the left side  Dorsalis pedis pulses are 2+ on the right side and 2+ on the left side  Posterior tibial pulses are 2+ on the right side and 2+ on the left side  Heart sounds: Normal heart sounds, S1 normal and S2 normal  No murmur heard  No friction rub  No gallop  Pulmonary:      Effort: Pulmonary effort is normal  No respiratory distress  Breath sounds: Normal breath sounds  No stridor  No decreased breath sounds, wheezing, rhonchi or rales  Abdominal:      General: There is no distension  Palpations: There is no mass  Tenderness: There is no abdominal tenderness  There is no guarding or rebound  Musculoskeletal:      Cervical back: Normal range of motion  Muscular tenderness present  No spinous process tenderness  Normal range of motion  Skin:     General: Skin is warm and dry  Neurological:      Mental Status: She is alert and oriented to person, place, and time  GCS: GCS eye subscore is 4  GCS verbal subscore is 5  GCS motor subscore is 6  Cranial Nerves:  No cranial nerve deficit  Sensory: No sensory deficit  Motor: No abnormal muscle tone  Comments: PERRLA; EOMI  Sensation intact to light touch over face in V1-V3 distribution bilaterally  Facial expressions symmetric  Tongue/uvula midline  Shoulder shrug equal bilaterally  Strength 5/5 in UE/LE bilaterally  Sensation intact to light touch in UE/LE bilaterally  Vital Signs  ED Triage Vitals [06/09/23 1937]   Temperature Pulse Respirations Blood Pressure SpO2   98 1 °F (36 7 °C) 86 16 (!) 186/100 96 %      Temp Source Heart Rate Source Patient Position - Orthostatic VS BP Location FiO2 (%)   Oral Monitor Sitting Left arm --      Pain Score       4           Vitals:    06/09/23 1937 06/09/23 2045   BP: (!) 186/100 (!) 172/88   Pulse: 86 73   Patient Position - Orthostatic VS: Sitting          Visual Acuity      ED Medications  Medications   potassium chloride (K-DUR,KLOR-CON) CR tablet 40 mEq (40 mEq Oral Given 6/9/23 2038)       Diagnostic Studies  Results Reviewed     Procedure Component Value Units Date/Time    HS Troponin I 2hr [[id number]]  (Normal) Collected: 06/09/23 2146    Lab Status: Final result Specimen: Blood from Arm, Right Updated: 06/09/23 2214     hs TnI 2hr <2 ng/L      Delta 2hr hsTnI <0 ng/L     CBC and differential [[id number]]  (Normal) Collected: 06/09/23 1951    Lab Status: Final result Specimen: Blood from Arm, Right Updated: 06/09/23 2101     WBC 9 07 Thousand/uL      RBC 4 60 Million/uL      Hemoglobin 14 6 g/dL      Hematocrit 43 3 %      MCV 94 fL      MCH 31 7 pg      MCHC 33 7 g/dL      RDW 12 4 %      MPV 11 9 fL      Platelets 623 Thousands/uL     Narrative: This is an appended report  These results have been appended to a previously verified report      Manual Differential(PHLEBS Do Not Order) [[id number]] Collected: 06/09/23 1951    Lab Status: Final result Specimen: Blood from Arm, Right Updated: 06/09/23 2101     Segmented % 54 %      Lymphocytes % 36 % Monocytes % 8 %      Eosinophils, % 1 %      Basophils % 1 %      Absolute Neutrophils 4 90 Thousand/uL      Lymphocytes Absolute 3 27 Thousand/uL      Monocytes Absolute 0 73 Thousand/uL      Eosinophils Absolute 0 09 Thousand/uL      Basophils Absolute 0 09 Thousand/uL      Total Counted --     RBC Morphology Present     Macrocytes Present     Polychromasia Present     Platelet Estimate Adequate     Large Platelet Present    HS Troponin 0hr (reflex protocol) [[id number]]  (Normal) Collected: 06/09/23 1951    Lab Status: Final result Specimen: Blood from Arm, Right Updated: 06/09/23 2020     hs TnI 0hr 2 ng/L     HS Troponin I 4hr [[id number]]     Lab Status: No result Specimen: Blood     Comprehensive metabolic panel [[id number]]  (Abnormal) Collected: 06/09/23 1951    Lab Status: Final result Specimen: Blood from Arm, Right Updated: 06/09/23 2013     Sodium 139 mmol/L      Potassium 3 1 mmol/L      Chloride 103 mmol/L      CO2 28 mmol/L      ANION GAP 8 mmol/L      BUN 13 mg/dL      Creatinine 0 69 mg/dL      Glucose 102 mg/dL      Calcium 9 4 mg/dL      AST 13 U/L      ALT 11 U/L      Alkaline Phosphatase 87 U/L      Total Protein 7 2 g/dL      Albumin 4 5 g/dL      Total Bilirubin 0 34 mg/dL      eGFR 106 ml/min/1 73sq m     Narrative:      [place] guidelines for Chronic Kidney Disease (CKD):   •  Stage 1 with normal or high GFR (GFR > 90 mL/min/1 73 square meters)  •  Stage 2 Mild CKD (GFR = 60-89 mL/min/1 73 square meters)  •  Stage 3A Moderate CKD (GFR = 45-59 mL/min/1 73 square meters)  •  Stage 3B Moderate CKD (GFR = 30-44 mL/min/1 73 square meters)  •  Stage 4 Severe CKD (GFR = 15-29 mL/min/1 73 square meters)  •  Stage 5 End Stage CKD (GFR <15 mL/min/1 73 square meters)  Note: GFR calculation is accurate only with a steady state creatinine                 XR chest 1 view portable   ED Interpretation by Jenelle Epley, DO (06/09 2016)   Airway is midline   Lungs are clear bilaterally with no evidence of pulmonary vascular congestion/focal infiltrate/pleural effusion/pneumothorax  Cardiac and mediastinal silhouettes are within normal limits  Osseous structures appear normal                    Procedures  Procedures         ED Course  ED Course as of 06/09/23 2220   Fri Jun 09, 2023 2000 CBC and differential  WBC wnl  Hg/Hct wnl  Plt wnl   2004 ECG NSR 81 bpm   QRS 86 QTc 460  No acute st/t changes  No ectopy  Normal axis  No Q waves  Interpreted by me   2014 Comprehensive metabolic panel(!)  Mild hypokalemia  Transaminases normal    2023 HS Troponin 0hr (reflex protocol)  Nonischemic  2025 MRI cervical spine 22 Dec 2021:    rocedure: MRI of the cervical spine was performed the following sequences:   Sagittal T1, T2, gradient echo and STIR images  Axial gradient-echo images and   axial T2 weighted images were also obtained  Comparison: 9/10/2026     Findings:     Alignment: The cervical vertebrae are normal in alignment  Marrow: There is no abnormal marrow signal on the STIR images  Vertebral bodies: Unremarkable     Disk Spaces: Degenerative disc disease at C5-C6     Cord: The spinal cord is normal in volume and signal intensity  Please note, estimations of neural foraminal narrowing may contain error due to   motion  Oblique imaging provides a more sensitive evaluation of the   neuroforamina  C2-C3: No neuroforaminal or spinal canal stenosis  C3-C4: Mild facet degeneration with mild neuroforaminal narrowing  No   significant spinal canal narrowing  C4-C5:Mild facet degeneration  No significant neuroforaminal narrowing or spinal   canal stenosis  C5-C6:Left paramedian disc extrusion is stable to minimally increased in size   measuring slightly greater on the axial views previously 6 mm, currently 7 mm  It is possible this difference is technical in nature   There is similar, perhaps   minimally increased, left hemicord flattening and bilateral left greater than   right neural foraminal stenoses  C6-C7:No neuroforaminal or spinal canal stenosis  C7-T1: No neuroforaminal or spinal canal stenosis  Posterior Fossa: Unremarkable     Flow Voids: Unremarkable     Additional Findings: There is a exophytic left thyroid lobe cystic lesion  2131 Reviewed results of work-up thus far with patient  Plan for T+ 2-hour troponin  Already post test probability for cardiac etiology of symptoms is extremely low and symptoms are more likely due to known cervical degenerative disease/disc herniation  She is agreeable to this plan  2215 HS Troponin I 2hr  Delta of zero not c/w ACS         SBIRT 20yo+    Flowsheet Row Most Recent Value   Initial Alcohol Screen: US AUDIT-C     1  How often do you have a drink containing alcohol? 0 Filed at: 06/09/2023 1941   2  How many drinks containing alcohol do you have on a typical day you are drinking? 0 Filed at: 06/09/2023 1941   3a  Male UNDER 65: How often do you have five or more drinks on one occasion? 0 Filed at: 06/09/2023 1941   3b  FEMALE Any Age, or MALE 65+: How often do you have 4 or more drinks on one occassion? 0 Filed at: 06/09/2023 1941   Audit-C Score 0 Filed at: 06/09/2023 1941   ROCHELLE: How many times in the past year have you    Used an illegal drug or used a prescription medication for non-medical reasons? Never Filed at: 06/09/2023 1941          Medical Decision Making  No evidence of ACS  Symptoms highly reproducible on exam and concordant with known cervical spine disease  Likely cervicogenic in origin  Advised that she see primary physician to determine if any additional investigation (MRI, etc ) indicated or if alternate treatment strategies might be tried  In the meantime she can continue all current medications and treatment strategies that she has used up to this point  ED return for any signs/symptoms suggestive of ACS    All questions were answered to patient's satisfaction prior to discharge  She expressed understanding and agreed to plan  Amount and/or Complexity of Data Reviewed  Labs: ordered  Decision-making details documented in ED Course  Radiology: ordered and independent interpretation performed  Risk  Prescription drug management  Disposition  Final diagnoses:   Neck pain on left side due to muscular spasm/cervical disc herniation     Time reflects when diagnosis was documented in both MDM as applicable and the Disposition within this note     Time User Action Codes Description Comment    6/9/2023 10:15 PM Bryson Glad Add [M54 2] Neck pain on left side     6/9/2023 10:16 PM Bryson Glad Modify [M54 2] Neck pain on left side due to muscular spasm/cervical disc herniation       ED Disposition     ED Disposition   Discharge    Condition   Stable    Date/Time   Fri Jun 9, 2023 10:15 PM    Comment   Mariangel Mccord discharge to home/self care  Follow-up Information     Follow up With Specialties Details Why [address], MD Internal Medicine Schedule an appointment as soon as possible for a visit in 1 week For recheck of symptoms [address]  [phone]            Patient's Medications   Discharge Prescriptions    No medications on file       No discharge procedures on file      PDMP Review     None          ED Provider  Electronically Signed by           Angelo Villalobos DO  06/09/23 0958

## 2023-06-10 NOTE — DISCHARGE INSTRUCTIONS
You can continue all treatments you normally use for the pain in your neck such as ibuprofen and muscle relaxants       You can be rechecked by your regular doctor in about 1 week  You may need additional imaging of your neck or other work-up to determine if there been any change in the disc problem in your neck  If you develop chest pain that spreads out of your chest, chest pain worse with physical activity, chest pain with shortness of breath, chest pain with sweating, or chest pain with vomiting, please go to the ER

## 2023-06-13 LAB
ATRIAL RATE: 81 BPM
ATRIAL RATE: 82 BPM
P AXIS: 32 DEGREES
P AXIS: 36 DEGREES
PR INTERVAL: 160 MS
PR INTERVAL: 166 MS
QRS AXIS: 57 DEGREES
QRS AXIS: 81 DEGREES
QRSD INTERVAL: 86 MS
QRSD INTERVAL: 88 MS
QT INTERVAL: 384 MS
QT INTERVAL: 396 MS
QTC INTERVAL: 448 MS
QTC INTERVAL: 460 MS
T WAVE AXIS: 16 DEGREES
T WAVE AXIS: 19 DEGREES
VENTRICULAR RATE: 81 BPM
VENTRICULAR RATE: 82 BPM

## 2023-06-13 PROCEDURE — 93010 ELECTROCARDIOGRAM REPORT: CPT | Performed by: INTERNAL MEDICINE

## 2023-07-20 ENCOUNTER — HOSPITAL ENCOUNTER (EMERGENCY)
Facility: HOSPITAL | Age: 43
Discharge: HOME | End: 2023-07-20
Attending: EMERGENCY MEDICINE
Payer: COMMERCIAL

## 2023-07-20 VITALS
WEIGHT: 175 LBS | HEIGHT: 65 IN | HEART RATE: 102 BPM | OXYGEN SATURATION: 99 % | DIASTOLIC BLOOD PRESSURE: 92 MMHG | RESPIRATION RATE: 18 BRPM | TEMPERATURE: 97.8 F | BODY MASS INDEX: 29.16 KG/M2 | SYSTOLIC BLOOD PRESSURE: 137 MMHG

## 2023-07-20 DIAGNOSIS — R10.9 ABDOMINAL PAIN: Primary | ICD-10-CM

## 2023-07-20 LAB
ALBUMIN SERPL BCP-MCNC: 4.2 G/DL (ref 3.5–5)
ALP SERPL-CCNC: 71 U/L (ref 34–104)
ALT SERPL W P-5'-P-CCNC: 10 U/L (ref 7–52)
ANION GAP SERPL CALCULATED.3IONS-SCNC: 5 MMOL/L
AST SERPL W P-5'-P-CCNC: 23 U/L (ref 13–39)
BACTERIA UR QL AUTO: NORMAL /HPF
BASOPHILS # BLD AUTO: 0.04 THOUSANDS/ÂΜL (ref 0–0.1)
BASOPHILS NFR BLD AUTO: 1 % (ref 0–1)
BILIRUB SERPL-MCNC: 0.58 MG/DL (ref 0.2–1)
BILIRUB UR QL STRIP: NEGATIVE
BUN SERPL-MCNC: 9 MG/DL (ref 5–25)
CALCIUM SERPL-MCNC: 9.1 MG/DL (ref 8.4–10.2)
CHLORIDE SERPL-SCNC: 105 MMOL/L (ref 96–108)
CLARITY UR: CLEAR
CO2 SERPL-SCNC: 28 MMOL/L (ref 21–32)
COLOR UR: YELLOW
CREAT SERPL-MCNC: 0.74 MG/DL (ref 0.6–1.3)
EOSINOPHIL # BLD AUTO: 0.18 THOUSAND/ÂΜL (ref 0–0.61)
EOSINOPHIL NFR BLD AUTO: 3 % (ref 0–6)
ERYTHROCYTE [DISTWIDTH] IN BLOOD BY AUTOMATED COUNT: 12.2 % (ref 11.6–15.1)
EXT PREGNANCY TEST URINE: NEGATIVE
EXT. CONTROL: NORMAL
GFR SERPL CREATININE-BSD FRML MDRD: 99 ML/MIN/1.73SQ M
GLUCOSE SERPL-MCNC: 106 MG/DL (ref 65–140)
GLUCOSE UR STRIP-MCNC: NEGATIVE MG/DL
HCT VFR BLD AUTO: 41.9 % (ref 34.8–46.1)
HGB BLD-MCNC: 13.8 G/DL (ref 11.5–15.4)
HGB UR QL STRIP.AUTO: ABNORMAL
IMM GRANULOCYTES # BLD AUTO: 0.01 THOUSAND/UL (ref 0–0.2)
IMM GRANULOCYTES NFR BLD AUTO: 0 % (ref 0–2)
KETONES UR STRIP-MCNC: NEGATIVE MG/DL
LEUKOCYTE ESTERASE UR QL STRIP: NEGATIVE
LIPASE SERPL-CCNC: 63 U/L (ref 11–82)
LYMPHOCYTES # BLD AUTO: 2.31 THOUSANDS/ÂΜL (ref 0.6–4.47)
LYMPHOCYTES NFR BLD AUTO: 40 % (ref 14–44)
MCH RBC QN AUTO: 30.7 PG (ref 26.8–34.3)
MCHC RBC AUTO-ENTMCNC: 32.9 G/DL (ref 31.4–37.4)
MCV RBC AUTO: 93 FL (ref 82–98)
MONOCYTES # BLD AUTO: 0.42 THOUSAND/ÂΜL (ref 0.17–1.22)
MONOCYTES NFR BLD AUTO: 7 % (ref 4–12)
NEUTROPHILS # BLD AUTO: 2.83 THOUSANDS/ÂΜL (ref 1.85–7.62)
NEUTS SEG NFR BLD AUTO: 49 % (ref 43–75)
NITRITE UR QL STRIP: NEGATIVE
NON-SQ EPI CELLS URNS QL MICRO: NORMAL /HPF
NRBC BLD AUTO-RTO: 0 /100 WBCS
PH UR STRIP.AUTO: 6.5 [PH]
PLATELET # BLD AUTO: 215 THOUSANDS/UL (ref 149–390)
PMV BLD AUTO: 11.4 FL (ref 8.9–12.7)
POTASSIUM SERPL-SCNC: 3.9 MMOL/L (ref 3.5–5.3)
PROT SERPL-MCNC: 7.1 G/DL (ref 6.4–8.4)
PROT UR STRIP-MCNC: NEGATIVE MG/DL
RBC # BLD AUTO: 4.49 MILLION/UL (ref 3.81–5.12)
RBC #/AREA URNS AUTO: NORMAL /HPF
SODIUM SERPL-SCNC: 138 MMOL/L (ref 135–147)
SP GR UR STRIP.AUTO: <=1.005 (ref 1–1.03)
UROBILINOGEN UR QL STRIP.AUTO: 0.2 E.U./DL
WBC # BLD AUTO: 5.79 THOUSAND/UL (ref 4.31–10.16)
WBC #/AREA URNS AUTO: NORMAL /HPF

## 2023-07-20 PROCEDURE — 99284 EMERGENCY DEPT VISIT MOD MDM: CPT | Performed by: EMERGENCY MEDICINE

## 2023-07-20 PROCEDURE — 80053 COMPREHEN METABOLIC PANEL: CPT | Performed by: EMERGENCY MEDICINE

## 2023-07-20 PROCEDURE — 96361 HYDRATE IV INFUSION ADD-ON: CPT

## 2023-07-20 PROCEDURE — 99284 EMERGENCY DEPT VISIT MOD MDM: CPT

## 2023-07-20 PROCEDURE — 85025 COMPLETE CBC W/AUTO DIFF WBC: CPT | Performed by: EMERGENCY MEDICINE

## 2023-07-20 PROCEDURE — 96375 TX/PRO/DX INJ NEW DRUG ADDON: CPT

## 2023-07-20 PROCEDURE — 36415 COLL VENOUS BLD VENIPUNCTURE: CPT | Performed by: EMERGENCY MEDICINE

## 2023-07-20 PROCEDURE — 81001 URINALYSIS AUTO W/SCOPE: CPT | Performed by: EMERGENCY MEDICINE

## 2023-07-20 PROCEDURE — 81025 URINE PREGNANCY TEST: CPT | Performed by: EMERGENCY MEDICINE

## 2023-07-20 PROCEDURE — 96374 THER/PROPH/DIAG INJ IV PUSH: CPT

## 2023-07-20 PROCEDURE — 83690 ASSAY OF LIPASE: CPT | Performed by: EMERGENCY MEDICINE

## 2023-07-20 RX ORDER — MAGNESIUM HYDROXIDE/ALUMINUM HYDROXICE/SIMETHICONE 120; 1200; 1200 MG/30ML; MG/30ML; MG/30ML
30 SUSPENSION ORAL ONCE
Status: COMPLETED | OUTPATIENT
Start: 2023-07-20 | End: 2023-07-20

## 2023-07-20 RX ORDER — FAMOTIDINE 10 MG/ML
20 INJECTION, SOLUTION INTRAVENOUS ONCE
Status: COMPLETED | OUTPATIENT
Start: 2023-07-20 | End: 2023-07-20

## 2023-07-20 RX ORDER — ONDANSETRON 2 MG/ML
4 INJECTION INTRAMUSCULAR; INTRAVENOUS ONCE
Status: COMPLETED | OUTPATIENT
Start: 2023-07-20 | End: 2023-07-20

## 2023-07-20 RX ORDER — SUCRALFATE 1 G/1
1 TABLET ORAL ONCE
Status: COMPLETED | OUTPATIENT
Start: 2023-07-20 | End: 2023-07-20

## 2023-07-20 RX ADMIN — SODIUM CHLORIDE 1000 ML: 0.9 INJECTION, SOLUTION INTRAVENOUS at 04:19

## 2023-07-20 RX ADMIN — SUCRALFATE 1 G: 1 TABLET ORAL at 04:20

## 2023-07-20 RX ADMIN — FAMOTIDINE 20 MG: 10 INJECTION, SOLUTION INTRAVENOUS at 04:21

## 2023-07-20 RX ADMIN — ONDANSETRON 4 MG: 2 INJECTION INTRAMUSCULAR; INTRAVENOUS at 04:21

## 2023-07-20 RX ADMIN — ALUMINUM HYDROXIDE, MAGNESIUM HYDROXIDE, AND DIMETHICONE 30 ML: 200; 20; 200 SUSPENSION ORAL at 04:20

## 2023-07-20 NOTE — ED PROVIDER NOTES
History  Chief Complaint   Patient presents with   • Abdominal Pain     Pt presents with abdominal pain that woke her out sleep     19-year-old female with a past medical history of , chronic back pain, who presents for abdominal pain. Patient reports that for the past 2 days she has had exacerbation of her chronic back pain, some radiation to the left thigh, however she does have chronic back pain. She reports that she woke up this morning suddenly with severe abdominal pain which she describes as largely in the epigastric region. She reports nausea without vomiting. No dysuria or frequency. No diarrhea. Review of systems otherwise negative. Prior to Admission Medications   Prescriptions Last Dose Informant Patient Reported? Taking? Levonorgestrel (Liletta, 52 MG,) 19.5 MCG/DAY IUD IUD  Self Yes No   Si each by Intrauterine route once   celecoxib (CeleBREX) 200 mg capsule  Self Yes No   famotidine (PEPCID) 40 MG tablet   No No   Sig: take 1 tablet by mouth at bedtime 2 HOURS BEFORE BED   hydrOXYzine HCL (ATARAX) 25 mg tablet  Self No No   Sig: Take 1 tablet (25 mg total) by mouth every 6 (six) hours   loratadine (CLARITIN) 10 mg tablet   Yes No   Sig: Take 10 mg by mouth daily   methocarbamol (ROBAXIN) 500 mg tablet  Self No No   Sig: Take 1 tablet (500 mg total) by mouth 2 (two) times a day as needed for muscle spasms   mupirocin (BACTROBAN) 2 % ointment   No No   Sig: Apply topically 3 (three) times a day for 7 days Apply three times daily specifically to area of swelling on the right breast for the next 7 days.    pantoprazole (PROTONIX) 40 mg tablet  Self Yes No   Sig: Take 40 mg by mouth daily   varenicline (CHANTIX) 1 mg tablet   Yes No   Patient not taking: Reported on 2023      Facility-Administered Medications: None       Past Medical History:   Diagnosis Date   • Allergic    • Asthma    • Cervical disc disorder with radiculopathy of mid-cervical region 2020   • Cervical radiculopathy 2020   • GERD (gastroesophageal reflux disease)    • Herniated disc, cervical    • Pinched nerve in shoulder, right    • Thyroid nodule        Past Surgical History:   Procedure Laterality Date   •  SECTION     • EPIDURAL BLOCK INJECTION N/A 3/4/2020    Procedure: C7-T1 Interlaminar Epidural Steroid Injection (09757); Surgeon: Carlos Carter MD;  Location: [facility];  Service: Pain Management    • EPIDURAL BLOCK INJECTION N/A 2020    Procedure: C7-T1 interlaminar epidural steroid injection;  Surgeon: Carlos Carter MD;  Location: [facility];  Service: Pain Management    • FL GUIDED NEEDLE PLAC BX/ASP/INJ  3/4/2020   • FL GUIDED NEEDLE PLAC BX/ASP/INJ  2020       Family History   Problem Relation Age of Onset   • Diabetes Mother    • Heart disease Mother    • Bone cancer Father    • Diabetes Brother    • No Known Problems Daughter    • No Known Problems Maternal Grandmother    • No Known Problems Paternal Grandmother    • No Known Problems Maternal Aunt    • No Known Problems Paternal Aunt    • No Known Problems Paternal Aunt    • Breast cancer Cousin    • Colon cancer Neg Hx    • Ovarian cancer Neg Hx      I have reviewed and agree with the history as documented. E-Cigarette/Vaping   • E-Cigarette Use Never User      E-Cigarette/Vaping Substances   • Nicotine No    • THC No    • CBD No    • Flavoring No    • Other No    • Unknown No      Social History     Tobacco Use   • Smoking status: Every Day     Packs/day: 1.00     Types: Cigarettes   • Smokeless tobacco: Never   Vaping Use   • Vaping Use: Never used   Substance Use Topics   • Alcohol use: No   • Drug use: Yes     Types: Marijuana       Review of Systems   Constitutional: Negative for chills and fever. HENT: Negative for congestion, rhinorrhea and sore throat. Respiratory: Negative for cough and shortness of breath. Cardiovascular: Negative for chest pain and palpitations. Gastrointestinal: Positive for abdominal pain and nausea. Negative for constipation, diarrhea and vomiting. Genitourinary: Negative for difficulty urinating and flank pain. Musculoskeletal: Negative for arthralgias. Neurological: Negative for dizziness, weakness, light-headedness and headaches. Psychiatric/Behavioral: Negative for agitation, behavioral problems and confusion. All other systems reviewed and are negative. Physical Exam  Physical Exam  Constitutional:       Appearance: She is well-developed. HENT:      Head: Normocephalic and atraumatic. Cardiovascular:      Rate and Rhythm: Normal rate and regular rhythm. Heart sounds: Normal heart sounds. No murmur heard. No friction rub. Pulmonary:      Effort: Pulmonary effort is normal. No respiratory distress. Breath sounds: Normal breath sounds. No wheezing or rales. Abdominal:      General: Bowel sounds are normal. There is no distension. Palpations: Abdomen is soft. Tenderness: There is abdominal tenderness in the epigastric area and periumbilical area. There is no right CVA tenderness or left CVA tenderness. Comments: Mild epigastric/periumbilical pain appreciated. No guarding or rigidity. Musculoskeletal:         General: Normal range of motion. Cervical back: Normal range of motion and neck supple. Skin:     General: Skin is warm. Neurological:      Mental Status: She is alert and oriented to person, place, and time. Coordination: Coordination normal.   Psychiatric:         Behavior: Behavior normal.         Thought Content:  Thought content normal.         Judgment: Judgment normal.         Vital Signs  ED Triage Vitals [07/20/23 0400]   Temperature Pulse Respirations Blood Pressure SpO2   97.8 °F (36.6 °C) 102 18 137/92 99 %      Temp Source Heart Rate Source Patient Position - Orthostatic VS BP Location FiO2 (%)   Temporal Monitor Sitting Left arm --      Pain Score       7 Vitals:    07/20/23 0400   BP: 137/92   Pulse: 102   Patient Position - Orthostatic VS: Sitting         Visual Acuity      ED Medications  Medications   sucralfate (CARAFATE) tablet 1 g (1 g Oral Given 7/20/23 0420)   aluminum-magnesium hydroxide-simethicone (MAALOX) oral suspension 30 mL (30 mL Oral Given 7/20/23 0420)   Famotidine (PF) (PEPCID) injection 20 mg (20 mg Intravenous Given 7/20/23 0421)   sodium chloride 0.9 % bolus 1,000 mL (0 mL Intravenous Stopped 7/20/23 0551)   ondansetron (ZOFRAN) injection 4 mg (4 mg Intravenous Given 7/20/23 0421)       Diagnostic Studies  Results Reviewed     Procedure Component Value Units Date/Time    POCT pregnancy, urine [[id number]]  (Normal) Resulted: 07/20/23 0448    Lab Status: Final result Updated: 07/20/23 0448     EXT Preg Test, Ur Negative     Control Valid    Urine Microscopic [[id number]]  (Normal) Collected: 07/20/23 0430    Lab Status: Final result Specimen: Urine, Clean Catch Updated: 07/20/23 0448     RBC, UA 0-1 /hpf      WBC, UA 0-1 /hpf      Epithelial Cells Occasional /hpf      Bacteria, UA Occasional /hpf     Comprehensive metabolic panel [[id number]] Collected: 07/20/23 0418    Lab Status: Final result Specimen: Blood from Arm, Left Updated: 07/20/23 0443     Sodium 138 mmol/L      Potassium 3.9 mmol/L      Chloride 105 mmol/L      CO2 28 mmol/L      ANION GAP 5 mmol/L      BUN 9 mg/dL      Creatinine 0.74 mg/dL      Glucose 106 mg/dL      Calcium 9.1 mg/dL      AST 23 U/L      ALT 10 U/L      Alkaline Phosphatase 71 U/L      Total Protein 7.1 g/dL      Albumin 4.2 g/dL      Total Bilirubin 0.58 mg/dL      eGFR 99 ml/min/1.73sq m     Narrative:      [place] guidelines for Chronic Kidney Disease (CKD):   •  Stage 1 with normal or high GFR (GFR > 90 mL/min/1.73 square meters)  •  Stage 2 Mild CKD (GFR = 60-89 mL/min/1.73 square meters)  •  Stage 3A Moderate CKD (GFR = 45-59 mL/min/1.73 square meters)  •  Stage 3B Moderate CKD (GFR = 30-44 mL/min/1.73 square meters)  •  Stage 4 Severe CKD (GFR = 15-29 mL/min/1.73 square meters)  •  Stage 5 End Stage CKD (GFR <15 mL/min/1.73 square meters)  Note: GFR calculation is accurate only with a steady state creatinine    Lipase [[id number]]  (Normal) Collected: 07/20/23 0418    Lab Status: Final result Specimen: Blood from Arm, Left Updated: 07/20/23 0443     Lipase 63 u/L     UA w Reflex to Microscopic w Reflex to Culture [[id number]]  (Abnormal) Collected: 07/20/23 0430    Lab Status: Final result Specimen: Urine, Clean Catch Updated: 07/20/23 0441     Color, UA Yellow     Clarity, UA Clear     Specific Gravity, UA <=1.005     pH, UA 6.5     Leukocytes, UA Negative     Nitrite, UA Negative     Protein, UA Negative mg/dl      Glucose, UA Negative mg/dl      Ketones, UA Negative mg/dl      Urobilinogen, UA 0.2 E.U./dl      Bilirubin, UA Negative     Occult Blood, UA Trace-Intact    CBC and differential [[id number]] Collected: 07/20/23 0433    Lab Status: Final result Specimen: Blood from Arm, Right Updated: 07/20/23 0439     WBC 5.79 Thousand/uL      RBC 4.49 Million/uL      Hemoglobin 13.8 g/dL      Hematocrit 41.9 %      MCV 93 fL      MCH 30.7 pg      MCHC 32.9 g/dL      RDW 12.2 %      MPV 11.4 fL      Platelets 673 Thousands/uL      nRBC 0 /100 WBCs      Neutrophils Relative 49 %      Immat GRANS % 0 %      Lymphocytes Relative 40 %      Monocytes Relative 7 %      Eosinophils Relative 3 %      Basophils Relative 1 %      Neutrophils Absolute 2.83 Thousands/µL      Immature Grans Absolute 0.01 Thousand/uL      Lymphocytes Absolute 2.31 Thousands/µL      Monocytes Absolute 0.42 Thousand/µL      Eosinophils Absolute 0.18 Thousand/µL      Basophils Absolute 0.04 Thousands/µL                  No orders to display              Procedures  Procedures         ED Course  ED Course as of 07/20/23 0707   Thu Jul 20, 2023   0505 Patient having large amount of flatus.  Running to the bathroom several times since my evaluation. 7934 Patient feels better after going to the bathroom multiple times. Would like to go home. Will discharge. SBIRT 20yo+    Flowsheet Row Most Recent Value   Initial Alcohol Screen: US AUDIT-C     1. How often do you have a drink containing alcohol? 0 Filed at: 07/20/2023 0402   2. How many drinks containing alcohol do you have on a typical day you are drinking? 0 Filed at: 07/20/2023 0402   3b. FEMALE Any Age, or MALE 65+: How often do you have 4 or more drinks on one occassion? 0 Filed at: 07/20/2023 0402   Audit-C Score 0 Filed at: 07/20/2023 5058   ROCHELLE: How many times in the past year have you. .. Used an illegal drug or used a prescription medication for non-medical reasons? Never Filed at: 07/20/2023 4934                    Medical Decision Making  I reviewed the patient's medical chart, PMHx, prior encounters, medications. My DDx includes: Pancreatitis, cholecystitis, appendicitis, gastritis, PUD, ACS    We will evaluate patient with GI labs, will treat patient symptomatically and reassess. Will perform EKG to evaluate for any ischemic changes although believe this is less likely given tenderness on exam.    Patient had large amount of flatus after evaluation, went to the bathroom multiple times. After this, he states that she felt significantly improved and was comfortable with discharge. Her laboratory evaluation was largely unremarkable. Patient was discharged with strict return precautions. Amount and/or Complexity of Data Reviewed  Labs: ordered. Risk  OTC drugs. Prescription drug management.           Disposition  Final diagnoses:   Abdominal pain     Time reflects when diagnosis was documented in both MDM as applicable and the Disposition within this note     Time User Action Codes Description Comment    7/20/2023  5:42 AM Marielos Bradley Add [R10.9] Abdominal pain       ED Disposition     ED Disposition   Discharge Condition   Stable    Date/Time   Thu Jul 20, 2023  5:42 AM    Comment   Cecillia Flurry discharge to home/self care. Follow-up Information     Follow up With Specialties Details Why [address]pathi MD Internal Medicine Call  For re-evaluation [address]  [phone]            Discharge Medication List as of 7/20/2023  5:47 AM      CONTINUE these medications which have NOT CHANGED    Details   celecoxib (CeleBREX) 200 mg capsule Historical Med      famotidine (PEPCID) 40 MG tablet take 1 tablet by mouth at bedtime 2 HOURS BEFORE BED, Normal      hydrOXYzine HCL (ATARAX) 25 mg tablet Take 1 tablet (25 mg total) by mouth every 6 (six) hours, Starting Fri 10/16/2020, Normal      Levonorgestrel (Liletta, 52 MG,) 19.5 MCG/DAY IUD IUD 1 each by Intrauterine route once, Starting Thu 3/10/2022, Historical Med      loratadine (CLARITIN) 10 mg tablet Take 10 mg by mouth daily, Starting Tue 1/17/2023, Historical Med      methocarbamol (ROBAXIN) 500 mg tablet Take 1 tablet (500 mg total) by mouth 2 (two) times a day as needed for muscle spasms, Starting Sun 11/24/2019, Normal      mupirocin (BACTROBAN) 2 % ointment Apply topically 3 (three) times a day for 7 days Apply three times daily specifically to area of swelling on the right breast for the next 7 days. , Starting Sun 1/15/2023, Until Sun 1/22/2023, Normal      pantoprazole (PROTONIX) 40 mg tablet Take 40 mg by mouth daily, Starting Mon 1/20/2020, Historical Med      varenicline (CHANTIX) 1 mg tablet Starting Tue 1/17/2023, Historical Med             No discharge procedures on file.     PDMP Review     None          ED Provider  Electronically Signed by           Phillip Hurtado MD  07/20/23 5970

## 2023-07-20 NOTE — DISCHARGE INSTRUCTIONS
Please follow all return precautions. If your pain worsens, changes, you are unable   to eat, please return for re-evaluation. You can take Gas-X over-the-counter. Thank you.

## 2023-08-09 ENCOUNTER — OFFICE VISIT (OUTPATIENT)
Dept: INTERNAL MEDICINE CLINIC | Facility: CLINIC | Age: 43
End: 2023-08-09
Payer: COMMERCIAL

## 2023-08-09 ENCOUNTER — TELEPHONE (OUTPATIENT)
Dept: ADMINISTRATIVE | Facility: OTHER | Age: 43
End: 2023-08-09

## 2023-08-09 VITALS
SYSTOLIC BLOOD PRESSURE: 130 MMHG | TEMPERATURE: 97.9 F | HEART RATE: 83 BPM | OXYGEN SATURATION: 98 % | HEIGHT: 65 IN | BODY MASS INDEX: 28.89 KG/M2 | DIASTOLIC BLOOD PRESSURE: 74 MMHG | WEIGHT: 173.4 LBS

## 2023-08-09 DIAGNOSIS — Z11.59 NEED FOR HEPATITIS C SCREENING TEST: ICD-10-CM

## 2023-08-09 DIAGNOSIS — E04.1 THYROID NODULE: Primary | ICD-10-CM

## 2023-08-09 DIAGNOSIS — M54.12 CERVICAL RADICULOPATHY: ICD-10-CM

## 2023-08-09 DIAGNOSIS — R73.03 PREDIABETES: ICD-10-CM

## 2023-08-09 DIAGNOSIS — J30.1 SEASONAL ALLERGIC RHINITIS DUE TO POLLEN: ICD-10-CM

## 2023-08-09 DIAGNOSIS — M50.20 HERNIATED CERVICAL DISC: ICD-10-CM

## 2023-08-09 DIAGNOSIS — G89.4 CHRONIC PAIN SYNDROME: ICD-10-CM

## 2023-08-09 DIAGNOSIS — Z13.6 SCREENING FOR CARDIOVASCULAR CONDITION: ICD-10-CM

## 2023-08-09 DIAGNOSIS — Z72.0 TOBACCO ABUSE: ICD-10-CM

## 2023-08-09 DIAGNOSIS — R10.13 EPIGASTRIC ABDOMINAL PAIN: ICD-10-CM

## 2023-08-09 DIAGNOSIS — K21.9 GASTROESOPHAGEAL REFLUX DISEASE WITHOUT ESOPHAGITIS: ICD-10-CM

## 2023-08-09 DIAGNOSIS — Z11.4 SCREENING FOR HIV (HUMAN IMMUNODEFICIENCY VIRUS): ICD-10-CM

## 2023-08-09 DIAGNOSIS — M62.830 SPASM OF MUSCLE, BACK: ICD-10-CM

## 2023-08-09 DIAGNOSIS — K25.9 GASTRIC EROSION DETERMINED BY ENDOSCOPY: ICD-10-CM

## 2023-08-09 DIAGNOSIS — I10 PRIMARY HYPERTENSION: ICD-10-CM

## 2023-08-09 PROBLEM — R14.3 FLATUS: Status: RESOLVED | Noted: 2021-08-19 | Resolved: 2023-08-09

## 2023-08-09 PROCEDURE — 99203 OFFICE O/P NEW LOW 30 MIN: CPT | Performed by: NURSE PRACTITIONER

## 2023-08-09 RX ORDER — PANTOPRAZOLE SODIUM 40 MG/1
40 TABLET, DELAYED RELEASE ORAL DAILY
Qty: 30 TABLET | Refills: 3 | Status: SHIPPED | OUTPATIENT
Start: 2023-08-09

## 2023-08-09 RX ORDER — BUPROPION HYDROCHLORIDE 100 MG/1
100 TABLET, EXTENDED RELEASE ORAL 2 TIMES DAILY
Qty: 60 TABLET | Refills: 0 | Status: SHIPPED | OUTPATIENT
Start: 2023-08-09

## 2023-08-09 RX ORDER — LORATADINE 10 MG/1
10 TABLET ORAL DAILY
Qty: 30 TABLET | Refills: 3 | Status: SHIPPED | OUTPATIENT
Start: 2023-08-09

## 2023-08-09 RX ORDER — CELECOXIB 200 MG/1
CAPSULE ORAL
Status: CANCELLED | OUTPATIENT
Start: 2023-08-09

## 2023-08-09 RX ORDER — HYDROXYZINE HYDROCHLORIDE 25 MG/1
25 TABLET, FILM COATED ORAL EVERY 6 HOURS
Qty: 12 TABLET | Refills: 0 | Status: CANCELLED | OUTPATIENT
Start: 2023-08-09

## 2023-08-09 RX ORDER — FAMOTIDINE 40 MG/1
TABLET, FILM COATED ORAL
Qty: 30 TABLET | Refills: 3 | Status: CANCELLED | OUTPATIENT
Start: 2023-08-09

## 2023-08-09 RX ORDER — METHOCARBAMOL 500 MG/1
500 TABLET, FILM COATED ORAL 2 TIMES DAILY PRN
Qty: 20 TABLET | Refills: 0 | Status: SHIPPED | OUTPATIENT
Start: 2023-08-09

## 2023-08-09 RX ORDER — CELECOXIB 200 MG/1
200 CAPSULE ORAL DAILY
Qty: 30 CAPSULE | Refills: 0 | Status: SHIPPED | OUTPATIENT
Start: 2023-08-09

## 2023-08-09 NOTE — TELEPHONE ENCOUNTER
Upon review of the In Basket request we have noted that     No up date needed HM has blue hyperlink  w/in  already,     because of this we are requesting that you forward this request/concern to the appropriate education email address. The Quality team members assigned to this email will be more than happy to assist you. Any additional questions or concerns should be emailed to the Practice Liaisons via the appropriate education email address, please do not reply via In Basket.     Thank you  Viola Hernandez

## 2023-08-09 NOTE — TELEPHONE ENCOUNTER
----- Message from Elham Corral sent at 8/9/2023  7:18 AM EDT -----  08/09/23 7:18 AM    Hello, our patient Myla Chandra has some results that may be used for health maint  completed/performed by [facility]. Please assist in updating the patient chart .  The date of service is 01/20/2022     Thank you,  Elham [facility]

## 2023-08-09 NOTE — PROGRESS NOTES
Name: Anabela Greenberg      : 1980      MRN: [id number]  Encounter Provider: KALRA Lockwood  Encounter Date: 2023   Encounter department: [address] Will repeat fasting labs. Continues to see ENT and pain management. Will renew her medications. Will order imaging of the back. Deferring covid vaccines. Will start on Zyban take as directed. Will bring her back in 6 months or sooner if need be. 1. Thyroid nodule  -     Comprehensive metabolic panel; Future  -     CBC and differential; Future  -     TSH, 3rd generation with Free T4 reflex; Future    2. Primary hypertension  -     Comprehensive metabolic panel; Future  -     CBC and differential; Future    3. Chronic pain syndrome  -     XR spine thoracic 3 vw; Future; Expected date: 2023  -     XR spine lumbar minimum 4 views non injury; Future; Expected date: 2023  -     celecoxib (CeleBREX) 200 mg capsule; Take 1 capsule (200 mg total) by mouth daily As needed  -     Comprehensive metabolic panel; Future  -     CBC and differential; Future    4. Spasm of muscle, back  -     methocarbamol (ROBAXIN) 500 mg tablet; Take 1 tablet (500 mg total) by mouth 2 (two) times a day as needed for muscle spasms  -     celecoxib (CeleBREX) 200 mg capsule; Take 1 capsule (200 mg total) by mouth daily As needed  -     Comprehensive metabolic panel; Future  -     CBC and differential; Future    5. Gastroesophageal reflux disease without esophagitis  -     Comprehensive metabolic panel; Future  -     CBC and differential; Future    6. Epigastric abdominal pain  -     Comprehensive metabolic panel; Future  -     CBC and differential; Future    7. Cervical radiculopathy  -     Comprehensive metabolic panel; Future  -     CBC and differential; Future    8. Herniated cervical disc  -     Comprehensive metabolic panel; Future  -     CBC and differential; Future    9.  Seasonal allergic rhinitis due to pollen  - loratadine (CLARITIN) 10 mg tablet; Take 1 tablet (10 mg total) by mouth daily  -     Comprehensive metabolic panel; Future  -     CBC and differential; Future    10. 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  -     pantoprazole (PROTONIX) 40 mg tablet; Take 1 tablet (40 mg total) by mouth daily  -     Comprehensive metabolic panel; Future  -     CBC and differential; Future    11. Prediabetes  -     Comprehensive metabolic panel; Future  -     CBC and differential; Future  -     HEMOGLOBIN A1C W/ EAG ESTIMATION; Future    12. Screening for cardiovascular condition  -     Lipid panel; Future    13. Tobacco abuse  -     buPROPion (Wellbutrin SR) 100 mg 12 hr tablet; Take 1 tablet (100 mg total) by mouth 2 (two) times a day    14. Screening for HIV (human immunodeficiency virus)  -     HIV-1 RNA, Quantitative PCR, [facility]; Future    15. Need for hepatitis C screening test  -     Hepatitis C antibody; Future           Subjective      Park is to establish care. She has a past history of neck issues and back problems. She was seeing pain management for injections. She did have an MRI of the neck in 2021. She does see ENT for her thyroid. She is continuing to have back pain and does take Robaxin and Celebrex as needed. She does see ENT for a thyroid nodule. She denies any chest pain, SOB, or palpitations. She denies any depression or anxiety. She does see GYN and is up to date on her GYN exams and mammogram. She did not have the covid vaccines. She does smoke around 1 PPD and is interesting in quitting. She would like labs done. She is up to date on her dental and eye exams. She offers no other issues. Review of Systems   Musculoskeletal: Positive for arthralgias, back pain, myalgias and neck pain. All other systems reviewed and are negative.       Current Outpatient Medications on File Prior to Visit   Medication Sig   • famotidine (PEPCID) 40 MG tablet take 1 tablet by mouth at bedtime 2 HOURS BEFORE BED   • Levonorgestrel (Liletta, 52 MG,) 19.5 MCG/DAY IUD IUD 1 each by Intrauterine route once   • mupirocin (BACTROBAN) 2 % ointment Apply topically 3 (three) times a day for 7 days Apply three times daily specifically to area of swelling on the right breast for the next 7 days. • [DISCONTINUED] celecoxib (CeleBREX) 200 mg capsule    • [DISCONTINUED] hydrOXYzine HCL (ATARAX) 25 mg tablet Take 1 tablet (25 mg total) by mouth every 6 (six) hours   • [DISCONTINUED] loratadine (CLARITIN) 10 mg tablet Take 10 mg by mouth daily   • [DISCONTINUED] methocarbamol (ROBAXIN) 500 mg tablet Take 1 tablet (500 mg total) by mouth 2 (two) times a day as needed for muscle spasms   • [DISCONTINUED] pantoprazole (PROTONIX) 40 mg tablet Take 40 mg by mouth daily   • varenicline (CHANTIX) 1 mg tablet  (Patient not taking: Reported on 8/9/2023)       Objective     /74   Pulse 83   Temp 97.9 °F (36.6 °C) (Temporal)   Ht 5' 5" (1.651 m)   Wt 78.7 kg (173 lb 6.4 oz)   SpO2 98%   BMI 28.86 kg/m²     Physical Exam  Vitals reviewed. Constitutional:       Appearance: Normal appearance. She is normal weight. HENT:      Head: Normocephalic and atraumatic. Right Ear: Tympanic membrane, ear canal and external ear normal.      Left Ear: Tympanic membrane, ear canal and external ear normal.      Nose: Nose normal.      Mouth/Throat:      Mouth: Mucous membranes are moist.      Pharynx: Oropharynx is clear. Eyes:      Extraocular Movements: Extraocular movements intact. Conjunctiva/sclera: Conjunctivae normal.      Pupils: Pupils are equal, round, and reactive to light. Cardiovascular:      Rate and Rhythm: Normal rate and regular rhythm. Pulses: Normal pulses. Heart sounds: Normal heart sounds. Pulmonary:      Effort: Pulmonary effort is normal.      Breath sounds: Normal breath sounds. Abdominal:      General: Abdomen is flat. Bowel sounds are normal.      Palpations: Abdomen is soft.    Musculoskeletal: General: Normal range of motion. Skin:     General: Skin is warm and dry. Capillary Refill: Capillary refill takes less than 2 seconds. Neurological:      General: No focal deficit present. Mental Status: She is alert and oriented to person, place, and time. Mental status is at baseline. Psychiatric:         Mood and Affect: Mood normal.         Behavior: Behavior normal.         Thought Content:  Thought content normal.         Judgment: Judgment normal.       KARLA Naylor

## 2023-08-21 ENCOUNTER — TELEPHONE (OUTPATIENT)
Dept: INTERNAL MEDICINE CLINIC | Facility: CLINIC | Age: 43
End: 2023-08-21

## 2023-08-21 NOTE — TELEPHONE ENCOUNTER
Can you let Park know her XR did come back showing mild degenerative spondylosis in the thoracic spine all other imaging was normal

## 2023-08-23 DIAGNOSIS — M50.20 HERNIATED CERVICAL DISC: Primary | ICD-10-CM

## 2023-08-23 DIAGNOSIS — M62.830 SPASM OF MUSCLE, BACK: ICD-10-CM

## 2023-10-08 PROBLEM — Z11.59 NEED FOR HEPATITIS C SCREENING TEST: Status: RESOLVED | Noted: 2023-08-09 | Resolved: 2023-10-08

## 2023-10-08 PROBLEM — Z13.6 SCREENING FOR CARDIOVASCULAR CONDITION: Status: RESOLVED | Noted: 2023-08-09 | Resolved: 2023-10-08

## 2023-10-12 ENCOUNTER — OFFICE VISIT (OUTPATIENT)
Dept: URGENT CARE | Facility: CLINIC | Age: 43
End: 2023-10-12
Payer: COMMERCIAL

## 2023-10-12 VITALS
TEMPERATURE: 97.7 F | DIASTOLIC BLOOD PRESSURE: 84 MMHG | HEART RATE: 91 BPM | OXYGEN SATURATION: 98 % | RESPIRATION RATE: 18 BRPM | SYSTOLIC BLOOD PRESSURE: 137 MMHG

## 2023-10-12 DIAGNOSIS — J06.9 ACUTE URI: Primary | ICD-10-CM

## 2023-10-12 LAB — S PYO AG THROAT QL: NEGATIVE

## 2023-10-12 PROCEDURE — S9088 SERVICES PROVIDED IN URGENT: HCPCS | Performed by: PHYSICIAN ASSISTANT

## 2023-10-12 PROCEDURE — 87880 STREP A ASSAY W/OPTIC: CPT | Performed by: PHYSICIAN ASSISTANT

## 2023-10-12 PROCEDURE — 99213 OFFICE O/P EST LOW 20 MIN: CPT | Performed by: PHYSICIAN ASSISTANT

## 2023-10-12 PROCEDURE — 87070 CULTURE OTHR SPECIMN AEROBIC: CPT | Performed by: PHYSICIAN ASSISTANT

## 2023-10-12 RX ORDER — BENZONATATE 200 MG/1
200 CAPSULE ORAL 3 TIMES DAILY PRN
Qty: 20 CAPSULE | Refills: 0 | Status: SHIPPED | OUTPATIENT
Start: 2023-10-12

## 2023-10-12 NOTE — PROGRESS NOTES
[facility]        NAME: Tayo Steele is a 39 y.o. female  : 1980    MRN: [id number]  DATE: 2023  TIME: 10:38 AM    Assessment and Plan   Acute URI [J06.9]  1. Acute URI  POCT rapid strepA    Throat culture    benzonatate (TESSALON) 200 MG capsule            Patient Instructions       Follow up with PCP in 3-5 days. Proceed to  ER if symptoms worsen. Chief Complaint     Chief Complaint   Patient presents with    Sore Throat     And cough some post nasal drip          History of Present Illness       Patient is a 38 y/o/f presenting [facility] with cough, sore throat, nasal congestion and headaches. Pt reports symptoms began yesterday. Pt has been using Flonase, Elderberry, Nyquil since onset. Flonase does help improve symptoms temporarily. Patient denies any fever or body aches. Sore Throat   This is a new problem. The current episode started yesterday. The problem has been gradually worsening. There has been no fever. Associated symptoms include congestion, coughing and headaches. Pertinent negatives include no abdominal pain, ear pain, shortness of breath or vomiting. Review of Systems   Review of Systems   Constitutional:  Negative for chills and fever. HENT:  Positive for congestion and sore throat. Negative for ear pain. Eyes:  Negative for pain and visual disturbance. Respiratory:  Positive for cough. Negative for shortness of breath. Cardiovascular:  Negative for chest pain and palpitations. Gastrointestinal:  Negative for abdominal pain and vomiting. Genitourinary:  Negative for dysuria and hematuria. Musculoskeletal:  Negative for arthralgias and back pain. Skin:  Negative for color change and rash. Neurological:  Positive for headaches. Negative for seizures and syncope. All other systems reviewed and are negative.         Current Medications       Current Outpatient Medications:     benzonatate (TESSALON) 200 MG capsule, Take 1 capsule (200 mg total) by mouth 3 (three) times a day as needed for cough, Disp: 20 capsule, Rfl: 0    buPROPion (Wellbutrin SR) 100 mg 12 hr tablet, Take 1 tablet (100 mg total) by mouth 2 (two) times a day, Disp: 60 tablet, Rfl: 0    celecoxib (CeleBREX) 200 mg capsule, Take 1 capsule (200 mg total) by mouth daily As needed, Disp: 30 capsule, Rfl: 0    famotidine (PEPCID) 40 MG tablet, take 1 tablet by mouth at bedtime 2 HOURS BEFORE BED, Disp: 30 tablet, Rfl: 3    Levonorgestrel (Liletta, 52 MG,) 19.5 MCG/DAY IUD IUD, 1 each by Intrauterine route once, Disp: , Rfl:     loratadine (CLARITIN) 10 mg tablet, Take 1 tablet (10 mg total) by mouth daily, Disp: 30 tablet, Rfl: 3    methocarbamol (ROBAXIN) 500 mg tablet, Take 1 tablet (500 mg total) by mouth 2 (two) times a day as needed for muscle spasms, Disp: 20 tablet, Rfl: 0    mupirocin (BACTROBAN) 2 % ointment, Apply topically 3 (three) times a day for 7 days Apply three times daily specifically to area of swelling on the right breast for the next 7 days. , Disp: 15 g, Rfl: 0    pantoprazole (PROTONIX) 40 mg tablet, Take 1 tablet (40 mg total) by mouth daily, Disp: 30 tablet, Rfl: 3  No current facility-administered medications for this visit.     Facility-Administered Medications Ordered in Other Visits:     Lidocaine Viscous HCl (XYLOCAINE) 2 % mucosal solution, , , Code/Trauma/Sedation Med, Cesar Glasgow DO, 15 mL at 05/10/21 1109    Current Allergies     Allergies as of 10/12/2023 - Reviewed 10/12/2023   Allergen Reaction Noted    No known allergies Allergic Rhinitis 12/16/2016            The following portions of the patient's history were reviewed and updated as appropriate: allergies, current medications, past family history, past medical history, past social history, past surgical history and problem list.     Past Medical History:   Diagnosis Date    Allergic     Asthma     Cervical disc disorder with radiculopathy of mid-cervical region 1/6/2020 Cervical radiculopathy 2020    GERD (gastroesophageal reflux disease)     Herniated disc, cervical     Pinched nerve in shoulder, right     Thyroid nodule        Past Surgical History:   Procedure Laterality Date     SECTION      EPIDURAL BLOCK INJECTION N/A 3/4/2020    Procedure: C7-T1 Interlaminar Epidural Steroid Injection (74762); Surgeon: Estela Atwood MD;  Location: [facility];  Service: Pain Management     EPIDURAL BLOCK INJECTION N/A 2020    Procedure: C7-T1 interlaminar epidural steroid injection;  Surgeon: Estela Atwood MD;  Location: [facility];  Service: Pain Management     FL GUIDED NEEDLE PLAC BX/ASP/INJ  3/4/2020    FL GUIDED NEEDLE PLAC BX/ASP/INJ  2020       Family History   Problem Relation Age of Onset    Diabetes Mother     Heart disease Mother     Bone cancer Father     Diabetes Brother     No Known Problems Daughter     No Known Problems Maternal Grandmother     No Known Problems Paternal Grandmother     No Known Problems Maternal Aunt     No Known Problems Paternal Aunt     No Known Problems Paternal Aunt     Breast cancer Cousin     Colon cancer Neg Hx     Ovarian cancer Neg Hx          Medications have been verified. Objective   /84   Pulse 91   Temp 97.7 °F (36.5 °C)   Resp 18   SpO2 98%   No LMP recorded. Patient has had an implant. Physical Exam     Physical Exam  Constitutional:       Appearance: Normal appearance. HENT:      Head: Normocephalic and atraumatic. Nose: Congestion present. Mouth/Throat:      Mouth: Mucous membranes are moist.      Pharynx: Posterior oropharyngeal erythema present. Eyes:      Extraocular Movements: Extraocular movements intact. Conjunctiva/sclera: Conjunctivae normal.      Pupils: Pupils are equal, round, and reactive to light. Cardiovascular:      Rate and Rhythm: Normal rate and regular rhythm. Heart sounds: No murmur heard. No friction rub. No gallop.    Pulmonary: Effort: Pulmonary effort is normal.      Breath sounds: No wheezing, rhonchi or rales. Musculoskeletal:         General: Normal range of motion. Cervical back: Normal range of motion and neck supple. Skin:     General: Skin is warm and dry. Capillary Refill: Capillary refill takes less than 2 seconds. Neurological:      General: No focal deficit present. Mental Status: She is alert and oriented to person, place, and time.    Psychiatric:         Mood and Affect: Mood normal.         Behavior: Behavior normal.

## 2023-10-14 LAB — BACTERIA THROAT CULT: NORMAL

## 2023-10-18 ENCOUNTER — HOSPITAL ENCOUNTER (OUTPATIENT)
Dept: ULTRASOUND IMAGING | Facility: HOSPITAL | Age: 43
Discharge: HOME | End: 2023-10-18
Attending: OTOLARYNGOLOGY
Payer: COMMERCIAL

## 2023-10-18 DIAGNOSIS — E04.1 THYROID NODULE: ICD-10-CM

## 2023-10-18 PROCEDURE — 76536 US EXAM OF HEAD AND NECK: CPT

## 2023-10-27 ENCOUNTER — OFFICE VISIT (OUTPATIENT)
Dept: OTOLARYNGOLOGY | Facility: CLINIC | Age: 43
End: 2023-10-27
Payer: COMMERCIAL

## 2023-10-27 VITALS
DIASTOLIC BLOOD PRESSURE: 82 MMHG | WEIGHT: 174.6 LBS | HEART RATE: 81 BPM | HEIGHT: 65 IN | SYSTOLIC BLOOD PRESSURE: 122 MMHG | BODY MASS INDEX: 29.09 KG/M2 | OXYGEN SATURATION: 99 % | TEMPERATURE: 98.1 F

## 2023-10-27 DIAGNOSIS — E04.2 MULTINODULAR GOITER: ICD-10-CM

## 2023-10-27 DIAGNOSIS — E04.1 THYROID NODULE: Primary | ICD-10-CM

## 2023-10-27 PROCEDURE — 99213 OFFICE O/P EST LOW 20 MIN: CPT | Performed by: OTOLARYNGOLOGY

## 2023-10-27 RX ORDER — NICOTINE 21 MG/24HR
1 PATCH, TRANSDERMAL 24 HOURS TRANSDERMAL EVERY 24 HOURS
COMMUNITY
Start: 2023-10-04 | End: 2024-04-01

## 2023-10-27 RX ORDER — NICOTINE 4 MG/1
1 INHALANT RESPIRATORY (INHALATION)
COMMUNITY
Start: 2023-10-04

## 2023-10-27 NOTE — PROGRESS NOTES
Otolaryngology Clinic Visit  Name:  Jenny Romberg  MRN:  [id number]  Date:  10/27/2023 9:24 AM  ________________________________________________________________________       CHIEF COMPLAINT:  Thyroid nodule follow up      HPI:  Jenny Romberg is a 39 y.o. female with PMH as below who is here today for follow up of thyroid nodules. No changes over the last year. No issues eating drinking swallowing. PMHx:  Past Medical History:   Diagnosis Date    Allergic     Asthma     Cervical disc disorder with radiculopathy of mid-cervical region 2020    Cervical radiculopathy 2020    GERD (gastroesophageal reflux disease)     Herniated disc, cervical     Pinched nerve in shoulder, right     Thyroid nodule        PSHx:  Past Surgical History:   Procedure Laterality Date     SECTION      EPIDURAL BLOCK INJECTION N/A 3/4/2020    Procedure: C7-T1 Interlaminar Epidural Steroid Injection ([id number]);   Surgeon: Robin Lemus MD;  Location: [facility];  Service: Pain Management     EPIDURAL BLOCK INJECTION N/A 2020    Procedure: C7-T1 interlaminar epidural steroid injection;  Surgeon: Robin Lemus MD;  Location: [facility];  Service: Pain Management     FL GUIDED NEEDLE PLAC BX/ASP/INJ  3/4/2020    FL GUIDED NEEDLE PLAC BX/ASP/INJ  2020       FAMHx:  Family History   Problem Relation Age of Onset    Diabetes Mother     Heart disease Mother     Bone cancer Father     Diabetes Brother     No Known Problems Daughter     No Known Problems Maternal Grandmother     No Known Problems Paternal Grandmother     No Known Problems Maternal Aunt     No Known Problems Paternal Aunt     No Known Problems Paternal Aunt     Breast cancer Cousin     Colon cancer Neg Hx     Ovarian cancer Neg Hx        SOCHx:  Social History     Socioeconomic History    Marital status: Single     Spouse name: None    Number of children: 2    Years of education: None    Highest education level: None   Occupational History Comment: currently not working / [place]   Tobacco Use    Smoking status: Every Day     Packs/day: 1.00     Types: Cigarettes    Smokeless tobacco: Never   Vaping Use    Vaping Use: Never used   Substance and Sexual Activity    Alcohol use: No    Drug use: Yes     Types: Marijuana    Sexual activity: Yes     Partners: Male     Birth control/protection: I.U.D. Other Topics Concern    None   Social History Narrative    None     Social Determinants of Health     Financial Resource Strain: Not on file   Food Insecurity: Not on file   Transportation Needs: Not on file   Physical Activity: Not on file   Stress: Not on file   Social Connections: Not on file   Intimate Partner Violence: Not on file   Housing Stability: Not on file       Allergies: Allergies   Allergen Reactions    No Known Allergies Allergic Rhinitis     Seasonal          MEDS:  Reviewed    ROS:  As above       PHYSICAL EXAM:  /82 (BP Location: Left arm, Cuff Size: Standard)   Pulse 81   Temp 98.1 °F (36.7 °C) (Temporal)   Ht 5' 5" (1.651 m)   Wt 79.2 kg (174 lb 9.6 oz)   SpO2 99%   BMI 29.05 kg/m²   General: NAD, AOx4  Eyes:  EOMI  Ears:  Right: ear canal normal, TM normal apperance, no fluid  Left: ear canal normal, TM normal apperance, no fluid  Nose:  No septal deviation, no inferior turbinate hypertrophy, no mass/lesions  Oral cavity:  No trismus,   Neck: Trachea is midline; bilateral thyroid nodules, Salivary glands symmetrical, no masses/abnormality on palpation  Lymph:  No cervical lymphadenopathy  Skin:  No obvious facial lesions  Neuro: Face symmetrical, no obvious cranial nerve palsy.  No focal deficits   Lungs:  Normal work of breathing, symmetrical chest expansion  Vascular: Well perfused       Procedures:  none     Medical Data Reviewed:  Records reviewed and summarized as in EPIC    Radiology:  US reviewed, nodule 1 R lower pole unchanged in size since jan 2020 but more echogenic, nodule 2 L mid- lower pole nodule decreased in size       Labs:       Patient Active Problem List   Diagnosis    Acquired spondylolisthesis of cervical vertebra    Neck pain    Thyroid nodule    Herniated cervical disc    GERD (gastroesophageal reflux disease)    Cervical radiculopathy    Chronic pain syndrome    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 female with acute and chronic problems as above who presents with:    1. Thyroid nodule    2. Multinodular goiter        39year old here today for evaluation of thyroid nodule, stable on US. Reviewed imaging and exam and indications for surgery. Will follow for now with repeat ultrasound in one year.       US thyroid 1 year     RTC 1 year        Edwin Teague MD MPH  Otolaryngology--Head and Neck Surgery  [facility]  10/27/2023 9:24 AM

## 2023-12-12 ENCOUNTER — OFFICE VISIT (OUTPATIENT)
Dept: URGENT CARE | Facility: CLINIC | Age: 43
End: 2023-12-12
Payer: COMMERCIAL

## 2023-12-12 VITALS
DIASTOLIC BLOOD PRESSURE: 91 MMHG | HEART RATE: 95 BPM | SYSTOLIC BLOOD PRESSURE: 163 MMHG | TEMPERATURE: 98 F | OXYGEN SATURATION: 98 % | RESPIRATION RATE: 18 BRPM

## 2023-12-12 DIAGNOSIS — N89.8 VAGINAL ITCHING: Primary | ICD-10-CM

## 2023-12-12 LAB
SL AMB  POCT GLUCOSE, UA: ABNORMAL
SL AMB LEUKOCYTE ESTERASE,UA: ABNORMAL
SL AMB POCT BILIRUBIN,UA: ABNORMAL
SL AMB POCT BLOOD,UA: ABNORMAL
SL AMB POCT CLARITY,UA: ABNORMAL
SL AMB POCT COLOR,UA: YELLOW
SL AMB POCT KETONES,UA: ABNORMAL
SL AMB POCT NITRITE,UA: ABNORMAL
SL AMB POCT PH,UA: 7.5
SL AMB POCT SPECIFIC GRAVITY,UA: 1.01
SL AMB POCT URINE HCG: NORMAL
SL AMB POCT URINE PROTEIN: ABNORMAL
SL AMB POCT UROBILINOGEN: 0.2

## 2023-12-12 PROCEDURE — 81002 URINALYSIS NONAUTO W/O SCOPE: CPT | Performed by: PHYSICIAN ASSISTANT

## 2023-12-12 PROCEDURE — 81025 URINE PREGNANCY TEST: CPT | Performed by: PHYSICIAN ASSISTANT

## 2023-12-12 PROCEDURE — 99213 OFFICE O/P EST LOW 20 MIN: CPT | Performed by: PHYSICIAN ASSISTANT

## 2023-12-12 PROCEDURE — S9088 SERVICES PROVIDED IN URGENT: HCPCS | Performed by: PHYSICIAN ASSISTANT

## 2023-12-12 RX ORDER — FLUCONAZOLE 150 MG/1
150 TABLET ORAL ONCE
Qty: 1 TABLET | Refills: 0 | Status: SHIPPED | OUTPATIENT
Start: 2023-12-12 | End: 2023-12-12

## 2023-12-12 NOTE — PROGRESS NOTES
[facility]    NAME: Elvira Ramírez is a 39 y.o. female  : 1980    MRN: [id number]  DATE: 2023  TIME: 1:55 PM    Assessment and Plan   Vaginal itching [N89.8]  1. Vaginal itching  fluconazole (DIFLUCAN) 150 mg tablet    POCT urine dip    POCT urine HCG          Patient Instructions     Patient Instructions   Diflucan as directed. Follow up with gyn if not improving. Chief Complaint     Chief Complaint   Patient presents with    Vaginal Itching     Started yesterday        History of Present Illness   Vaginal itching started yesterday. No discharge. No burning with urination. Review of Systems   Review of Systems   Constitutional:  Negative for activity change, appetite change, chills, fatigue and fever. Respiratory:  Negative for cough. Cardiovascular:  Negative for chest pain. Gastrointestinal:  Negative for abdominal pain, constipation, diarrhea, nausea and vomiting. Genitourinary:  Negative for difficulty urinating, dysuria, flank pain, frequency, hematuria, urgency and vaginal discharge. Vaginal itching   Musculoskeletal:  Negative for back pain and myalgias. All other systems reviewed and are negative.       Current Medications     Current Outpatient Medications:     fluconazole (DIFLUCAN) 150 mg tablet, Take 1 tablet (150 mg total) by mouth once for 1 dose, Disp: 1 tablet, Rfl: 0    benzonatate (TESSALON) 200 MG capsule, Take 1 capsule (200 mg total) by mouth 3 (three) times a day as needed for cough, Disp: 20 capsule, Rfl: 0    buPROPion (WELLBUTRIN SR) 100 mg 12 hr tablet, TAKE ONE TABLET BY MOUTH 2 TIMES A DAY, Disp: 60 tablet, Rfl: 0    celecoxib (CeleBREX) 200 mg capsule, TAKE ONE CAPSULE BY MOUTH EVERY DAY AS NEEDED, Disp: 30 capsule, Rfl: 0    famotidine (PEPCID) 40 MG tablet, take 1 tablet by mouth at bedtime 2 HOURS BEFORE BED, Disp: 30 tablet, Rfl: 3    Levonorgestrel (Liletta, 52 MG,) 19.5 MCG/DAY IUD IUD, 1 each by Intrauterine route once, Disp: , Rfl:     loratadine (CLARITIN) 10 mg tablet, Take 1 tablet (10 mg total) by mouth daily, Disp: 30 tablet, Rfl: 3    methocarbamol (ROBAXIN) 500 mg tablet, Take 1 tablet (500 mg total) by mouth 2 (two) times a day as needed for muscle spasms, Disp: 20 tablet, Rfl: 0    mupirocin (BACTROBAN) 2 % ointment, Apply topically 3 (three) times a day for 7 days Apply three times daily specifically to area of swelling on the right breast for the next 7 days. , Disp: 15 g, Rfl: 0    nicotine (NICODERM CQ) 14 mg/24hr TD 24 hr patch, Place 1 patch on the skin every 24 hours, Disp: , Rfl:     nicotine (Nicotrol) 10 MG inhaler, Inhale 1 puff, Disp: , Rfl:     nicotine polacrilex (NICORETTE) 4 mg gum, Chew 4 mg, Disp: , Rfl:     pantoprazole (PROTONIX) 40 mg tablet, Take 1 tablet (40 mg total) by mouth daily, Disp: 30 tablet, Rfl: 3  No current facility-administered medications for this visit.     Facility-Administered Medications Ordered in Other Visits:     Lidocaine Viscous HCl (XYLOCAINE) 2 % mucosal solution, , , Code/Trauma/Sedation Med, Nilsa Glasgow, , 15 mL at 05/10/21 1109    Current Allergies     Allergies as of 2023 - Reviewed 2023   Allergen Reaction Noted    No known allergies Allergic Rhinitis 2016          The following portions of the patient's history were reviewed and updated as appropriate: allergies, current medications, past family history, past medical history, past social history, past surgical history and problem list.   Past Medical History:   Diagnosis Date    Allergic     Asthma     Cervical disc disorder with radiculopathy of mid-cervical region 2020    Cervical radiculopathy 2020    GERD (gastroesophageal reflux disease)     Herniated disc, cervical     Pinched nerve in shoulder, right     Thyroid nodule      Past Surgical History:   Procedure Laterality Date     SECTION      EPIDURAL BLOCK INJECTION N/A 3/4/2020    Procedure: C7-T1 Interlaminar Epidural Steroid Injection (38660); Surgeon: Estela Atwood MD;  Location: [facility];  Service: Pain Management     EPIDURAL BLOCK INJECTION N/A 6/26/2020    Procedure: C7-T1 interlaminar epidural steroid injection;  Surgeon: Estela Atwood MD;  Location: [facility];  Service: Pain Management     FL GUIDED NEEDLE PLAC BX/ASP/INJ  3/4/2020    FL GUIDED NEEDLE PLAC BX/ASP/INJ  6/26/2020     Family History   Problem Relation Age of Onset    Diabetes Mother     Heart disease Mother     Bone cancer Father     Diabetes Brother     No Known Problems Daughter     No Known Problems Maternal Grandmother     No Known Problems Paternal Grandmother     No Known Problems Maternal Aunt     No Known Problems Paternal Aunt     No Known Problems Paternal Aunt     Breast cancer Cousin     Colon cancer Neg Hx     Ovarian cancer Neg Hx      Social History     Socioeconomic History    Marital status: Single     Spouse name: Not on file    Number of children: 2    Years of education: Not on file    Highest education level: Not on file   Occupational History     Comment: currently not working / [employer]   Tobacco Use    Smoking status: Every Day     Packs/day: 1.00     Types: Cigarettes    Smokeless tobacco: Never   Vaping Use    Vaping Use: Never used   Substance and Sexual Activity    Alcohol use: No    Drug use: Yes     Types: Marijuana    Sexual activity: Yes     Partners: Male     Birth control/protection: I.U.D. Other Topics Concern    Not on file   Social History Narrative    Not on file     Social Determinants of Health     Financial Resource Strain: Not on file   Food Insecurity: Not on file   Transportation Needs: Not on file   Physical Activity: Not on file   Stress: Not on file   Social Connections: Not on file   Intimate Partner Violence: Not on file   Housing Stability: Not on file     Medications have been verified.     Objective   /91   Pulse 95   Temp 98 °F (36.7 °C)   Resp 18   SpO2 98%      Physical Exam Physical Exam  Vitals and nursing note reviewed. Constitutional:       General: She is not in acute distress. Appearance: Normal appearance. She is well-developed. HENT:      Head: Normocephalic and atraumatic. Cardiovascular:      Rate and Rhythm: Normal rate and regular rhythm. Heart sounds: Normal heart sounds. No murmur heard. Pulmonary:      Effort: Pulmonary effort is normal. No respiratory distress. Breath sounds: Normal breath sounds. Abdominal:      General: Bowel sounds are normal.      Tenderness: There is no abdominal tenderness. Genitourinary:     Vagina: No vaginal discharge.

## 2023-12-13 ENCOUNTER — HOSPITAL ENCOUNTER (EMERGENCY)
Facility: HOSPITAL | Age: 43
Discharge: HOME | End: 2023-12-13
Attending: EMERGENCY MEDICINE
Payer: COMMERCIAL

## 2023-12-13 ENCOUNTER — APPOINTMENT (EMERGENCY)
Dept: CT IMAGING | Facility: HOSPITAL | Age: 43
End: 2023-12-13
Payer: COMMERCIAL

## 2023-12-13 ENCOUNTER — APPOINTMENT (EMERGENCY)
Dept: RADIOLOGY | Facility: HOSPITAL | Age: 43
End: 2023-12-13
Payer: COMMERCIAL

## 2023-12-13 VITALS
HEIGHT: 65 IN | WEIGHT: 175 LBS | BODY MASS INDEX: 29.16 KG/M2 | TEMPERATURE: 97.4 F | RESPIRATION RATE: 18 BRPM | SYSTOLIC BLOOD PRESSURE: 145 MMHG | DIASTOLIC BLOOD PRESSURE: 91 MMHG | OXYGEN SATURATION: 98 % | HEART RATE: 86 BPM

## 2023-12-13 DIAGNOSIS — R05.9 COUGH: ICD-10-CM

## 2023-12-13 DIAGNOSIS — R10.9 ABDOMINAL PAIN: Primary | ICD-10-CM

## 2023-12-13 DIAGNOSIS — J45.909 ASTHMA: ICD-10-CM

## 2023-12-13 LAB
ALBUMIN SERPL BCP-MCNC: 4.6 G/DL (ref 3.5–5)
ALP SERPL-CCNC: 70 U/L (ref 34–104)
ALT SERPL W P-5'-P-CCNC: 9 U/L (ref 7–52)
ANION GAP SERPL CALCULATED.3IONS-SCNC: 8 MMOL/L
APTT PPP: 27 SECONDS (ref 23–37)
AST SERPL W P-5'-P-CCNC: 13 U/L (ref 13–39)
ATRIAL RATE: 88 BPM
BACTERIA UR QL AUTO: ABNORMAL /HPF
BASOPHILS # BLD AUTO: 0.03 THOUSANDS/ÂΜL (ref 0–0.1)
BASOPHILS NFR BLD AUTO: 0 % (ref 0–1)
BILIRUB SERPL-MCNC: 0.56 MG/DL (ref 0.2–1)
BILIRUB UR QL STRIP: NEGATIVE
BUN SERPL-MCNC: 8 MG/DL (ref 5–25)
CALCIUM SERPL-MCNC: 9.6 MG/DL (ref 8.4–10.2)
CARDIAC TROPONIN I PNL SERPL HS: 3 NG/L
CHLORIDE SERPL-SCNC: 105 MMOL/L (ref 96–108)
CLARITY UR: CLEAR
CO2 SERPL-SCNC: 27 MMOL/L (ref 21–32)
COLOR UR: YELLOW
CREAT SERPL-MCNC: 0.76 MG/DL (ref 0.6–1.3)
D DIMER PPP FEU-MCNC: 0.33 UG/ML FEU
EOSINOPHIL # BLD AUTO: 0.18 THOUSAND/ÂΜL (ref 0–0.61)
EOSINOPHIL NFR BLD AUTO: 2 % (ref 0–6)
ERYTHROCYTE [DISTWIDTH] IN BLOOD BY AUTOMATED COUNT: 12.1 % (ref 11.6–15.1)
EXT PREGNANCY TEST URINE: NEGATIVE
EXT. CONTROL: NORMAL
FLUAV RNA RESP QL NAA+PROBE: NEGATIVE
FLUBV RNA RESP QL NAA+PROBE: NEGATIVE
GFR SERPL CREATININE-BSD FRML MDRD: 95 ML/MIN/1.73SQ M
GLUCOSE SERPL-MCNC: 97 MG/DL (ref 65–140)
GLUCOSE UR STRIP-MCNC: NEGATIVE MG/DL
HCT VFR BLD AUTO: 44.4 % (ref 34.8–46.1)
HGB BLD-MCNC: 14.5 G/DL (ref 11.5–15.4)
HGB UR QL STRIP.AUTO: ABNORMAL
IMM GRANULOCYTES # BLD AUTO: 0.01 THOUSAND/UL (ref 0–0.2)
IMM GRANULOCYTES NFR BLD AUTO: 0 % (ref 0–2)
INR PPP: 1.04 (ref 0.84–1.19)
KETONES UR STRIP-MCNC: NEGATIVE MG/DL
LEUKOCYTE ESTERASE UR QL STRIP: NEGATIVE
LIPASE SERPL-CCNC: 38 U/L (ref 11–82)
LYMPHOCYTES # BLD AUTO: 2.72 THOUSANDS/ÂΜL (ref 0.6–4.47)
LYMPHOCYTES NFR BLD AUTO: 35 % (ref 14–44)
MCH RBC QN AUTO: 30.1 PG (ref 26.8–34.3)
MCHC RBC AUTO-ENTMCNC: 32.7 G/DL (ref 31.4–37.4)
MCV RBC AUTO: 92 FL (ref 82–98)
MONOCYTES # BLD AUTO: 0.5 THOUSAND/ÂΜL (ref 0.17–1.22)
MONOCYTES NFR BLD AUTO: 7 % (ref 4–12)
NEUTROPHILS # BLD AUTO: 4.31 THOUSANDS/ÂΜL (ref 1.85–7.62)
NEUTS SEG NFR BLD AUTO: 56 % (ref 43–75)
NITRITE UR QL STRIP: NEGATIVE
NON-SQ EPI CELLS URNS QL MICRO: ABNORMAL /HPF
NRBC BLD AUTO-RTO: 0 /100 WBCS
P AXIS: 42 DEGREES
PH UR STRIP.AUTO: 7 [PH]
PLATELET # BLD AUTO: 232 THOUSANDS/UL (ref 149–390)
PMV BLD AUTO: 11.7 FL (ref 8.9–12.7)
POTASSIUM SERPL-SCNC: 3.4 MMOL/L (ref 3.5–5.3)
PR INTERVAL: 146 MS
PROT SERPL-MCNC: 7.6 G/DL (ref 6.4–8.4)
PROT UR STRIP-MCNC: NEGATIVE MG/DL
PROTHROMBIN TIME: 13.5 SECONDS (ref 11.6–14.5)
QRS AXIS: 66 DEGREES
QRSD INTERVAL: 92 MS
QT INTERVAL: 364 MS
QTC INTERVAL: 440 MS
RBC # BLD AUTO: 4.81 MILLION/UL (ref 3.81–5.12)
RBC #/AREA URNS AUTO: ABNORMAL /HPF
RSV RNA RESP QL NAA+PROBE: NEGATIVE
SARS-COV-2 RNA RESP QL NAA+PROBE: NEGATIVE
SODIUM SERPL-SCNC: 140 MMOL/L (ref 135–147)
SP GR UR STRIP.AUTO: 1.01 (ref 1–1.03)
T WAVE AXIS: -4 DEGREES
UROBILINOGEN UR QL STRIP.AUTO: 0.2 E.U./DL
VENTRICULAR RATE: 88 BPM
WBC # BLD AUTO: 7.75 THOUSAND/UL (ref 4.31–10.16)
WBC #/AREA URNS AUTO: ABNORMAL /HPF

## 2023-12-13 PROCEDURE — 99284 EMERGENCY DEPT VISIT MOD MDM: CPT

## 2023-12-13 PROCEDURE — 71045 X-RAY EXAM CHEST 1 VIEW: CPT

## 2023-12-13 PROCEDURE — 0241U HB NFCT DS VIR RESP RNA 4 TRGT: CPT | Performed by: PHYSICIAN ASSISTANT

## 2023-12-13 PROCEDURE — 83690 ASSAY OF LIPASE: CPT | Performed by: PHYSICIAN ASSISTANT

## 2023-12-13 PROCEDURE — 85730 THROMBOPLASTIN TIME PARTIAL: CPT | Performed by: PHYSICIAN ASSISTANT

## 2023-12-13 PROCEDURE — 96374 THER/PROPH/DIAG INJ IV PUSH: CPT

## 2023-12-13 PROCEDURE — 84484 ASSAY OF TROPONIN QUANT: CPT | Performed by: PHYSICIAN ASSISTANT

## 2023-12-13 PROCEDURE — 85025 COMPLETE CBC W/AUTO DIFF WBC: CPT | Performed by: PHYSICIAN ASSISTANT

## 2023-12-13 PROCEDURE — 80053 COMPREHEN METABOLIC PANEL: CPT | Performed by: PHYSICIAN ASSISTANT

## 2023-12-13 PROCEDURE — G1004 CDSM NDSC: HCPCS

## 2023-12-13 PROCEDURE — 81025 URINE PREGNANCY TEST: CPT | Performed by: PHYSICIAN ASSISTANT

## 2023-12-13 PROCEDURE — 74177 CT ABD & PELVIS W/CONTRAST: CPT

## 2023-12-13 PROCEDURE — 85610 PROTHROMBIN TIME: CPT | Performed by: PHYSICIAN ASSISTANT

## 2023-12-13 PROCEDURE — 85379 FIBRIN DEGRADATION QUANT: CPT | Performed by: PHYSICIAN ASSISTANT

## 2023-12-13 PROCEDURE — 93005 ELECTROCARDIOGRAM TRACING: CPT

## 2023-12-13 PROCEDURE — 36415 COLL VENOUS BLD VENIPUNCTURE: CPT | Performed by: PHYSICIAN ASSISTANT

## 2023-12-13 PROCEDURE — 99285 EMERGENCY DEPT VISIT HI MDM: CPT | Performed by: PHYSICIAN ASSISTANT

## 2023-12-13 PROCEDURE — 81001 URINALYSIS AUTO W/SCOPE: CPT | Performed by: PHYSICIAN ASSISTANT

## 2023-12-13 RX ORDER — METHYLPREDNISOLONE SODIUM SUCCINATE 125 MG/2ML
125 INJECTION, POWDER, LYOPHILIZED, FOR SOLUTION INTRAMUSCULAR; INTRAVENOUS ONCE
Status: COMPLETED | OUTPATIENT
Start: 2023-12-13 | End: 2023-12-13

## 2023-12-13 RX ORDER — IPRATROPIUM BROMIDE AND ALBUTEROL SULFATE 2.5; .5 MG/3ML; MG/3ML
3 SOLUTION RESPIRATORY (INHALATION) ONCE
Status: COMPLETED | OUTPATIENT
Start: 2023-12-13 | End: 2023-12-13

## 2023-12-13 RX ADMIN — METHYLPREDNISOLONE SODIUM SUCCINATE 125 MG: 125 INJECTION, POWDER, FOR SOLUTION INTRAMUSCULAR; INTRAVENOUS at 18:30

## 2023-12-13 RX ADMIN — IOHEXOL 100 ML: 350 INJECTION, SOLUTION INTRAVENOUS at 19:45

## 2023-12-13 RX ADMIN — IPRATROPIUM BROMIDE AND ALBUTEROL SULFATE 3 ML: .5; 3 SOLUTION RESPIRATORY (INHALATION) at 18:32

## 2023-12-13 NOTE — ED PROVIDER NOTES
History  Chief Complaint   Patient presents with    Medical Problem     Patient c/o abdominal pain beginning yesterday after taking meds that urgent care gave her for possible yeast infection; also c/o slight SOB, cough     39year old female with PMH asthma, allergies, GERD presenting ambulatory from home for evaluation. Pt reports yesterday she was seen at urgent care for vaginal itching. No reported vaginal pain, dysuria, vaginal bleeding or discharge. She states they checked a urine that was negative. She was prescribed a single dose of diflucan. Pt notes since taking that medication she has been experiencing abdominal pain described in her epigastric region. She reports associated indigestion and diarrhea. Denies N/V/C. Denies dysuria, frequency, urgency, hematuria, flank pain. Denies chest pain. She reports for the past few days (even before taking the diflucan) she has been experiencing a non productive cough. She also notes wheezing and feeling slightly short of breath. Denies fever, chills. She notes she used her albuterol inhaler yesterday which helped some. Today she didn't feel like it helped as much. Denies sick contacts. Denies recent illness. No leg pain or swelling. No personal h/o DVT or PE. No reported aggravating or alleviating factors. Took some mylanta without relief. She has an IUD. Has had a prior C section. History provided by:  Medical records and patient   used: No    Medical Problem  Associated symptoms: abdominal pain, cough, diarrhea, shortness of breath and wheezing    Associated symptoms: no chest pain, no congestion, no fatigue, no fever, no headaches, no myalgias, no nausea, no rash, no rhinorrhea, no sore throat and no vomiting        Prior to Admission Medications   Prescriptions Last Dose Informant Patient Reported? Taking?    Levonorgestrel (Liletta, 52 MG,) 19.5 MCG/DAY IUD IUD  Self Yes No   Si each by Intrauterine route once benzonatate (TESSALON) 200 MG capsule  Self No No   Sig: Take 1 capsule (200 mg total) by mouth 3 (three) times a day as needed for cough   buPROPion (WELLBUTRIN SR) 100 mg 12 hr tablet   No No   Sig: TAKE ONE TABLET BY MOUTH 2 TIMES A DAY   celecoxib (CeleBREX) 200 mg capsule   No No   Sig: TAKE ONE CAPSULE BY MOUTH EVERY DAY AS NEEDED   famotidine (PEPCID) 40 MG tablet  Self No No   Sig: take 1 tablet by mouth at bedtime 2 HOURS BEFORE BED   fluconazole (DIFLUCAN) 150 mg tablet   No No   Sig: Take 1 tablet (150 mg total) by mouth once for 1 dose   loratadine (CLARITIN) 10 mg tablet  Self No No   Sig: Take 1 tablet (10 mg total) by mouth daily   methocarbamol (ROBAXIN) 500 mg tablet  Self No No   Sig: Take 1 tablet (500 mg total) by mouth 2 (two) times a day as needed for muscle spasms   mupirocin (BACTROBAN) 2 % ointment  Self No No   Sig: Apply topically 3 (three) times a day for 7 days Apply three times daily specifically to area of swelling on the right breast for the next 7 days. nicotine (NICODERM CQ) 14 mg/24hr TD 24 hr patch  Self Yes No   Sig: Place 1 patch on the skin every 24 hours   nicotine (Nicotrol) 10 MG inhaler  Self Yes No   Sig: Inhale 1 puff   nicotine polacrilex (NICORETTE) 4 mg gum  Self Yes No   Sig: Chew 4 mg   pantoprazole (PROTONIX) 40 mg tablet  Self No No   Sig: Take 1 tablet (40 mg total) by mouth daily      Facility-Administered Medications: None       Past Medical History:   Diagnosis Date    Allergic     Asthma     Cervical disc disorder with radiculopathy of mid-cervical region 2020    Cervical radiculopathy 2020    GERD (gastroesophageal reflux disease)     Herniated disc, cervical     Pinched nerve in shoulder, right     Thyroid nodule        Past Surgical History:   Procedure Laterality Date     SECTION      EPIDURAL BLOCK INJECTION N/A 3/4/2020    Procedure: C7-T1 Interlaminar Epidural Steroid Injection (31037);   Surgeon: Johnna Greenberg MD;  Location: [facility];  Service: Pain Management     EPIDURAL BLOCK INJECTION N/A 6/26/2020    Procedure: C7-T1 interlaminar epidural steroid injection;  Surgeon: Laura Bernard MD;  Location: [facility];  Service: Pain Management     FL GUIDED NEEDLE PLAC BX/ASP/INJ  3/4/2020    FL GUIDED NEEDLE PLAC BX/ASP/INJ  6/26/2020       Family History   Problem Relation Age of Onset    Diabetes Mother     Heart disease Mother     Bone cancer Father     Diabetes Brother     No Known Problems Daughter     No Known Problems Maternal Grandmother     No Known Problems Paternal Grandmother     No Known Problems Maternal Aunt     No Known Problems Paternal Aunt     No Known Problems Paternal Aunt     Breast cancer Cousin     Colon cancer Neg Hx     Ovarian cancer Neg Hx      I have reviewed and agree with the history as documented. E-Cigarette/Vaping    E-Cigarette Use Never User      E-Cigarette/Vaping Substances    Nicotine No     THC No     CBD No     Flavoring No     Other No     Unknown No      Social History     Tobacco Use    Smoking status: Every Day     Current packs/day: 1.00     Types: Cigarettes    Smokeless tobacco: Never   Vaping Use    Vaping status: Never Used   Substance Use Topics    Alcohol use: No    Drug use: Not Currently     Types: Marijuana       Review of Systems   Constitutional: Negative. Negative for chills, fatigue and fever. HENT: Negative. Negative for congestion, rhinorrhea and sore throat. Eyes: Negative. Negative for visual disturbance. Respiratory:  Positive for cough, chest tightness, shortness of breath and wheezing. Cardiovascular: Negative. Negative for chest pain, palpitations and leg swelling. Gastrointestinal:  Positive for abdominal pain and diarrhea. Negative for constipation, nausea and vomiting. Genitourinary: Negative. Negative for dysuria, flank pain, frequency, hematuria and vaginal discharge. Musculoskeletal: Negative.   Negative for back pain, myalgias and neck pain.   Skin: Negative. Negative for rash. Neurological: Negative. Negative for dizziness, light-headedness and headaches. Psychiatric/Behavioral: Negative. Negative for confusion. All other systems reviewed and are negative. Physical Exam  Physical Exam  Vitals and nursing note reviewed. Constitutional:       General: She is awake. She is not in acute distress. Appearance: She is well-developed and overweight. She is not toxic-appearing or diaphoretic. HENT:      Head: Normocephalic and atraumatic. Right Ear: Hearing, tympanic membrane, ear canal and external ear normal.      Left Ear: Hearing, tympanic membrane, ear canal and external ear normal.      Nose: Nose normal.      Mouth/Throat:      Mouth: Mucous membranes are moist.      Tongue: Tongue does not deviate from midline. Pharynx: Oropharynx is clear. Uvula midline. Eyes:      General: Lids are normal. No scleral icterus. Conjunctiva/sclera: Conjunctivae normal.      Pupils: Pupils are equal, round, and reactive to light. Neck:      Trachea: Trachea and phonation normal. No tracheal deviation. Cardiovascular:      Rate and Rhythm: Normal rate and regular rhythm. Pulses: Normal pulses. Radial pulses are 2+ on the right side and 2+ on the left side. Dorsalis pedis pulses are 2+ on the right side and 2+ on the left side. Posterior tibial pulses are 2+ on the right side and 2+ on the left side. Heart sounds: Normal heart sounds, S1 normal and S2 normal. No murmur heard. Pulmonary:      Effort: Pulmonary effort is normal. No tachypnea or respiratory distress. Breath sounds: No stridor. Wheezing present. No rhonchi or rales. Comments: +freq coughing  Abdominal:      General: Bowel sounds are normal. There is no distension. Palpations: Abdomen is soft. Tenderness: There is no abdominal tenderness. There is no guarding or rebound.    Musculoskeletal:      Cervical back: Normal range of motion and neck supple. Right lower leg: No edema. Left lower leg: No edema. Skin:     General: Skin is warm and dry. Capillary Refill: Capillary refill takes less than 2 seconds. Findings: No rash. Neurological:      General: No focal deficit present. Mental Status: She is alert and oriented to person, place, and time. GCS: GCS eye subscore is 4. GCS verbal subscore is 5. GCS motor subscore is 6. Cranial Nerves: No cranial nerve deficit. Sensory: No sensory deficit. Motor: No abnormal muscle tone. Gait: Gait normal.   Psychiatric:         Mood and Affect: Mood normal.         Speech: Speech normal.         Behavior: Behavior normal. Behavior is cooperative.          Vital Signs  ED Triage Vitals [12/13/23 1805]   Temperature Pulse Respirations Blood Pressure SpO2   (!) 97.4 °F (36.3 °C) 86 18 145/91 98 %      Temp Source Heart Rate Source Patient Position - Orthostatic VS BP Location FiO2 (%)   Temporal Monitor -- -- --      Pain Score       --           Vitals:    12/13/23 1805   BP: 145/91   Pulse: 86         Visual Acuity      ED Medications  Medications   methylPREDNISolone sodium succinate (Solu-MEDROL) injection 125 mg (125 mg Intravenous Given 12/13/23 1830)   ipratropium-albuterol (DUO-NEB) 0.5-2.5 mg/3 mL inhalation solution 3 mL (3 mL Nebulization Given 12/13/23 1832)   iohexol (OMNIPAQUE) 350 MG/ML injection (MULTI-DOSE) 100 mL (100 mL Intravenous Given 12/13/23 1945)       Diagnostic Studies  Results Reviewed       Procedure Component Value Units Date/Time    HS Troponin I 4hr [[id number]]     Lab Status: No result Specimen: Blood     FLU/RSV/COVID - if FLU/RSV clinically relevant [[id number]]  (Normal) Collected: 12/13/23 1830    Lab Status: Final result Specimen: Nares from Nose Updated: 12/13/23 1935     SARS-CoV-2 Negative     INFLUENZA A PCR Negative     INFLUENZA B PCR Negative     RSV PCR Negative    Narrative:      FOR PEDIATRIC PATIENTS - copy/paste COVID Guidelines URL to browser: [email or web address]    SARS-CoV-2 assay is a Nucleic Acid Amplification assay intended for the  qualitative detection of nucleic acid from SARS-CoV-2 in nasopharyngeal  swabs. Results are for the presumptive identification of SARS-CoV-2 RNA. Positive results are indicative of infection with SARS-CoV-2, the virus  causing COVID-19, but do not rule out bacterial infection or co-infection  with other viruses. Laboratories within the [facility] and its  territories are required to report all positive results to the appropriate  public health authorities. Negative results do not preclude SARS-CoV-2  infection and should not be used as the sole basis for treatment or other  patient management decisions. Negative results must be combined with  clinical observations, patient history, and epidemiological information. This test has not been FDA cleared or approved. This test has been authorized by FDA under an Emergency Use Authorization  (EUA). This test is only authorized for the duration of time the  declaration that circumstances exist justifying the authorization of the  emergency use of an in vitro diagnostic tests for detection of SARS-CoV-2  virus and/or diagnosis of COVID-19 infection under section 564(b)(1) of  the Act, 21 U. S.C. 706SQB-3(S)(3), unless the authorization is terminated  or revoked sooner. The test has been validated but independent review by FDA  and CLIA is pending. Test performed using [employer] GeneXpert: This RT-PCR assay targets N2,  a region unique to SARS-CoV-2. A conserved region in the E-gene was chosen  for pan-Sarbecovirus detection which includes SARS-CoV-2. According to CMS-2020-01-R, this platform meets the definition of high-throughput technology.     HS Troponin I 2hr [[id number]]     Lab Status: No result Specimen: Blood     HS Troponin 0hr (reflex protocol) [[id number]]  (Normal) Collected: 12/13/23 1829    Lab Status: Final result Specimen: Blood from Arm, Right Updated: 12/13/23 1905     hs TnI 0hr 3 ng/L     Comprehensive metabolic panel [[id number]]  (Abnormal) Collected: 12/13/23 1829    Lab Status: Final result Specimen: Blood from Arm, Right Updated: 12/13/23 1859     Sodium 140 mmol/L      Potassium 3.4 mmol/L      Chloride 105 mmol/L      CO2 27 mmol/L      ANION GAP 8 mmol/L      BUN 8 mg/dL      Creatinine 0.76 mg/dL      Glucose 97 mg/dL      Calcium 9.6 mg/dL      AST 13 U/L      ALT 9 U/L      Alkaline Phosphatase 70 U/L      Total Protein 7.6 g/dL      Albumin 4.6 g/dL      Total Bilirubin 0.56 mg/dL      eGFR 95 ml/min/1.73sq m     Narrative:      [facility] guidelines for Chronic Kidney Disease (CKD):     Stage 1 with normal or high GFR (GFR > 90 mL/min/1.73 square meters)    Stage 2 Mild CKD (GFR = 60-89 mL/min/1.73 square meters)    Stage 3A Moderate CKD (GFR = 45-59 mL/min/1.73 square meters)    Stage 3B Moderate CKD (GFR = 30-44 mL/min/1.73 square meters)    Stage 4 Severe CKD (GFR = 15-29 mL/min/1.73 square meters)    Stage 5 End Stage CKD (GFR <15 mL/min/1.73 square meters)  Note: GFR calculation is accurate only with a steady state creatinine    Lipase [[id number]]  (Normal) Collected: 12/13/23 1829    Lab Status: Final result Specimen: Blood from Arm, Right Updated: 12/13/23 1859     Lipase 38 u/L     POCT pregnancy, urine [[id number]]  (Normal) Resulted: 12/13/23 1855    Lab Status: Final result Updated: 12/13/23 1856     EXT Preg Test, Ur Negative     Control Valid    Urine Microscopic [[id number]]  (Abnormal) Collected: 12/13/23 1832    Lab Status: Final result Specimen: Urine, Clean Catch Updated: 12/13/23 1855     RBC, UA 2-4 /hpf      WBC, UA 1-2 /hpf      Epithelial Cells Moderate /hpf      Bacteria, UA Occasional /hpf     D-Dimer [[id number]]  (Normal) Collected: 12/13/23 1820    Lab Status: Final result Specimen: Blood from Arm, Right Updated: 12/13/23 1852     D-Dimer, Quant 0.33 ug/ml FEU     Protime-INR [[id number]]  (Normal) Collected: 12/13/23 1829    Lab Status: Final result Specimen: Blood from Arm, Right Updated: 12/13/23 1850     Protime 13.5 seconds      INR 1.04    APTT [[id number]]  (Normal) Collected: 12/13/23 1829    Lab Status: Final result Specimen: Blood from Arm, Right Updated: 12/13/23 1850     PTT 27 seconds     UA w Reflex to Microscopic w Reflex to Culture [[id number]]  (Abnormal) Collected: 12/13/23 1832    Lab Status: Final result Specimen: Urine, Clean Catch Updated: 12/13/23 1839     Color, UA Yellow     Clarity, UA Clear     Specific Gravity, UA 1.010     pH, UA 7.0     Leukocytes, UA Negative     Nitrite, UA Negative     Protein, UA Negative mg/dl      Glucose, UA Negative mg/dl      Ketones, UA Negative mg/dl      Urobilinogen, UA 0.2 E.U./dl      Bilirubin, UA Negative     Occult Blood, UA Small    CBC and differential [[id number]] Collected: 12/13/23 1829    Lab Status: Final result Specimen: Blood from Arm, Right Updated: 12/13/23 1838     WBC 7.75 Thousand/uL      RBC 4.81 Million/uL      Hemoglobin 14.5 g/dL      Hematocrit 44.4 %      MCV 92 fL      MCH 30.1 pg      MCHC 32.7 g/dL      RDW 12.1 %      MPV 11.7 fL      Platelets 269 Thousands/uL      nRBC 0 /100 WBCs      Neutrophils Relative 56 %      Immat GRANS % 0 %      Lymphocytes Relative 35 %      Monocytes Relative 7 %      Eosinophils Relative 2 %      Basophils Relative 0 %      Neutrophils Absolute 4.31 Thousands/µL      Immature Grans Absolute 0.01 Thousand/uL      Lymphocytes Absolute 2.72 Thousands/µL      Monocytes Absolute 0.50 Thousand/µL      Eosinophils Absolute 0.18 Thousand/µL      Basophils Absolute 0.03 Thousands/µL                    CT abdomen pelvis with contrast   Final Result by SAMMI Hale MD (12/13 2030)      No acute intra-abdominal pathology.             Workstation performed: [id number]         XR chest 1 view portable    (Results Pending)              Procedures  ECG 12 Lead Documentation Only    Date/Time: 12/13/2023 6:49 PM    Performed by: Shannan Landeros PA-C  Authorized by: Shannan Landeros PA-C    Indications / Diagnosis:  Chest tightness, dyspnea  ECG reviewed by me, the ED Provider: yes    Patient location:  ED  Previous ECG:     Previous ECG:  Compared to current    Comparison ECG info:  6/9/23    Similarity:  No change    Comparison to cardiac monitor: Yes    Interpretation:     Interpretation: abnormal    Rate:     ECG rate:  88    ECG rate assessment: normal    Rhythm:     Rhythm: sinus rhythm    Ectopy:     Ectopy: none    QRS:     QRS axis:  Normal    QRS intervals:  Normal  Conduction:     Conduction: normal    ST segments:     ST segments:  Normal  T waves:     T waves: non-specific and inverted      Inverted:  III, aVF and V6  Comments:      , QRS 90, QT/QTc 356/430; T wave inversions noted are similar to prior. ED Course  ED Course as of 12/13/23 2051   Wed Dec 13, 2023   1840 WBC: 7.75   1840 Hemoglobin: 14.5   1840 Platelet Count: 762   9951 Blood, UA(!): Small  UA shows small blood, otherwise negative; UA not suggestive of infection   1840 XR chest 1 view portable  Independently viewed and interpreted by me - no acute cardiopulmonary process, no significant interval change from prior; pending official read.    1852 POCT INR: 1.04   1852 PROTIME: 13.5   1852 PTT: 27   1854 D-Dimer, Quant: 0.33  Wells' low risk, negative d dimer, doubt PE   1855 RBC, UA: 2-4   1855 Epithelial Cells(!): Moderate  Likely skin contaminant; UA not suggestive of infection   1857 PREGNANCY TEST URINE: Negative   1907 Glucose, Random: 97   1907 Creatinine: 0.76   1907 BUN: 8   1907 Sodium: 140   1907 Potassium(!): 3.4  Mildly decreased   1907 Chloride: 105   1907 CO2: 27   1907 Anion Gap: 8   1907 Calcium: 9.6   1907 AST: 13   1907 ALT: 9   1907 Alkaline Phosphatase: 70   1907 Total Protein: 7.6 1907 Albumin: 4.6   1907 TOTAL BILIRUBIN: 0.56   1907 eGFR: 95   1907 Lipase: 38  Not c/w pancreatitis   1908 hs TnI 0hr: 3  Not c/w ACS   1936 SARS-COV-2: Negative   1936 INFLU A PCR: Negative   1936 INFLU B PCR: Negative   1936 RSV PCR: Negative   1956 CT performed and pending interpretation. 2010 Pt reassessed. She notes her stomach is feeling better. She also feels her breathing is better. Repeat lung exam is clear. She is declining to stay here any longer. She had CT performed but still pending interpretation. She does not want to wait for the results. She states her back hurts from sitting here and requests to leave. She was offered tylenol and toradol which she declined. She notes having sufficient albuterol inhaler at home if needed. 2035 CT abdomen pelvis with contrast  IMPRESSION:     No acute intra-abdominal pathology. HEART Risk Score      Flowsheet Row Most Recent Value   Heart Score Risk Calculator    History 0 Filed at: 12/13/2023 1834   ECG 1 Filed at: 12/13/2023 1834   Age 0 Filed at: 12/13/2023 1834   Risk Factors 1 Filed at: 12/13/2023 1834   Troponin 0 Filed at: 12/13/2023 1834   HEART Score 2 Filed at: 12/13/2023 1834                          SBIRT 20yo+      Flowsheet Row Most Recent Value   Initial Alcohol Screen: US AUDIT-C     1. How often do you have a drink containing alcohol? 0 Filed at: 12/13/2023 1838   2. How many drinks containing alcohol do you have on a typical day you are drinking? 0 Filed at: 12/13/2023 1838   3b. FEMALE Any Age, or MALE 65+: How often do you have 4 or more drinks on one occassion? 0 Filed at: 12/13/2023 1838   Audit-C Score 0 Filed at: 12/13/2023 4200   ROCHELLE: How many times in the past year have you. .. Used an illegal drug or used a prescription medication for non-medical reasons?  Never Filed at: 12/13/2023 Ariel Munson' Criteria for PE      Flowsheet Row Most Recent Value   Dre' Criteria for PE    Clinical signs and symptoms of DVT 0 Filed at: 12/13/2023 5116   PE is primary diagnosis or equally likely 0 Filed at: 12/13/2023 1834   HR >100 0 Filed at: 12/13/2023 1834   Immobilization at least 3 days or Surgery in the previous 4 weeks 0 Filed at: 12/13/2023 1834   Previous, objectively diagnosed PE or DVT 0 Filed at: 12/13/2023 1834   Hemoptysis 0 Filed at: 12/13/2023 1834   Malignancy with treatment within 6 months or palliative 0 Filed at: 12/13/2023 4907   Wells' Criteria Total 0 Filed at: 12/13/2023 1834                  Medical Decision Making  38 yo female presenting for evaluation of abdominal pain. She reports this started after taking dose of diflucan for a reported yeast infection. In addition, pt has been complaining of cough, shortness of breath. Will obtain EKG, CXR, labs, viral swab. Will screen for PE with d dimer, otherwise low risk by Chad Ferguson'. Abdomen soft, non tender. She is not exhibiting an acute abdomen. Work up obtained as noted above. EKG and troponin not c/w ACS. D dimer negative, Wells' low risk, doubt PE. CXR does not reveal pneumonia, pneumothorax, vascular congestion or pleural effusion. No noted leukocytosis, no anemia. No infectious concerns. No hypo or hyperglycemia. Renal function within normal limits. Electrolytes within normal limits. UA not suggestive of infection. CT abd/pelv without acute findings. Symptomatically pt felt improved. Respiratory status stable. It is potentially likely that her stomach upset is related to the diflucan. Fortunately she does not have to take any doses. Would advise symptom management and outpatient recheck with her PCP. Reviewed symptomatic management. OTC meds reviewed. Anticipatory guidance. Advised recheck with PCP or return to ER as needed. Strict return precautions outlined. Patient voiced understanding and had no further questions. Please refer to above ER course for further details/discussion.       Problems Addressed:  Abdominal pain: acute illness or injury  Asthma: acute illness or injury  Cough: acute illness or injury    Amount and/or Complexity of Data Reviewed  External Data Reviewed: labs, ECG and notes. Labs: ordered. Decision-making details documented in ED Course. Radiology: ordered and independent interpretation performed. Decision-making details documented in ED Course. ECG/medicine tests: ordered and independent interpretation performed. Decision-making details documented in ED Course. Risk  OTC drugs. Prescription drug management. Disposition  Final diagnoses:   Abdominal pain   Cough   Asthma     Time reflects when diagnosis was documented in both MDM as applicable and the Disposition within this note       Time User Action Codes Description Comment    12/13/2023  8:20 PM Loree Chris Add [R10.9] Abdominal pain     12/13/2023  8:20 PM Rayna Lade [R05.9] Cough     12/13/2023  8:20 PM Loree Chris Add [E78.621] Asthma           ED Disposition       ED Disposition   Discharge    Condition   Stable    Date/Time   Wed Dec 13, 2023 2035    Comment   Date: 12/13/2023  Patient: Elvira Ramírez  Admitted: 12/13/2023  6:02 PM  Attending Provider: Jolly Augustin DO    Elvira Ramírez or her authorized caregiver has made the decision for the patient to leave the emergency department against the adv ice of her attending physician. She or her authorized caregiver has been informed and understands the inherent risks, including death. She or her authorized caregiver has decided to accept the responsibility for this decision. Elvira Ramírez and all  necessary parties have been advised that she may return for further evaluation or treatment. Her condition at time of discharge was stable.   Elvira Ramírez had current vital signs as follows:  /91   Pulse 86   Temp (!) 97.4 °F (36.3 °C) (Tempo ral)   Resp 18   Ht 5' 5" (1.651 m)   Wt 79.4 kg (175 lb)                Follow-up Information       Follow up With Specialties Details Why Contact Info Additional Information    [address] [facility] Emergency Medicine  As needed [address] [facility] [id number] 075 [facility], [address] [place] [address], [id number]            Discharge Medication List as of 12/13/2023  8:24 PM        CONTINUE these medications which have NOT CHANGED    Details   benzonatate (TESSALON) 200 MG capsule Take 1 capsule (200 mg total) by mouth 3 (three) times a day as needed for cough, Starting Thu 10/12/2023, Normal      buPROPion (WELLBUTRIN SR) 100 mg 12 hr tablet TAKE ONE TABLET BY MOUTH 2 TIMES A DAY, Starting Tue 12/5/2023, Normal      celecoxib (CeleBREX) 200 mg capsule TAKE ONE CAPSULE BY MOUTH EVERY DAY AS NEEDED, Starting Tue 12/5/2023, Normal      famotidine (PEPCID) 40 MG tablet take 1 tablet by mouth at bedtime 2 HOURS BEFORE BED, Normal      Levonorgestrel (Liletta, 52 MG,) 19.5 MCG/DAY IUD IUD 1 each by Intrauterine route once, Starting Thu 3/10/2022, Historical Med      loratadine (CLARITIN) 10 mg tablet Take 1 tablet (10 mg total) by mouth daily, Starting Wed 8/9/2023, Normal      methocarbamol (ROBAXIN) 500 mg tablet Take 1 tablet (500 mg total) by mouth 2 (two) times a day as needed for muscle spasms, Starting Wed 8/9/2023, Normal      mupirocin (BACTROBAN) 2 % ointment Apply topically 3 (three) times a day for 7 days Apply three times daily specifically to area of swelling on the right breast for the next 7 days. , Starting Sun 1/15/2023, Until Fri 10/27/2023, Normal      nicotine (NICODERM CQ) 14 mg/24hr TD 24 hr patch Place 1 patch on the skin every 24 hours, Starting Wed 10/4/2023, Until Mon 4/1/2024, Historical Med      nicotine (Nicotrol) 10 MG inhaler Inhale 1 puff, Starting Wed 10/4/2023, Historical Med      nicotine polacrilex (NICORETTE) 4 mg gum Chew 4 mg, Starting Mon 10/9/2023, Until Wed 11/8/2023 at 2359, Historical Med      pantoprazole (PROTONIX) 40 mg tablet Take 1 tablet (40 mg total) by mouth daily, Starting Wed 8/9/2023, Normal           STOP taking these medications       fluconazole (DIFLUCAN) 150 mg tablet Comments:   Reason for Stopping:               No discharge procedures on file.     PDMP Review       None            ED Provider  Electronically Signed by             Cynthia Craig PA-C  12/13/23 2051

## 2023-12-14 LAB
ATRIAL RATE: 88 BPM
P AXIS: 46 DEGREES
PR INTERVAL: 148 MS
QRS AXIS: 60 DEGREES
QRSD INTERVAL: 90 MS
QT INTERVAL: 356 MS
QTC INTERVAL: 430 MS
T WAVE AXIS: 9 DEGREES
VENTRICULAR RATE: 88 BPM

## 2023-12-14 NOTE — DISCHARGE INSTRUCTIONS
You have decided to leave against medical advice prior to your CT scan results returning. The results will be available in your mychart. Continue to drink plenty of fluids. Continue your acid reducing medications. Continue your albuterol as needed for your asthma. Follow up with your PCP or return to ER as needed.

## 2024-01-21 ENCOUNTER — OFFICE VISIT (OUTPATIENT)
Dept: URGENT CARE | Facility: CLINIC | Age: 44
End: 2024-01-21
Payer: COMMERCIAL

## 2024-01-21 VITALS
HEART RATE: 82 BPM | DIASTOLIC BLOOD PRESSURE: 88 MMHG | OXYGEN SATURATION: 97 % | RESPIRATION RATE: 20 BRPM | SYSTOLIC BLOOD PRESSURE: 141 MMHG | TEMPERATURE: 98.3 F

## 2024-01-21 DIAGNOSIS — U07.1 COVID: Primary | ICD-10-CM

## 2024-01-21 LAB
SARS-COV-2 AG UPPER RESP QL IA: POSITIVE
VALID CONTROL: ABNORMAL

## 2024-01-21 PROCEDURE — S9088 SERVICES PROVIDED IN URGENT: HCPCS | Performed by: PHYSICIAN ASSISTANT

## 2024-01-21 PROCEDURE — 99213 OFFICE O/P EST LOW 20 MIN: CPT | Performed by: PHYSICIAN ASSISTANT

## 2024-01-21 PROCEDURE — 87811 SARS-COV-2 COVID19 W/OPTIC: CPT | Performed by: PHYSICIAN ASSISTANT

## 2024-01-21 RX ORDER — SEMAGLUTIDE 1 MG/.5ML
1 INJECTION, SOLUTION SUBCUTANEOUS WEEKLY
COMMUNITY
Start: 2024-01-10

## 2024-01-21 RX ORDER — FLUCONAZOLE 150 MG/1
TABLET ORAL
COMMUNITY
Start: 2023-12-12 | End: 2024-02-01 | Stop reason: ALTCHOICE

## 2024-01-21 RX ORDER — FLUTICASONE PROPIONATE 50 MCG
SPRAY, SUSPENSION (ML) NASAL
COMMUNITY
Start: 2023-12-05

## 2024-01-21 RX ORDER — ALBUTEROL SULFATE 90 UG/1
AEROSOL, METERED RESPIRATORY (INHALATION)
COMMUNITY
Start: 2023-12-05

## 2024-01-21 NOTE — PROGRESS NOTES
[facility]        NAME: Park Marte is a 43 y.o. female  : 1980    MRN: [id number]  DATE: 2024  TIME: 11:24 AM    Assessment and Plan   COVID [U07.1]  1. COVID  Poct Covid 19 Rapid Antigen Test            Patient Instructions     Patient Instructions   Rapid COVID test positive.  Recommend continuing supportive care.  All patient's questions answered and is agreeable with this plan.      Follow up with PCP in 3-5 days.  Proceed to  ER if symptoms worsen.    Chief Complaint     Chief Complaint   Patient presents with    exposure to covid on          History of Present Illness       Patient is a 43-year-old female presenting today for COVID exposure.  Patient notes she was exposed to an individual 4 days ago who just tested positive for COVID, notes she is not experiencing any symptoms but wanted to be tested to make sure.  Has not had to take any medications for her symptoms.  Offers no acute complaints today.        Review of Systems   Review of Systems   Constitutional:  Negative for chills and fever.   HENT:  Negative for ear pain and sore throat.    Eyes:  Negative for pain and visual disturbance.   Respiratory:  Negative for cough and shortness of breath.    Cardiovascular:  Negative for chest pain and palpitations.   Gastrointestinal:  Negative for abdominal pain and vomiting.   Genitourinary:  Negative for dysuria and hematuria.   Musculoskeletal:  Negative for arthralgias and back pain.   Skin:  Negative for color change and rash.   Neurological:  Negative for seizures and syncope.   All other systems reviewed and are negative.        Current Medications       Current Outpatient Medications:     albuterol (PROVENTIL HFA,VENTOLIN HFA) 90 mcg/act inhaler, , Disp: , Rfl:     benzonatate (TESSALON) 200 MG capsule, Take 1 capsule (200 mg total) by mouth 3 (three) times a day as needed for cough, Disp: 20 capsule, Rfl: 0    buPROPion (WELLBUTRIN SR) 100 mg 12 hr tablet, TAKE ONE  TABLET BY MOUTH 2 TIMES A DAY, Disp: 60 tablet, Rfl: 0    celecoxib (CeleBREX) 200 mg capsule, TAKE ONE CAPSULE BY MOUTH EVERY DAY AS NEEDED, Disp: 30 capsule, Rfl: 0    famotidine (PEPCID) 40 MG tablet, take 1 tablet by mouth at bedtime 2 HOURS BEFORE BED, Disp: 30 tablet, Rfl: 3    fluconazole (DIFLUCAN) 150 mg tablet, , Disp: , Rfl:     fluticasone (FLONASE) 50 mcg/act nasal spray, , Disp: , Rfl:     Levonorgestrel (Liletta, 52 MG,) 19.5 MCG/DAY IUD IUD, 1 each by Intrauterine route once, Disp: , Rfl:     loratadine (CLARITIN) 10 mg tablet, Take 1 tablet (10 mg total) by mouth daily, Disp: 30 tablet, Rfl: 3    methocarbamol (ROBAXIN) 500 mg tablet, Take 1 tablet (500 mg total) by mouth 2 (two) times a day as needed for muscle spasms, Disp: 20 tablet, Rfl: 0    nicotine (NICODERM CQ) 14 mg/24hr TD 24 hr patch, Place 1 patch on the skin every 24 hours, Disp: , Rfl:     nicotine (Nicotrol) 10 MG inhaler, Inhale 1 puff, Disp: , Rfl:     pantoprazole (PROTONIX) 40 mg tablet, Take 1 tablet (40 mg total) by mouth daily, Disp: 30 tablet, Rfl: 3    Wegovy 1 MG/0.5ML, Inject 1 mg under the skin Once a week, Disp: , Rfl:     mupirocin (BACTROBAN) 2 % ointment, Apply topically 3 (three) times a day for 7 days Apply three times daily specifically to area of swelling on the right breast for the next 7 days., Disp: 15 g, Rfl: 0    nicotine polacrilex (NICORETTE) 4 mg gum, Chew 4 mg, Disp: , Rfl:   No current facility-administered medications for this visit.    Facility-Administered Medications Ordered in Other Visits:     Lidocaine Viscous HCl (XYLOCAINE) 2 % mucosal solution, , , Code/Trauma/Sedation Med, Santiago Glasgow DO, 15 mL at 05/10/21 1109    Current Allergies     Allergies as of 01/21/2024 - Reviewed 01/21/2024   Allergen Reaction Noted    No known allergies Allergic Rhinitis 12/16/2016            The following portions of the patient's history were reviewed and updated as appropriate: allergies, current  medications, past family history, past medical history, past social history, past surgical history and problem list.     Past Medical History:   Diagnosis Date    Allergic     Asthma     Cervical disc disorder with radiculopathy of mid-cervical region 2020    Cervical radiculopathy 2020    GERD (gastroesophageal reflux disease)     Herniated disc, cervical     Pinched nerve in shoulder, right     Thyroid nodule        Past Surgical History:   Procedure Laterality Date     SECTION      EPIDURAL BLOCK INJECTION N/A 3/4/2020    Procedure: C7-T1 Interlaminar Epidural Steroid Injection (78300);  Surgeon: Ana Morrell MD;  Location: [facility];  Service: Pain Management     EPIDURAL BLOCK INJECTION N/A 2020    Procedure: C7-T1 interlaminar epidural steroid injection;  Surgeon: Ana Morrell MD;  Location: [facility];  Service: Pain Management     FL GUIDED NEEDLE PLAC BX/ASP/INJ  3/4/2020    FL GUIDED NEEDLE PLAC BX/ASP/INJ  2020       Family History   Problem Relation Age of Onset    Diabetes Mother     Heart disease Mother     Bone cancer Father     Diabetes Brother     No Known Problems Daughter     No Known Problems Maternal Grandmother     No Known Problems Paternal Grandmother     No Known Problems Maternal Aunt     No Known Problems Paternal Aunt     No Known Problems Paternal Aunt     Breast cancer Cousin     Colon cancer Neg Hx     Ovarian cancer Neg Hx          Medications have been verified.        Objective   /88   Pulse 82   Temp 98.3 °F (36.8 °C)   Resp 20   SpO2 97%        Physical Exam     Physical Exam  Vitals and nursing note reviewed.   Constitutional:       General: She is not in acute distress.     Appearance: Normal appearance.   HENT:      Head: Normocephalic.      Right Ear: Tympanic membrane, ear canal and external ear normal.      Left Ear: Tympanic membrane, ear canal and external ear normal.      Nose: Nose normal.      Mouth/Throat:       Mouth: Mucous membranes are moist.      Pharynx: Oropharynx is clear.   Eyes:      Conjunctiva/sclera: Conjunctivae normal.   Cardiovascular:      Rate and Rhythm: Normal rate and regular rhythm.      Pulses: Normal pulses.      Heart sounds: Normal heart sounds.   Pulmonary:      Effort: Pulmonary effort is normal.      Breath sounds: Normal breath sounds.   Musculoskeletal:      Cervical back: Normal range of motion.   Skin:     General: Skin is warm.      Capillary Refill: Capillary refill takes less than 2 seconds.   Neurological:      Mental Status: She is alert.

## 2024-01-21 NOTE — PATIENT INSTRUCTIONS
Rapid COVID test positive.  Recommend continuing supportive care.  All patient's questions answered and is agreeable with this plan.

## 2024-01-23 ENCOUNTER — HOSPITAL ENCOUNTER (EMERGENCY)
Facility: HOSPITAL | Age: 44
Discharge: HOME | End: 2024-01-23
Attending: EMERGENCY MEDICINE | Admitting: EMERGENCY MEDICINE
Payer: COMMERCIAL

## 2024-01-23 ENCOUNTER — OFFICE VISIT (OUTPATIENT)
Dept: URGENT CARE | Facility: CLINIC | Age: 44
End: 2024-01-23
Payer: COMMERCIAL

## 2024-01-23 ENCOUNTER — APPOINTMENT (EMERGENCY)
Dept: RADIOLOGY | Facility: HOSPITAL | Age: 44
End: 2024-01-23
Payer: COMMERCIAL

## 2024-01-23 VITALS
HEART RATE: 88 BPM | OXYGEN SATURATION: 99 % | RESPIRATION RATE: 18 BRPM | WEIGHT: 177.03 LBS | DIASTOLIC BLOOD PRESSURE: 71 MMHG | TEMPERATURE: 98.3 F | SYSTOLIC BLOOD PRESSURE: 127 MMHG | BODY MASS INDEX: 29.46 KG/M2

## 2024-01-23 VITALS
HEART RATE: 120 BPM | WEIGHT: 177 LBS | RESPIRATION RATE: 20 BRPM | HEIGHT: 65 IN | DIASTOLIC BLOOD PRESSURE: 80 MMHG | OXYGEN SATURATION: 98 % | SYSTOLIC BLOOD PRESSURE: 150 MMHG | BODY MASS INDEX: 29.49 KG/M2 | TEMPERATURE: 98 F

## 2024-01-23 DIAGNOSIS — U07.1 COVID-19 VIRUS INFECTION: Primary | ICD-10-CM

## 2024-01-23 DIAGNOSIS — M79.10 MYALGIA: ICD-10-CM

## 2024-01-23 DIAGNOSIS — Z86.16 HISTORY OF COVID-19: Primary | ICD-10-CM

## 2024-01-23 LAB
ALBUMIN SERPL BCP-MCNC: 4.5 G/DL (ref 3.5–5)
ALP SERPL-CCNC: 77 U/L (ref 34–104)
ALT SERPL W P-5'-P-CCNC: 13 U/L (ref 7–52)
ANION GAP SERPL CALCULATED.3IONS-SCNC: 11 MMOL/L
AST SERPL W P-5'-P-CCNC: 15 U/L (ref 13–39)
BASOPHILS # BLD AUTO: 0.04 THOUSANDS/ÂΜL (ref 0–0.1)
BASOPHILS NFR BLD AUTO: 1 % (ref 0–1)
BILIRUB SERPL-MCNC: 0.42 MG/DL (ref 0.2–1)
BUN SERPL-MCNC: 9 MG/DL (ref 5–25)
CALCIUM SERPL-MCNC: 9.6 MG/DL (ref 8.4–10.2)
CARDIAC TROPONIN I PNL SERPL HS: <2 NG/L
CHLORIDE SERPL-SCNC: 104 MMOL/L (ref 96–108)
CK SERPL-CCNC: 95 U/L (ref 26–192)
CO2 SERPL-SCNC: 23 MMOL/L (ref 21–32)
CREAT SERPL-MCNC: 0.72 MG/DL (ref 0.6–1.3)
EOSINOPHIL # BLD AUTO: 0.02 THOUSAND/ÂΜL (ref 0–0.61)
EOSINOPHIL NFR BLD AUTO: 0 % (ref 0–6)
ERYTHROCYTE [DISTWIDTH] IN BLOOD BY AUTOMATED COUNT: 12.3 % (ref 11.6–15.1)
FLUAV RNA RESP QL NAA+PROBE: NEGATIVE
FLUBV RNA RESP QL NAA+PROBE: NEGATIVE
GFR SERPL CREATININE-BSD FRML MDRD: 102 ML/MIN/1.73SQ M
GLUCOSE SERPL-MCNC: 109 MG/DL (ref 65–140)
HCT VFR BLD AUTO: 41.2 % (ref 34.8–46.1)
HGB BLD-MCNC: 13.9 G/DL (ref 11.5–15.4)
IMM GRANULOCYTES # BLD AUTO: 0.03 THOUSAND/UL (ref 0–0.2)
IMM GRANULOCYTES NFR BLD AUTO: 0 % (ref 0–2)
LIPASE SERPL-CCNC: 17 U/L (ref 11–82)
LYMPHOCYTES # BLD AUTO: 0.34 THOUSANDS/ÂΜL (ref 0.6–4.47)
LYMPHOCYTES NFR BLD AUTO: 5 % (ref 14–44)
MCH RBC QN AUTO: 31.2 PG (ref 26.8–34.3)
MCHC RBC AUTO-ENTMCNC: 33.7 G/DL (ref 31.4–37.4)
MCV RBC AUTO: 93 FL (ref 82–98)
MONOCYTES # BLD AUTO: 0.93 THOUSAND/ÂΜL (ref 0.17–1.22)
MONOCYTES NFR BLD AUTO: 13 % (ref 4–12)
NEUTROPHILS # BLD AUTO: 5.8 THOUSANDS/ÂΜL (ref 1.85–7.62)
NEUTS SEG NFR BLD AUTO: 81 % (ref 43–75)
NRBC BLD AUTO-RTO: 0 /100 WBCS
PLATELET # BLD AUTO: 185 THOUSANDS/UL (ref 149–390)
PMV BLD AUTO: 12.4 FL (ref 8.9–12.7)
POTASSIUM SERPL-SCNC: 3.3 MMOL/L (ref 3.5–5.3)
PROT SERPL-MCNC: 7.6 G/DL (ref 6.4–8.4)
RBC # BLD AUTO: 4.45 MILLION/UL (ref 3.81–5.12)
RSV RNA RESP QL NAA+PROBE: NEGATIVE
S PYO DNA THROAT QL NAA+PROBE: NOT DETECTED
SARS-COV-2 AG UPPER RESP QL IA: NEGATIVE
SARS-COV-2 RNA RESP QL NAA+PROBE: POSITIVE
SODIUM SERPL-SCNC: 138 MMOL/L (ref 135–147)
VALID CONTROL: NORMAL
WBC # BLD AUTO: 7.16 THOUSAND/UL (ref 4.31–10.16)

## 2024-01-23 PROCEDURE — 87651 STREP A DNA AMP PROBE: CPT | Performed by: EMERGENCY MEDICINE

## 2024-01-23 PROCEDURE — 99285 EMERGENCY DEPT VISIT HI MDM: CPT | Performed by: EMERGENCY MEDICINE

## 2024-01-23 PROCEDURE — 99213 OFFICE O/P EST LOW 20 MIN: CPT | Performed by: NURSE PRACTITIONER

## 2024-01-23 PROCEDURE — 85025 COMPLETE CBC W/AUTO DIFF WBC: CPT | Performed by: EMERGENCY MEDICINE

## 2024-01-23 PROCEDURE — S9088 SERVICES PROVIDED IN URGENT: HCPCS | Performed by: NURSE PRACTITIONER

## 2024-01-23 PROCEDURE — 82550 ASSAY OF CK (CPK): CPT | Performed by: EMERGENCY MEDICINE

## 2024-01-23 PROCEDURE — 96374 THER/PROPH/DIAG INJ IV PUSH: CPT

## 2024-01-23 PROCEDURE — 96375 TX/PRO/DX INJ NEW DRUG ADDON: CPT

## 2024-01-23 PROCEDURE — 80053 COMPREHEN METABOLIC PANEL: CPT | Performed by: EMERGENCY MEDICINE

## 2024-01-23 PROCEDURE — 96361 HYDRATE IV INFUSION ADD-ON: CPT

## 2024-01-23 PROCEDURE — 0241U HB NFCT DS VIR RESP RNA 4 TRGT: CPT | Performed by: EMERGENCY MEDICINE

## 2024-01-23 PROCEDURE — 84484 ASSAY OF TROPONIN QUANT: CPT | Performed by: EMERGENCY MEDICINE

## 2024-01-23 PROCEDURE — 99285 EMERGENCY DEPT VISIT HI MDM: CPT

## 2024-01-23 PROCEDURE — 93005 ELECTROCARDIOGRAM TRACING: CPT

## 2024-01-23 PROCEDURE — 83690 ASSAY OF LIPASE: CPT | Performed by: EMERGENCY MEDICINE

## 2024-01-23 PROCEDURE — 87811 SARS-COV-2 COVID19 W/OPTIC: CPT | Performed by: NURSE PRACTITIONER

## 2024-01-23 PROCEDURE — 71045 X-RAY EXAM CHEST 1 VIEW: CPT

## 2024-01-23 PROCEDURE — 36415 COLL VENOUS BLD VENIPUNCTURE: CPT | Performed by: EMERGENCY MEDICINE

## 2024-01-23 RX ORDER — MULTIVIT WITH MINERALS/LUTEIN
1000 TABLET ORAL DAILY
Qty: 7 TABLET | Refills: 0 | Status: SHIPPED | OUTPATIENT
Start: 2024-01-23 | End: 2024-02-01

## 2024-01-23 RX ORDER — METHOCARBAMOL 500 MG/1
1000 TABLET, FILM COATED ORAL ONCE
Status: COMPLETED | OUTPATIENT
Start: 2024-01-23 | End: 2024-01-23

## 2024-01-23 RX ORDER — IBUPROFEN 600 MG/1
600 TABLET ORAL EVERY 6 HOURS PRN
Qty: 30 TABLET | Refills: 0 | Status: SHIPPED | OUTPATIENT
Start: 2024-01-23

## 2024-01-23 RX ORDER — DEXAMETHASONE 4 MG/1
10 TABLET ORAL ONCE
Status: COMPLETED | OUTPATIENT
Start: 2024-01-23 | End: 2024-01-23

## 2024-01-23 RX ORDER — KETOROLAC TROMETHAMINE 30 MG/ML
30 INJECTION, SOLUTION INTRAMUSCULAR; INTRAVENOUS ONCE
Status: COMPLETED | OUTPATIENT
Start: 2024-01-23 | End: 2024-01-23

## 2024-01-23 RX ORDER — MELATONIN
2000 DAILY
Qty: 14 TABLET | Refills: 0 | Status: SHIPPED | OUTPATIENT
Start: 2024-01-23 | End: 2024-02-01

## 2024-01-23 RX ORDER — MAGNESIUM HYDROXIDE/ALUMINUM HYDROXICE/SIMETHICONE 120; 1200; 1200 MG/30ML; MG/30ML; MG/30ML
30 SUSPENSION ORAL ONCE
Status: COMPLETED | OUTPATIENT
Start: 2024-01-23 | End: 2024-01-23

## 2024-01-23 RX ORDER — ONDANSETRON 2 MG/ML
4 INJECTION INTRAMUSCULAR; INTRAVENOUS ONCE
Status: COMPLETED | OUTPATIENT
Start: 2024-01-23 | End: 2024-01-23

## 2024-01-23 RX ORDER — ACETAMINOPHEN 325 MG/1
975 TABLET ORAL ONCE
Status: COMPLETED | OUTPATIENT
Start: 2024-01-23 | End: 2024-01-23

## 2024-01-23 RX ORDER — POTASSIUM CHLORIDE 20 MEQ/1
40 TABLET, EXTENDED RELEASE ORAL ONCE
Status: COMPLETED | OUTPATIENT
Start: 2024-01-23 | End: 2024-01-23

## 2024-01-23 RX ADMIN — ACETAMINOPHEN 975 MG: 325 TABLET, FILM COATED ORAL at 19:34

## 2024-01-23 RX ADMIN — ONDANSETRON 4 MG: 2 INJECTION INTRAMUSCULAR; INTRAVENOUS at 19:27

## 2024-01-23 RX ADMIN — SODIUM CHLORIDE 1000 ML: 0.9 INJECTION, SOLUTION INTRAVENOUS at 19:24

## 2024-01-23 RX ADMIN — METHOCARBAMOL TABLETS 1000 MG: 500 TABLET, COATED ORAL at 21:18

## 2024-01-23 RX ADMIN — POTASSIUM CHLORIDE 40 MEQ: 1500 TABLET, EXTENDED RELEASE ORAL at 21:42

## 2024-01-23 RX ADMIN — KETOROLAC TROMETHAMINE 30 MG: 30 INJECTION, SOLUTION INTRAMUSCULAR at 19:27

## 2024-01-23 RX ADMIN — DEXAMETHASONE 10 MG: 4 TABLET ORAL at 21:42

## 2024-01-23 RX ADMIN — ALUMINUM HYDROXIDE, MAGNESIUM HYDROXIDE, AND DIMETHICONE 30 ML: 200; 20; 200 SUSPENSION ORAL at 19:26

## 2024-01-23 NOTE — PATIENT INSTRUCTIONS
Your covid test today is negative.   You may return to work 1/24/2024.  Follow up with your PCP in 3-5 days  Go to the ED if symptoms worsen

## 2024-01-23 NOTE — Clinical Note
Park Marte was seen and treated in our emergency department on 1/23/2024.                Diagnosis:     Park  may return to work on return date.    She may return on this date: 01/27/2024    Positive on 1/23/2024.  Recommend quarantine based on initial positive test result patient may return to work on 1/27/2024.  If symptoms resolve sooner than that may seek repeat testing for return to work sooner.     If you have any questions or concerns, please don't hesitate to call.      Alessandro Brennan, DO    ______________________________           _______________          _______________  Hospital Representative                              Date                                Time

## 2024-01-23 NOTE — LETTER
January 23, 2024     Patient: Park Marte   YOB: 1980   Date of Visit: 1/23/2024       To Whom It May Concern:    It is my medical opinion that Park Marte may return to work on 1/24/2024.  Covid is negative today .    If you have any questions or concerns, please don't hesitate to call.         Sincerely,        KARLA Beal    CC: No Recipients

## 2024-01-23 NOTE — PROGRESS NOTES
[facility]        NAME: Park Marte is a 43 y.o. female  : 1980    MRN: [id number]  DATE: 2024  TIME: 1:43 PM    Assessment and Plan   History of COVID-19 [Z86.16]  1. History of COVID-19  Poct Covid 19 Rapid Antigen Test            Patient Instructions       Follow up with PCP in 3-5 days.  Proceed to  ER if symptoms worsen.    Your covid test today is negative.   You may return to work 2024.  Follow up with your PCP in 3-5 days  Go to the ED if symptoms worsen        Chief Complaint     Chief Complaint   Patient presents with    COVID-19     Tested positive for covid 6 days ago - per pt needs proof of negative test          History of Present Illness       This is a 43 year old female who states had tested + on  for covid. She denies any symptoms. She states the household was ill with covid as well. She denies taking anything for her symptoms.  Denies fevers, chills, n/v/d, cough, runny nose.  PMH is listed.  She states she needs official reading for returning to work.         Review of Systems   Review of Systems   Constitutional: Negative.    HENT: Negative.     Eyes: Negative.    Respiratory: Negative.     Cardiovascular: Negative.    Gastrointestinal: Negative.    Endocrine: Negative.    Genitourinary: Negative.    Musculoskeletal: Negative.    Skin: Negative.    Allergic/Immunologic: Negative.    Neurological: Negative.    Hematological: Negative.    Psychiatric/Behavioral: Negative.           Current Medications       Current Outpatient Medications:     albuterol (PROVENTIL HFA,VENTOLIN HFA) 90 mcg/act inhaler, , Disp: , Rfl:     benzonatate (TESSALON) 200 MG capsule, Take 1 capsule (200 mg total) by mouth 3 (three) times a day as needed for cough, Disp: 20 capsule, Rfl: 0    buPROPion (WELLBUTRIN SR) 100 mg 12 hr tablet, TAKE ONE TABLET BY MOUTH 2 TIMES A DAY, Disp: 60 tablet, Rfl: 0    celecoxib (CeleBREX) 200 mg capsule, TAKE ONE CAPSULE BY MOUTH EVERY DAY AS  NEEDED, Disp: 30 capsule, Rfl: 0    famotidine (PEPCID) 40 MG tablet, take 1 tablet by mouth at bedtime 2 HOURS BEFORE BED, Disp: 30 tablet, Rfl: 3    fluconazole (DIFLUCAN) 150 mg tablet, , Disp: , Rfl:     fluticasone (FLONASE) 50 mcg/act nasal spray, , Disp: , Rfl:     Levonorgestrel (Liletta, 52 MG,) 19.5 MCG/DAY IUD IUD, 1 each by Intrauterine route once, Disp: , Rfl:     loratadine (CLARITIN) 10 mg tablet, Take 1 tablet (10 mg total) by mouth daily, Disp: 30 tablet, Rfl: 3    methocarbamol (ROBAXIN) 500 mg tablet, Take 1 tablet (500 mg total) by mouth 2 (two) times a day as needed for muscle spasms, Disp: 20 tablet, Rfl: 0    mupirocin (BACTROBAN) 2 % ointment, Apply topically 3 (three) times a day for 7 days Apply three times daily specifically to area of swelling on the right breast for the next 7 days., Disp: 15 g, Rfl: 0    nicotine (NICODERM CQ) 14 mg/24hr TD 24 hr patch, Place 1 patch on the skin every 24 hours, Disp: , Rfl:     nicotine (Nicotrol) 10 MG inhaler, Inhale 1 puff, Disp: , Rfl:     nicotine polacrilex (NICORETTE) 4 mg gum, Chew 4 mg, Disp: , Rfl:     pantoprazole (PROTONIX) 40 mg tablet, Take 1 tablet (40 mg total) by mouth daily, Disp: 30 tablet, Rfl: 3    Wegovy 1 MG/0.5ML, Inject 1 mg under the skin Once a week, Disp: , Rfl:   No current facility-administered medications for this visit.    Facility-Administered Medications Ordered in Other Visits:     Lidocaine Viscous HCl (XYLOCAINE) 2 % mucosal solution, , , Code/Trauma/Sedation Med, Santiago Glasgow DO, 15 mL at 05/10/21 1109    Current Allergies     Allergies as of 01/23/2024 - Reviewed 01/23/2024   Allergen Reaction Noted    No known allergies Allergic Rhinitis 12/16/2016            The following portions of the patient's history were reviewed and updated as appropriate: allergies, current medications, past family history, past medical history, past social history, past surgical history and problem list.     Past Medical  "History:   Diagnosis Date    Allergic     Asthma     Cervical disc disorder with radiculopathy of mid-cervical region 2020    Cervical radiculopathy 2020    GERD (gastroesophageal reflux disease)     Herniated disc, cervical     Pinched nerve in shoulder, right     Thyroid nodule        Past Surgical History:   Procedure Laterality Date     SECTION      EPIDURAL BLOCK INJECTION N/A 3/4/2020    Procedure: C7-T1 Interlaminar Epidural Steroid Injection (26503);  Surgeon: Ana Morrell MD;  Location: [facility];  Service: Pain Management     EPIDURAL BLOCK INJECTION N/A 2020    Procedure: C7-T1 interlaminar epidural steroid injection;  Surgeon: Ana Morrell MD;  Location: [facility];  Service: Pain Management     FL GUIDED NEEDLE PLAC BX/ASP/INJ  3/4/2020    FL GUIDED NEEDLE PLAC BX/ASP/INJ  2020       Family History   Problem Relation Age of Onset    Diabetes Mother     Heart disease Mother     Bone cancer Father     Diabetes Brother     No Known Problems Daughter     No Known Problems Maternal Grandmother     No Known Problems Paternal Grandmother     No Known Problems Maternal Aunt     No Known Problems Paternal Aunt     No Known Problems Paternal Aunt     Breast cancer Cousin     Colon cancer Neg Hx     Ovarian cancer Neg Hx          Medications have been verified.        Objective   /80   Pulse (!) 120   Temp 98 °F (36.7 °C)   Resp 20   Ht 5' 5\" (1.651 m)   Wt 80.3 kg (177 lb)   SpO2 98%   BMI 29.45 kg/m²   No LMP recorded. Patient has had an implant.       Physical Exam     Physical Exam  Vitals and nursing note reviewed.   Constitutional:       General: She is not in acute distress.     Appearance: Normal appearance. She is normal weight. She is not ill-appearing, toxic-appearing or diaphoretic.   HENT:      Head: Normocephalic and atraumatic.   Eyes:      Extraocular Movements: Extraocular movements intact.   Cardiovascular:      Rate and Rhythm: Normal rate " and regular rhythm.      Pulses: Normal pulses.      Heart sounds: Normal heart sounds. No murmur heard.  Pulmonary:      Effort: Pulmonary effort is normal. No respiratory distress.      Breath sounds: Normal breath sounds. No stridor. No wheezing, rhonchi or rales.   Chest:      Chest wall: No tenderness.   Musculoskeletal:         General: Normal range of motion.      Cervical back: Normal range of motion and neck supple.   Skin:     General: Skin is warm and dry.      Capillary Refill: Capillary refill takes less than 2 seconds.   Neurological:      General: No focal deficit present.      Mental Status: She is alert and oriented to person, place, and time.   Psychiatric:         Mood and Affect: Mood normal.         Behavior: Behavior normal.         Thought Content: Thought content normal.         Judgment: Judgment normal.             Pt has agreed for covid results to be placed on work note

## 2024-01-24 LAB
ATRIAL RATE: 121 BPM
P AXIS: 41 DEGREES
PR INTERVAL: 156 MS
QRS AXIS: 64 DEGREES
QRSD INTERVAL: 82 MS
QT INTERVAL: 320 MS
QTC INTERVAL: 454 MS
T WAVE AXIS: -1 DEGREES
VENTRICULAR RATE: 121 BPM

## 2024-01-24 NOTE — ED PROVIDER NOTES
History  Chief Complaint   Patient presents with    Leg Pain     Patient started with bilateral leg pain today. Denies injury.     Chest Pain     Patient started with mid chest pain in the past hour as well that radiates into her stomach, patient states this started after she ate.        Leg Pain  Associated symptoms: fatigue    Associated symptoms: no neck pain    Chest Pain  Associated symptoms: fatigue, headache, nausea and vomiting    Associated symptoms: no abdominal pain, no cough, no diaphoresis, no dysphagia, no numbness, no palpitations and no shortness of breath      This is a 43-year-old female with a past medical history significant for herniated disc in the cervical spine and a history of upper back pain, who recently had a positive COVID test on 1/21/2024 but denies having symptoms at that time and then followed up today at an urgent care around 1 PM seeking a negative COVID test for return to work and was feeling well at that time who reports that around the time of that visit she began to feel acute onset of symptoms described as fatigue, leg pain, generalized back pain, generalized headache, nausea with vomiting, chest pain.    Patient reports that the symptoms began today after going to the urgent care.  She said the onset was relatively quick.  She was feeling well this morning.  She first noticed sensation of achiness in bilateral legs described as moderate to severe without associated numbness or focal weakness or any history of falls injuries or trauma.  She denies a history of pain like that in the past.  She denies a history of neurologic back pain with radiation to the legs.  She notes a more mild degree of discomfort in the rest of the body similar to what she is feeling in the legs most consistent with myalgias. She denies severe midline pain in the back. She reports about an hour ago she began to feel nauseous and had a small-volume nonbloody nonbilious emesis and then developed pain in  the chest and upper abdomen after that time which prompted her to seek evaluation given the constellation of symptoms.  She notes a generalized headache she denies any vision loss, diplopia, severe sudden onset worst headache of life, syncope.    She denies any significant chronic medical problems or chronic medication use she reports intermittent use of medications as needed for symptoms.  She denies any personal or family history of any arterial disorders such as acute arterial occlusions or dissections or aneurysms he denies any known history in the family or personally of venous thromboembolism.    Prior to this she denies any other recent changes in health, surgeries, immobility.      Prior to Admission Medications   Prescriptions Last Dose Informant Patient Reported? Taking?   Levonorgestrel (Liletta, 52 MG,) 19.5 MCG/DAY IUD IUD  Self Yes No   Si each by Intrauterine route once   Wegovy 1 MG/0.5ML   Yes No   Sig: Inject 1 mg under the skin Once a week   albuterol (PROVENTIL HFA,VENTOLIN HFA) 90 mcg/act inhaler   Yes No   benzonatate (TESSALON) 200 MG capsule  Self No No   Sig: Take 1 capsule (200 mg total) by mouth 3 (three) times a day as needed for cough   buPROPion (WELLBUTRIN SR) 100 mg 12 hr tablet   No No   Sig: TAKE ONE TABLET BY MOUTH 2 TIMES A DAY   celecoxib (CeleBREX) 200 mg capsule   No No   Sig: TAKE ONE CAPSULE BY MOUTH EVERY DAY AS NEEDED   famotidine (PEPCID) 40 MG tablet  Self No No   Sig: take 1 tablet by mouth at bedtime 2 HOURS BEFORE BED   fluconazole (DIFLUCAN) 150 mg tablet   Yes No   fluticasone (FLONASE) 50 mcg/act nasal spray   Yes No   loratadine (CLARITIN) 10 mg tablet  Self No No   Sig: Take 1 tablet (10 mg total) by mouth daily   methocarbamol (ROBAXIN) 500 mg tablet  Self No No   Sig: Take 1 tablet (500 mg total) by mouth 2 (two) times a day as needed for muscle spasms   mupirocin (BACTROBAN) 2 % ointment  Self No No   Sig: Apply topically 3 (three) times a day for 7 days  Apply three times daily specifically to area of swelling on the right breast for the next 7 days.   nicotine (NICODERM CQ) 14 mg/24hr TD 24 hr patch  Self Yes No   Sig: Place 1 patch on the skin every 24 hours   nicotine (Nicotrol) 10 MG inhaler  Self Yes No   Sig: Inhale 1 puff   nicotine polacrilex (NICORETTE) 4 mg gum  Self Yes No   Sig: Chew 4 mg   pantoprazole (PROTONIX) 40 mg tablet  Self No No   Sig: Take 1 tablet (40 mg total) by mouth daily      Facility-Administered Medications: None       Past Medical History:   Diagnosis Date    Allergic     Asthma     Cervical disc disorder with radiculopathy of mid-cervical region 2020    Cervical radiculopathy 2020    GERD (gastroesophageal reflux disease)     Herniated disc, cervical     Pinched nerve in shoulder, right     Thyroid nodule        Past Surgical History:   Procedure Laterality Date     SECTION      EPIDURAL BLOCK INJECTION N/A 3/4/2020    Procedure: C7-T1 Interlaminar Epidural Steroid Injection (18635);  Surgeon: Ana Morrell MD;  Location: [facility];  Service: Pain Management     EPIDURAL BLOCK INJECTION N/A 2020    Procedure: C7-T1 interlaminar epidural steroid injection;  Surgeon: Ana Morrell MD;  Location: [facility];  Service: Pain Management     FL GUIDED NEEDLE PLAC BX/ASP/INJ  3/4/2020    FL GUIDED NEEDLE PLAC BX/ASP/INJ  2020       Family History   Problem Relation Age of Onset    Diabetes Mother     Heart disease Mother     Bone cancer Father     Diabetes Brother     No Known Problems Daughter     No Known Problems Maternal Grandmother     No Known Problems Paternal Grandmother     No Known Problems Maternal Aunt     No Known Problems Paternal Aunt     No Known Problems Paternal Aunt     Breast cancer Cousin     Colon cancer Neg Hx     Ovarian cancer Neg Hx      I have reviewed and agree with the history as documented.    E-Cigarette/Vaping    E-Cigarette Use Never User      E-Cigarette/Vaping  Substances    Nicotine No     THC No     CBD No     Flavoring No     Other No     Unknown No      Social History     Tobacco Use    Smoking status: Every Day     Current packs/day: 1.00     Types: Cigarettes    Smokeless tobacco: Never   Vaping Use    Vaping status: Never Used   Substance Use Topics    Alcohol use: No    Drug use: Not Currently     Types: Marijuana       Review of Systems   Constitutional:  Positive for activity change, appetite change, chills and fatigue. Negative for diaphoresis.   HENT:  Negative for congestion, sore throat, trouble swallowing and voice change.    Eyes:  Negative for visual disturbance.   Respiratory:  Negative for cough, chest tightness and shortness of breath.    Cardiovascular:  Positive for chest pain. Negative for palpitations and leg swelling.   Gastrointestinal:  Positive for nausea and vomiting. Negative for abdominal distention, abdominal pain, diarrhea and rectal pain.   Genitourinary:  Negative for difficulty urinating and flank pain.   Musculoskeletal:  Positive for arthralgias and myalgias. Negative for joint swelling, neck pain and neck stiffness.   Skin:  Negative for color change, rash and wound.   Neurological:  Positive for headaches. Negative for syncope and numbness.       Physical Exam  Physical Exam  Vitals and nursing note reviewed.   Constitutional:       General: She is not in acute distress.     Appearance: She is well-developed. She is ill-appearing. She is not toxic-appearing or diaphoretic.   HENT:      Head: Normocephalic and atraumatic.   Eyes:      General: No visual field deficit.  Neck:      Thyroid: No thyromegaly or thyroid tenderness.      Vascular: No JVD.      Trachea: No tracheal deviation.      Meningeal: Brudzinski's sign absent.   Cardiovascular:      Rate and Rhythm: Regular rhythm. Tachycardia present.      Pulses:           Radial pulses are 2+ on the right side and 2+ on the left side.      Heart sounds: Normal heart sounds. No  murmur heard.     No friction rub. No gallop.   Pulmonary:      Effort: Pulmonary effort is normal. No tachypnea or respiratory distress.      Breath sounds: Normal breath sounds. No decreased breath sounds, wheezing, rhonchi or rales.   Abdominal:      Palpations: Abdomen is soft.      Tenderness: There is no abdominal tenderness. There is no guarding or rebound.   Musculoskeletal:      Cervical back: Normal and neck supple. No rigidity or bony tenderness. No pain with movement or spinous process tenderness. Normal range of motion.      Thoracic back: Normal. No bony tenderness. Normal range of motion.      Lumbar back: Normal. No bony tenderness. Normal range of motion.      Right lower leg: No edema.      Left lower leg: No edema.   Lymphadenopathy:      Cervical: No cervical adenopathy.   Skin:     General: Skin is warm and dry.      Capillary Refill: Capillary refill takes less than 2 seconds.      Findings: No rash.   Neurological:      General: No focal deficit present.      Mental Status: She is alert and oriented to person, place, and time.      GCS: GCS eye subscore is 4. GCS verbal subscore is 5. GCS motor subscore is 6.      Cranial Nerves: No dysarthria or facial asymmetry.      Sensory: Sensation is intact.      Motor: Motor function is intact. No tremor.         Vital Signs  ED Triage Vitals   Temperature Pulse Respirations Blood Pressure SpO2   01/23/24 1903 01/23/24 1903 01/23/24 1903 01/23/24 1903 01/23/24 1903   98.3 °F (36.8 °C) (!) 127 18 155/98 99 %      Temp Source Heart Rate Source Patient Position - Orthostatic VS BP Location FiO2 (%)   01/23/24 1903 01/23/24 1903 01/23/24 1903 01/23/24 1903 --   Temporal Monitor Lying Right arm       Pain Score       01/23/24 1927       6           Vitals:    01/23/24 1903 01/23/24 2030 01/23/24 2125 01/23/24 2135   BP: 155/98 134/79 127/71    Pulse: (!) 127 105 97 88   Patient Position - Orthostatic VS: Lying Lying Lying          Visual Acuity      ED  Medications  Medications   aluminum-magnesium hydroxide-simethicone (MAALOX) oral suspension 30 mL (30 mL Oral Given 1/23/24 1926)   ketorolac (TORADOL) injection 30 mg (30 mg Intravenous Given 1/23/24 1927)   sodium chloride 0.9 % bolus 1,000 mL (0 mL Intravenous Stopped 1/23/24 2126)   ondansetron (ZOFRAN) injection 4 mg (4 mg Intravenous Given 1/23/24 1927)   acetaminophen (TYLENOL) tablet 975 mg (975 mg Oral Given 1/23/24 1934)   methocarbamol (ROBAXIN) tablet 1,000 mg (1,000 mg Oral Given 1/23/24 2118)   potassium chloride (K-DUR,KLOR-CON) CR tablet 40 mEq (40 mEq Oral Given 1/23/24 2142)   dexamethasone (DECADRON) tablet 10 mg (10 mg Oral Given 1/23/24 2142)       Diagnostic Studies  Results Reviewed       Procedure Component Value Units Date/Time    Comprehensive metabolic panel [[id number]]  (Abnormal) Collected: 01/23/24 1924    Lab Status: Final result Specimen: Blood from Arm, Right Updated: 01/23/24 2028     Sodium 138 mmol/L      Potassium 3.3 mmol/L      Chloride 104 mmol/L      CO2 23 mmol/L      ANION GAP 11 mmol/L      BUN 9 mg/dL      Creatinine 0.72 mg/dL      Glucose 109 mg/dL      Calcium 9.6 mg/dL      AST 15 U/L      ALT 13 U/L      Alkaline Phosphatase 77 U/L      Total Protein 7.6 g/dL      Albumin 4.5 g/dL      Total Bilirubin 0.42 mg/dL      eGFR 102 ml/min/1.73sq m     Narrative:      National Kidney Disease Foundation guidelines for Chronic Kidney Disease (CKD):     Stage 1 with normal or high GFR (GFR > 90 mL/min/1.73 square meters)    Stage 2 Mild CKD (GFR = 60-89 mL/min/1.73 square meters)    Stage 3A Moderate CKD (GFR = 45-59 mL/min/1.73 square meters)    Stage 3B Moderate CKD (GFR = 30-44 mL/min/1.73 square meters)    Stage 4 Severe CKD (GFR = 15-29 mL/min/1.73 square meters)    Stage 5 End Stage CKD (GFR <15 mL/min/1.73 square meters)  Note: GFR calculation is accurate only with a steady state creatinine    Lipase [[id number]]  (Normal) Collected: 01/23/24 1924    Lab Status: Final  result Specimen: Blood from Arm, Right Updated: 01/23/24 2028     Lipase 17 u/L     CK [[id number]]  (Normal) Collected: 01/23/24 1924    Lab Status: Final result Specimen: Blood from Arm, Right Updated: 01/23/24 2028     Total CK 95 U/L     FLU/RSV/COVID - if FLU/RSV clinically relevant [[id number]]  (Abnormal) Collected: 01/23/24 1924    Lab Status: Final result Specimen: Nares from Nose Updated: 01/23/24 2022     SARS-CoV-2 Positive     INFLUENZA A PCR Negative     INFLUENZA B PCR Negative     RSV PCR Negative    Narrative:      FOR PEDIATRIC PATIENTS - copy/paste COVID Guidelines URL to browser: [email or web address]    SARS-CoV-2 assay is a Nucleic Acid Amplification assay intended for the  qualitative detection of nucleic acid from SARS-CoV-2 in nasopharyngeal  swabs. Results are for the presumptive identification of SARS-CoV-2 RNA.    Positive results are indicative of infection with SARS-CoV-2, the virus  causing COVID-19, but do not rule out bacterial infection or co-infection  with other viruses. Laboratories within the [place] and its  territories are required to report all positive results to the appropriate  public health authorities. Negative results do not preclude SARS-CoV-2  infection and should not be used as the sole basis for treatment or other  patient management decisions. Negative results must be combined with  clinical observations, patient history, and epidemiological information.  This test has not been FDA cleared or approved.    This test has been authorized by FDA under an Emergency Use Authorization  (EUA). This test is only authorized for the duration of time the  declaration that circumstances exist justifying the authorization of the  emergency use of an in vitro diagnostic tests for detection of SARS-CoV-2  virus and/or diagnosis of COVID-19 infection under section 564(b)(1) of  the Act, 21 U.S.C. 360bbb-3(b)(1), unless the  authorization is terminated  or revoked sooner. The test has been validated but independent review by FDA  and CLIA is pending.    Test performed using [employer] GeneXpert: This RT-PCR assay targets N2,  a region unique to SARS-CoV-2. A conserved region in the E-gene was chosen  for pan-Sarbecovirus detection which includes SARS-CoV-2.    According to CMS-2020-01-R, this platform meets the definition of high-throughput technology.    Strep A PCR [[id number]]  (Normal) Collected: 01/23/24 1934    Lab Status: Final result Specimen: Throat Updated: 01/23/24 2011     STREP A PCR Not Detected    HS Troponin 0hr (reflex protocol) [[id number]]  (Normal) Collected: 01/23/24 1924    Lab Status: Final result Specimen: Blood from Arm, Right Updated: 01/23/24 2006     hs TnI 0hr <2 ng/L     CBC and differential [[id number]]  (Abnormal) Collected: 01/23/24 1924    Lab Status: Final result Specimen: Blood from Arm, Right Updated: 01/23/24 1942     WBC 7.16 Thousand/uL      RBC 4.45 Million/uL      Hemoglobin 13.9 g/dL      Hematocrit 41.2 %      MCV 93 fL      MCH 31.2 pg      MCHC 33.7 g/dL      RDW 12.3 %      MPV 12.4 fL      Platelets 185 Thousands/uL      nRBC 0 /100 WBCs      Neutrophils Relative 81 %      Immat GRANS % 0 %      Lymphocytes Relative 5 %      Monocytes Relative 13 %      Eosinophils Relative 0 %      Basophils Relative 1 %      Neutrophils Absolute 5.80 Thousands/µL      Immature Grans Absolute 0.03 Thousand/uL      Lymphocytes Absolute 0.34 Thousands/µL      Monocytes Absolute 0.93 Thousand/µL      Eosinophils Absolute 0.02 Thousand/µL      Basophils Absolute 0.04 Thousands/µL                    XR chest 1 view portable   ED Interpretation by Alessandro Brennan DO (01/23 1951)   X-ray of the chest 1 view interpreted by myself pending official radiology report.  No acute cardiopulmonary process seen when compared to prior chest x-ray on file from 2023.      Final Result by Alessandro Madsen MD (01/24 0828)       No acute cardiopulmonary disease.                  Resident: JACKSON COVINGTON I, the attending radiologist, have reviewed the images and agree with the final report above.      Workstation performed: [id number]                    Procedures  Procedures         ED Course  ED Course as of 01/26/24 1501   Tue Jan 23, 2024   1920 Pulse(!): 127   2025 SARS-COV-2(!): Positive   Fri Jan 26, 2024   1501 Potassium(!): 3.3  Hypokalemia repleted PO                               SBIRT 20yo+      Flowsheet Row Most Recent Value   Initial Alcohol Screen: US AUDIT-C     1. How often do you have a drink containing alcohol? 0 Filed at: 01/23/2024 1941   2. How many drinks containing alcohol do you have on a typical day you are drinking?  0 Filed at: 01/23/2024 1941   3a. Male UNDER 65: How often do you have five or more drinks on one occasion? 0 Filed at: 01/23/2024 1911   3b. FEMALE Any Age, or MALE 65+: How often do you have 4 or more drinks on one occassion? 0 Filed at: 01/23/2024 1941   Audit-C Score 0 Filed at: 01/23/2024 1941   ROCHELLE: How many times in the past year have you...    Used an illegal drug or used a prescription medication for non-medical reasons? Never Filed at: 01/23/2024 1941                      Medical Decision Making  This is a 43-year-old female who presents the ER complaining of severe myalgias fatigue with clinical suspicion for influenza or some other viral syndrome.  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  She is in no distress here she has a normal mental status clear lungs no cardiac murmurs no hypoxia or hypotension she does have tachycardia of unclear etiology on arrival which markedly improved during the  ED course and was treated for pain and her COVID swab was positive and it was felt that she had a false negative COVID result earlier at the urgent care workup demonstrated some mild hypokalemia which was addressed and the patient felt improved on reassessment was provided work restrictions and discharged    Problems Addressed:  COVID-19 virus infection: acute illness or injury  Myalgia: acute illness or injury    Amount and/or Complexity of Data Reviewed  Labs: ordered. Decision-making details documented in ED Course.  Radiology: ordered and independent interpretation performed.    Risk  OTC drugs.  Prescription drug management.             Disposition  Final diagnoses:   COVID-19 virus infection   Myalgia     Time reflects when diagnosis was documented in both MDM as applicable and the Disposition within this note       Time User Action Codes Description Comment    1/23/2024  9:38 PM Alessandro Brennan [U07.1] COVID-19 virus infection     1/23/2024  9:39 PM Alessandro Brennan [M79.10] Myalgia           ED Disposition       ED Disposition   Discharge    Condition   Stable    Date/Time   Tue Jan 23, 2024 2142    Comment   [place] discharge to home/self care.                   Follow-up Information    None         Discharge Medication List as of 1/23/2024  9:46 PM        START taking these medications    Details   Ascorbic Acid (vitamin C) 1000 MG tablet Take 1 tablet (1,000 mg total) by mouth daily for 7 days, Starting Tue 1/23/2024, Until Tue 1/30/2024, Normal      cholecalciferol (VITAMIN D3) 1,000 units tablet Take 2 tablets (2,000 Units total) by mouth daily for 7 days, Starting Tue 1/23/2024, Until Tue 1/30/2024, Normal      ibuprofen (MOTRIN) 600 mg tablet Take 1 tablet (600 mg total) by mouth every 6 (six) hours as needed for moderate pain, Starting Tue 1/23/2024, Normal           CONTINUE these medications which have NOT CHANGED    Details   albuterol (PROVENTIL HFA,VENTOLIN HFA) 90 mcg/act  inhaler Historical Med      benzonatate (TESSALON) 200 MG capsule Take 1 capsule (200 mg total) by mouth 3 (three) times a day as needed for cough, Starting Thu 10/12/2023, Normal      buPROPion (WELLBUTRIN SR) 100 mg 12 hr tablet TAKE ONE TABLET BY MOUTH 2 TIMES A DAY, Starting Tue 12/5/2023, Normal      celecoxib (CeleBREX) 200 mg capsule TAKE ONE CAPSULE BY MOUTH EVERY DAY AS NEEDED, Starting Tue 12/5/2023, Normal      famotidine (PEPCID) 40 MG tablet take 1 tablet by mouth at bedtime 2 HOURS BEFORE BED, Normal      fluconazole (DIFLUCAN) 150 mg tablet Historical Med      fluticasone (FLONASE) 50 mcg/act nasal spray Historical Med      Levonorgestrel (Liletta, 52 MG,) 19.5 MCG/DAY IUD IUD 1 each by Intrauterine route once, Starting Thu 3/10/2022, Historical Med      loratadine (CLARITIN) 10 mg tablet Take 1 tablet (10 mg total) by mouth daily, Starting Wed 8/9/2023, Normal      methocarbamol (ROBAXIN) 500 mg tablet Take 1 tablet (500 mg total) by mouth 2 (two) times a day as needed for muscle spasms, Starting Wed 8/9/2023, Normal      mupirocin (BACTROBAN) 2 % ointment Apply topically 3 (three) times a day for 7 days Apply three times daily specifically to area of swelling on the right breast for the next 7 days., Starting Sun 1/15/2023, Until Fri 10/27/2023, Normal      nicotine (NICODERM CQ) 14 mg/24hr TD 24 hr patch Place 1 patch on the skin every 24 hours, Starting Wed 10/4/2023, Until Mon 4/1/2024, Historical Med      nicotine (Nicotrol) 10 MG inhaler Inhale 1 puff, Starting Wed 10/4/2023, Historical Med      nicotine polacrilex (NICORETTE) 4 mg gum Chew 4 mg, Starting Mon 10/9/2023, Until Wed 11/8/2023 at 2359, Historical Med      pantoprazole (PROTONIX) 40 mg tablet Take 1 tablet (40 mg total) by mouth daily, Starting Wed 8/9/2023, Normal      Wegovy 1 MG/0.5ML Inject 1 mg under the skin Once a week, Starting Wed 1/10/2024, Historical Med             No discharge procedures on file.    PDMP Review        None            ED Provider  Electronically Signed by             Alessandro Brennan DO  01/26/24 7413

## 2024-01-30 ENCOUNTER — OFFICE VISIT (OUTPATIENT)
Dept: URGENT CARE | Facility: MEDICAL CENTER | Age: 44
End: 2024-01-30
Payer: COMMERCIAL

## 2024-01-30 VITALS
SYSTOLIC BLOOD PRESSURE: 142 MMHG | DIASTOLIC BLOOD PRESSURE: 80 MMHG | WEIGHT: 169.2 LBS | HEART RATE: 112 BPM | OXYGEN SATURATION: 98 % | TEMPERATURE: 97.7 F | BODY MASS INDEX: 31.95 KG/M2 | RESPIRATION RATE: 18 BRPM | HEIGHT: 61 IN

## 2024-01-30 DIAGNOSIS — J01.10 ACUTE NON-RECURRENT FRONTAL SINUSITIS: Primary | ICD-10-CM

## 2024-01-30 PROCEDURE — S9088 SERVICES PROVIDED IN URGENT: HCPCS

## 2024-01-30 PROCEDURE — 99213 OFFICE O/P EST LOW 20 MIN: CPT

## 2024-01-30 RX ORDER — AZITHROMYCIN 250 MG/1
TABLET, FILM COATED ORAL
Qty: 6 TABLET | Refills: 0 | Status: SHIPPED | OUTPATIENT
Start: 2024-01-30 | End: 2024-02-03

## 2024-01-30 NOTE — PROGRESS NOTES
[facility]        NAME: Park Marte is a 43 y.o. female  : 1980    MRN: [id number]  DATE: 2024  TIME: 8:49 AM    Assessment and Plan   Acute non-recurrent frontal sinusitis [J01.10]  1. Acute non-recurrent frontal sinusitis  azithromycin (ZITHROMAX) 250 mg tablet            Patient Instructions     Take antibiotic as prescribed  Can take OTC decongestant as needed  Plenty of fluids  Can use honey   Cool mist humidifier   Warm gargle with salt water for sore throat   Use Tylenol/ibuprofen as needed for fever or pain    Follow up with PCP in 3-5 days.  Proceed to  ER if symptoms worsen.    Chief Complaint     Chief Complaint   Patient presents with    Cold Like Symptoms     Sinus congestion and pressure x 4 days     Ear Fullness     Ear fullness to right ear          History of Present Illness       Ear Fullness   There is pain in the right ear. This is a new problem. Episode onset: 4 days. There has been no fever. Pertinent negatives include no coughing, ear discharge, rhinorrhea or sore throat.       Review of Systems   Review of Systems   Constitutional:  Positive for diaphoresis. Negative for chills, fatigue and fever.   HENT:  Positive for congestion, ear pain (ear fullness of R ear) and sinus pressure. Negative for ear discharge, postnasal drip, rhinorrhea, sore throat and trouble swallowing.    Respiratory:  Negative for cough, chest tightness, shortness of breath and wheezing.    Cardiovascular:  Negative for chest pain and palpitations.   Skin:  Negative for color change.   Neurological:  Negative for dizziness and light-headedness.   Psychiatric/Behavioral:  Negative for sleep disturbance.          Current Medications       Current Outpatient Medications:     albuterol (PROVENTIL HFA,VENTOLIN HFA) 90 mcg/act inhaler, , Disp: , Rfl:     Ascorbic Acid (vitamin C) 1000 MG tablet, Take 1 tablet (1,000 mg total) by mouth daily for 7 days, Disp: 7 tablet, Rfl: 0    azithromycin  (ZITHROMAX) 250 mg tablet, Take 2 tablets today then 1 tablet daily x 4 days, Disp: 6 tablet, Rfl: 0    benzonatate (TESSALON) 200 MG capsule, Take 1 capsule (200 mg total) by mouth 3 (three) times a day as needed for cough, Disp: 20 capsule, Rfl: 0    buPROPion (WELLBUTRIN SR) 100 mg 12 hr tablet, TAKE ONE TABLET BY MOUTH 2 TIMES A DAY, Disp: 60 tablet, Rfl: 0    celecoxib (CeleBREX) 200 mg capsule, TAKE ONE CAPSULE BY MOUTH EVERY DAY AS NEEDED, Disp: 30 capsule, Rfl: 0    cholecalciferol (VITAMIN D3) 1,000 units tablet, Take 2 tablets (2,000 Units total) by mouth daily for 7 days, Disp: 14 tablet, Rfl: 0    famotidine (PEPCID) 40 MG tablet, take 1 tablet by mouth at bedtime 2 HOURS BEFORE BED, Disp: 30 tablet, Rfl: 3    fluticasone (FLONASE) 50 mcg/act nasal spray, , Disp: , Rfl:     ibuprofen (MOTRIN) 600 mg tablet, Take 1 tablet (600 mg total) by mouth every 6 (six) hours as needed for moderate pain, Disp: 30 tablet, Rfl: 0    Levonorgestrel (Liletta, 52 MG,) 19.5 MCG/DAY IUD IUD, 1 each by Intrauterine route once, Disp: , Rfl:     loratadine (CLARITIN) 10 mg tablet, Take 1 tablet (10 mg total) by mouth daily, Disp: 30 tablet, Rfl: 3    methocarbamol (ROBAXIN) 500 mg tablet, Take 1 tablet (500 mg total) by mouth 2 (two) times a day as needed for muscle spasms, Disp: 20 tablet, Rfl: 0    pantoprazole (PROTONIX) 40 mg tablet, Take 1 tablet (40 mg total) by mouth daily, Disp: 30 tablet, Rfl: 3    fluconazole (DIFLUCAN) 150 mg tablet, , Disp: , Rfl:     mupirocin (BACTROBAN) 2 % ointment, Apply topically 3 (three) times a day for 7 days Apply three times daily specifically to area of swelling on the right breast for the next 7 days., Disp: 15 g, Rfl: 0    nicotine (NICODERM CQ) 14 mg/24hr TD 24 hr patch, Place 1 patch on the skin every 24 hours (Patient not taking: Reported on 1/30/2024), Disp: , Rfl:     nicotine (Nicotrol) 10 MG inhaler, Inhale 1 puff (Patient not taking: Reported on 1/30/2024), Disp: , Rfl:      nicotine polacrilex (NICORETTE) 4 mg gum, Chew 4 mg, Disp: , Rfl:     Wegovy 1 MG/0.5ML, Inject 1 mg under the skin Once a week (Patient not taking: Reported on 2024), Disp: , Rfl:   No current facility-administered medications for this visit.    Facility-Administered Medications Ordered in Other Visits:     Lidocaine Viscous HCl (XYLOCAINE) 2 % mucosal solution, , , Code/Trauma/Sedation Med, Santiago Glasgow DO, 15 mL at 05/10/21 1109    Current Allergies     Allergies as of 2024 - Reviewed 2024   Allergen Reaction Noted    No known allergies Allergic Rhinitis 2016            The following portions of the patient's history were reviewed and updated as appropriate: allergies, current medications, past family history, past medical history, past social history, past surgical history and problem list.     Past Medical History:   Diagnosis Date    Allergic     Asthma     Cervical disc disorder with radiculopathy of mid-cervical region 2020    Cervical radiculopathy 2020    GERD (gastroesophageal reflux disease)     Herniated disc, cervical     Pinched nerve in shoulder, right     Thyroid nodule        Past Surgical History:   Procedure Laterality Date     SECTION      EPIDURAL BLOCK INJECTION N/A 3/4/2020    Procedure: C7-T1 Interlaminar Epidural Steroid Injection (77847);  Surgeon: Ana Morrell MD;  Location: [facility];  Service: Pain Management     EPIDURAL BLOCK INJECTION N/A 2020    Procedure: C7-T1 interlaminar epidural steroid injection;  Surgeon: Ana Morrell MD;  Location: [facility];  Service: Pain Management     FL GUIDED NEEDLE PLAC BX/ASP/INJ  3/4/2020    FL GUIDED NEEDLE PLAC BX/ASP/INJ  2020       Family History   Problem Relation Age of Onset    Diabetes Mother     Heart disease Mother     Bone cancer Father     Diabetes Brother     No Known Problems Daughter     No Known Problems Maternal Grandmother     No Known Problems Paternal  "Grandmother     No Known Problems Maternal Aunt     No Known Problems Paternal Aunt     No Known Problems Paternal Aunt     Breast cancer Cousin     Colon cancer Neg Hx     Ovarian cancer Neg Hx          Medications have been verified.        Objective   /80   Pulse (!) 112   Temp 97.7 °F (36.5 °C) (Temporal)   Resp 18   Ht 5' 1\" (1.549 m)   Wt 76.7 kg (169 lb 3.2 oz)   SpO2 98%   BMI 31.97 kg/m²        Physical Exam     Physical Exam  Constitutional:       General: She is not in acute distress.     Appearance: Normal appearance. She is not ill-appearing.   HENT:      Head: Normocephalic.      Right Ear: External ear normal. Tympanic membrane is erythematous.      Left Ear: Tympanic membrane and external ear normal.      Nose: No congestion.      Right Sinus: Maxillary sinus tenderness and frontal sinus tenderness present.      Left Sinus: Maxillary sinus tenderness and frontal sinus tenderness present.      Mouth/Throat:      Mouth: Mucous membranes are moist.      Pharynx: Oropharyngeal exudate and posterior oropharyngeal erythema present.   Cardiovascular:      Rate and Rhythm: Normal rate and regular rhythm.      Pulses: Normal pulses.      Heart sounds: Normal heart sounds.   Pulmonary:      Effort: Pulmonary effort is normal. No respiratory distress.      Breath sounds: Normal breath sounds. No stridor. No wheezing, rhonchi or rales.   Lymphadenopathy:      Cervical: No cervical adenopathy.   Skin:     General: Skin is warm and dry.   Neurological:      General: No focal deficit present.      Mental Status: She is alert and oriented to person, place, and time. Mental status is at baseline.   Psychiatric:         Mood and Affect: Mood normal.         Behavior: Behavior normal.         Thought Content: Thought content normal.         Judgment: Judgment normal.                   "

## 2024-01-30 NOTE — PATIENT INSTRUCTIONS
Take antibiotic as prescribed  Can take OTC decongestant as needed  Plenty of fluids  Can use honey   Cool mist humidifier   Warm gargle with salt water for sore throat   Use Tylenol/ibuprofen as needed for fever or pain    Follow up with PCP in 3-5 days.  Proceed to  ER if symptoms worsen.

## 2024-01-31 NOTE — PROGRESS NOTES
[facility] [address], [place], [place]    ASSESSMENT/PLAN: Park Marte is a 43 y.o.  who presents for annual gynecologic exam.    Encounter for routine gynecologic examination  - Routine well woman exam completed today.  - Cervical Cancer Screening: Current ASCCP Guidelines reviewed. Last Pap: 2022 normal. Next Pap Due:   - HPV Vaccination status: Not immunized  - STI screening offered including HIV: not indicated based on hx or requested at time of visit  - Contraceptive counseling discussed.  Current contraception: Liletta IDU- 3/2022   - Breast Cancer Screening: Last Mammogram 2023, script given  - RTO 1 yr    Additional problems addressed at this visit:  1. Encntr for gyn exam (general) (routine) w/o abn findings  -     Mammo screening bilateral w 3d & cad; Future; Expected date: 2024    2. IUD check up    3. Encounter for screening mammogram for malignant neoplasm of breast  -     Mammo screening bilateral w 3d & cad; Future; Expected date: 2024        CC:  Annual Gynecologic Examination    HPI: Park Marte is a 43 y.o.  who presents for annual gynecologic examination.  Park presents for gyn exam today. N/A LMP. Menses is absent since IUD insertion. Using IUD- Liletta as birth control method. Happy with method. Last pap smear 2022. Hx of abnormal pap smear-no. Sexually active- yes. With partner 10 yrs. Does not desire STI testing.  3/2023 mammogram- normal.   6-7 hrs sleep per day. minimal servings of calcium rich food per day, will take a multivitamin and start 2000 vitamin. No routine exercise per week- active daily. 1-2 servings of caffeine per day. Does not perform SBE monthly. Safe at home- yes. Wears seatbelt - yes. Concerns: questionable pimple vaginal labia         The following portions of the patient's history were reviewed and updated as appropriate: allergies, current medications, past family history, past medical  "history, obstetric history, gynecologic history, past social history, past surgical history and problem list.    Review of Systems   Constitutional:  Negative for chills, fatigue and fever.   Respiratory:  Negative for cough and shortness of breath.    Cardiovascular:  Negative for chest pain, palpitations and leg swelling.   Gastrointestinal:  Negative for constipation and diarrhea.   Genitourinary:  Negative for difficulty urinating, dysuria, frequency, menstrual problem, pelvic pain, urgency, vaginal bleeding, vaginal discharge and vaginal pain.   Neurological:  Negative for light-headedness and headaches.   Psychiatric/Behavioral:  The patient is not nervous/anxious.          Objective:  /62   Ht 5' 1\" (1.549 m)   Wt 78 kg (171 lb 14.4 oz)   LMP  (LMP Unknown)   BMI 32.48 kg/m²    Physical Exam  Vitals reviewed.   Constitutional:       Appearance: Normal appearance.   HENT:      Head: Normocephalic.   Neck:      Thyroid: No thyroid mass or thyroid tenderness.   Cardiovascular:      Rate and Rhythm: Normal rate and regular rhythm.      Heart sounds: Normal heart sounds.   Pulmonary:      Effort: Pulmonary effort is normal.      Breath sounds: Normal breath sounds.   Chest:   Breasts:     Right: No mass, nipple discharge, skin change or tenderness.      Left: No mass, nipple discharge, skin change or tenderness.   Abdominal:      General: There is no distension.      Palpations: There is no mass.      Tenderness: There is no abdominal tenderness. There is no guarding.   Genitourinary:     General: Normal vulva.      Exam position: Lithotomy position.      Labia:         Right: No tenderness or lesion.         Left: No tenderness or lesion.       Vagina: No vaginal discharge, tenderness, bleeding or lesions.      Cervix: No cervical motion tenderness, discharge, friability, lesion, erythema or cervical bleeding.      Uterus: Normal. Not enlarged and not tender.       Adnexa:         Right: No mass, " tenderness or fullness.          Left: No mass, tenderness or fullness.        Comments: Visible IUD string on exam  Musculoskeletal:      Cervical back: Normal range of motion.   Lymphadenopathy:      Upper Body:      Right upper body: No axillary adenopathy.      Left upper body: No axillary adenopathy.   Skin:     General: Skin is warm and dry.   Neurological:      Mental Status: She is alert.   Psychiatric:         Mood and Affect: Mood normal.         Behavior: Behavior normal.         Judgment: Judgment normal.             Amalia Nino CNM  [facility]  02/01/24 11:26 AM

## 2024-02-01 ENCOUNTER — ANNUAL EXAM (OUTPATIENT)
Dept: OBGYN CLINIC | Facility: MEDICAL CENTER | Age: 44
End: 2024-02-01
Payer: COMMERCIAL

## 2024-02-01 VITALS
HEIGHT: 61 IN | DIASTOLIC BLOOD PRESSURE: 62 MMHG | BODY MASS INDEX: 32.45 KG/M2 | SYSTOLIC BLOOD PRESSURE: 112 MMHG | WEIGHT: 171.9 LBS

## 2024-02-01 DIAGNOSIS — Z30.431 IUD CHECK UP: ICD-10-CM

## 2024-02-01 DIAGNOSIS — Z12.31 ENCOUNTER FOR SCREENING MAMMOGRAM FOR MALIGNANT NEOPLASM OF BREAST: ICD-10-CM

## 2024-02-01 DIAGNOSIS — Z01.419 ENCNTR FOR GYN EXAM (GENERAL) (ROUTINE) W/O ABN FINDINGS: Primary | ICD-10-CM

## 2024-02-01 PROCEDURE — 99396 PREV VISIT EST AGE 40-64: CPT | Performed by: ADVANCED PRACTICE MIDWIFE

## 2024-05-01 NOTE — PATIENT INSTRUCTIONS
Prednisone as prescribed  Do not take NSAIDs while taking prednisone  You may take tylenol as needed for pain  Over the counter lidocaine patches  Monitor area for any rashes  If a rash develops, see a healthcare provider ASAP  Do not take Follow up with PCP in 3-5 days  Proceed to  ER if symptoms worsen  Back Pain   WHAT YOU NEED TO KNOW:   Back pain is common  It can be caused by many conditions, such as arthritis or the breakdown of spinal discs  Your risk for back pain is increased by injuries, lack of activity, or repeated bending and twisting  You may feel sore or stiff on one or both sides of your back  The pain may spread to your buttocks or thighs  DISCHARGE INSTRUCTIONS:   Return to the emergency department if:   · You have pain, numbness, or weakness in one or both legs  · Your pain becomes so severe that you cannot walk  · You cannot control your urine or bowel movements  · You have severe back pain with chest pain  · You have severe back pain, nausea, and vomiting  · You have severe back pain that spreads to your side or genital area  Contact your healthcare provider if:   · You have back pain that does not get better with rest and pain medicine  · You have a fever  · You have pain that worsens when you are on your back or when you rest     · You have pain that worsens when you cough or sneeze  · You lose weight without trying  · You have questions or concerns about your condition or care  Medicines:   · NSAIDs  help decrease swelling and pain  This medicine is available with or without a doctor's order  NSAIDs can cause stomach bleeding or kidney problems in certain people  If you take blood thinner medicine, always ask your healthcare provider if NSAIDs are safe for you  Always read the medicine label and follow directions  · Acetaminophen  decreases pain and fever  It is available without a doctor's order  Ask how much to take and how often to take it  Detail Level: Zone Follow directions  Read the labels of all other medicines you are using to see if they also contain acetaminophen, or ask your doctor or pharmacist  Acetaminophen can cause liver damage if not taken correctly  Do not use more than 4 grams (4,000 milligrams) total of acetaminophen in one day  · Muscle relaxers  help decrease muscle spasms and back pain  · Prescription pain medicine  may be given  Ask your healthcare provider how to take this medicine safely  Some prescription pain medicines contain acetaminophen  Do not take other medicines that contain acetaminophen without talking to your healthcare provider  Too much acetaminophen may cause liver damage  Prescription pain medicine may cause constipation  Ask your healthcare provider how to prevent or treat constipation  · Take your medicine as directed  Contact your healthcare provider if you think your medicine is not helping or if you have side effects  Tell him or her if you are allergic to any medicine  Keep a list of the medicines, vitamins, and herbs you take  Include the amounts, and when and why you take them  Bring the list or the pill bottles to follow-up visits  Carry your medicine list with you in case of an emergency  How to manage your back pain:   · Apply ice  on your back for 15 to 20 minutes every hour or as directed  Use an ice pack, or put crushed ice in a plastic bag  Cover it with a towel before you apply it to your skin  Ice helps prevent tissue damage and decreases pain  · Apply heat  on your back for 20 to 30 minutes every 2 hours for as many days as directed  Heat helps decrease pain and muscle spasms  · Stay active  as much as you can without causing more pain  Bed rest could make your back pain worse  Avoid heavy lifting until your pain is gone  · Go to physical therapy as directed  A physical therapist can teach you exercises to help improve movement and strength, and to decrease pain      Follow up with your healthcare provider in 2 weeks, or as directed:  Write down your questions so you remember to ask them during your visits  © Copyright [address] Information is for End User's use only and may not be sold, redistributed or otherwise used for commercial purposes  All illustrations and images included in CareNotes® are the copyrighted property of A D A M , Inc  or [facility] Tanesha Lombardi   The above information is an  only  It is not intended as medical advice for individual conditions or treatments  Talk to your doctor, nurse or pharmacist before following any medical regimen to see if it is safe and effective for you

## 2024-05-06 ENCOUNTER — APPOINTMENT (OUTPATIENT)
Dept: RADIOLOGY | Facility: CLINIC | Age: 44
End: 2024-05-06
Payer: COMMERCIAL

## 2024-05-06 ENCOUNTER — OFFICE VISIT (OUTPATIENT)
Dept: URGENT CARE | Facility: CLINIC | Age: 44
End: 2024-05-06
Payer: COMMERCIAL

## 2024-05-06 VITALS
SYSTOLIC BLOOD PRESSURE: 136 MMHG | TEMPERATURE: 97.2 F | OXYGEN SATURATION: 98 % | HEART RATE: 66 BPM | DIASTOLIC BLOOD PRESSURE: 81 MMHG | RESPIRATION RATE: 18 BRPM

## 2024-05-06 DIAGNOSIS — M79.642 PAIN OF LEFT HAND: ICD-10-CM

## 2024-05-06 DIAGNOSIS — S63.92XA HAND SPRAIN, LEFT, INITIAL ENCOUNTER: Primary | ICD-10-CM

## 2024-05-06 PROCEDURE — 73130 X-RAY EXAM OF HAND: CPT

## 2024-05-06 PROCEDURE — 99213 OFFICE O/P EST LOW 20 MIN: CPT | Performed by: PHYSICIAN ASSISTANT

## 2024-05-06 PROCEDURE — 73110 X-RAY EXAM OF WRIST: CPT

## 2024-05-06 PROCEDURE — S9088 SERVICES PROVIDED IN URGENT: HCPCS | Performed by: PHYSICIAN ASSISTANT

## 2024-05-06 RX ORDER — IBUPROFEN 800 MG/1
800 TABLET ORAL EVERY 8 HOURS PRN
Qty: 30 TABLET | Refills: 0 | Status: SHIPPED | OUTPATIENT
Start: 2024-05-06

## 2024-05-06 NOTE — PROGRESS NOTES
Assessment/Plan    Pain of left hand [M79.642]  1. Pain of left hand  XR hand 3+ vw left    CANCELED: XR wrist 3+ vw right    CANCELED: XR wrist 3+ vw left            Subjective:     Patient ID: Park Marte is a 43 y.o. female.      Reason For Visit / Chief Complaint  Chief Complaint   Patient presents with    Hand Pain     Patient fell on Friday having hand and wrist pain Left          Patient is a 43 year-old female presenting with left hand and wrist pain that began after a fall on Friday. She fell onto an out stretched hand. The pain is located along the ulnar aspect of her hand. She also has some pain in the thenar eminence. The pain sometime radiates to her mid forearm. She took Tylenol, motrin, and a brace. The brace helped to decrease pain.     Hand Pain   The incident occurred 3 to 5 days ago. The injury mechanism was a fall. The pain is present in the left hand. Pertinent negatives include no chest pain.         Past Medical History:   Diagnosis Date    Allergic     Asthma     Cervical disc disorder with radiculopathy of mid-cervical region 2020    Cervical radiculopathy 2020    GERD (gastroesophageal reflux disease)     Herniated disc, cervical     Pinched nerve in shoulder, right     Thyroid nodule        Past Surgical History:   Procedure Laterality Date     SECTION      EPIDURAL BLOCK INJECTION N/A 3/4/2020    Procedure: C7-T1 Interlaminar Epidural Steroid Injection (49305);  Surgeon: Ana Morrell MD;  Location: [facility];  Service: Pain Management     EPIDURAL BLOCK INJECTION N/A 2020    Procedure: C7-T1 interlaminar epidural steroid injection;  Surgeon: Ana Morrell MD;  Location: [facility];  Service: Pain Management     FL GUIDED NEEDLE PLAC BX/ASP/INJ  3/4/2020    FL GUIDED NEEDLE PLAC BX/ASP/INJ  2020       Family History   Problem Relation Age of Onset    Diabetes Mother     Heart disease Mother     Bone cancer Father     Diabetes Brother     No  Known Problems Daughter     No Known Problems Maternal Grandmother     No Known Problems Paternal Grandmother     No Known Problems Maternal Aunt     No Known Problems Paternal Aunt     No Known Problems Paternal Aunt     Breast cancer Cousin     Colon cancer Neg Hx     Ovarian cancer Neg Hx        Review of Systems   Constitutional:  Negative for chills and fever.   HENT:  Negative for ear pain and sore throat.    Eyes:  Negative for pain and visual disturbance.   Respiratory:  Negative for cough and shortness of breath.    Cardiovascular:  Negative for chest pain and palpitations.   Gastrointestinal:  Negative for abdominal pain and vomiting.   Genitourinary:  Negative for dysuria and hematuria.   Musculoskeletal:  Positive for arthralgias. Negative for back pain.   Skin:  Negative for color change and rash.   Neurological:  Negative for seizures and syncope.   All other systems reviewed and are negative.      Objective:    /81   Pulse 66   Temp (!) 97.2 °F (36.2 °C)   Resp 18   SpO2 98%     Physical Exam  Constitutional:       General: She is not in acute distress.     Appearance: Normal appearance. She is not toxic-appearing.   HENT:      Head: Normocephalic and atraumatic.      Nose: Nose normal.      Mouth/Throat:      Mouth: Mucous membranes are moist.   Eyes:      Extraocular Movements: Extraocular movements intact.      Conjunctiva/sclera: Conjunctivae normal.      Pupils: Pupils are equal, round, and reactive to light.   Cardiovascular:      Rate and Rhythm: Normal rate and regular rhythm.      Pulses:           Radial pulses are 2+ on the left side.      Heart sounds: No murmur heard.     No friction rub. No gallop.   Pulmonary:      Effort: Pulmonary effort is normal.      Breath sounds: No wheezing, rhonchi or rales.   Musculoskeletal:         General: Tenderness present. No swelling or deformity. Normal range of motion.        Hands:       Cervical back: Normal range of motion and neck  supple.      Comments: Pain located on ulnar aspect of hand, radiates into the thenar eminence and mid forearm. No pain on dorsal side. Full range of motion- pain with extension of fingers. Left hand is neurovascularly intact.    Skin:     General: Skin is warm and dry.      Capillary Refill: Capillary refill takes less than 2 seconds.   Neurological:      General: No focal deficit present.      Mental Status: She is alert and oriented to person, place, and time.   Psychiatric:         Mood and Affect: Mood normal.         Behavior: Behavior normal.

## 2024-05-13 ENCOUNTER — APPOINTMENT (EMERGENCY)
Dept: CT IMAGING | Facility: HOSPITAL | Age: 44
End: 2024-05-13
Payer: COMMERCIAL

## 2024-05-13 ENCOUNTER — HOSPITAL ENCOUNTER (EMERGENCY)
Facility: HOSPITAL | Age: 44
Discharge: HOME | End: 2024-05-14
Attending: EMERGENCY MEDICINE
Payer: COMMERCIAL

## 2024-05-13 VITALS
HEART RATE: 92 BPM | SYSTOLIC BLOOD PRESSURE: 149 MMHG | OXYGEN SATURATION: 100 % | DIASTOLIC BLOOD PRESSURE: 92 MMHG | TEMPERATURE: 97.7 F | RESPIRATION RATE: 19 BRPM

## 2024-05-13 DIAGNOSIS — E87.6 HYPOKALEMIA: ICD-10-CM

## 2024-05-13 DIAGNOSIS — R10.32 LEFT LOWER QUADRANT ABDOMINAL PAIN: Primary | ICD-10-CM

## 2024-05-13 LAB
ALBUMIN SERPL BCP-MCNC: 4.4 G/DL (ref 3.5–5)
ALP SERPL-CCNC: 79 U/L (ref 34–104)
ALT SERPL W P-5'-P-CCNC: 9 U/L (ref 7–52)
ANION GAP SERPL CALCULATED.3IONS-SCNC: 10 MMOL/L (ref 4–13)
AST SERPL W P-5'-P-CCNC: 12 U/L (ref 13–39)
BACTERIA UR QL AUTO: ABNORMAL /HPF
BASOPHILS # BLD AUTO: 0.06 THOUSANDS/ÂΜL (ref 0–0.1)
BASOPHILS NFR BLD AUTO: 1 % (ref 0–1)
BILIRUB SERPL-MCNC: 0.38 MG/DL (ref 0.2–1)
BILIRUB UR QL STRIP: NEGATIVE
BUN SERPL-MCNC: 10 MG/DL (ref 5–25)
CALCIUM SERPL-MCNC: 9.7 MG/DL (ref 8.4–10.2)
CHLORIDE SERPL-SCNC: 103 MMOL/L (ref 96–108)
CLARITY UR: ABNORMAL
CO2 SERPL-SCNC: 27 MMOL/L (ref 21–32)
COLOR UR: YELLOW
CREAT SERPL-MCNC: 0.74 MG/DL (ref 0.6–1.3)
EOSINOPHIL # BLD AUTO: 0.33 THOUSAND/ÂΜL (ref 0–0.61)
EOSINOPHIL NFR BLD AUTO: 4 % (ref 0–6)
ERYTHROCYTE [DISTWIDTH] IN BLOOD BY AUTOMATED COUNT: 12.4 % (ref 11.6–15.1)
EXT PREGNANCY TEST URINE: NEGATIVE
EXT. CONTROL: NORMAL
GFR SERPL CREATININE-BSD FRML MDRD: 98 ML/MIN/1.73SQ M
GLUCOSE SERPL-MCNC: 107 MG/DL (ref 65–140)
GLUCOSE UR STRIP-MCNC: NEGATIVE MG/DL
HCT VFR BLD AUTO: 41.4 % (ref 34.8–46.1)
HGB BLD-MCNC: 13.8 G/DL (ref 11.5–15.4)
HGB UR QL STRIP.AUTO: ABNORMAL
IMM GRANULOCYTES # BLD AUTO: 0.01 THOUSAND/UL (ref 0–0.2)
IMM GRANULOCYTES NFR BLD AUTO: 0 % (ref 0–2)
KETONES UR STRIP-MCNC: NEGATIVE MG/DL
LEUKOCYTE ESTERASE UR QL STRIP: ABNORMAL
LIPASE SERPL-CCNC: 42 U/L (ref 11–82)
LYMPHOCYTES # BLD AUTO: 2.64 THOUSANDS/ÂΜL (ref 0.6–4.47)
LYMPHOCYTES NFR BLD AUTO: 33 % (ref 14–44)
MCH RBC QN AUTO: 30.9 PG (ref 26.8–34.3)
MCHC RBC AUTO-ENTMCNC: 33.3 G/DL (ref 31.4–37.4)
MCV RBC AUTO: 93 FL (ref 82–98)
MONOCYTES # BLD AUTO: 0.55 THOUSAND/ÂΜL (ref 0.17–1.22)
MONOCYTES NFR BLD AUTO: 7 % (ref 4–12)
MUCOUS THREADS UR QL AUTO: ABNORMAL
NEUTROPHILS # BLD AUTO: 4.44 THOUSANDS/ÂΜL (ref 1.85–7.62)
NEUTS SEG NFR BLD AUTO: 55 % (ref 43–75)
NITRITE UR QL STRIP: NEGATIVE
NON-SQ EPI CELLS URNS QL MICRO: ABNORMAL /HPF
NRBC BLD AUTO-RTO: 0 /100 WBCS
PH UR STRIP.AUTO: 7 [PH]
PLATELET # BLD AUTO: 239 THOUSANDS/UL (ref 149–390)
PMV BLD AUTO: 11.7 FL (ref 8.9–12.7)
POTASSIUM SERPL-SCNC: 3.4 MMOL/L (ref 3.5–5.3)
PROT SERPL-MCNC: 7.5 G/DL (ref 6.4–8.4)
PROT UR STRIP-MCNC: NEGATIVE MG/DL
RBC # BLD AUTO: 4.47 MILLION/UL (ref 3.81–5.12)
RBC #/AREA URNS AUTO: ABNORMAL /HPF
SODIUM SERPL-SCNC: 140 MMOL/L (ref 135–147)
SP GR UR STRIP.AUTO: 1.02 (ref 1–1.03)
UROBILINOGEN UR STRIP-ACNC: <2 MG/DL
WBC # BLD AUTO: 8.03 THOUSAND/UL (ref 4.31–10.16)
WBC #/AREA URNS AUTO: ABNORMAL /HPF

## 2024-05-13 PROCEDURE — 74177 CT ABD & PELVIS W/CONTRAST: CPT

## 2024-05-13 PROCEDURE — 80053 COMPREHEN METABOLIC PANEL: CPT | Performed by: EMERGENCY MEDICINE

## 2024-05-13 PROCEDURE — 96374 THER/PROPH/DIAG INJ IV PUSH: CPT

## 2024-05-13 PROCEDURE — 99284 EMERGENCY DEPT VISIT MOD MDM: CPT

## 2024-05-13 PROCEDURE — 83690 ASSAY OF LIPASE: CPT | Performed by: EMERGENCY MEDICINE

## 2024-05-13 PROCEDURE — 36415 COLL VENOUS BLD VENIPUNCTURE: CPT | Performed by: EMERGENCY MEDICINE

## 2024-05-13 PROCEDURE — 81001 URINALYSIS AUTO W/SCOPE: CPT | Performed by: EMERGENCY MEDICINE

## 2024-05-13 PROCEDURE — 85025 COMPLETE CBC W/AUTO DIFF WBC: CPT | Performed by: EMERGENCY MEDICINE

## 2024-05-13 PROCEDURE — 99285 EMERGENCY DEPT VISIT HI MDM: CPT | Performed by: EMERGENCY MEDICINE

## 2024-05-13 PROCEDURE — 81025 URINE PREGNANCY TEST: CPT | Performed by: EMERGENCY MEDICINE

## 2024-05-13 RX ORDER — KETOROLAC TROMETHAMINE 30 MG/ML
15 INJECTION, SOLUTION INTRAMUSCULAR; INTRAVENOUS ONCE
Status: COMPLETED | OUTPATIENT
Start: 2024-05-13 | End: 2024-05-13

## 2024-05-13 RX ADMIN — IOHEXOL 100 ML: 350 INJECTION, SOLUTION INTRAVENOUS at 23:36

## 2024-05-13 RX ADMIN — KETOROLAC TROMETHAMINE 15 MG: 30 INJECTION, SOLUTION INTRAMUSCULAR at 22:55

## 2024-05-14 RX ORDER — POTASSIUM CHLORIDE 20 MEQ/1
40 TABLET, EXTENDED RELEASE ORAL ONCE
Status: COMPLETED | OUTPATIENT
Start: 2024-05-14 | End: 2024-05-14

## 2024-05-14 RX ORDER — DICYCLOMINE HCL 20 MG
20 TABLET ORAL 2 TIMES DAILY
Qty: 20 TABLET | Refills: 0 | Status: SHIPPED | OUTPATIENT
Start: 2024-05-14

## 2024-05-14 RX ADMIN — POTASSIUM CHLORIDE 40 MEQ: 1500 TABLET, EXTENDED RELEASE ORAL at 00:51

## 2024-05-14 NOTE — ED PROVIDER NOTES
History  Chief Complaint   Patient presents with    Abdominal Pain     LLQ pain and flank pain since yesterday. Taking mylanta with no help. Deneis N+V+D, melena stools. No Hx of diverticulitis/losis. Abdominal Surgery history limited to C Section. Denies fevres at home. Denies urinary pain, burning, frequency urgency     43 y.o. female who presents for LLQ abdominal pain. This began 1 ago. no nausea, no vomiting, no diarrhea, no bloody bowel movements. No radiation to left flank, no dysuria/frequency, no chest pain. no fevers/chills. ROS otherwise negative.         Prior to Admission Medications   Prescriptions Last Dose Informant Patient Reported? Taking?   Ascorbic Acid (vitamin C) 1000 MG tablet   No No   Sig: Take 1 tablet (1,000 mg total) by mouth daily for 7 days   Levonorgestrel (Liletta, 52 MG,) 19.5 MCG/DAY IUD IUD  Self Yes No   Si each by Intrauterine route once   Wegovy 1 MG/0.5ML   Yes No   Sig: Inject 1 mg under the skin Once a week   Patient not taking: Reported on 2024   albuterol (PROVENTIL HFA,VENTOLIN HFA) 90 mcg/act inhaler   Yes No   benzonatate (TESSALON) 200 MG capsule  Self No No   Sig: Take 1 capsule (200 mg total) by mouth 3 (three) times a day as needed for cough   buPROPion (WELLBUTRIN SR) 100 mg 12 hr tablet   No No   Sig: TAKE ONE TABLET BY MOUTH 2 TIMES A DAY   celecoxib (CeleBREX) 200 mg capsule   No No   Sig: TAKE ONE CAPSULE BY MOUTH EVERY DAY AS NEEDED   cholecalciferol (VITAMIN D3) 1,000 units tablet   No No   Sig: Take 2 tablets (2,000 Units total) by mouth daily for 7 days   famotidine (PEPCID) 40 MG tablet  Self No No   Sig: take 1 tablet by mouth at bedtime 2 HOURS BEFORE BED   fluticasone (FLONASE) 50 mcg/act nasal spray   Yes No   ibuprofen (MOTRIN) 600 mg tablet   No No   Sig: Take 1 tablet (600 mg total) by mouth every 6 (six) hours as needed for moderate pain   ibuprofen (MOTRIN) 800 mg tablet   No No   Sig: Take 1 tablet (800 mg total) by mouth every 8 (eight)  hours as needed for mild pain   loratadine (CLARITIN) 10 mg tablet  Self No No   Sig: Take 1 tablet (10 mg total) by mouth daily   methocarbamol (ROBAXIN) 500 mg tablet  Self No No   Sig: Take 1 tablet (500 mg total) by mouth 2 (two) times a day as needed for muscle spasms   mupirocin (BACTROBAN) 2 % ointment  Self No No   Sig: Apply topically 3 (three) times a day for 7 days Apply three times daily specifically to area of swelling on the right breast for the next 7 days.   nicotine (NICODERM CQ) 14 mg/24hr TD 24 hr patch  Self Yes No   Sig: Place 1 patch on the skin every 24 hours   Patient not taking: Reported on 2024   nicotine (Nicotrol) 10 MG inhaler  Self Yes No   Sig: Inhale 1 puff   Patient not taking: Reported on 2024   nicotine polacrilex (NICORETTE) 4 mg gum  Self Yes No   Sig: Chew 4 mg   pantoprazole (PROTONIX) 40 mg tablet  Self No No   Sig: Take 1 tablet (40 mg total) by mouth daily      Facility-Administered Medications: None       Past Medical History:   Diagnosis Date    Allergic     Asthma     Cervical disc disorder with radiculopathy of mid-cervical region 2020    Cervical radiculopathy 2020    GERD (gastroesophageal reflux disease)     Herniated disc, cervical     Pinched nerve in shoulder, right     Thyroid nodule        Past Surgical History:   Procedure Laterality Date     SECTION      EPIDURAL BLOCK INJECTION N/A 3/4/2020    Procedure: C7-T1 Interlaminar Epidural Steroid Injection (63790);  Surgeon: Ana Morrell MD;  Location: [facility];  Service: Pain Management     EPIDURAL BLOCK INJECTION N/A 2020    Procedure: C7-T1 interlaminar epidural steroid injection;  Surgeon: Ana Morrell MD;  Location: [facility];  Service: Pain Management     FL GUIDED NEEDLE PLAC BX/ASP/INJ  3/4/2020    FL GUIDED NEEDLE PLAC BX/ASP/INJ  2020       Family History   Problem Relation Age of Onset    Diabetes Mother     Heart disease Mother     Bone cancer Father      Diabetes Brother     No Known Problems Daughter     No Known Problems Maternal Grandmother     No Known Problems Paternal Grandmother     No Known Problems Maternal Aunt     No Known Problems Paternal Aunt     No Known Problems Paternal Aunt     Breast cancer Cousin     Colon cancer Neg Hx     Ovarian cancer Neg Hx      I have reviewed and agree with the history as documented.    E-Cigarette/Vaping    E-Cigarette Use Never User      E-Cigarette/Vaping Substances    Nicotine No     THC No     CBD No     Flavoring No     Other No     Unknown No      Social History     Tobacco Use    Smoking status: Every Day     Current packs/day: 1.00     Types: Cigarettes    Smokeless tobacco: Never   Vaping Use    Vaping status: Never Used   Substance Use Topics    Alcohol use: No    Drug use: Not Currently     Types: Marijuana       Review of Systems   Constitutional:  Negative for chills and fever.   HENT:  Negative for congestion, rhinorrhea and sore throat.    Respiratory:  Negative for cough and shortness of breath.    Cardiovascular:  Negative for chest pain and palpitations.   Gastrointestinal:  Positive for abdominal pain. Negative for constipation, diarrhea, nausea and vomiting.   Genitourinary:  Negative for difficulty urinating and flank pain.   Musculoskeletal:  Negative for arthralgias.   Neurological:  Negative for dizziness, weakness, light-headedness and headaches.   Psychiatric/Behavioral:  Negative for agitation, behavioral problems and confusion.    All other systems reviewed and are negative.      Physical Exam  Physical Exam  Constitutional:       Appearance: She is well-developed.   HENT:      Head: Normocephalic and atraumatic.   Cardiovascular:      Rate and Rhythm: Normal rate and regular rhythm.      Heart sounds: Normal heart sounds. No murmur heard.     No friction rub.   Pulmonary:      Effort: Pulmonary effort is normal. No respiratory distress.      Breath sounds: Normal breath sounds. No wheezing  or rales.   Abdominal:      General: Bowel sounds are normal. There is no distension.      Palpations: Abdomen is soft.      Tenderness: There is no abdominal tenderness. There is no right CVA tenderness or left CVA tenderness.      Comments: No abdominal tenderness, no CVA tenderness   Musculoskeletal:         General: Normal range of motion.      Cervical back: Normal range of motion and neck supple.   Skin:     General: Skin is warm.   Neurological:      Mental Status: She is alert and oriented to person, place, and time.      Coordination: Coordination normal.   Psychiatric:         Behavior: Behavior normal.         Thought Content: Thought content normal.         Judgment: Judgment normal.         Vital Signs  ED Triage Vitals   Temperature Pulse Respirations Blood Pressure SpO2   05/13/24 2234 05/13/24 2235 05/13/24 2234 05/13/24 2234 05/13/24 2234   97.7 °F (36.5 °C) 92 19 149/92 100 %      Temp Source Heart Rate Source Patient Position - Orthostatic VS BP Location FiO2 (%)   05/13/24 2234 -- 05/13/24 2234 05/13/24 2234 --   Temporal  Sitting Left arm       Pain Score       05/13/24 2234       4           Vitals:    05/13/24 2234 05/13/24 2235   BP: 149/92    Pulse:  92   Patient Position - Orthostatic VS: Sitting          Visual Acuity      ED Medications  Medications   ketorolac (TORADOL) injection 15 mg (15 mg Intravenous Given 5/13/24 2255)   iohexol (OMNIPAQUE) 350 MG/ML injection (MULTI-DOSE) 100 mL (100 mL Intravenous Given 5/13/24 2336)   potassium chloride (Klor-Con M20) CR tablet 40 mEq (40 mEq Oral Given 5/14/24 0051)       Diagnostic Studies  Results Reviewed       Procedure Component Value Units Date/Time    Urine Microscopic [[id number]]  (Abnormal) Collected: 05/13/24 2249    Lab Status: Final result Specimen: Urine, Clean Catch Updated: 05/13/24 2328     RBC, UA 0-5 /hpf      WBC, UA 0-5 /hpf      Epithelial Cells Moderate /hpf      Bacteria, UA Occasional /hpf      MUCUS THREADS Occasional     Comprehensive metabolic panel [[id number]]  (Abnormal) Collected: 05/13/24 2248    Lab Status: Final result Specimen: Blood from Arm, Left Updated: 05/13/24 2319     Sodium 140 mmol/L      Potassium 3.4 mmol/L      Chloride 103 mmol/L      CO2 27 mmol/L      ANION GAP 10 mmol/L      BUN 10 mg/dL      Creatinine 0.74 mg/dL      Glucose 107 mg/dL      Calcium 9.7 mg/dL      AST 12 U/L      ALT 9 U/L      Alkaline Phosphatase 79 U/L      Total Protein 7.5 g/dL      Albumin 4.4 g/dL      Total Bilirubin 0.38 mg/dL      eGFR 98 ml/min/1.73sq m     Narrative:      National Kid[employer] guidelines for Chronic Kidney Disease (CKD):     Stage 1 with normal or high GFR (GFR > 90 mL/min/1.73 square meters)    Stage 2 Mild CKD (GFR = 60-89 mL/min/1.73 square meters)    Stage 3A Moderate CKD (GFR = 45-59 mL/min/1.73 square meters)    Stage 3B Moderate CKD (GFR = 30-44 mL/min/1.73 square meters)    Stage 4 Severe CKD (GFR = 15-29 mL/min/1.73 square meters)    Stage 5 End Stage CKD (GFR <15 mL/min/1.73 square meters)  Note: GFR calculation is accurate only with a steady state creatinine    Lipase [[id number]]  (Normal) Collected: 05/13/24 2248    Lab Status: Final result Specimen: Blood from Arm, Left Updated: 05/13/24 2319     Lipase 42 u/L     UA w Reflex to Microscopic w Reflex to Culture [[id number]]  (Abnormal) Collected: 05/13/24 2249    Lab Status: Final result Specimen: Urine, Clean Catch Updated: 05/13/24 2308     Color, UA Yellow     Clarity, UA Hazy     Specific Gravity, UA 1.020     pH, UA 7.0     Leukocytes, UA Trace     Nitrite, UA Negative     Protein, UA Negative mg/dl      Glucose, UA Negative mg/dl      Ketones, UA Negative mg/dl      Urobilinogen, UA <2.0 mg/dl      Bilirubin, UA Negative     Occult Blood, UA Small    CBC and differential [[id number]] Collected: 05/13/24 2248    Lab Status: Final result Specimen: Blood from Arm, Left Updated: 05/13/24 2257     WBC 8.03 Thousand/uL      RBC 4.47  Million/uL      Hemoglobin 13.8 g/dL      Hematocrit 41.4 %      MCV 93 fL      MCH 30.9 pg      MCHC 33.3 g/dL      RDW 12.4 %      MPV 11.7 fL      Platelets 239 Thousands/uL      nRBC 0 /100 WBCs      Segmented % 55 %      Immature Grans % 0 %      Lymphocytes % 33 %      Monocytes % 7 %      Eosinophils Relative 4 %      Basophils Relative 1 %      Absolute Neutrophils 4.44 Thousands/µL      Absolute Immature Grans 0.01 Thousand/uL      Absolute Lymphocytes 2.64 Thousands/µL      Absolute Monocytes 0.55 Thousand/µL      Eosinophils Absolute 0.33 Thousand/µL      Basophils Absolute 0.06 Thousands/µL     POCT pregnancy, urine [[id number]]  (Normal) Resulted: 05/13/24 2251    Lab Status: Final result Updated: 05/13/24 2251     EXT Preg Test, Ur Negative     Control Valid                   CT abdomen pelvis with contrast   Final Result by Ronan Zayas MD (05/14 0031)      No acute findings in the abdomen or pelvis.      No hydronephrosis.      Intrauterine device which appears to be in appropriate position.      Small fat-containing umbilical hernia.         Workstation performed: [id number]                    Procedures  Procedures         ED Course  ED Course as of 05/14/24 2138   Tue May 14, 2024   0045 Potassium(!): 3.4  Hypokalemia, will replete   0046 On reassessment, she reports significant improvement in pain. Feels comfortable with plan for discharge after explaining CT findings, labs.                               SBIRT 20yo+      Flowsheet Row Most Recent Value   Initial Alcohol Screen: US AUDIT-C     1. How often do you have a drink containing alcohol? 0 Filed at: 05/13/2024 2235   Audit-C Score 0 Filed at: 05/13/2024 2235                      Medical Decision Making  I reviewed the patient's medical chart, PMHx, prior encounters, medications.    My DDx includes: pancreatitis, diverticulitis, colitis, kidney stone.At risk for UTI, pyelonephritis.    Will obtain GI labs including CBC, CMP to  evaluate electrolytes and kidney function, lipase to evaluate pancreas. Will check UA. Will treat supportively, reassess.     Patient's labs are unremarkable for acute pathology. Patient's UA is not consistent with infection and she denies urinary complaints.    CT scan negative for acute pathology.    Will discharge with strict return precautions, recommend follow up as an outpatient with PCP. Will write script for bentyl.    Amount and/or Complexity of Data Reviewed  Labs: ordered. Decision-making details documented in ED Course.  Radiology: ordered.    Risk  Prescription drug management.             Disposition  Final diagnoses:   Left lower quadrant abdominal pain   Hypokalemia     Time reflects when diagnosis was documented in both MDM as applicable and the Disposition within this note       Time User Action Codes Description Comment    5/14/2024 12:42 AM Rizwan Khalil [R10.32] Left lower quadrant abdominal pain     5/14/2024 12:46 AM Rizwan Khalil [E87.6] Hypokalemia           ED Disposition       ED Disposition   Discharge    Condition   Stable    Date/Time   Tue May 14, 2024 0042    Comment   [place] discharge to home/self care.                   Follow-up Information       Follow up With Specialties Details Why Contact Info Additional Information    [facility] Call  For re-evaluation [address] [address] [phone] [facility] [phone]            Discharge Medication List as of 5/14/2024 12:44 AM        START taking these medications    Details   dicyclomine (BENTYL) 20 mg tablet Take 1 tablet (20 mg total) by mouth 2 (two) times a day, Starting Tue 5/14/2024, Normal           CONTINUE these medications which have NOT CHANGED    Details   albuterol (PROVENTIL HFA,VENTOLIN HFA) 90 mcg/act inhaler Historical Med      Ascorbic Acid (vitamin C) 1000 MG tablet Take 1 tablet (1,000 mg  total) by mouth daily for 7 days, Starting Tue 1/23/2024, Until Thu 2/1/2024, Normal      benzonatate (TESSALON) 200 MG capsule Take 1 capsule (200 mg total) by mouth 3 (three) times a day as needed for cough, Starting Thu 10/12/2023, Normal      buPROPion (WELLBUTRIN SR) 100 mg 12 hr tablet TAKE ONE TABLET BY MOUTH 2 TIMES A DAY, Starting Tue 12/5/2023, Normal      celecoxib (CeleBREX) 200 mg capsule TAKE ONE CAPSULE BY MOUTH EVERY DAY AS NEEDED, Starting Tue 12/5/2023, Normal      cholecalciferol (VITAMIN D3) 1,000 units tablet Take 2 tablets (2,000 Units total) by mouth daily for 7 days, Starting Tue 1/23/2024, Until Thu 2/1/2024, Normal      famotidine (PEPCID) 40 MG tablet take 1 tablet by mouth at bedtime 2 HOURS BEFORE BED, Normal      fluticasone (FLONASE) 50 mcg/act nasal spray Historical Med      !! ibuprofen (MOTRIN) 600 mg tablet Take 1 tablet (600 mg total) by mouth every 6 (six) hours as needed for moderate pain, Starting Tue 1/23/2024, Normal      !! ibuprofen (MOTRIN) 800 mg tablet Take 1 tablet (800 mg total) by mouth every 8 (eight) hours as needed for mild pain, Starting Mon 5/6/2024, Normal      Levonorgestrel (Liletta, 52 MG,) 19.5 MCG/DAY IUD IUD 1 each by Intrauterine route once, Starting Thu 3/10/2022, Historical Med      loratadine (CLARITIN) 10 mg tablet Take 1 tablet (10 mg total) by mouth daily, Starting Wed 8/9/2023, Normal      methocarbamol (ROBAXIN) 500 mg tablet Take 1 tablet (500 mg total) by mouth 2 (two) times a day as needed for muscle spasms, Starting Wed 8/9/2023, Normal      mupirocin (BACTROBAN) 2 % ointment Apply topically 3 (three) times a day for 7 days Apply three times daily specifically to area of swelling on the right breast for the next 7 days., Starting Sun 1/15/2023, Until Thu 2/1/2024, Normal      nicotine (NICODERM CQ) 14 mg/24hr TD 24 hr patch Place 1 patch on the skin every 24 hours, Starting Wed 10/4/2023, Until Mon 4/1/2024, Historical Med      nicotine  (Nicotrol) 10 MG inhaler Inhale 1 puff, Starting Wed 10/4/2023, Historical Med      nicotine polacrilex (NICORETTE) 4 mg gum Chew 4 mg, Starting Mon 10/9/2023, Until Wed 11/8/2023 at 2359, Historical Med      pantoprazole (PROTONIX) 40 mg tablet Take 1 tablet (40 mg total) by mouth daily, Starting Wed 8/9/2023, Normal      Wegovy 1 MG/0.5ML Inject 1 mg under the skin Once a week, Starting Wed 1/10/2024, Historical Med       !! - Potential duplicate medications found. Please discuss with provider.          No discharge procedures on file.    PDMP Review       None            ED Provider  Electronically Signed by             Rizwan Khalil MD  05/14/24 3279

## 2024-08-16 ENCOUNTER — APPOINTMENT (EMERGENCY)
Dept: RADIOLOGY | Facility: HOSPITAL | Age: 44
End: 2024-08-16
Payer: COMMERCIAL

## 2024-08-16 ENCOUNTER — HOSPITAL ENCOUNTER (EMERGENCY)
Facility: HOSPITAL | Age: 44
Discharge: HOME | End: 2024-08-16
Attending: EMERGENCY MEDICINE
Payer: COMMERCIAL

## 2024-08-16 ENCOUNTER — APPOINTMENT (EMERGENCY)
Dept: CT IMAGING | Facility: HOSPITAL | Age: 44
End: 2024-08-16
Payer: COMMERCIAL

## 2024-08-16 VITALS
TEMPERATURE: 97.9 F | OXYGEN SATURATION: 99 % | DIASTOLIC BLOOD PRESSURE: 90 MMHG | BODY MASS INDEX: 32.31 KG/M2 | HEART RATE: 93 BPM | SYSTOLIC BLOOD PRESSURE: 159 MMHG | RESPIRATION RATE: 19 BRPM | WEIGHT: 171 LBS

## 2024-08-16 DIAGNOSIS — R07.9 CHEST PAIN: Primary | ICD-10-CM

## 2024-08-16 DIAGNOSIS — R06.02 SHORTNESS OF BREATH: ICD-10-CM

## 2024-08-16 LAB
ALBUMIN SERPL BCG-MCNC: 4.3 G/DL (ref 3.5–5)
ALP SERPL-CCNC: 77 U/L (ref 34–104)
ALT SERPL W P-5'-P-CCNC: 10 U/L (ref 7–52)
ANION GAP SERPL CALCULATED.3IONS-SCNC: 7 MMOL/L (ref 4–13)
AST SERPL W P-5'-P-CCNC: 13 U/L (ref 13–39)
ATRIAL RATE: 82 BPM
BASOPHILS # BLD AUTO: 0.05 THOUSANDS/ÂΜL (ref 0–0.1)
BASOPHILS NFR BLD AUTO: 1 % (ref 0–1)
BILIRUB SERPL-MCNC: 0.36 MG/DL (ref 0.2–1)
BNP SERPL-MCNC: 7 PG/ML (ref 0–100)
BUN SERPL-MCNC: 9 MG/DL (ref 5–25)
CALCIUM SERPL-MCNC: 9.1 MG/DL (ref 8.4–10.2)
CARDIAC TROPONIN I PNL SERPL HS: <2 NG/L
CHLORIDE SERPL-SCNC: 105 MMOL/L (ref 96–108)
CO2 SERPL-SCNC: 27 MMOL/L (ref 21–32)
CREAT SERPL-MCNC: 0.75 MG/DL (ref 0.6–1.3)
EOSINOPHIL # BLD AUTO: 0.19 THOUSAND/ÂΜL (ref 0–0.61)
EOSINOPHIL NFR BLD AUTO: 4 % (ref 0–6)
ERYTHROCYTE [DISTWIDTH] IN BLOOD BY AUTOMATED COUNT: 12.7 % (ref 11.6–15.1)
EXT PREGNANCY TEST URINE: NEGATIVE
EXT. CONTROL: NORMAL
GFR SERPL CREATININE-BSD FRML MDRD: 97 ML/MIN/1.73SQ M
GLUCOSE SERPL-MCNC: 119 MG/DL (ref 65–140)
HCT VFR BLD AUTO: 44.2 % (ref 34.8–46.1)
HGB BLD-MCNC: 14.2 G/DL (ref 11.5–15.4)
IMM GRANULOCYTES # BLD AUTO: 0 THOUSAND/UL (ref 0–0.2)
IMM GRANULOCYTES NFR BLD AUTO: 0 % (ref 0–2)
LYMPHOCYTES # BLD AUTO: 2.23 THOUSANDS/ÂΜL (ref 0.6–4.47)
LYMPHOCYTES NFR BLD AUTO: 41 % (ref 14–44)
MCH RBC QN AUTO: 30.5 PG (ref 26.8–34.3)
MCHC RBC AUTO-ENTMCNC: 32.1 G/DL (ref 31.4–37.4)
MCV RBC AUTO: 95 FL (ref 82–98)
MONOCYTES # BLD AUTO: 0.4 THOUSAND/ÂΜL (ref 0.17–1.22)
MONOCYTES NFR BLD AUTO: 7 % (ref 4–12)
NEUTROPHILS # BLD AUTO: 2.57 THOUSANDS/ÂΜL (ref 1.85–7.62)
NEUTS SEG NFR BLD AUTO: 47 % (ref 43–75)
NRBC BLD AUTO-RTO: 0 /100 WBCS
P AXIS: 40 DEGREES
PLATELET # BLD AUTO: 188 THOUSANDS/UL (ref 149–390)
PMV BLD AUTO: 11.5 FL (ref 8.9–12.7)
POTASSIUM SERPL-SCNC: 3.7 MMOL/L (ref 3.5–5.3)
PR INTERVAL: 158 MS
PROT SERPL-MCNC: 7.3 G/DL (ref 6.4–8.4)
QRS AXIS: 95 DEGREES
QRSD INTERVAL: 86 MS
QT INTERVAL: 388 MS
QTC INTERVAL: 453 MS
RBC # BLD AUTO: 4.66 MILLION/UL (ref 3.81–5.12)
SODIUM SERPL-SCNC: 139 MMOL/L (ref 135–147)
T WAVE AXIS: 10 DEGREES
VENTRICULAR RATE: 82 BPM
WBC # BLD AUTO: 5.44 THOUSAND/UL (ref 4.31–10.16)

## 2024-08-16 PROCEDURE — 93010 ELECTROCARDIOGRAM REPORT: CPT | Performed by: INTERNAL MEDICINE

## 2024-08-16 PROCEDURE — 71045 X-RAY EXAM CHEST 1 VIEW: CPT

## 2024-08-16 PROCEDURE — 99285 EMERGENCY DEPT VISIT HI MDM: CPT

## 2024-08-16 PROCEDURE — 84484 ASSAY OF TROPONIN QUANT: CPT | Performed by: EMERGENCY MEDICINE

## 2024-08-16 PROCEDURE — 71250 CT THORAX DX C-: CPT

## 2024-08-16 PROCEDURE — 81025 URINE PREGNANCY TEST: CPT | Performed by: EMERGENCY MEDICINE

## 2024-08-16 PROCEDURE — 85025 COMPLETE CBC W/AUTO DIFF WBC: CPT | Performed by: EMERGENCY MEDICINE

## 2024-08-16 PROCEDURE — 80053 COMPREHEN METABOLIC PANEL: CPT | Performed by: EMERGENCY MEDICINE

## 2024-08-16 PROCEDURE — 99285 EMERGENCY DEPT VISIT HI MDM: CPT | Performed by: EMERGENCY MEDICINE

## 2024-08-16 PROCEDURE — 93005 ELECTROCARDIOGRAM TRACING: CPT

## 2024-08-16 PROCEDURE — 83880 ASSAY OF NATRIURETIC PEPTIDE: CPT | Performed by: EMERGENCY MEDICINE

## 2024-08-16 PROCEDURE — 36415 COLL VENOUS BLD VENIPUNCTURE: CPT | Performed by: EMERGENCY MEDICINE

## 2024-08-16 NOTE — ED PROVIDER NOTES
History  Chief Complaint   Patient presents with    Medical Problem     Was on a amusement park ride. [place] and felt pop in chest. States had episodes of SOB on exertion. Denies CP     43-year-old female who presents for chest pain, shortness of breath.  Patient reports that yesterday while riding on an amusement park ride, she noticed a pop sensation in the middle of her chest.  She states that that sharp pain has resolved now, she has no pain currently.  However she has noticed occasional shortness of breath, particularly with ambulating up steps.  She denies any leg pain or swelling, no recent long distance travel, no estrogen-based birth control.  Denies any fevers or chills, no cough or congestion.  No nausea or vomiting.  He has no other complaints currently.  Review of systems otherwise negative.        Prior to Admission Medications   Prescriptions Last Dose Informant Patient Reported? Taking?   Ascorbic Acid (vitamin C) 1000 MG tablet   No No   Sig: Take 1 tablet (1,000 mg total) by mouth daily for 7 days   Levonorgestrel (Liletta, 52 MG,) 19.5 MCG/DAY IUD IUD  Self Yes No   Si each by Intrauterine route once   Wegovy 1 MG/0.5ML   Yes No   Sig: Inject 1 mg under the skin Once a week   Patient not taking: Reported on 2024   albuterol (PROVENTIL HFA,VENTOLIN HFA) 90 mcg/act inhaler   Yes No   benzonatate (TESSALON) 200 MG capsule  Self No No   Sig: Take 1 capsule (200 mg total) by mouth 3 (three) times a day as needed for cough   buPROPion (WELLBUTRIN SR) 100 mg 12 hr tablet   No No   Sig: TAKE ONE TABLET BY MOUTH 2 TIMES A DAY   celecoxib (CeleBREX) 200 mg capsule   No No   Sig: TAKE ONE CAPSULE BY MOUTH EVERY DAY AS NEEDED   cholecalciferol (VITAMIN D3) 1,000 units tablet   No No   Sig: Take 2 tablets (2,000 Units total) by mouth daily for 7 days   dicyclomine (BENTYL) 20 mg tablet   No No   Sig: Take 1 tablet (20 mg total) by mouth 2 (two) times a day   famotidine (PEPCID) 40 MG tablet  Self No No    Sig: take 1 tablet by mouth at bedtime 2 HOURS BEFORE BED   fluticasone (FLONASE) 50 mcg/act nasal spray   Yes No   ibuprofen (MOTRIN) 600 mg tablet   No No   Sig: Take 1 tablet (600 mg total) by mouth every 6 (six) hours as needed for moderate pain   ibuprofen (MOTRIN) 800 mg tablet   No No   Sig: Take 1 tablet (800 mg total) by mouth every 8 (eight) hours as needed for mild pain   loratadine (CLARITIN) 10 mg tablet  Self No No   Sig: Take 1 tablet (10 mg total) by mouth daily   methocarbamol (ROBAXIN) 500 mg tablet  Self No No   Sig: Take 1 tablet (500 mg total) by mouth 2 (two) times a day as needed for muscle spasms   mupirocin (BACTROBAN) 2 % ointment  Self No No   Sig: Apply topically 3 (three) times a day for 7 days Apply three times daily specifically to area of swelling on the right breast for the next 7 days.   nicotine (NICODERM CQ) 14 mg/24hr TD 24 hr patch  Self Yes No   Sig: Place 1 patch on the skin every 24 hours   Patient not taking: Reported on 2024   nicotine (Nicotrol) 10 MG inhaler  Self Yes No   Sig: Inhale 1 puff   Patient not taking: Reported on 2024   nicotine polacrilex (NICORETTE) 4 mg gum  Self Yes No   Sig: Chew 4 mg   pantoprazole (PROTONIX) 40 mg tablet  Self No No   Sig: Take 1 tablet (40 mg total) by mouth daily      Facility-Administered Medications: None       Past Medical History:   Diagnosis Date    Allergic     Asthma     Cervical disc disorder with radiculopathy of mid-cervical region 2020    Cervical radiculopathy 2020    GERD (gastroesophageal reflux disease)     Herniated disc, cervical     Pinched nerve in shoulder, right     Thyroid nodule        Past Surgical History:   Procedure Laterality Date     SECTION      EPIDURAL BLOCK INJECTION N/A 3/4/2020    Procedure: C7-T1 Interlaminar Epidural Steroid Injection (08186);  Surgeon: Ana Morrell MD;  Location: [facility];  Service: Pain Management     EPIDURAL BLOCK INJECTION N/A 2020     Procedure: C7-T1 interlaminar epidural steroid injection;  Surgeon: Ana Morrell MD;  Location: [facility];  Service: Pain Management     FL GUIDED NEEDLE PLAC BX/ASP/INJ  3/4/2020    FL GUIDED NEEDLE PLAC BX/ASP/INJ  6/26/2020       Family History   Problem Relation Age of Onset    Diabetes Mother     Heart disease Mother     Bone cancer Father     Diabetes Brother     No Known Problems Daughter     No Known Problems Maternal Grandmother     No Known Problems Paternal Grandmother     No Known Problems Maternal Aunt     No Known Problems Paternal Aunt     No Known Problems Paternal Aunt     Breast cancer Cousin     Colon cancer Neg Hx     Ovarian cancer Neg Hx      I have reviewed and agree with the history as documented.    E-Cigarette/Vaping    E-Cigarette Use Never User      E-Cigarette/Vaping Substances    Nicotine No     THC No     CBD No     Flavoring No     Other No     Unknown No      Social History     Tobacco Use    Smoking status: Every Day     Current packs/day: 1.00     Types: Cigarettes    Smokeless tobacco: Never   Vaping Use    Vaping status: Never Used   Substance Use Topics    Alcohol use: No    Drug use: Not Currently     Types: Marijuana       Review of Systems   Constitutional:  Negative for chills and fever.   HENT:  Negative for congestion, rhinorrhea and sore throat.    Respiratory:  Positive for shortness of breath. Negative for cough.    Cardiovascular:  Positive for chest pain. Negative for palpitations.   Gastrointestinal:  Negative for abdominal pain, constipation, diarrhea, nausea and vomiting.   Genitourinary:  Negative for difficulty urinating and flank pain.   Musculoskeletal:  Negative for arthralgias.   Neurological:  Negative for dizziness, weakness, light-headedness and headaches.   Psychiatric/Behavioral:  Negative for agitation, behavioral problems and confusion.    All other systems reviewed and are negative.      Physical Exam  Physical Exam  Constitutional:        Appearance: She is well-developed.   HENT:      Head: Normocephalic and atraumatic.   Cardiovascular:      Rate and Rhythm: Normal rate and regular rhythm.      Heart sounds: Normal heart sounds. No murmur heard.     No friction rub.   Pulmonary:      Effort: Pulmonary effort is normal. No respiratory distress.      Breath sounds: Normal breath sounds. No wheezing or rales.   Abdominal:      General: Bowel sounds are normal. There is no distension.      Palpations: Abdomen is soft.      Tenderness: There is no abdominal tenderness.   Musculoskeletal:         General: Normal range of motion.      Cervical back: Normal range of motion and neck supple.      Right lower leg: No edema.      Left lower leg: No edema.   Skin:     General: Skin is warm.   Neurological:      Mental Status: She is alert and oriented to person, place, and time.      Coordination: Coordination normal.   Psychiatric:         Behavior: Behavior normal.         Thought Content: Thought content normal.         Judgment: Judgment normal.         Vital Signs  ED Triage Vitals [08/16/24 0623]   Temperature Pulse Respirations Blood Pressure SpO2   97.9 °F (36.6 °C) 93 19 159/90 99 %      Temp Source Heart Rate Source Patient Position - Orthostatic VS BP Location FiO2 (%)   Temporal Monitor -- -- --      Pain Score       --           Vitals:    08/16/24 0623   BP: 159/90   Pulse: 93         Visual Acuity      ED Medications  Medications - No data to display    Diagnostic Studies  Results Reviewed       Procedure Component Value Units Date/Time    HS Troponin 0hr (reflex protocol) [[id number]]  (Normal) Collected: 08/16/24 0709    Lab Status: Final result Specimen: Blood from Arm, Right Updated: 08/16/24 0739     hs TnI 0hr <2 ng/L     B-Type Natriuretic Peptide(BNP) [[id number]]  (Normal) Collected: 08/16/24 0709    Lab Status: Final result Specimen: Blood from Arm, Right Updated: 08/16/24 0738     BNP 7 pg/mL     Comprehensive metabolic panel [[id number]]  Collected: 08/16/24 0709    Lab Status: Final result Specimen: Blood from Arm, Right Updated: 08/16/24 0732     Sodium 139 mmol/L      Potassium 3.7 mmol/L      Chloride 105 mmol/L      CO2 27 mmol/L      ANION GAP 7 mmol/L      BUN 9 mg/dL      Creatinine 0.75 mg/dL      Glucose 119 mg/dL      Calcium 9.1 mg/dL      AST 13 U/L      ALT 10 U/L      Alkaline Phosphatase 77 U/L      Total Protein 7.3 g/dL      Albumin 4.3 g/dL      Total Bilirubin 0.36 mg/dL      eGFR 97 ml/min/1.73sq m     Narrative:      National Kidney Disease Foundation guidelines for Chronic Kidney Disease (CKD):     Stage 1 with normal or high GFR (GFR > 90 mL/min/1.73 square meters)    Stage 2 Mild CKD (GFR = 60-89 mL/min/1.73 square meters)    Stage 3A Moderate CKD (GFR = 45-59 mL/min/1.73 square meters)    Stage 3B Moderate CKD (GFR = 30-44 mL/min/1.73 square meters)    Stage 4 Severe CKD (GFR = 15-29 mL/min/1.73 square meters)    Stage 5 End Stage CKD (GFR <15 mL/min/1.73 square meters)  Note: GFR calculation is accurate only with a steady state creatinine    POCT pregnancy, urine [[id number]]  (Normal) Resulted: 08/16/24 0727    Lab Status: Final result Updated: 08/16/24 0728     EXT Preg Test, Ur Negative     Control Valid    CBC and differential [[id number]] Collected: 08/16/24 0709    Lab Status: Final result Specimen: Blood from Arm, Right Updated: 08/16/24 0717     WBC 5.44 Thousand/uL      RBC 4.66 Million/uL      Hemoglobin 14.2 g/dL      Hematocrit 44.2 %      MCV 95 fL      MCH 30.5 pg      MCHC 32.1 g/dL      RDW 12.7 %      MPV 11.5 fL      Platelets 188 Thousands/uL      nRBC 0 /100 WBCs      Segmented % 47 %      Immature Grans % 0 %      Lymphocytes % 41 %      Monocytes % 7 %      Eosinophils Relative 4 %      Basophils Relative 1 %      Absolute Neutrophils 2.57 Thousands/µL      Absolute Immature Grans 0.00 Thousand/uL      Absolute Lymphocytes 2.23 Thousands/µL      Absolute Monocytes 0.40 Thousand/µL      Eosinophils  Absolute 0.19 Thousand/µL      Basophils Absolute 0.05 Thousands/µL                    CT chest without contrast   Final Result by Alessandro Madsen MD (08/16 0748)   1.  No acute thoracic injury.   2.  Two 2 mm pulmonary nodules nodules.  Based on current Fleischner Society 2017 Guidelines on incidental pulmonary nodule, no routine follow-up is needed if the patient is low risk. If the patient is high risk, optional follow-up chest CT at 12 months    can be considered.                     Workstation performed: [id number]         XR chest 1 view portable   ED Interpretation by Rizwan Khalil MD (08/16 0650)   No acute cardiopulmonary disease.      Final Result by Benjamin Luis MD (08/16 0709)      No acute cardiopulmonary disease.      Findings are stable      Workstation performed: [id number]                    Procedures  ECG 12 Lead Documentation Only    Date/Time: 8/16/2024 7:18 AM    Performed by: Rizwan Khalil MD  Authorized by: Rizwan Khalil MD    Indications / Diagnosis:  Cp  ECG reviewed by me, the ED Provider: yes    Patient location:  ED  Previous ECG:     Previous ECG:  Compared to current    Similarity:  No change  Interpretation:     Interpretation: non-specific    Rate:     ECG rate:  82    ECG rate assessment: normal    Rhythm:     Rhythm: sinus rhythm    Ectopy:     Ectopy: none    QRS:     QRS axis:  Right    QRS intervals:  Normal  Conduction:     Conduction: normal    ST segments:     ST segments:  Normal  T waves:     T waves: non-specific             ED Course               HEART Risk Score      Flowsheet Row Most Recent Value   Heart Score Risk Calculator    History 0 Filed at: 08/16/2024 2138   ECG 1 Filed at: 08/16/2024 2138   Age 0 Filed at: 08/16/2024 2138   Risk Factors 1 Filed at: 08/16/2024 2138   Troponin 0 Filed at: 08/16/2024 2138   HEART Score 2 Filed at: 08/16/2024 2138                          SBIRT 20yo+      Flowsheet Row Most  Recent Value   Initial Alcohol Screen: US AUDIT-C     1. How often do you have a drink containing alcohol? 0 Filed at: 08/16/2024 0700   2. How many drinks containing alcohol do you have on a typical day you are drinking?  0 Filed at: 08/16/2024 0700   3b. FEMALE Any Age, or MALE 65+: How often do you have 4 or more drinks on one occassion? 0 Filed at: 08/16/2024 0700   Audit-C Score 0 Filed at: 08/16/2024 0700   ROCHELLE: How many times in the past year have you...    Used an illegal drug or used a prescription medication for non-medical reasons? Never Filed at: 08/16/2024 0700                      Medical Decision Making  I reviewed the patient's medical chart, PMHx, prior encounters, medications.    My independent interpretation of ECG: No acute ischemic changes, NSR, rightward axis  My independent interpretation of CXR: No PTX, no acute cardiopulmonary disease    My DDx includes: ACS, PE, PTX, arrhythmia, electrolyte abnormality, costochondritis    Will perform cardiac workup. CMP to evaluate electrolytes, CBC to evaluate for anemia, troponin to evaluate for cardiac ischemia, ECG. Imaging of chest. Will reassess symptoms.     CXR negative. Given concerning story for PTX as well as complaint of progressive SOB, will also order CT chest.    Discharge with strict return precautions.    Amount and/or Complexity of Data Reviewed  Labs: ordered.  Radiology: ordered and independent interpretation performed.                 Disposition  Final diagnoses:   Chest pain   Shortness of breath     Time reflects when diagnosis was documented in both MDM as applicable and the Disposition within this note       Time User Action Codes Description Comment    8/16/2024  6:58 AM Rizwan Khalil Add [R07.9] Chest pain     8/16/2024  6:58 AM Rizwan Khalil Add [R06.02] Shortness of breath           ED Disposition       ED Disposition   Discharge    Condition   Stable    Date/Time   Fri Aug 16, 2024 0751    Comment   [facility]  Estuardo discharge to home/self care.                   Follow-up Information    None         Discharge Medication List as of 8/16/2024  7:52 AM        CONTINUE these medications which have NOT CHANGED    Details   albuterol (PROVENTIL HFA,VENTOLIN HFA) 90 mcg/act inhaler Historical Med      Ascorbic Acid (vitamin C) 1000 MG tablet Take 1 tablet (1,000 mg total) by mouth daily for 7 days, Starting Tue 1/23/2024, Until Thu 2/1/2024, Normal      benzonatate (TESSALON) 200 MG capsule Take 1 capsule (200 mg total) by mouth 3 (three) times a day as needed for cough, Starting Thu 10/12/2023, Normal      buPROPion (WELLBUTRIN SR) 100 mg 12 hr tablet TAKE ONE TABLET BY MOUTH 2 TIMES A DAY, Starting Tue 12/5/2023, Normal      celecoxib (CeleBREX) 200 mg capsule TAKE ONE CAPSULE BY MOUTH EVERY DAY AS NEEDED, Starting Tue 12/5/2023, Normal      cholecalciferol (VITAMIN D3) 1,000 units tablet Take 2 tablets (2,000 Units total) by mouth daily for 7 days, Starting Tue 1/23/2024, Until Thu 2/1/2024, Normal      dicyclomine (BENTYL) 20 mg tablet Take 1 tablet (20 mg total) by mouth 2 (two) times a day, Starting Tue 5/14/2024, Normal      famotidine (PEPCID) 40 MG tablet take 1 tablet by mouth at bedtime 2 HOURS BEFORE BED, Normal      fluticasone (FLONASE) 50 mcg/act nasal spray Historical Med      !! ibuprofen (MOTRIN) 600 mg tablet Take 1 tablet (600 mg total) by mouth every 6 (six) hours as needed for moderate pain, Starting Tue 1/23/2024, Normal      !! ibuprofen (MOTRIN) 800 mg tablet Take 1 tablet (800 mg total) by mouth every 8 (eight) hours as needed for mild pain, Starting Mon 5/6/2024, Normal      Levonorgestrel (Liletta, 52 MG,) 19.5 MCG/DAY IUD IUD 1 each by Intrauterine route once, Starting Thu 3/10/2022, Historical Med      loratadine (CLARITIN) 10 mg tablet Take 1 tablet (10 mg total) by mouth daily, Starting Wed 8/9/2023, Normal      methocarbamol (ROBAXIN) 500 mg tablet Take 1 tablet (500 mg total) by mouth 2 (two)  times a day as needed for muscle spasms, Starting Wed 8/9/2023, Normal      mupirocin (BACTROBAN) 2 % ointment Apply topically 3 (three) times a day for 7 days Apply three times daily specifically to area of swelling on the right breast for the next 7 days., Starting Sun 1/15/2023, Until Thu 2/1/2024, Normal      nicotine (NICODERM CQ) 14 mg/24hr TD 24 hr patch Place 1 patch on the skin every 24 hours, Starting Wed 10/4/2023, Until Mon 4/1/2024, Historical Med      nicotine (Nicotrol) 10 MG inhaler Inhale 1 puff, Starting Wed 10/4/2023, Historical Med      nicotine polacrilex (NICORETTE) 4 mg gum Chew 4 mg, Starting Mon 10/9/2023, Until Wed 11/8/2023 at 2359, Historical Med      pantoprazole (PROTONIX) 40 mg tablet Take 1 tablet (40 mg total) by mouth daily, Starting Wed 8/9/2023, Normal      Wegovy 1 MG/0.5ML Inject 1 mg under the skin Once a week, Starting Wed 1/10/2024, Historical Med       !! - Potential duplicate medications found. Please discuss with provider.          No discharge procedures on file.    PDMP Review       None            ED Provider  Electronically Signed by             Rizwan Khalil MD  08/16/24 8795

## 2024-08-20 ENCOUNTER — CONSULT (OUTPATIENT)
Dept: PAIN MEDICINE | Facility: CLINIC | Age: 44
End: 2024-08-20
Payer: COMMERCIAL

## 2024-08-20 VITALS — BODY MASS INDEX: 32.31 KG/M2 | DIASTOLIC BLOOD PRESSURE: 100 MMHG | SYSTOLIC BLOOD PRESSURE: 154 MMHG | HEIGHT: 61 IN

## 2024-08-20 DIAGNOSIS — M54.2 NECK PAIN: ICD-10-CM

## 2024-08-20 DIAGNOSIS — G89.4 CHRONIC PAIN SYNDROME: Primary | ICD-10-CM

## 2024-08-20 DIAGNOSIS — G89.29 CHRONIC BILATERAL LOW BACK PAIN WITH BILATERAL SCIATICA: ICD-10-CM

## 2024-08-20 DIAGNOSIS — M54.42 CHRONIC BILATERAL LOW BACK PAIN WITH BILATERAL SCIATICA: ICD-10-CM

## 2024-08-20 DIAGNOSIS — M54.41 CHRONIC BILATERAL LOW BACK PAIN WITH BILATERAL SCIATICA: ICD-10-CM

## 2024-08-20 DIAGNOSIS — M79.18 MYOFASCIAL PAIN SYNDROME: ICD-10-CM

## 2024-08-20 PROCEDURE — 99204 OFFICE O/P NEW MOD 45 MIN: CPT | Performed by: STUDENT IN AN ORGANIZED HEALTH CARE EDUCATION/TRAINING PROGRAM

## 2024-08-20 NOTE — PROGRESS NOTES
"Assessment:  1. Chronic pain syndrome    2. Neck pain    3. Chronic bilateral low back pain with bilateral sciatica    4. Myofascial pain syndrome      Portions of the record may have been created with voice recognition software. Occasional wrong word or \"sound a like\" substitutions may have occurred due to the inherent limitations of voice recognition software. Read the chart carefully and recognize, using context, where substitutions have occurred. Contact me with any questions.     Plan:  With past medical history of GERD, HTN, depression, referred by PCP, here for initial valuation of chronic neck and low back pain symptoms of several years duration.  Notes symptoms are mostly axial, denies radicular pain, paresthesias, new or progressive weakness, saddle anesthesia, BBI.  Recent cervical and lumbar spine imaging showed multilevel DDD.  She has not yet been to physical therapy for the symptoms.  Symptoms likely due to myofascial pain, DDD.    Discussed option of trigger point injections for symptom management.    Recommend course of physical therapy for neck and core strengthening exercises, optimizing ROM.    Follow-up in 1 month after injections.          Complete risks and benefits including bleeding, infection, tissue reaction, nerve injury and allergic reaction were discussed. The approach was demonstrated using models and literature was provided. Verbal and written consent was obtained.      History of Present Illness:    The patient is a 43 y.o. female who presents for consultation in regards to Neck Pain, Shoulder Pain, and Back Pain.  Symptoms have been present since Dec '19. Symptoms began following an injury at work. Pain is reported to be 6 on the numeric rating scale.  Symptoms are felt nearly constantly and worst in the morning, evening, nighttime.  Symptoms are characterized as burning, cramping, numbing, and pressure-like.  Symptoms are associated with no weakness.  Aggravating factors include " standing, bending, sitting, walking, exercise, relaxation, and bowel movements.  Relieving factors include nothing.        Review of Systems:    Review of Systems   Constitutional:  Negative for chills and fever.   HENT:  Negative for ear pain, sinus pressure and sore throat.    Eyes:  Negative for pain and redness.   Respiratory:  Negative for cough and wheezing.    Cardiovascular:  Negative for leg swelling.   Gastrointestinal:  Negative for abdominal pain and nausea.   Endocrine: Negative for polydipsia and polyuria.   Genitourinary:  Negative for difficulty urinating and frequency.   Musculoskeletal:  Negative for gait problem and myalgias.   Skin:  Negative for pallor and wound.   Neurological:  Positive for headaches. Negative for seizures and weakness.   Psychiatric/Behavioral:  Negative for decreased concentration and sleep disturbance.            Past Medical History:   Diagnosis Date    Allergic     Asthma     Cervical disc disorder with radiculopathy of mid-cervical region 2020    Cervical radiculopathy 2020    GERD (gastroesophageal reflux disease)     Herniated disc, cervical     Pinched nerve in shoulder, right     Thyroid nodule        Past Surgical History:   Procedure Laterality Date     SECTION      EPIDURAL BLOCK INJECTION N/A 3/4/2020    Procedure: C7-T1 Interlaminar Epidural Steroid Injection (86073);  Surgeon: Ana Morrell MD;  Location: [facility];  Service: Pain Management     EPIDURAL BLOCK INJECTION N/A 2020    Procedure: C7-T1 interlaminar epidural steroid injection;  Surgeon: Ana Morrell MD;  Location: [facility];  Service: Pain Management     FL GUIDED NEEDLE PLAC BX/ASP/INJ  3/4/2020    FL GUIDED NEEDLE PLAC BX/ASP/INJ  2020       Family History   Problem Relation Age of Onset    Diabetes Mother     Heart disease Mother     Bone cancer Father     Diabetes Brother     No Known Problems Daughter     No Known Problems Maternal Grandmother     No  Known Problems Paternal Grandmother     No Known Problems Maternal Aunt     No Known Problems Paternal Aunt     No Known Problems Paternal Aunt     Breast cancer Cousin     Colon cancer Neg Hx     Ovarian cancer Neg Hx        Social History     Occupational History     Comment: currently not working / FMLA   Tobacco Use    Smoking status: Every Day     Current packs/day: 1.00     Types: Cigarettes    Smokeless tobacco: Never   Vaping Use    Vaping status: Never Used   Substance and Sexual Activity    Alcohol use: No    Drug use: Not Currently     Types: Marijuana    Sexual activity: Yes     Partners: Male     Birth control/protection: I.U.D.         Current Outpatient Medications:     albuterol (PROVENTIL HFA,VENTOLIN HFA) 90 mcg/act inhaler, , Disp: , Rfl:     benzonatate (TESSALON) 200 MG capsule, Take 1 capsule (200 mg total) by mouth 3 (three) times a day as needed for cough, Disp: 20 capsule, Rfl: 0    buPROPion (WELLBUTRIN SR) 100 mg 12 hr tablet, TAKE ONE TABLET BY MOUTH 2 TIMES A DAY, Disp: 60 tablet, Rfl: 0    celecoxib (CeleBREX) 200 mg capsule, TAKE ONE CAPSULE BY MOUTH EVERY DAY AS NEEDED, Disp: 30 capsule, Rfl: 0    dicyclomine (BENTYL) 20 mg tablet, Take 1 tablet (20 mg total) by mouth 2 (two) times a day, Disp: 20 tablet, Rfl: 0    famotidine (PEPCID) 40 MG tablet, take 1 tablet by mouth at bedtime 2 HOURS BEFORE BED, Disp: 30 tablet, Rfl: 3    fluticasone (FLONASE) 50 mcg/act nasal spray, , Disp: , Rfl:     ibuprofen (MOTRIN) 600 mg tablet, Take 1 tablet (600 mg total) by mouth every 6 (six) hours as needed for moderate pain, Disp: 30 tablet, Rfl: 0    ibuprofen (MOTRIN) 800 mg tablet, Take 1 tablet (800 mg total) by mouth every 8 (eight) hours as needed for mild pain, Disp: 30 tablet, Rfl: 0    Levonorgestrel (Liletta, 52 MG,) 19.5 MCG/DAY IUD IUD, 1 each by Intrauterine route once, Disp: , Rfl:     loratadine (CLARITIN) 10 mg tablet, Take 1 tablet (10 mg total) by mouth daily, Disp: 30 tablet, Rfl:  "3    methocarbamol (ROBAXIN) 500 mg tablet, Take 1 tablet (500 mg total) by mouth 2 (two) times a day as needed for muscle spasms, Disp: 20 tablet, Rfl: 0    pantoprazole (PROTONIX) 40 mg tablet, Take 1 tablet (40 mg total) by mouth daily, Disp: 30 tablet, Rfl: 3    Ascorbic Acid (vitamin C) 1000 MG tablet, Take 1 tablet (1,000 mg total) by mouth daily for 7 days, Disp: 7 tablet, Rfl: 0    cholecalciferol (VITAMIN D3) 1,000 units tablet, Take 2 tablets (2,000 Units total) by mouth daily for 7 days, Disp: 14 tablet, Rfl: 0    mupirocin (BACTROBAN) 2 % ointment, Apply topically 3 (three) times a day for 7 days Apply three times daily specifically to area of swelling on the right breast for the next 7 days., Disp: 15 g, Rfl: 0    nicotine (NICODERM CQ) 14 mg/24hr TD 24 hr patch, Place 1 patch on the skin every 24 hours (Patient not taking: Reported on 1/30/2024), Disp: , Rfl:     nicotine (Nicotrol) 10 MG inhaler, Inhale 1 puff (Patient not taking: Reported on 1/30/2024), Disp: , Rfl:     nicotine polacrilex (NICORETTE) 4 mg gum, Chew 4 mg, Disp: , Rfl:     Wegovy 1 MG/0.5ML, Inject 1 mg under the skin Once a week (Patient not taking: Reported on 1/30/2024), Disp: , Rfl:   No current facility-administered medications for this visit.    Facility-Administered Medications Ordered in Other Visits:     Lidocaine Viscous HCl (XYLOCAINE) 2 % mucosal solution, , , Code/Trauma/Sedation Med, Santiago Glasgow DO, 15 mL at 05/10/21 1109    Allergies   Allergen Reactions    No Known Allergies Allergic Rhinitis     Seasonal         Physical Exam:    /100   Ht 5' 1\" (1.549 m)   BMI 32.31 kg/m²     Constitutional: normal, well developed, well nourished, alert, in no distress and non-toxic and no overt pain behavior.  Eyes: anicteric  HEENT: grossly intact  Neck: supple, symmetric, trachea midline and no masses   Pulmonary:even and unlabored  Cardiovascular:No edema or pitting edema present  Skin:Normal without rashes or " lesions and well hydrated  Psychiatric:Mood and affect appropriate  Neurologic:Cranial Nerves II-XII grossly intact  Musculoskeletal:normal gait, pain to palpation over upper traps, lumbar paraspinals, limited cervical and lumbar ROM, grossly intact strength over BUE/BLE      Imaging    XR spine cervical complete 4 or 5 vw non injury  Order: [id number]  Impression    IMPRESSION:  1.  Mild C5-6 spondylosis. Otherwise unremarkable cervical spine radiographs.            Workstation:[id number]  Narrative    STUDY: Cervical spine, 5 views.    INDICATION: Cervical disc disease.    COMPARISON: None    ACCESSION NUMBER(S): [id number]    ORDERING CLINICIAN: CECI MCKEON    FINDINGS:  Alignment is within normal limits.    Disc heights are well preserved    Mild C5-6 spondylosis with small anterior osteophytes noted.    Vertebral body heights are preserved. Posterior elements are intact.    No significant osseous foraminal stenosis on the oblique views.    Prevertebral soft tissues are unremarkable.    Open-mouth odontoid view is unremarkable.        XR spine lumbar minimum 4 views non injury  Order: [id number]  Impression    IMPRESSION:  1.  Mild L4-5 and L5-S1 facet joint arthropathy            Workstation:[id number]  Narrative    STUDY: Lumbar spine, 5 views.    INDICATION: Low back pain    COMPARISON: 8/16/2023    ACCESSION NUMBER(S): [id number]    ORDERING CLINICIAN: CECI MCKEON    FINDINGS:  Alignment is within normal limits.  Disc heights are maintained.  Mild L4-5 and L5-S1 facet joint degenerative changes noted.  No spondylolysis on the oblique views.  Vertebral body heights are preserved. Posterior elements are intact.    Orders Placed This Encounter   Procedures    Ambulatory referral to Physical Therapy

## 2024-08-26 NOTE — PROGRESS NOTES
"PT Evaluation     Today's date:   Patient name: Park Marte  : 1980  MRN: [id number]  Referring provider: Treasure Gonzales MD  Dx:   Encounter Diagnosis     ICD-10-CM    1. Chronic pain syndrome  G89.4       2. Neck pain  M54.2       3. Chronic bilateral low back pain with bilateral sciatica  M54.42     M54.41     G89.29       4. Myofascial pain syndrome  M79.18                      Assessment  Impairments: abnormal or restricted ROM, abnormal movement, activity intolerance, impaired physical strength, lacks appropriate home exercise program and pain with function  Symptom irritability: moderate    Assessment details: Park Marte is a 43 y.o. female presenting to outpatient physical therapy with noted impairments including pain, impaired soft tissue mobility, reduced range of motion, reduced strength, reduced postural awareness, and reduced activity tolerance. Signs and symptoms at present are consistent with referring diagnosis of neck pain and B chronic LBP w/B sciatica. Due to noted impairments, the patient's present functional limitations include difficulty with ADLs with increased need for assistance, reliance on medication and/or modalities for pain relief, reduced tolerance for functional mobility and activity, and difficulty completing self care and HH responsibilities. Patient to benefit from skilled outpatient physical therapy 2x/week for 6 weeks in order to reduce pain, maximize pain free range of motion, increase strength and stability, and improve functional mobility/functional activity in order to maximize return to prior level of function with reduced limitations. Home exercise program was provided and all questions answered to patient's level of satisfaction. Thank you for your referral.        Understanding of Dx/Px/POC: good     Prognosis: fair    Goals  STGs to be achieved in 4 weeks:  1. Pt to demonstrate reduced subjective pain rating \"at worst\" by at least 2-3 points from " Initial Eval in order to allow for reduced pain with ADLs and improved functional activity tolerance.   2. Pt to demonstrate increased AROM of neck by at least 5-10 degrees in order to allow for greater ease and independence with ADLs and functional mobility.   3. Pt to demonstrate increased strength of lumbar and abdominal mms   in order to improve safety and stability with ADLs and functional mobility.     LTGs to be achieved in 6-8 weeks:  1. Pt will be I with HEP in order to continue to improve quality of life and independence and reduce risk for re-injury.   2. Pt to demonstrate return to HH chores without limitations or restrictions.   3. Pt to demonstrate improved function as noted by achieving or exceeding predicted score on FOTO outcomes assessment tool.       Plan  Patient would benefit from: skilled physical therapy  Other planned modality interventions: Modalities prn for symptom management    Planned therapy interventions: manual therapy, neuromuscular re-education, therapeutic activities, therapeutic exercise, strengthening, stretching and home exercise program    Frequency: 2x week  Duration in weeks: 6  Plan of Care beginning date: 8/28/2024  Plan of Care expiration date: 10/9/2024  Treatment plan discussed with: PTA and patient      Subjective Evaluation    History of Present Illness  Mechanism of injury: Pt notes 3 year hx of neck and LBP . States she was at work and had a spasm of neck and back and through testing was found to have lumbar srinivas disc and pinched nerve in her neck. (As per pt) Current symptoms include burning in B shldrs and back, restless legs, muscle tightness, hands get tired, facial tingling. Pt has had PT and  epidural injections in back. Notes  cerv traction in past helped. Pt notes driving too much affects her pain. Takes ms relaxants at times. Uses heating pads. Pain increases with increased activity. Mother of autistic child.          Recurrent probem    Patient  Goals  Patient goals for therapy: decreased pain, increased motion and increased strength  Patient goal: cooking  Pain  Current pain ratin (low back 5)  At best pain ratin (low back 2/10)  At worst pain rating: 10 (low back 10/10)  Location: neck and low back  Quality: burning, radiating, tight, dull ache, pressure and pulling (heaviness, weight of bra straps)  Relieving factors: heat, medications and rest  Aggravating factors: sitting, standing, overhead activity and lifting (driving, pain post activity)  Progression: worsening    Social Support  Steps to enter house: yes  6  Stairs in house: yes   Lives in: multiple-level home  Lives with: young children    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 Negative for disturbed sleep    Postural Observations  Seated posture: fair  Standing posture: fair    Additional Postural Observation Details  Excessive lumbar curve, large chested, genu valgus  Equal leg length    Palpation   Left   Hypertonic in the upper trapezius.   Tenderness of the suboccipitals and upper trapezius.     Right   Hypertonic in the upper trapezius.   Tenderness of the suboccipitals and upper trapezius.     Neurological Testing     Sensation   Cervical/Thoracic   Left   Intact: light touch    Right   Intact: light touch    Active Range of Motion   Cervical/Thoracic Spine       Cervical    Flexion: 45 degrees   Extension: 52 degrees      Left lateral flexion: 22 degrees      Right lateral flexion: 17 degrees      Left rotation: 50 degrees  Right rotation: 60 degrees       Thoracic    Flexion: Active thoracic flexion: slight rib hump R.  Restriction level: moderate  Extension:  WFL  Left lateral flexion:  Restriction level:  minimal  Right lateral flexion:  Restriction level: minimal    Lumbar   Left lateral flexion:  Restriction level: minimal  Right lateral flexion:  Restriction level: minimal    Strength/Myotome Testing     Left Shoulder     Planes of Motion   Flexion: 4   Abduction: 5   External rotation at 0°: 4+   Internal rotation at 0°: 4+     Right Shoulder     Planes of Motion   Flexion: 4   Abduction: 5   External rotation at 0°: 4+   Internal rotation at 0°: 4+     Left Elbow   Flexion: 5    Right Elbow   Flexion: 5    Lumbar   Left   Normal strength    Right   Normal strength    Tests   Cervical   Negative vertical compression and cervical distraction (relief with distraction).     Lumbar   Negative vertical compression.     Left   Negative passive SLR.     Right   Negative passive SLR.     Left Hip   Negative AYANNA.     Right Hip   Negative AYANNA.     General Comments:    Upper quarter screen   Shoulder: unremarkable  Elbow: unremarkable  Hand/wrist: unremarkable  Neuro Exam:     Headaches   Patient reports headaches: Yes (occ and possibly due to sinuses).            Precautions: cerv radic, LBP w/B sciatica,asthma, srinivas cerv disc    Re-eval Date: 10/9/24    Date 8/28       Visit Count 1       FOTO Comp 8/28       Pain In        Pain Out              Manuals 8/28       SOR/distraction                                Neuro Re-Ed                                                                Ther Ex        Nustep w/UEs        SKTC        DKTC        LTRs        bridges        AH        Ham stretch        Cerv AROM  W/ERS        MTPs/LTPs  W/AH        Standing 3 way SLR                                        Ther Activity                        Gait Training                        Modalities

## 2024-08-28 ENCOUNTER — EVALUATION (OUTPATIENT)
Dept: PHYSICAL THERAPY | Facility: CLINIC | Age: 44
End: 2024-08-28
Payer: COMMERCIAL

## 2024-08-28 DIAGNOSIS — M54.41 CHRONIC BILATERAL LOW BACK PAIN WITH BILATERAL SCIATICA: ICD-10-CM

## 2024-08-28 DIAGNOSIS — G89.29 CHRONIC BILATERAL LOW BACK PAIN WITH BILATERAL SCIATICA: ICD-10-CM

## 2024-08-28 DIAGNOSIS — M54.2 NECK PAIN: ICD-10-CM

## 2024-08-28 DIAGNOSIS — M79.18 MYOFASCIAL PAIN SYNDROME: ICD-10-CM

## 2024-08-28 DIAGNOSIS — G89.4 CHRONIC PAIN SYNDROME: Primary | ICD-10-CM

## 2024-08-28 DIAGNOSIS — M54.42 CHRONIC BILATERAL LOW BACK PAIN WITH BILATERAL SCIATICA: ICD-10-CM

## 2024-08-28 PROCEDURE — 97163 PT EVAL HIGH COMPLEX 45 MIN: CPT | Performed by: PHYSICAL MEDICINE & REHABILITATION

## 2024-09-03 ENCOUNTER — APPOINTMENT (OUTPATIENT)
Dept: RADIOLOGY | Facility: CLINIC | Age: 44
End: 2024-09-03
Payer: COMMERCIAL

## 2024-09-03 ENCOUNTER — OFFICE VISIT (OUTPATIENT)
Dept: URGENT CARE | Facility: CLINIC | Age: 44
End: 2024-09-03
Payer: COMMERCIAL

## 2024-09-03 VITALS
TEMPERATURE: 97.8 F | SYSTOLIC BLOOD PRESSURE: 172 MMHG | HEART RATE: 79 BPM | RESPIRATION RATE: 20 BRPM | OXYGEN SATURATION: 100 % | DIASTOLIC BLOOD PRESSURE: 99 MMHG

## 2024-09-03 DIAGNOSIS — M25.532 ACUTE PAIN OF LEFT WRIST: ICD-10-CM
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
## 2024-09-03 DIAGNOSIS — S93.492A SPRAIN OF OTHER LIGAMENT OF LEFT ANKLE, INITIAL ENCOUNTER: ICD-10-CM

## 2024-09-03 DIAGNOSIS — S63.92XA HAND SPRAIN, LEFT, INITIAL ENCOUNTER: ICD-10-CM

## 2024-09-03 DIAGNOSIS — S93.492A SPRAIN OF OTHER LIGAMENT OF LEFT ANKLE, INITIAL ENCOUNTER: Primary | ICD-10-CM

## 2024-09-03 PROCEDURE — 99213 OFFICE O/P EST LOW 20 MIN: CPT | Performed by: NURSE PRACTITIONER

## 2024-09-03 PROCEDURE — 73610 X-RAY EXAM OF ANKLE: CPT

## 2024-09-03 PROCEDURE — 73130 X-RAY EXAM OF HAND: CPT

## 2024-09-03 PROCEDURE — S9088 SERVICES PROVIDED IN URGENT: HCPCS | Performed by: NURSE PRACTITIONER

## 2024-09-03 NOTE — PATIENT INSTRUCTIONS
Your xray was preliminarily read by your provider.  A radiologist will read the xray and you will be notified if it is abnormal.    You are to Rest, Ice, compression (ace wrap, splint) elevate  Wear the ace wrap as instructed   Take tylenol or motrin as needed.  You are to see your PCP   Go to the ED if symptoms worsen.    You have been given an orthopedic referral if symptoms not resolved in 7 days.  Call for appointment     If tests have been performed at [facility], our office will contact you with results if changes need to be made to the care plan discussed with you at the visit.  You can review your full results on [facility] MyChart.

## 2024-09-03 NOTE — PROGRESS NOTES
[facility]        NAME: Park Marte is a 43 y.o. female  : 1980    MRN: [id number]  DATE: September 3, 2024  TIME: 10:01 AM    Assessment and Plan   Sprain of other ligament of left ankle, initial encounter [S93.492A]  1. Sprain of other ligament of left ankle, initial encounter  XR ankle 3+ vw left    Ambulatory Referral to Orthopedic Surgery      2. Hand sprain, left, initial encounter  XR hand 3+ vw left    Ambulatory Referral to Orthopedic Surgery      3. Acute pain of left wrist  XR hand 3+ vw left    Ambulatory Referral to Orthopedic Surgery      4. Fall, initial encounter  Ambulatory Referral to Orthopedic Surgery            Patient Instructions       Follow up with PCP in 3-5 days.  Proceed to  ER if symptoms worsen.    If tests have been performed at [facility], our office will contact you with results if changes need to be made to the care plan discussed with you at the visit.  You can review your full results on [facility].      Your xray was preliminarily read by your provider.  A radiologist will read the xray and you will be notified if it is abnormal.    You are to Rest, Ice, compression (ace wrap, splint) elevate  Wear the ace wrap as instructed   Take tylenol or motrin as needed.  You are to see your PCP   Go to the ED if symptoms worsen.    You have been given an orthopedic referral if symptoms not resolved in 7 days.  Call for appointment     If tests have been performed at [facility], our office will contact you with results if changes need to be made to the care plan discussed with you at the visit.  You can review your full results on [facility].         Chief Complaint     Chief Complaint   Patient presents with    Fall     Left hand and ankle pain         History of Present Illness       This is a 43 year old female who states was walking out of [employer] and stepped over a curb and twisted her left ankle and fell onto an outstretched left hand just PTA.  She  denies applying ice or taking anything for pain.  She has swelling at the lateral let ankle and pain over dorsal surface of hand and wrist.  PMH is listed. Denies pregnancy.     Fall        Review of Systems   Review of Systems   Constitutional: Negative.    HENT: Negative.     Eyes: Negative.    Respiratory: Negative.     Cardiovascular: Negative.    Gastrointestinal: Negative.    Endocrine: Negative.    Genitourinary: Negative.    Musculoskeletal:         Left wrist, hand and ankle pain    Skin: Negative.    Allergic/Immunologic: Negative.    Neurological: Negative.    Hematological: Negative.    Psychiatric/Behavioral: Negative.           Current Medications       Current Outpatient Medications:     albuterol (PROVENTIL HFA,VENTOLIN HFA) 90 mcg/act inhaler, , Disp: , Rfl:     buPROPion (WELLBUTRIN SR) 100 mg 12 hr tablet, TAKE ONE TABLET BY MOUTH 2 TIMES A DAY, Disp: 60 tablet, Rfl: 0    dicyclomine (BENTYL) 20 mg tablet, Take 1 tablet (20 mg total) by mouth 2 (two) times a day, Disp: 20 tablet, Rfl: 0    famotidine (PEPCID) 40 MG tablet, take 1 tablet by mouth at bedtime 2 HOURS BEFORE BED, Disp: 30 tablet, Rfl: 3    fluticasone (FLONASE) 50 mcg/act nasal spray, , Disp: , Rfl:     Levonorgestrel (Liletta, 52 MG,) 19.5 MCG/DAY IUD IUD, 1 each by Intrauterine route once, Disp: , Rfl:     Ascorbic Acid (vitamin C) 1000 MG tablet, Take 1 tablet (1,000 mg total) by mouth daily for 7 days, Disp: 7 tablet, Rfl: 0    benzonatate (TESSALON) 200 MG capsule, Take 1 capsule (200 mg total) by mouth 3 (three) times a day as needed for cough (Patient not taking: Reported on 9/3/2024), Disp: 20 capsule, Rfl: 0    celecoxib (CeleBREX) 200 mg capsule, TAKE ONE CAPSULE BY MOUTH EVERY DAY AS NEEDED (Patient not taking: Reported on 9/3/2024), Disp: 30 capsule, Rfl: 0    cholecalciferol (VITAMIN D3) 1,000 units tablet, Take 2 tablets (2,000 Units total) by mouth daily for 7 days, Disp: 14 tablet, Rfl: 0    ibuprofen (MOTRIN) 600 mg  tablet, Take 1 tablet (600 mg total) by mouth every 6 (six) hours as needed for moderate pain (Patient not taking: Reported on 9/3/2024), Disp: 30 tablet, Rfl: 0    ibuprofen (MOTRIN) 800 mg tablet, Take 1 tablet (800 mg total) by mouth every 8 (eight) hours as needed for mild pain (Patient not taking: Reported on 9/3/2024), Disp: 30 tablet, Rfl: 0    loratadine (CLARITIN) 10 mg tablet, Take 1 tablet (10 mg total) by mouth daily (Patient not taking: Reported on 9/3/2024), Disp: 30 tablet, Rfl: 3    methocarbamol (ROBAXIN) 500 mg tablet, Take 1 tablet (500 mg total) by mouth 2 (two) times a day as needed for muscle spasms (Patient not taking: Reported on 9/3/2024), Disp: 20 tablet, Rfl: 0    mupirocin (BACTROBAN) 2 % ointment, Apply topically 3 (three) times a day for 7 days Apply three times daily specifically to area of swelling on the right breast for the next 7 days., Disp: 15 g, Rfl: 0    nicotine (NICODERM CQ) 14 mg/24hr TD 24 hr patch, Place 1 patch on the skin every 24 hours (Patient not taking: Reported on 1/30/2024), Disp: , Rfl:     nicotine (Nicotrol) 10 MG inhaler, Inhale 1 puff (Patient not taking: Reported on 1/30/2024), Disp: , Rfl:     nicotine polacrilex (NICORETTE) 4 mg gum, Chew 4 mg, Disp: , Rfl:     pantoprazole (PROTONIX) 40 mg tablet, Take 1 tablet (40 mg total) by mouth daily (Patient not taking: Reported on 9/3/2024), Disp: 30 tablet, Rfl: 3    Wegovy 1 MG/0.5ML, Inject 1 mg under the skin Once a week (Patient not taking: Reported on 1/30/2024), Disp: , Rfl:   No current facility-administered medications for this visit.    Facility-Administered Medications Ordered in Other Visits:     Lidocaine Viscous HCl (XYLOCAINE) 2 % mucosal solution, , , Code/Trauma/Sedation Med, Santiago Glasgow DO, 15 mL at 05/10/21 1109    Current Allergies     Allergies as of 09/03/2024 - Reviewed 09/03/2024   Allergen Reaction Noted    No known allergies Allergic Rhinitis 12/16/2016            The following  portions of the patient's history were reviewed and updated as appropriate: allergies, current medications, past family history, past medical history, past social history, past surgical history and problem list.     Past Medical History:   Diagnosis Date    Allergic     Asthma     Cervical disc disorder with radiculopathy of mid-cervical region 2020    Cervical radiculopathy 2020    GERD (gastroesophageal reflux disease)     Herniated disc, cervical     Pinched nerve in shoulder, right     Thyroid nodule        Past Surgical History:   Procedure Laterality Date     SECTION      EPIDURAL BLOCK INJECTION N/A 3/4/2020    Procedure: C7-T1 Interlaminar Epidural Steroid Injection (03796);  Surgeon: Ana Morrell MD;  Location: [facility];  Service: Pain Management     EPIDURAL BLOCK INJECTION N/A 2020    Procedure: C7-T1 interlaminar epidural steroid injection;  Surgeon: Ana Morrell MD;  Location: [facility];  Service: Pain Management     FL GUIDED NEEDLE PLAC BX/ASP/INJ  3/4/2020    FL GUIDED NEEDLE PLAC BX/ASP/INJ  2020       Family History   Problem Relation Age of Onset    Diabetes Mother     Heart disease Mother     Bone cancer Father     Diabetes Brother     No Known Problems Daughter     No Known Problems Maternal Grandmother     No Known Problems Paternal Grandmother     No Known Problems Maternal Aunt     No Known Problems Paternal Aunt     No Known Problems Paternal Aunt     Breast cancer Cousin     Colon cancer Neg Hx     Ovarian cancer Neg Hx          Medications have been verified.        Objective   BP (!) 172/99   Pulse 79   Temp 97.8 °F (36.6 °C)   Resp 20   SpO2 100%   No LMP recorded. Patient has had an implant.       Physical Exam     Physical Exam  Vitals and nursing note reviewed.   Constitutional:       General: She is not in acute distress.     Appearance: Normal appearance. She is normal weight. She is not ill-appearing, toxic-appearing or diaphoretic.  "  HENT:      Head: Normocephalic and atraumatic.      Nose: Nose normal.      Mouth/Throat:      Mouth: Mucous membranes are moist.   Eyes:      Extraocular Movements: Extraocular movements intact.   Cardiovascular:      Rate and Rhythm: Normal rate.      Pulses: Normal pulses.   Pulmonary:      Effort: Pulmonary effort is normal.   Musculoskeletal:         General: Swelling, tenderness and signs of injury present. No deformity. Normal range of motion.      Cervical back: Normal range of motion.      Comments: Left ankle: FROM.  Swelling and TTP over lateral ankle.  No deformity.  NVI. Able to bear weight.  Muscle strength 5/5.  Slight limping.    Left wrist/hand.  No swelling of either.  + radial pulse. NVI.  FROM  no deformity.        Skin:     General: Skin is warm and dry.      Capillary Refill: Capillary refill takes less than 2 seconds.   Neurological:      General: No focal deficit present.      Mental Status: She is alert and oriented to person, place, and time.   Psychiatric:         Mood and Affect: Mood normal.         Behavior: Behavior normal.         Thought Content: Thought content normal.         Judgment: Judgment normal.         Preliminary reading left ankle xray  No acute process seen   Waiting on rad read    Preliminary reading left wrist and hand xray  No acute process seen  Waiting on rad read          Orthopedic injury treatment    Date/Time: 9/3/2024 9:56 AM    Performed by: KARLA Beal  Authorized by: KARLA Beal    Patient Location:  [facility] Protocol:  Procedure performed by: (Jacquie KENNY)  Consent: The procedure was performed in an emergent situation. Verbal consent obtained. Written consent obtained.  Risks and benefits: risks, benefits and alternatives were discussed  Consent given by: patient  Time out: Immediately prior to procedure a \"time out\" was called to verify the correct patient, procedure, equipment, support staff and site/side marked " "as required.  Timeout called at: 9/3/2024 9:56 AM.  Patient understanding: patient states understanding of the procedure being performed  Patient consent: the patient's understanding of the procedure matches consent given  Procedure consent: procedure consent matches procedure scheduled  Relevant documents: relevant documents present and verified  Test results: test results available and properly labeled  Site marked: the operative site was marked  Radiology Images displayed and confirmed. If images not available, report reviewed: imaging studies available  Required items: required blood products, implants, devices, and special equipment available  Patient identity confirmed: verbally with patient and hospital-assigned identification number    Injury location:  Wrist (hand)  Location details:  Left wrist  Injury type:  Soft tissue  Neurovascular status: Neurovascularly intact    Distal perfusion: normal    Neurological function: normal    Range of motion: normal    Immobilization:  Ace wrap  Neurovascular status: Neurovascularly intact    Distal perfusion: normal    Neurological function: normal    Range of motion: normal    Patient tolerance:  Patient tolerated the procedure well with no immediate complications  Orthopedic injury treatment    Date/Time: 9/3/2024 9:57 AM    Performed by: KARLA Beal  Authorized by: KARLA Beal    Patient Location:  [facility] Protocol:  Procedure performed by: (Jacquie KENNY)  Consent: The procedure was performed in an emergent situation. Verbal consent obtained. Written consent obtained.  Risks and benefits: risks, benefits and alternatives were discussed  Consent given by: patient  Time out: Immediately prior to procedure a \"time out\" was called to verify the correct patient, procedure, equipment, support staff and site/side marked as required.  Timeout called at: 9/3/2024 9:57 AM.  Patient understanding: patient states understanding of the " procedure being performed  Patient consent: the patient's understanding of the procedure matches consent given  Procedure consent: procedure consent matches procedure scheduled  Relevant documents: relevant documents present and verified  Test results: test results available and properly labeled  Site marked: the operative site was marked  Radiology Images displayed and confirmed. If images not available, report reviewed: imaging studies available  Required items: required blood products, implants, devices, and special equipment available  Patient identity confirmed: verbally with patient and hospital-assigned identification number    Injury location:  Ankle  Location details:  Left ankle  Injury type:  Soft tissue  Neurovascular status: Neurovascularly intact    Distal perfusion: normal    Neurological function: normal    Range of motion: normal    Immobilization:  Ace wrap  Neurovascular status: Neurovascularly intact    Distal perfusion: normal    Neurological function: normal    Range of motion: normal    Patient tolerance:  Patient tolerated the procedure well with no immediate complications

## 2024-09-04 ENCOUNTER — TELEPHONE (OUTPATIENT)
Age: 44
End: 2024-09-04

## 2024-09-05 NOTE — PROGRESS NOTES
"Daily Note     Today's date: 2024  Patient name: Park Marte  : 1980  MRN: [id number]  Referring provider: Treasure Gonzales MD  Dx:   Encounter Diagnosis     ICD-10-CM    1. Chronic pain syndrome  G89.4       2. Neck pain  M54.2       3. Chronic bilateral low back pain with bilateral sciatica  M54.42     M54.41     G89.29       4. Myofascial pain syndrome  M79.18           Start Time: 915  Stop Time: 959  Total time in clinic (min): 44 minutes    Subjective: Patient reports falling at work last week and spraining her L ankle. She states her neck and back pain are at a 6/10 although, she had a taken a muscle relaxer prior to PT appointment.       Objective: See treatment diary below      Assessment: Patient being seen for first PT treatment following IE. Treatment session consisted of initiating POC as outlined below. Started session with warm-up on Nustep for ROM and to increase blood flow to areas being treated. This was followed by self-stretches on mat which patient responded favorably to. SOR effective in reducing tension and pain. Patient demonstrated good technique and understanding of exercises presented today without experiencing any adverse effects. Patient would benefit from continued PT to improve level of function.       Plan: Continue per POC. Increase reps/resistance as tolerated.      Precautions: cerv radic, LBP w/B sciatica,asthma, srinivas cerv disc    Re-eval Date: 10/9/24    Date       Visit Count 1 2      FOTO Comp        Pain In        Pain Out              Manuals       SOR/distraction  MS                              Neuro Re-Ed                                                                Ther Ex        Nustep w/UEs  L1 x 10'      SKTC  10\"x5 ea      DKTC        LTRs  5\" x 10      bridges  3\" 2x10        AH        Ham stretch  30\" x3      Cerv AROM  W/ERS        MTPs/LTPs  W/AH  GTB 2x10 ea      Standing 3 way SLR                                        Ther " Activity                        Gait Training                        Modalities

## 2024-09-06 ENCOUNTER — OFFICE VISIT (OUTPATIENT)
Dept: PHYSICAL THERAPY | Facility: CLINIC | Age: 44
End: 2024-09-06
Payer: COMMERCIAL

## 2024-09-06 DIAGNOSIS — M54.41 CHRONIC BILATERAL LOW BACK PAIN WITH BILATERAL SCIATICA: ICD-10-CM

## 2024-09-06 DIAGNOSIS — M54.42 CHRONIC BILATERAL LOW BACK PAIN WITH BILATERAL SCIATICA: ICD-10-CM

## 2024-09-06 DIAGNOSIS — M79.18 MYOFASCIAL PAIN SYNDROME: ICD-10-CM

## 2024-09-06 DIAGNOSIS — G89.4 CHRONIC PAIN SYNDROME: Primary | ICD-10-CM

## 2024-09-06 DIAGNOSIS — M54.2 NECK PAIN: ICD-10-CM

## 2024-09-06 DIAGNOSIS — G89.29 CHRONIC BILATERAL LOW BACK PAIN WITH BILATERAL SCIATICA: ICD-10-CM

## 2024-09-06 PROCEDURE — 97110 THERAPEUTIC EXERCISES: CPT

## 2024-09-06 PROCEDURE — 97140 MANUAL THERAPY 1/> REGIONS: CPT

## 2024-09-10 ENCOUNTER — OFFICE VISIT (OUTPATIENT)
Dept: PHYSICAL THERAPY | Facility: CLINIC | Age: 44
End: 2024-09-10
Payer: COMMERCIAL

## 2024-09-10 DIAGNOSIS — M54.41 CHRONIC BILATERAL LOW BACK PAIN WITH BILATERAL SCIATICA: ICD-10-CM

## 2024-09-10 DIAGNOSIS — M79.18 MYOFASCIAL PAIN SYNDROME: ICD-10-CM

## 2024-09-10 DIAGNOSIS — M54.2 NECK PAIN: ICD-10-CM

## 2024-09-10 DIAGNOSIS — M54.42 CHRONIC BILATERAL LOW BACK PAIN WITH BILATERAL SCIATICA: ICD-10-CM

## 2024-09-10 DIAGNOSIS — G89.4 CHRONIC PAIN SYNDROME: Primary | ICD-10-CM

## 2024-09-10 DIAGNOSIS — G89.29 CHRONIC BILATERAL LOW BACK PAIN WITH BILATERAL SCIATICA: ICD-10-CM

## 2024-09-10 PROCEDURE — 97140 MANUAL THERAPY 1/> REGIONS: CPT | Performed by: PHYSICAL THERAPIST

## 2024-09-10 PROCEDURE — 97110 THERAPEUTIC EXERCISES: CPT | Performed by: PHYSICAL THERAPIST

## 2024-09-10 NOTE — PROGRESS NOTES
"Daily Note     Today's date: 9/10/2024  Patient name: Park Marte  : 1980  MRN: [id number]  Referring provider: Treasure Gonzales MD  Dx:   Encounter Diagnosis     ICD-10-CM    1. Chronic pain syndrome  G89.4       2. Neck pain  M54.2       3. Chronic bilateral low back pain with bilateral sciatica  M54.42     M54.41     G89.29       4. Myofascial pain syndrome  M79.18                      Subjective: Notes that she had no soreness after her therapy session but notes soreness today coming in.       Objective: See treatment diary below      Assessment: Tolerated treatment well. Patient demonstrated fatigue post treatment, exhibited good technique with therapeutic exercises, and would benefit from continued PT. She was most challenged with bridges, does demonstrate continued glute strength deficits secondary to decreased height noted with bridges. Continue to progress strengthening as able.       Plan: Continue per plan of care.  Progress treatment as tolerated.       Precautions: cerv radic, LBP w/B sciatica,asthma, srinivas cerv disc    Re-eval Date: 10/9/24    Date 8/28 9/6 9/10     Visit Count 1 2 3     FOTO Comp        Pain In        Pain Out              Manuals 8/28 9/6 9/10     SOR/distraction  MS KB + UT                             Neuro Re-Ed        TA c SLR   10 ea                                                     Ther Ex        Nustep w/UEs  L1 x 10' L1 x 10'     SKTC  10\"x5 ea 10\"x5 ea c towel     DKTC        LTRs  5\" x 10 5\" x 15 ea     bridges  3\" 2x10 3\" 2x10       AH        Ham stretch  30\" x3 30\" x3 ea     Cerv AROM  W/ERS        MTPs/LTPs  W/AH  GTB 2x10 ea GTB 2x10 ea     Standing 3 way SLR                                        Ther Activity                        Gait Training                        Modalities                               "

## 2024-09-11 ENCOUNTER — PROCEDURE VISIT (OUTPATIENT)
Dept: PAIN MEDICINE | Facility: CLINIC | Age: 44
End: 2024-09-11
Payer: COMMERCIAL

## 2024-09-11 VITALS — SYSTOLIC BLOOD PRESSURE: 144 MMHG | HEART RATE: 76 BPM | DIASTOLIC BLOOD PRESSURE: 86 MMHG

## 2024-09-11 DIAGNOSIS — M79.18 MYOFASCIAL PAIN SYNDROME: Primary | ICD-10-CM

## 2024-09-11 PROCEDURE — 20553 NJX 1/MLT TRIGGER POINTS 3/>: CPT | Performed by: STUDENT IN AN ORGANIZED HEALTH CARE EDUCATION/TRAINING PROGRAM

## 2024-09-11 PROCEDURE — 76942 ECHO GUIDE FOR BIOPSY: CPT | Performed by: STUDENT IN AN ORGANIZED HEALTH CARE EDUCATION/TRAINING PROGRAM

## 2024-09-11 RX ADMIN — BUPIVACAINE HYDROCHLORIDE 5 ML: 2.5 INJECTION, SOLUTION EPIDURAL; INFILTRATION; INTRACAUDAL at 10:30

## 2024-09-11 RX ADMIN — METHYLPREDNISOLONE ACETATE 1 ML: 40 INJECTION, SUSPENSION INTRA-ARTICULAR; INTRALESIONAL; INTRAMUSCULAR; SOFT TISSUE at 10:30

## 2024-09-12 ENCOUNTER — OFFICE VISIT (OUTPATIENT)
Dept: PHYSICAL THERAPY | Facility: CLINIC | Age: 44
End: 2024-09-12
Payer: COMMERCIAL

## 2024-09-12 DIAGNOSIS — M79.18 MYOFASCIAL PAIN SYNDROME: ICD-10-CM

## 2024-09-12 DIAGNOSIS — M54.2 NECK PAIN: ICD-10-CM

## 2024-09-12 DIAGNOSIS — M54.42 CHRONIC BILATERAL LOW BACK PAIN WITH BILATERAL SCIATICA: ICD-10-CM

## 2024-09-12 DIAGNOSIS — M54.41 CHRONIC BILATERAL LOW BACK PAIN WITH BILATERAL SCIATICA: ICD-10-CM

## 2024-09-12 DIAGNOSIS — G89.4 CHRONIC PAIN SYNDROME: Primary | ICD-10-CM

## 2024-09-12 DIAGNOSIS — G89.29 CHRONIC BILATERAL LOW BACK PAIN WITH BILATERAL SCIATICA: ICD-10-CM

## 2024-09-12 PROCEDURE — 97140 MANUAL THERAPY 1/> REGIONS: CPT | Performed by: PHYSICAL MEDICINE & REHABILITATION

## 2024-09-12 PROCEDURE — 97110 THERAPEUTIC EXERCISES: CPT | Performed by: PHYSICAL MEDICINE & REHABILITATION

## 2024-09-12 NOTE — PROGRESS NOTES
"Daily Note     Today's date: 2024  Patient name: Park Marte  : 1980  MRN: [id number]  Referring provider: Treasure Gonzales MD  Dx:   Encounter Diagnosis     ICD-10-CM    1. Chronic pain syndrome  G89.4       2. Neck pain  M54.2       3. Chronic bilateral low back pain with bilateral sciatica  M54.42     M54.41     G89.29       4. Myofascial pain syndrome  M79.18                      Subjective: Pt notes she had injections in her upper, mid and low back yesterday and notes she is sore today. Reports pain level 6/10 \"all over\". States she just laid in bed yesterday after her injections.      Objective: See treatment diary below      Assessment: Tolerated treatment well. Patient demonstrated fatigue post treatment, exhibited good technique with therapeutic exercises, and would benefit from continued PT      Plan: Continue per plan of care.      Precautions: cerv radic, LBP w/B sciatica,asthma, srinivas cerv disc    Re-eval Date: 10/9/24    Date 8/28 9/6 9/10 9/12    Visit Count 1 2 3 4    FOTO Comp        Pain In    10    Pain Out    4/10          Manuals 8/28 9/6 9/10 9/12    SOR/distraction  MS KB + UT TT  +UT                            Neuro Re-Ed        TA c SLR   10 ea 10x ea                                                    Ther Ex        Nustep w/UEs  L1 x 10' L1 x 10' L3  10'    SKTC  10\"x5 ea 10\"x5 ea c towel 5 x 10\"  W/towel    DKTC    5 x 10\"    LTRs  5\" x 10 5\" x 15 ea 15 x 5\" ea    bridges  3\" 2x10 3\" 2x10 2 x 10  3\"      AH        Ham stretch  30\" x3 30\" x3 ea  X 30\"    W/rope    Cerv AROM  W/ERS        MTPs/LTPs  W/AH  GTB 2x10 ea GTB 2x10 ea GTB  20x/3\"    Standing 3 way SLR                                        Ther Activity                        Gait Training                        Modalities                                 "

## 2024-09-14 RX ORDER — BUPIVACAINE HYDROCHLORIDE 2.5 MG/ML
5 INJECTION, SOLUTION EPIDURAL; INFILTRATION; INTRACAUDAL
Status: COMPLETED | OUTPATIENT
Start: 2024-09-11 | End: 2024-09-11

## 2024-09-14 RX ORDER — METHYLPREDNISOLONE ACETATE 40 MG/ML
1 INJECTION, SUSPENSION INTRA-ARTICULAR; INTRALESIONAL; INTRAMUSCULAR; SOFT TISSUE
Status: COMPLETED | OUTPATIENT
Start: 2024-09-11 | End: 2024-09-11

## 2024-09-14 NOTE — PROGRESS NOTES
" Universal Protocol:  Consent: Written consent obtained.  Risks and benefits: risks, benefits and alternatives were discussed  Consent given by: patient  Time out: Immediately prior to procedure a \"time out\" was called to verify the correct patient, procedure, equipment, support staff and site/side marked as required.  Patient understanding: patient states understanding of the procedure being performed  Patient consent: the patient's understanding of the procedure matches consent given  Procedure consent: procedure consent matches procedure scheduled  Site marked: the operative site was marked  Patient identity confirmed: verbally with patient  Supporting Documentation  Trigger Point Injections: multiple trigger points: 3 or more muscle groups    Injection site identified by: ultrasound  Procedure Details  Location(s):    Neck/Upper Back: L upper trapezius, R upper trapezius, L supraspinatus and R supraspinatus     Prep: patient was prepped and draped in usual sterile fashion  Needle size: 25 G  Medications: 5 mL bupivacaine (PF) 0.25 %; 1 mL methylPREDNISolone acetate 40 mg/mL  Patient tolerance: patient tolerated the procedure well with no immediate complications         "

## 2024-09-17 ENCOUNTER — OFFICE VISIT (OUTPATIENT)
Dept: PHYSICAL THERAPY | Facility: CLINIC | Age: 44
End: 2024-09-17
Payer: COMMERCIAL

## 2024-09-17 DIAGNOSIS — M54.42 CHRONIC BILATERAL LOW BACK PAIN WITH BILATERAL SCIATICA: ICD-10-CM

## 2024-09-17 DIAGNOSIS — M54.2 NECK PAIN: ICD-10-CM

## 2024-09-17 DIAGNOSIS — M54.41 CHRONIC BILATERAL LOW BACK PAIN WITH BILATERAL SCIATICA: ICD-10-CM

## 2024-09-17 DIAGNOSIS — G89.29 CHRONIC BILATERAL LOW BACK PAIN WITH BILATERAL SCIATICA: ICD-10-CM

## 2024-09-17 DIAGNOSIS — G89.4 CHRONIC PAIN SYNDROME: Primary | ICD-10-CM

## 2024-09-17 PROCEDURE — 97110 THERAPEUTIC EXERCISES: CPT | Performed by: PHYSICAL MEDICINE & REHABILITATION

## 2024-09-17 PROCEDURE — 97140 MANUAL THERAPY 1/> REGIONS: CPT | Performed by: PHYSICAL MEDICINE & REHABILITATION

## 2024-09-17 NOTE — PROGRESS NOTES
"Daily Note     Today's date: 2024  Patient name: Park Marte  : 1980  MRN: [id number]  Referring provider: Treasure Gonzales MD  Dx:   Encounter Diagnosis     ICD-10-CM    1. Chronic pain syndrome  G89.4       2. Neck pain  M54.2       3. Chronic bilateral low back pain with bilateral sciatica  M54.42     M54.41     G89.29                      Subjective: Pt states she is feeling \"rough\" this morning.      Objective: See treatment diary below      Assessment: Tolerated treatment well. Patient exhibited good technique with therapeutic exercises and would benefit from continued PT. Good relief with manual therapy for SOR.      Plan: Continue per plan of care.      Precautions: cerv radic, LBP w/B sciatica,asthma, srinivas cerv disc    Re-eval Date: 10/9/24    Date 8/28 9/6 9/10 9/12 9/17   Visit Count 1 2 3 4 5   FOTO Comp        Pain In    6/10 6/10   Pain Out    4/10          Manuals 8/28 9/6 9/10 9/12 9/17   SOR/distraction  MS KB + UT TT  +UT TT                           Neuro Re-Ed        TA c SLR   10 ea 10x ea 15x ea LE                                                   Ther Ex        Nustep w/UEs  L1 x 10' L1 x 10' L3  10' L3  10'   SKTC  10\"x5 ea 10\"x5 ea c towel 5 x 10\"  W/towel 5 x 10\" w/towel   DKTC    5 x 10\" 5 x 10\"   LTRs  5\" x 10 5\" x 15 ea 15 x 5\" ea 15 x 5\" ea   bridges  3\" 2x10 3\" 2x10 2 x 10  3\" 20 x 3\"     AH        Ham stretch  30\" x3 30\" x3 ea 3 X 30\"    W/rope  3 x 30\"   Cerv AROM  W/ERS        MTPs/LTPs  W/AH  GTB 2x10 ea GTB 2x10 ea GTB  20x/3\" GTB  25 x 3\"   Standing 3 way SLR                                        Ther Activity                        Gait Training                        Modalities                                   "

## 2024-09-18 ENCOUNTER — OFFICE VISIT (OUTPATIENT)
Dept: URGENT CARE | Facility: MEDICAL CENTER | Age: 44
End: 2024-09-18
Payer: COMMERCIAL

## 2024-09-18 VITALS
WEIGHT: 175.2 LBS | DIASTOLIC BLOOD PRESSURE: 106 MMHG | OXYGEN SATURATION: 98 % | BODY MASS INDEX: 33.1 KG/M2 | HEART RATE: 83 BPM | SYSTOLIC BLOOD PRESSURE: 158 MMHG | RESPIRATION RATE: 18 BRPM | TEMPERATURE: 97.3 F

## 2024-09-18 DIAGNOSIS — J01.10 ACUTE NON-RECURRENT FRONTAL SINUSITIS: ICD-10-CM

## 2024-09-18 DIAGNOSIS — H65.03 NON-RECURRENT ACUTE SEROUS OTITIS MEDIA OF BOTH EARS: Primary | ICD-10-CM

## 2024-09-18 PROCEDURE — 99213 OFFICE O/P EST LOW 20 MIN: CPT

## 2024-09-18 PROCEDURE — S9088 SERVICES PROVIDED IN URGENT: HCPCS

## 2024-09-18 RX ORDER — PREDNISONE 20 MG/1
40 TABLET ORAL DAILY
Qty: 10 TABLET | Refills: 0 | Status: SHIPPED | OUTPATIENT
Start: 2024-09-18 | End: 2024-09-23

## 2024-09-18 NOTE — PROGRESS NOTES
[facility]        NAME: Park Marte is a 44 y.o. female  : 1980    MRN: [id number]  DATE: 2024  TIME: 10:15 AM    Assessment and Plan   Non-recurrent acute serous otitis media of both ears [H65.03]  1. Non-recurrent acute serous otitis media of both ears  amoxicillin-clavulanate (AUGMENTIN) 875-125 mg per tablet      2. Acute non-recurrent frontal sinusitis  predniSONE 20 mg tablet            Patient Instructions     Take full course of Augmentin as prescribed  Eat yogurt with live and active cultures and/or take a probiotic at least 3 hours before or after antibiotic dose. Monitor stool for diarrhea and/or blood. If this occurs, contact primary care doctor ASAP.   Take prednisone as prescribed - take in the morning.     Mucinex/ Tylenol Sinus over the counter  Warm compresses over sinuses  Steam treatment (practice proper safety precautions when handing hot liquids/steam)  Over the counter saline nasal spray  Follow up with PCP in 3-5 days.  Proceed to  ER if symptoms worsen.    If tests are performed, our office will contact you with results only if changes need to made to the care plan discussed with you at the visit. You can review your full results on [facility].    Chief Complaint     Chief Complaint   Patient presents with    Nasal Congestion     Burning sensation,Runny nose, cough, hot/cold flashes headache, face hurts, 1 day, tylenol sinus, motrin, nightquil, nasal spray nothings working.          History of Present Illness       Sinus pain and pressure with congestion, ear pain and sore throat increasing since yesterday.  Patient reports she took multiple over-the-counter medications without any relief.  Patient denies any fevers.  Patient denies any exposure to sick contacts.  Patient denies shortness of breath, chest pain or abdominal pain.        Review of Systems   Review of Systems   Constitutional:  Positive for chills and diaphoresis.   HENT:  Positive for  congestion, rhinorrhea, sinus pressure, sinus pain and sore throat.    Respiratory:  Positive for cough. Negative for shortness of breath.    Cardiovascular:  Negative for chest pain.   Gastrointestinal:  Negative for abdominal pain.         Current Medications       Current Outpatient Medications:     albuterol (PROVENTIL HFA,VENTOLIN HFA) 90 mcg/act inhaler, , Disp: , Rfl:     amoxicillin-clavulanate (AUGMENTIN) 875-125 mg per tablet, Take 1 tablet by mouth every 12 (twelve) hours for 7 days, Disp: 14 tablet, Rfl: 0    buPROPion (WELLBUTRIN SR) 100 mg 12 hr tablet, TAKE ONE TABLET BY MOUTH 2 TIMES A DAY, Disp: 60 tablet, Rfl: 0    dicyclomine (BENTYL) 20 mg tablet, Take 1 tablet (20 mg total) by mouth 2 (two) times a day, Disp: 20 tablet, Rfl: 0    famotidine (PEPCID) 40 MG tablet, take 1 tablet by mouth at bedtime 2 HOURS BEFORE BED, Disp: 30 tablet, Rfl: 3    fluticasone (FLONASE) 50 mcg/act nasal spray, , Disp: , Rfl:     Levonorgestrel (Liletta, 52 MG,) 19.5 MCG/DAY IUD IUD, 1 each by Intrauterine route once, Disp: , Rfl:     predniSONE 20 mg tablet, Take 2 tablets (40 mg total) by mouth daily for 5 days, Disp: 10 tablet, Rfl: 0    Ascorbic Acid (vitamin C) 1000 MG tablet, Take 1 tablet (1,000 mg total) by mouth daily for 7 days, Disp: 7 tablet, Rfl: 0    benzonatate (TESSALON) 200 MG capsule, Take 1 capsule (200 mg total) by mouth 3 (three) times a day as needed for cough (Patient not taking: Reported on 9/3/2024), Disp: 20 capsule, Rfl: 0    celecoxib (CeleBREX) 200 mg capsule, TAKE ONE CAPSULE BY MOUTH EVERY DAY AS NEEDED (Patient not taking: Reported on 9/3/2024), Disp: 30 capsule, Rfl: 0    cholecalciferol (VITAMIN D3) 1,000 units tablet, Take 2 tablets (2,000 Units total) by mouth daily for 7 days, Disp: 14 tablet, Rfl: 0    ibuprofen (MOTRIN) 600 mg tablet, Take 1 tablet (600 mg total) by mouth every 6 (six) hours as needed for moderate pain (Patient not taking: Reported on 9/3/2024), Disp: 30 tablet,  Rfl: 0    ibuprofen (MOTRIN) 800 mg tablet, Take 1 tablet (800 mg total) by mouth every 8 (eight) hours as needed for mild pain (Patient not taking: Reported on 9/3/2024), Disp: 30 tablet, Rfl: 0    loratadine (CLARITIN) 10 mg tablet, Take 1 tablet (10 mg total) by mouth daily (Patient not taking: Reported on 9/3/2024), Disp: 30 tablet, Rfl: 3    methocarbamol (ROBAXIN) 500 mg tablet, Take 1 tablet (500 mg total) by mouth 2 (two) times a day as needed for muscle spasms (Patient not taking: Reported on 9/3/2024), Disp: 20 tablet, Rfl: 0    mupirocin (BACTROBAN) 2 % ointment, Apply topically 3 (three) times a day for 7 days Apply three times daily specifically to area of swelling on the right breast for the next 7 days., Disp: 15 g, Rfl: 0    nicotine (NICODERM CQ) 14 mg/24hr TD 24 hr patch, Place 1 patch on the skin every 24 hours (Patient not taking: Reported on 1/30/2024), Disp: , Rfl:     nicotine (Nicotrol) 10 MG inhaler, Inhale 1 puff (Patient not taking: Reported on 1/30/2024), Disp: , Rfl:     nicotine polacrilex (NICORETTE) 4 mg gum, Chew 4 mg, Disp: , Rfl:     pantoprazole (PROTONIX) 40 mg tablet, Take 1 tablet (40 mg total) by mouth daily (Patient not taking: Reported on 9/3/2024), Disp: 30 tablet, Rfl: 3    Wegovy 1 MG/0.5ML, Inject 1 mg under the skin Once a week (Patient not taking: Reported on 1/30/2024), Disp: , Rfl:   No current facility-administered medications for this visit.    Facility-Administered Medications Ordered in Other Visits:     Lidocaine Viscous HCl (XYLOCAINE) 2 % mucosal solution, , , Code/Trauma/Sedation Med, Santiago Glasgow DO, 15 mL at 05/10/21 1109    Current Allergies     Allergies as of 09/18/2024 - Reviewed 09/18/2024   Allergen Reaction Noted    No known allergies Allergic Rhinitis 12/16/2016            The following portions of the patient's history were reviewed and updated as appropriate: allergies, current medications, past family history, past medical history, past  social history, past surgical history and problem list.     Past Medical History:   Diagnosis Date    Allergic     Asthma     Cervical disc disorder with radiculopathy of mid-cervical region 2020    Cervical radiculopathy 2020    GERD (gastroesophageal reflux disease)     Herniated disc, cervical     Pinched nerve in shoulder, right     Thyroid nodule        Past Surgical History:   Procedure Laterality Date     SECTION      EPIDURAL BLOCK INJECTION N/A 3/4/2020    Procedure: C7-T1 Interlaminar Epidural Steroid Injection (03602);  Surgeon: Ana Morrell MD;  Location: [facility];  Service: Pain Management     EPIDURAL BLOCK INJECTION N/A 2020    Procedure: C7-T1 interlaminar epidural steroid injection;  Surgeon: Ana Morrell MD;  Location: [facility];  Service: Pain Management     FL GUIDED NEEDLE PLAC BX/ASP/INJ  3/4/2020    FL GUIDED NEEDLE PLAC BX/ASP/INJ  2020       Family History   Problem Relation Age of Onset    Diabetes Mother     Heart disease Mother     Bone cancer Father     Diabetes Brother     No Known Problems Daughter     No Known Problems Maternal Grandmother     No Known Problems Paternal Grandmother     No Known Problems Maternal Aunt     No Known Problems Paternal Aunt     No Known Problems Paternal Aunt     Breast cancer Cousin     Colon cancer Neg Hx     Ovarian cancer Neg Hx          Medications have been verified.        Objective   BP (!) 158/106   Pulse 83   Temp (!) 97.3 °F (36.3 °C)   Resp 18   Wt 79.5 kg (175 lb 3.2 oz)   SpO2 98%   BMI 33.10 kg/m²        Physical Exam     Physical Exam  Vitals and nursing note reviewed.   Constitutional:       General: She is not in acute distress.     Appearance: Normal appearance. She is ill-appearing.   HENT:      Head: Normocephalic.      Right Ear: External ear normal. A middle ear effusion is present. Tympanic membrane is bulging.      Left Ear: External ear normal. A middle ear effusion is present.       Nose: Congestion present.      Right Sinus: Maxillary sinus tenderness and frontal sinus tenderness present.      Left Sinus: Maxillary sinus tenderness and frontal sinus tenderness present.      Mouth/Throat:      Pharynx: Oropharyngeal exudate and posterior oropharyngeal erythema present.   Eyes:      Pupils: Pupils are equal, round, and reactive to light.   Cardiovascular:      Rate and Rhythm: Normal rate and regular rhythm.      Pulses: Normal pulses.      Heart sounds: Normal heart sounds.   Pulmonary:      Effort: Pulmonary effort is normal. No respiratory distress.      Breath sounds: Normal breath sounds. No stridor. No wheezing, rhonchi or rales.   Chest:      Chest wall: No tenderness.   Musculoskeletal:         General: Normal range of motion.      Cervical back: Normal range of motion and neck supple.   Lymphadenopathy:      Cervical: Cervical adenopathy present.   Skin:     General: Skin is warm and dry.      Capillary Refill: Capillary refill takes less than 2 seconds.   Neurological:      General: No focal deficit present.      Mental Status: She is alert and oriented to person, place, and time.   Psychiatric:         Mood and Affect: Mood normal.         Behavior: Behavior normal.

## 2024-09-18 NOTE — PATIENT INSTRUCTIONS
Take full course of Augmentin as prescribed  Eat yogurt with live and active cultures and/or take a probiotic at least 3 hours before or after antibiotic dose. Monitor stool for diarrhea and/or blood. If this occurs, contact primary care doctor ASAP.   Take prednisone as prescribed - take in the morning.     Mucinex/ Tylenol Sinus over the counter  Warm compresses over sinuses  Steam treatment (practice proper safety precautions when handing hot liquids/steam)  Over the counter saline nasal spray  Follow up with PCP in 3-5 days.  Proceed to  ER if symptoms worsen.    If tests are performed, our office will contact you with results only if changes need to made to the care plan discussed with you at the visit. [facility]    Patient Education     Serous Otitis Media   About this topic   The Eustachian tube allows fluid to drain from the middle ear. Sometimes, fluid cannot drain from the tube. This may cause an acute or chronic problem known as serous otitis media. An acute problem is one that does not last for a long time. Acute serous media is often caused by an infection or allergy. A chronic problem is one that lasts for a long time. Chronic serous otitis media may happen when the tube stays blocked because the fluid is too thick to drain from the tube.  This problem may go away on its own without treatment. If the fluid becomes infected, you may have ear infections more often. Your ears may feel like they are full and you may not be able to hear as well.     What are the causes?   Serous otitis media happens when something blocks the Eustachian tube. Sometimes, you are born with this problem. Other causes may include:  Infection  Allergy  Irritants like cigarette smoke  Drinking when lying down  Increase in air pressure like when flying  Enlarged adenoids  Cleft palate  What can make this more likely to happen?   Young children are more likely to have this problem. Kids  get more colds. They have Eustachian tubes that are not as developed as those of an adult. Having many colds or allergies may make you more at risk. Having a problem you were born with may cause a block.  What are the main signs?   Trouble hearing  Ear fullness  Pain or pulling on the ear  Problems with balance  How does the doctor diagnose this health problem?   Your doctor will take your history. Your doctor will do an exam and check your ears with a special tool. The doctor will look for changes to the eardrum. The doctor may order:  Lab tests  Hearing tests  How does the doctor treat this health problem?   Your doctor will treat the problem based on what the cause is. Often, it is an acute problem that the doctor will monitor over time. Drugs often do not help. Surgery may be needed to remove chronic fluid. Ear tubes may be put in to open the Eustachian tube.  Are there other health problems to treat?   If enlarged adenoids are the problem, you may need surgery to remove them.  What drugs may be needed?   Your doctor may order drugs based on what problems you are having. The doctor may order drugs to:  Help with pain and swelling  Fight an infection   Treat an allergy  What problems could happen?   Infection could come back  Loss of hearing  Problems with speech  Scarring on the eardrum  What can be done to prevent this health problem?   If you smoke, quit. Do not go near people who smoke.  Stay away from people who have colds.  If you have allergies, treat them, and try to avoid your triggers.  Wash your hands often.  If over 12 months of age, stop daytime use of a pacifier.  Last Reviewed Date   2020-08-05  Consumer Information Use and Disclaimer   This generalized information is a limited summary of diagnosis, treatment, and/or medication information. It is not meant to be comprehensive and should be used as a tool to help the user understand and/or assess potential diagnostic and treatment options. It does  NOT include all information about conditions, treatments, medications, side effects, or risks that may apply to a specific patient. It is not intended to be medical advice or a substitute for the medical advice, diagnosis, or treatment of a health care provider based on the health care provider's examination and assessment of a patient’s specific and unique circumstances. Patients must speak with a health care provider for complete information about their health, medical questions, and treatment options, including any risks or benefits regarding use of medications. This information does not endorse any treatments or medications as safe, effective, or approved for treating a specific patient. [employer] and its affiliates disclaim any warranty or liability relating to this information or the use thereof. The use of this information is governed by the Terms of Use, available at https://www.[employer].com/en/know/clinical-effectiveness-terms   Copyright   Copyright © 2024 [employer] and its affiliates and/or licensors. All rights reserved.

## 2024-09-24 ENCOUNTER — APPOINTMENT (OUTPATIENT)
Dept: PHYSICAL THERAPY | Facility: CLINIC | Age: 44
End: 2024-09-24
Payer: COMMERCIAL

## 2024-09-24 ENCOUNTER — OFFICE VISIT (OUTPATIENT)
Dept: URGENT CARE | Facility: CLINIC | Age: 44
End: 2024-09-24
Payer: COMMERCIAL

## 2024-09-24 VITALS
BODY MASS INDEX: 33.22 KG/M2 | TEMPERATURE: 98.5 F | OXYGEN SATURATION: 93 % | RESPIRATION RATE: 18 BRPM | HEART RATE: 94 BPM | WEIGHT: 175.8 LBS | SYSTOLIC BLOOD PRESSURE: 134 MMHG | DIASTOLIC BLOOD PRESSURE: 72 MMHG

## 2024-09-24 DIAGNOSIS — G89.29 CHRONIC BILATERAL LOW BACK PAIN WITH BILATERAL SCIATICA: ICD-10-CM

## 2024-09-24 DIAGNOSIS — M79.18 MYOFASCIAL PAIN SYNDROME: ICD-10-CM

## 2024-09-24 DIAGNOSIS — M54.41 CHRONIC BILATERAL LOW BACK PAIN WITH BILATERAL SCIATICA: ICD-10-CM

## 2024-09-24 DIAGNOSIS — M54.2 NECK PAIN: ICD-10-CM

## 2024-09-24 DIAGNOSIS — G89.4 CHRONIC PAIN SYNDROME: Primary | ICD-10-CM

## 2024-09-24 DIAGNOSIS — M54.42 CHRONIC BILATERAL LOW BACK PAIN WITH BILATERAL SCIATICA: ICD-10-CM

## 2024-09-24 DIAGNOSIS — J20.9 ACUTE BRONCHITIS, UNSPECIFIED ORGANISM: Primary | ICD-10-CM

## 2024-09-24 PROCEDURE — S9088 SERVICES PROVIDED IN URGENT: HCPCS | Performed by: FAMILY MEDICINE

## 2024-09-24 PROCEDURE — 99213 OFFICE O/P EST LOW 20 MIN: CPT | Performed by: FAMILY MEDICINE

## 2024-09-24 RX ORDER — PREDNISONE 20 MG/1
TABLET ORAL
Qty: 27 TABLET | Refills: 0 | Status: SHIPPED | OUTPATIENT
Start: 2024-09-24 | End: 2024-10-08

## 2024-09-24 RX ORDER — FLUTICASONE PROPIONATE 50 MCG
2 SPRAY, SUSPENSION (ML) NASAL DAILY
Qty: 1 G | Refills: 0 | Status: SHIPPED | OUTPATIENT
Start: 2024-09-24

## 2024-09-24 NOTE — PROGRESS NOTES
"Daily Note     Today's date: 2024  Patient name: Park Marte  : 1980  MRN: [id number]  Referring provider: Treasure Gonzales MD  Dx: No diagnosis found.               Subjective: ***      Objective: See treatment diary below      Assessment: Tolerated treatment {Tolerated treatment :}. Patient {assessment:}      Plan: {PLAN:}     Precautions: cerv radic, LBP w/B sciatica,asthma, srinivas cerv disc    Re-eval Date: 10/9/24    Date 8/28 9/6 9/10 9/12 9/17   Visit Count 1 2 3 4 5   FOTO Comp        Pain In    6/10 6/10   Pain Out    4/10          Manuals 8/28 9/6 9/10 9/12 9/17   SOR/distraction  MS KB + UT TT  +UT TT                           Neuro Re-Ed        TA c SLR   10 ea 10x ea 15x ea LE                                                   Ther Ex        Nustep w/UEs  L1 x 10' L1 x 10' L3  10' L3  10'   SKTC  10\"x5 ea 10\"x5 ea c towel 5 x 10\"  W/towel 5 x 10\" w/towel   DKTC    5 x 10\" 5 x 10\"   LTRs  5\" x 10 5\" x 15 ea 15 x 5\" ea 15 x 5\" ea   bridges  3\" 2x10 3\" 2x10 2 x 10  3\" 20 x 3\"     AH        Ham stretch  30\" x3 30\" x3 ea 3 X 30\"    W/rope  3 x 30\"   Cerv AROM  W/ERS        MTPs/LTPs  W/AH  GTB 2x10 ea GTB 2x10 ea GTB  20x/3\" GTB  25 x 3\"   Standing 3 way SLR                                        Ther Activity                        Gait Training                        Modalities                                     "

## 2024-09-24 NOTE — PROGRESS NOTES
[facility]        NAME: Park Marte is a 44 y.o. female  : 1980    MRN: [id number]  DATE: 2024  TIME: 9:02 AM    Assessment and Plan   Acute bronchitis, unspecified organism [J20.9]  1. Acute bronchitis, unspecified organism  loratadine-pseudoephedrine (CLARITIN-D 12-HOUR) 5-120 mg per tablet    fluticasone (FLONASE) 50 mcg/act nasal spray    predniSONE 20 mg tablet            Patient Instructions       Follow up with PCP in 3-5 days.  Proceed to  ER if symptoms worsen.    If tests have been performed at [facility], our office will contact you with results if changes need to be made to the care plan discussed with you at the visit.  You can [facility] on [facility]    Chief Complaint     Chief Complaint   Patient presents with    Cough     C/O cough sore throat chest congestion for one week. Was seen in [place] 5 days prior.          History of Present Illness       Patient is a 44 year old female presenting to [facility] with non improving symptoms.  Patient was evaluated in clinic 6 days ago and prescribed Augmentin for ear infection for which she is still taking.  Patient cough and wheezing has not improved.  Pt completed Prednisone 40mg for 5 days.  Pt is using inhaler at home as needed as well.  Patient reports ear is still hurting.     Cough  This is a new problem. The current episode started in the past 7 days. The problem has been unchanged. Associated symptoms include ear pain, nasal congestion and wheezing. Pertinent negatives include no chest pain, chills, fever, rash, sore throat or shortness of breath.       Review of Systems   Review of Systems   Constitutional:  Negative for chills and fever.   HENT:  Positive for congestion and ear pain. Negative for sore throat.    Eyes:  Negative for pain and visual disturbance.   Respiratory:  Positive for cough and wheezing. Negative for shortness of breath.    Cardiovascular:  Negative for chest pain and  palpitations.   Gastrointestinal:  Negative for abdominal pain and vomiting.   Genitourinary:  Negative for dysuria and hematuria.   Musculoskeletal:  Negative for arthralgias and back pain.   Skin:  Negative for color change and rash.   Neurological:  Negative for seizures and syncope.   All other systems reviewed and are negative.        Current Medications       Current Outpatient Medications:     albuterol (PROVENTIL HFA,VENTOLIN HFA) 90 mcg/act inhaler, , Disp: , Rfl:     celecoxib (CeleBREX) 200 mg capsule, TAKE ONE CAPSULE BY MOUTH EVERY DAY AS NEEDED, Disp: 30 capsule, Rfl: 0    dicyclomine (BENTYL) 20 mg tablet, Take 1 tablet (20 mg total) by mouth 2 (two) times a day, Disp: 20 tablet, Rfl: 0    famotidine (PEPCID) 40 MG tablet, take 1 tablet by mouth at bedtime 2 HOURS BEFORE BED, Disp: 30 tablet, Rfl: 3    fluticasone (FLONASE) 50 mcg/act nasal spray, , Disp: , Rfl:     fluticasone (FLONASE) 50 mcg/act nasal spray, 2 sprays into each nostril daily, Disp: 1 g, Rfl: 0    loratadine (CLARITIN) 10 mg tablet, Take 1 tablet (10 mg total) by mouth daily, Disp: 30 tablet, Rfl: 3    loratadine-pseudoephedrine (CLARITIN-D 12-HOUR) 5-120 mg per tablet, Take 1 tablet by mouth 2 (two) times a day, Disp: 30 tablet, Rfl: 0    methocarbamol (ROBAXIN) 500 mg tablet, Take 1 tablet (500 mg total) by mouth 2 (two) times a day as needed for muscle spasms, Disp: 20 tablet, Rfl: 0    pantoprazole (PROTONIX) 40 mg tablet, Take 1 tablet (40 mg total) by mouth daily, Disp: 30 tablet, Rfl: 3    predniSONE 20 mg tablet, Take 3 tablets (60 mg total) by mouth daily for 3 days, THEN 2.5 tablets (50 mg total) daily for 3 days, THEN 2 tablets (40 mg total) daily for 2 days, THEN 1.5 tablets (30 mg total) daily for 2 days, THEN 1 tablet (20 mg total) daily for 2 days, THEN 0.5 tablets (10 mg total) daily for 2 days., Disp: 27 tablet, Rfl: 0    Wegovy 1 MG/0.5ML, Inject 1 mg under the skin Once a week, Disp: , Rfl:      amoxicillin-clavulanate (AUGMENTIN) 875-125 mg per tablet, Take 1 tablet by mouth every 12 (twelve) hours for 7 days (Patient not taking: Reported on 9/24/2024), Disp: 14 tablet, Rfl: 0    Ascorbic Acid (vitamin C) 1000 MG tablet, Take 1 tablet (1,000 mg total) by mouth daily for 7 days, Disp: 7 tablet, Rfl: 0    benzonatate (TESSALON) 200 MG capsule, Take 1 capsule (200 mg total) by mouth 3 (three) times a day as needed for cough (Patient not taking: Reported on 9/3/2024), Disp: 20 capsule, Rfl: 0    buPROPion (WELLBUTRIN SR) 100 mg 12 hr tablet, TAKE ONE TABLET BY MOUTH 2 TIMES A DAY (Patient not taking: Reported on 9/24/2024), Disp: 60 tablet, Rfl: 0    cholecalciferol (VITAMIN D3) 1,000 units tablet, Take 2 tablets (2,000 Units total) by mouth daily for 7 days, Disp: 14 tablet, Rfl: 0    ibuprofen (MOTRIN) 600 mg tablet, Take 1 tablet (600 mg total) by mouth every 6 (six) hours as needed for moderate pain (Patient not taking: Reported on 9/3/2024), Disp: 30 tablet, Rfl: 0    ibuprofen (MOTRIN) 800 mg tablet, Take 1 tablet (800 mg total) by mouth every 8 (eight) hours as needed for mild pain (Patient not taking: Reported on 9/3/2024), Disp: 30 tablet, Rfl: 0    Levonorgestrel (Liletta, 52 MG,) 19.5 MCG/DAY IUD IUD, 1 each by Intrauterine route once, Disp: , Rfl:     mupirocin (BACTROBAN) 2 % ointment, Apply topically 3 (three) times a day for 7 days Apply three times daily specifically to area of swelling on the right breast for the next 7 days., Disp: 15 g, Rfl: 0    nicotine (NICODERM CQ) 14 mg/24hr TD 24 hr patch, Place 1 patch on the skin every 24 hours (Patient not taking: Reported on 1/30/2024), Disp: , Rfl:     nicotine (Nicotrol) 10 MG inhaler, Inhale 1 puff (Patient not taking: Reported on 1/30/2024), Disp: , Rfl:     nicotine polacrilex (NICORETTE) 4 mg gum, Chew 4 mg, Disp: , Rfl:   No current facility-administered medications for this visit.    Facility-Administered Medications Ordered in Other  Visits:     Lidocaine Viscous HCl (XYLOCAINE) 2 % mucosal solution, , , Code/Trauma/Sedation Med, Santiago Glasgow, , 15 mL at 05/10/21 1109    Current Allergies     Allergies as of 2024 - Reviewed 2024   Allergen Reaction Noted    No known allergies Allergic Rhinitis 2016            The following portions of the patient's history were reviewed and updated as appropriate: allergies, current medications, past family history, past medical history, past social history, past surgical history and problem list.     Past Medical History:   Diagnosis Date    Allergic     Asthma     Cervical disc disorder with radiculopathy of mid-cervical region 2020    Cervical radiculopathy 2020    GERD (gastroesophageal reflux disease)     Herniated disc, cervical     Pinched nerve in shoulder, right     Thyroid nodule        Past Surgical History:   Procedure Laterality Date     SECTION      EPIDURAL BLOCK INJECTION N/A 3/4/2020    Procedure: C7-T1 Interlaminar Epidural Steroid Injection (70713);  Surgeon: Ana Morrell MD;  Location: [facility];  Service: Pain Management     EPIDURAL BLOCK INJECTION N/A 2020    Procedure: C7-T1 interlaminar epidural steroid injection;  Surgeon: Ana Morrell MD;  Location: [facility];  Service: Pain Management     FL GUIDED NEEDLE PLAC BX/ASP/INJ  3/4/2020    FL GUIDED NEEDLE PLAC BX/ASP/INJ  2020       Family History   Problem Relation Age of Onset    Diabetes Mother     Heart disease Mother     Bone cancer Father     Diabetes Brother     No Known Problems Daughter     No Known Problems Maternal Grandmother     No Known Problems Paternal Grandmother     No Known Problems Maternal Aunt     No Known Problems Paternal Aunt     No Known Problems Paternal Aunt     Breast cancer Cousin     Colon cancer Neg Hx     Ovarian cancer Neg Hx          Medications have been verified.        Objective   /72   Pulse 94   Temp 98.5 °F (36.9 °C)    Resp 18   Wt 79.7 kg (175 lb 12.8 oz)   SpO2 93%   BMI 33.22 kg/m²   No LMP recorded. Patient has had an implant.       Physical Exam     Physical Exam  Constitutional:       Appearance: Normal appearance.   HENT:      Head: Normocephalic and atraumatic.      Right Ear: Tympanic membrane and ear canal normal.      Left Ear: Tympanic membrane and ear canal normal.      Nose: Congestion present.      Mouth/Throat:      Mouth: Mucous membranes are moist.      Pharynx: Posterior oropharyngeal erythema present.   Eyes:      Extraocular Movements: Extraocular movements intact.      Conjunctiva/sclera: Conjunctivae normal.      Pupils: Pupils are equal, round, and reactive to light.   Cardiovascular:      Rate and Rhythm: Normal rate.      Heart sounds: No murmur heard.     No friction rub. No gallop.   Pulmonary:      Effort: Pulmonary effort is normal.      Breath sounds: Wheezing present. No rhonchi or rales.   Musculoskeletal:         General: Normal range of motion.      Cervical back: Normal range of motion and neck supple.   Skin:     General: Skin is warm and dry.      Capillary Refill: Capillary refill takes less than 2 seconds.   Neurological:      General: No focal deficit present.      Mental Status: She is alert and oriented to person, place, and time.   Psychiatric:         Mood and Affect: Mood normal.         Behavior: Behavior normal.

## 2024-09-25 ENCOUNTER — TELEPHONE (OUTPATIENT)
Dept: PAIN MEDICINE | Facility: CLINIC | Age: 44
End: 2024-09-25

## 2024-09-26 ENCOUNTER — OFFICE VISIT (OUTPATIENT)
Dept: PHYSICAL THERAPY | Facility: CLINIC | Age: 44
End: 2024-09-26
Payer: COMMERCIAL

## 2024-09-26 DIAGNOSIS — M54.2 NECK PAIN: ICD-10-CM

## 2024-09-26 DIAGNOSIS — G89.29 CHRONIC BILATERAL LOW BACK PAIN WITH BILATERAL SCIATICA: ICD-10-CM

## 2024-09-26 DIAGNOSIS — M79.18 MYOFASCIAL PAIN SYNDROME: ICD-10-CM

## 2024-09-26 DIAGNOSIS — M54.42 CHRONIC BILATERAL LOW BACK PAIN WITH BILATERAL SCIATICA: ICD-10-CM

## 2024-09-26 DIAGNOSIS — M54.41 CHRONIC BILATERAL LOW BACK PAIN WITH BILATERAL SCIATICA: ICD-10-CM

## 2024-09-26 DIAGNOSIS — G89.4 CHRONIC PAIN SYNDROME: Primary | ICD-10-CM

## 2024-09-26 PROCEDURE — 97140 MANUAL THERAPY 1/> REGIONS: CPT | Performed by: PHYSICAL MEDICINE & REHABILITATION

## 2024-09-26 PROCEDURE — 97110 THERAPEUTIC EXERCISES: CPT | Performed by: PHYSICAL MEDICINE & REHABILITATION

## 2024-09-26 NOTE — PROGRESS NOTES
"Daily Note     Today's date: 2024  Patient name: Park Marte  : 1980  MRN: [id number]  Referring provider: Treasure Gonzales MD  Dx:   Encounter Diagnosis     ICD-10-CM    1. Chronic pain syndrome  G89.4       2. Neck pain  M54.2       3. Chronic bilateral low back pain with bilateral sciatica  M54.42     M54.41     G89.29       4. Myofascial pain syndrome  M79.18                      Subjective: Pt states she has had an URI which has increased the pain and tension in her neck rated at 8/10. Notes her pain fluctuates depending on her amount of driving, HH chores,walking,and activity she is performing. Pt questioning what type of pillow she should get for her condition and requesting Rx for same. Advised pt we cannot write Rx's and pt stated she would discuss this with her physician and call her insurance company to see if it is covered.      Objective: See treatment diary below      Assessment: Tolerated treatment well. Patient exhibited good technique with therapeutic exercises and would benefit from continued PT. Pt noted feeling better following tx.      Plan: Continue per plan of care.      Precautions: cerv radic, LBP w/B sciatica,asthma, srinivas cerv disc    Re-eval Date: 10/9/24    Date 9/26 9/6 9/10 9/12 9/17   Visit Count 6 2 3 4 5   FOTO Due NV       Pain In 8/10   6/10 6/10   Pain Out \"Better\"   410          Manuals 9/26 9/6 9/10 9/12 9/17   SOR/distraction TT MS KB + UT TT  +UT TT                           Neuro Re-Ed        TA c SLR 15x ea  10 ea 10x ea 15x ea LE                                                   Ther Ex        Nustep w/UEs L3  10' L1 x 10' L1 x 10' L3  10' L3  10'   SKTC 5 x 10\" 10\"x5 ea 10\"x5 ea c towel 5 x 10\"  W/towel 5 x 10\" w/towel   DKTC 5 x 10\"   5 x 10\" 5 x 10\"   LTRs 15 x 5\" 5\" x 10 5\" x 15 ea 15 x 5\" ea 15 x 5\" ea   bridges 25 x 3\" 3\" 2x10 3\" 2x10 2 x 10  3\" 20 x 3\"     AH        Ham stretch 3 x 30\" 30\" x3 30\" x3 ea 3 X 30\"    W/rope  3 x 30\"   Cerv AROM  W/ERS   " "     MTPs/LTPs  W/AH GTB 3x10 GTB 2x10 ea GTB 2x10 ea GTB  20x/3\" GTB  25 x 3\"   Standing 3 way SLR        Cerv retraction into pillow 10 x 5\"                               Ther Activity                        Gait Training                        Modalities                                       "

## 2024-10-01 ENCOUNTER — HOSPITAL ENCOUNTER (OUTPATIENT)
Dept: ULTRASOUND IMAGING | Facility: HOSPITAL | Age: 44
Discharge: HOME | End: 2024-10-01
Attending: OTOLARYNGOLOGY
Payer: COMMERCIAL

## 2024-10-01 DIAGNOSIS — E04.1 THYROID NODULE: ICD-10-CM

## 2024-10-01 PROCEDURE — 76536 US EXAM OF HEAD AND NECK: CPT

## 2024-10-03 ENCOUNTER — OFFICE VISIT (OUTPATIENT)
Dept: URGENT CARE | Facility: CLINIC | Age: 44
End: 2024-10-03
Payer: COMMERCIAL

## 2024-10-03 VITALS
OXYGEN SATURATION: 97 % | TEMPERATURE: 98.1 F | DIASTOLIC BLOOD PRESSURE: 81 MMHG | RESPIRATION RATE: 18 BRPM | HEART RATE: 88 BPM | SYSTOLIC BLOOD PRESSURE: 130 MMHG

## 2024-10-03 DIAGNOSIS — H69.91 ACUTE DYSFUNCTION OF RIGHT EUSTACHIAN TUBE: ICD-10-CM

## 2024-10-03 DIAGNOSIS — N30.01 ACUTE CYSTITIS WITH HEMATURIA: Primary | ICD-10-CM

## 2024-10-03 LAB
SL AMB  POCT GLUCOSE, UA: NEGATIVE
SL AMB LEUKOCYTE ESTERASE,UA: ABNORMAL
SL AMB POCT BILIRUBIN,UA: ABNORMAL
SL AMB POCT BLOOD,UA: ABNORMAL
SL AMB POCT CLARITY,UA: ABNORMAL
SL AMB POCT COLOR,UA: ABNORMAL
SL AMB POCT KETONES,UA: NEGATIVE
SL AMB POCT NITRITE,UA: NEGATIVE
SL AMB POCT PH,UA: 7
SL AMB POCT SPECIFIC GRAVITY,UA: 1.01
SL AMB POCT URINE PROTEIN: NEGATIVE
SL AMB POCT UROBILINOGEN: 0.2

## 2024-10-03 PROCEDURE — 99213 OFFICE O/P EST LOW 20 MIN: CPT | Performed by: PHYSICIAN ASSISTANT

## 2024-10-03 PROCEDURE — 87086 URINE CULTURE/COLONY COUNT: CPT | Performed by: PHYSICIAN ASSISTANT

## 2024-10-03 PROCEDURE — 81025 URINE PREGNANCY TEST: CPT | Performed by: PHYSICIAN ASSISTANT

## 2024-10-03 PROCEDURE — S9088 SERVICES PROVIDED IN URGENT: HCPCS | Performed by: PHYSICIAN ASSISTANT

## 2024-10-03 PROCEDURE — 81002 URINALYSIS NONAUTO W/O SCOPE: CPT | Performed by: PHYSICIAN ASSISTANT

## 2024-10-03 RX ORDER — CEPHALEXIN 500 MG/1
500 CAPSULE ORAL EVERY 12 HOURS SCHEDULED
Qty: 14 CAPSULE | Refills: 0 | Status: SHIPPED | OUTPATIENT
Start: 2024-10-03 | End: 2024-10-10

## 2024-10-03 RX ORDER — FLUTICASONE PROPIONATE 50 MCG
2 SPRAY, SUSPENSION (ML) NASAL DAILY
Qty: 1 G | Refills: 0 | Status: SHIPPED | OUTPATIENT
Start: 2024-10-03

## 2024-10-03 NOTE — PROGRESS NOTES
[facility]        NAME: Park Marte is a 44 y.o. female  : 1980    MRN: [id number]  DATE: October 3, 2024  TIME: 11:42 AM    Assessment and Plan   Acute cystitis with hematuria [N30.01]  1. Acute cystitis with hematuria  POCT urine dip    Urine culture    POCT urine HCG    cephalexin (KEFLEX) 500 mg capsule      2. Acute dysfunction of right eustachian tube  fluticasone (FLONASE) 50 mcg/act nasal spray    loratadine-pseudoephedrine (CLARITIN-D 12-HOUR) 5-120 mg per tablet            Patient Instructions       Follow up with PCP in 3-5 days.  Proceed to  ER if symptoms worsen.    If tests have been performed at [facility], our office will contact you with results if changes need to be made to the care plan discussed with you at the visit.  [facility]    Chief Complaint     Chief Complaint   Patient presents with    Earache     Both ears , for a while, feels a pop in ears with leaning head, also, c/o sinus congestion     Possible UTI     For a few days, c/o burning          History of Present Illness       Patient is a 44 year old female presenting to [facility] with urinary symptoms and right ear pain/pressure.  Patient reports urinary symptoms began a few days ago.  Burning w/ urination.  Pt denies fever, flank or abdominal pain.   Patient also reports a right ear fullness/pressure sensation.  Ear pressure began a few weeks ago and is intermittent.  Patient has not tried anything for the sensation.  No ear drainage.    Earache   There is pain in the right ear. This is a new problem. The current episode started 1 to 4 weeks ago. The problem has been waxing and waning. There has been no fever. Pertinent negatives include no abdominal pain, coughing, rash, sore throat or vomiting.       Review of Systems   Review of Systems   Constitutional:  Negative for chills and fever.   HENT:  Positive for ear pain. Negative for sore throat.    Eyes:  Negative for pain  and visual disturbance.   Respiratory:  Negative for cough and shortness of breath.    Cardiovascular:  Negative for chest pain and palpitations.   Gastrointestinal:  Negative for abdominal pain and vomiting.   Genitourinary:  Positive for dysuria and frequency. Negative for hematuria.   Musculoskeletal:  Negative for arthralgias and back pain.   Skin:  Negative for color change and rash.   Neurological:  Negative for seizures and syncope.   All other systems reviewed and are negative.        Current Medications       Current Outpatient Medications:     albuterol (PROVENTIL HFA,VENTOLIN HFA) 90 mcg/act inhaler, , Disp: , Rfl:     celecoxib (CeleBREX) 200 mg capsule, TAKE ONE CAPSULE BY MOUTH EVERY DAY AS NEEDED, Disp: 30 capsule, Rfl: 0    cephalexin (KEFLEX) 500 mg capsule, Take 1 capsule (500 mg total) by mouth every 12 (twelve) hours for 7 days, Disp: 14 capsule, Rfl: 0    dicyclomine (BENTYL) 20 mg tablet, Take 1 tablet (20 mg total) by mouth 2 (two) times a day, Disp: 20 tablet, Rfl: 0    famotidine (PEPCID) 40 MG tablet, take 1 tablet by mouth at bedtime 2 HOURS BEFORE BED, Disp: 30 tablet, Rfl: 3    fluticasone (FLONASE) 50 mcg/act nasal spray, , Disp: , Rfl:     fluticasone (FLONASE) 50 mcg/act nasal spray, 2 sprays into each nostril daily, Disp: 1 g, Rfl: 0    fluticasone (FLONASE) 50 mcg/act nasal spray, 2 sprays into each nostril daily, Disp: 1 g, Rfl: 0    Levonorgestrel (Liletta, 52 MG,) 19.5 MCG/DAY IUD IUD, 1 each by Intrauterine route once, Disp: , Rfl:     loratadine (CLARITIN) 10 mg tablet, Take 1 tablet (10 mg total) by mouth daily, Disp: 30 tablet, Rfl: 3    loratadine-pseudoephedrine (CLARITIN-D 12-HOUR) 5-120 mg per tablet, Take 1 tablet by mouth 2 (two) times a day, Disp: 30 tablet, Rfl: 0    loratadine-pseudoephedrine (CLARITIN-D 12-HOUR) 5-120 mg per tablet, Take 1 tablet by mouth 2 (two) times a day, Disp: 30 tablet, Rfl: 0    methocarbamol (ROBAXIN) 500 mg tablet, Take 1 tablet (500 mg  total) by mouth 2 (two) times a day as needed for muscle spasms, Disp: 20 tablet, Rfl: 0    pantoprazole (PROTONIX) 40 mg tablet, Take 1 tablet (40 mg total) by mouth daily, Disp: 30 tablet, Rfl: 3    Ascorbic Acid (vitamin C) 1000 MG tablet, Take 1 tablet (1,000 mg total) by mouth daily for 7 days, Disp: 7 tablet, Rfl: 0    benzonatate (TESSALON) 200 MG capsule, Take 1 capsule (200 mg total) by mouth 3 (three) times a day as needed for cough (Patient not taking: Reported on 9/3/2024), Disp: 20 capsule, Rfl: 0    buPROPion (WELLBUTRIN SR) 100 mg 12 hr tablet, TAKE ONE TABLET BY MOUTH 2 TIMES A DAY (Patient not taking: Reported on 9/24/2024), Disp: 60 tablet, Rfl: 0    cholecalciferol (VITAMIN D3) 1,000 units tablet, Take 2 tablets (2,000 Units total) by mouth daily for 7 days, Disp: 14 tablet, Rfl: 0    ibuprofen (MOTRIN) 600 mg tablet, Take 1 tablet (600 mg total) by mouth every 6 (six) hours as needed for moderate pain (Patient not taking: Reported on 9/3/2024), Disp: 30 tablet, Rfl: 0    ibuprofen (MOTRIN) 800 mg tablet, Take 1 tablet (800 mg total) by mouth every 8 (eight) hours as needed for mild pain (Patient not taking: Reported on 9/3/2024), Disp: 30 tablet, Rfl: 0    mupirocin (BACTROBAN) 2 % ointment, Apply topically 3 (three) times a day for 7 days Apply three times daily specifically to area of swelling on the right breast for the next 7 days., Disp: 15 g, Rfl: 0    nicotine (NICODERM CQ) 14 mg/24hr TD 24 hr patch, Place 1 patch on the skin every 24 hours (Patient not taking: Reported on 1/30/2024), Disp: , Rfl:     nicotine (Nicotrol) 10 MG inhaler, Inhale 1 puff (Patient not taking: Reported on 1/30/2024), Disp: , Rfl:     nicotine polacrilex (NICORETTE) 4 mg gum, Chew 4 mg, Disp: , Rfl:     predniSONE 20 mg tablet, Take 3 tablets (60 mg total) by mouth daily for 3 days, THEN 2.5 tablets (50 mg total) daily for 3 days, THEN 2 tablets (40 mg total) daily for 2 days, THEN 1.5 tablets (30 mg total) daily  for 2 days, THEN 1 tablet (20 mg total) daily for 2 days, THEN 0.5 tablets (10 mg total) daily for 2 days. (Patient not taking: Reported on 10/3/2024), Disp: 27 tablet, Rfl: 0    Wegovy 1 MG/0.5ML, Inject 1 mg under the skin Once a week (Patient not taking: Reported on 10/3/2024), Disp: , Rfl:   No current facility-administered medications for this visit.    Facility-Administered Medications Ordered in Other Visits:     Lidocaine Viscous HCl (XYLOCAINE) 2 % mucosal solution, , , Code/Trauma/Sedation Med, Santiago Glasgow DO, 15 mL at 05/10/21 1109    Current Allergies     Allergies as of 10/03/2024 - Reviewed 10/03/2024   Allergen Reaction Noted    No known allergies Allergic Rhinitis 2016            The following portions of the patient's history were reviewed and updated as appropriate: allergies, current medications, past family history, past medical history, past social history, past surgical history and problem list.     Past Medical History:   Diagnosis Date    Allergic     Asthma     Cervical disc disorder with radiculopathy of mid-cervical region 2020    Cervical radiculopathy 2020    GERD (gastroesophageal reflux disease)     Herniated disc, cervical     Pinched nerve in shoulder, right     Thyroid nodule        Past Surgical History:   Procedure Laterality Date     SECTION      EPIDURAL BLOCK INJECTION N/A 3/4/2020    Procedure: C7-T1 Interlaminar Epidural Steroid Injection (73519);  Surgeon: Ana Morrell MD;  Location: [facility];  Service: Pain Management     EPIDURAL BLOCK INJECTION N/A 2020    Procedure: C7-T1 interlaminar epidural steroid injection;  Surgeon: Ana Morrell MD;  Location: [facility];  Service: Pain Management     FL GUIDED NEEDLE PLAC BX/ASP/INJ  3/4/2020    FL GUIDED NEEDLE PLAC BX/ASP/INJ  2020       Family History   Problem Relation Age of Onset    Diabetes Mother     Heart disease Mother     Bone cancer Father     Diabetes  Brother     No Known Problems Daughter     No Known Problems Maternal Grandmother     No Known Problems Paternal Grandmother     No Known Problems Maternal Aunt     No Known Problems Paternal Aunt     No Known Problems Paternal Aunt     Breast cancer Cousin     Colon cancer Neg Hx     Ovarian cancer Neg Hx          Medications have been verified.        Objective   /81   Pulse 88   Temp 98.1 °F (36.7 °C)   Resp 18   SpO2 97%   No LMP recorded. Patient has had an implant.       Physical Exam     Physical Exam  Constitutional:       Appearance: Normal appearance.   HENT:      Head: Normocephalic and atraumatic.      Right Ear: A middle ear effusion is present. Tympanic membrane is not erythematous or bulging.      Left Ear: Tympanic membrane, ear canal and external ear normal.      Nose: Nose normal.      Mouth/Throat:      Mouth: Mucous membranes are moist.   Eyes:      Extraocular Movements: Extraocular movements intact.      Conjunctiva/sclera: Conjunctivae normal.      Pupils: Pupils are equal, round, and reactive to light.   Cardiovascular:      Rate and Rhythm: Normal rate.   Pulmonary:      Effort: Pulmonary effort is normal.   Musculoskeletal:         General: Normal range of motion.      Cervical back: Normal range of motion and neck supple.   Skin:     General: Skin is warm and dry.      Capillary Refill: Capillary refill takes less than 2 seconds.   Neurological:      General: No focal deficit present.      Mental Status: She is alert and oriented to person, place, and time.   Psychiatric:         Mood and Affect: Mood normal.         Behavior: Behavior normal.

## 2024-10-04 LAB — BACTERIA UR CULT: NORMAL

## 2024-10-24 ENCOUNTER — OFFICE VISIT (OUTPATIENT)
Dept: OTOLARYNGOLOGY | Facility: CLINIC | Age: 44
End: 2024-10-24
Payer: COMMERCIAL

## 2024-10-24 ENCOUNTER — OFFICE VISIT (OUTPATIENT)
Dept: AUDIOLOGY | Facility: CLINIC | Age: 44
End: 2024-10-24
Payer: COMMERCIAL

## 2024-10-24 VITALS
DIASTOLIC BLOOD PRESSURE: 89 MMHG | TEMPERATURE: 98.6 F | BODY MASS INDEX: 32.85 KG/M2 | RESPIRATION RATE: 16 BRPM | HEIGHT: 61 IN | SYSTOLIC BLOOD PRESSURE: 125 MMHG | OXYGEN SATURATION: 97 % | HEART RATE: 84 BPM | WEIGHT: 174 LBS

## 2024-10-24 DIAGNOSIS — H60.40 SQUAMOUS DEBRIS IN EAR CANAL: ICD-10-CM

## 2024-10-24 DIAGNOSIS — E04.1 THYROID NODULE: Primary | ICD-10-CM

## 2024-10-24 DIAGNOSIS — H90.0 CONDUCTIVE HEARING LOSS, BILATERAL: ICD-10-CM

## 2024-10-24 DIAGNOSIS — H61.21 IMPACTED CERUMEN OF RIGHT EAR: ICD-10-CM

## 2024-10-24 DIAGNOSIS — H90.0 CONDUCTIVE HEARING LOSS OF BOTH EARS: Primary | ICD-10-CM

## 2024-10-24 DIAGNOSIS — H73.891 RETRACTION POCKET OF TYMPANIC MEMBRANE OF RIGHT EAR: ICD-10-CM

## 2024-10-24 DIAGNOSIS — H73.892: ICD-10-CM

## 2024-10-24 DIAGNOSIS — H69.93 DYSFUNCTION OF BOTH EUSTACHIAN TUBES: ICD-10-CM

## 2024-10-24 PROCEDURE — 92567 TYMPANOMETRY: CPT | Performed by: AUDIOLOGIST-HEARING AID FITTER

## 2024-10-24 PROCEDURE — 69210 REMOVE IMPACTED EAR WAX UNI: CPT | Performed by: PHYSICIAN ASSISTANT

## 2024-10-24 PROCEDURE — 99214 OFFICE O/P EST MOD 30 MIN: CPT | Performed by: PHYSICIAN ASSISTANT

## 2024-10-24 PROCEDURE — 92557 COMPREHENSIVE HEARING TEST: CPT | Performed by: AUDIOLOGIST-HEARING AID FITTER

## 2024-10-24 RX ORDER — PREDNISOLONE ACETATE 10 MG/ML
SUSPENSION/ DROPS OPHTHALMIC
Qty: 10 ML | Refills: 1 | Status: SHIPPED | OUTPATIENT
Start: 2024-10-24

## 2024-10-24 NOTE — PROGRESS NOTES
ENT HEARING EVALUATION    Name:  Park Marte  :  1980  Age:  44 y.o.   MRN:  [id number]  Date of Evaluation: 10/24/24     HISTORY:      Park Marte is being seen today for an evaluation of hearing as part of their ENT visit.   EVALUATION:    Otoscopy was completed during ENT visit.    Tympanometry:   Right Ear: Type B; no measurable middle ear pressure or static compliance, consistent with middle ear pathology.    Left Ear: Type C; significant negative middle ear pressure in the presence of normal static compliance, consistent with Eustachian tube dysfunction or middle ear pathology.     Speech Audiometry:  Speech Reception (SRT)   Right Ear: 30 dB HL   Left Ear: 30 dB HL    Word Recognition Scores (WRS):  Right Ear: excellent (100 % correct)     Left Ear: excellent (100 % correct)   Stimuli: [id number]    Pure Tone Audiometry:  Conventional pure tone audiometry from 250 - 8000 Hz  was obtained with good reliability and revealed the following:     Right Ear: Mild conductive hearing loss (CNHL)    Left Ear: Mild conductive hearing loss (CNHL)       *see attached audiogram    RECOMMENDATIONS:  Consult ENT      Vishnu Sandhu, CCC-A  Clinical Audiologist

## 2024-10-24 NOTE — PROGRESS NOTES
[facility] Otolaryngology Follow up visit      Park Marte is a 44 y.o. female who presents with a chief complaint of thyroid     Independent historian: none      Time interval of problem since last visit:  1 year    Pertinent elements of the history:  Here to review thyroid US  H/o thyroid nodules, following since at least 2019    No nodule meeting criteria for biopsy   No family history   No radiation exposure       Nasal congestion   Ear clogging, Right>Left   Right ear pops but doesn't go back to how it should  Pressure fullness   Sense of smell isn't the greatest       No history of tympanostomy tubes     Review of any relevant imaging: images from any scan reviewed personally  Scans:       Thyroid US 10/01/24:   Right lobe: 5.0 x 1.5 x 1.9 cm. Volume 7.0 mL, Left lobe: 4.8 x 1.6 x 1.6 cm. Volume 6.1 mL, Isthmus: 0.2 cm.     Nodule #1. RIGHT lower pole nodule measuring 1.5 x 1.2 x 1.1 cm. Given differences in measuring technique, no significant change from prior. TR 3.     Nodule #2. LEFT lower pole nodule measuring 2.1 x 1.2 x 1.3 cm. Given differences in measuring technique, no significant change since and significantly smaller since July. TR 2.      Labs: TSH 1.59  Notes:     Review of Systems:  As above    PMHx:  Past Medical History:   Diagnosis Date    Allergic     Asthma     Cervical disc disorder with radiculopathy of mid-cervical region 1/6/2020    Cervical radiculopathy 1/6/2020    GERD (gastroesophageal reflux disease)     Herniated disc, cervical     Pinched nerve in shoulder, right     Thyroid nodule         FAMHx:  Family History   Problem Relation Age of Onset    Diabetes Mother     Heart disease Mother     Bone cancer Father     Diabetes Brother     No Known Problems Daughter     No Known Problems Maternal Grandmother     No Known Problems Paternal Grandmother     No Known Problems Maternal Aunt     No Known Problems Paternal Aunt     No Known Problems Paternal Aunt     Breast cancer Cousin      Colon cancer Neg Hx     Ovarian cancer Neg Hx        SOCHx:  Social History     Socioeconomic History    Marital status: Single     Spouse name: Not on file    Number of children: 2    Years of education: Not on file    Highest education level: Not on file   Occupational History     Comment: currently not working / FMLA   Tobacco Use    Smoking status: Every Day     Current packs/day: 1.00     Types: Cigarettes    Smokeless tobacco: Never   Vaping Use    Vaping status: Never Used   Substance and Sexual Activity    Alcohol use: No    Drug use: Not Currently     Types: Marijuana    Sexual activity: Yes     Partners: Male     Birth control/protection: I.U.D.   Other Topics Concern    Not on file   Social History Narrative    Not on file     Social Determinants of Health     Financial Resource Strain: Patient Declined (2/7/2024)    [facility]    Overall Financial Resource Strain (CARDIA)     Difficulty of Paying Living Expenses: Patient declined   Food Insecurity: Patient Declined (2/7/2024)    [facility]    Hunger Vital Sign     Worried About Running Out of Food in the Last Year: Patient declined     Ran Out of Food in the Last Year: Patient declined   Transportation Needs: No Transportation Needs (2/7/2024)    [facility]    PRAPARE - Transportation     Lack of Transportation (Medical): No     Lack of Transportation (Non-Medical): No   Physical Activity: Insufficiently Active (3/30/2023)    Received from [facility]    Exercise Vital Sign     Days of Exercise per Week: 1 day     Minutes of Exercise per Session: 10 min   Stress: No Stress Concern Present (2/7/2024)    [facility]    [employer] - Occupational Stress Questionnaire  "    Feeling of Stress : Not at all   Social Connections: Feeling Socially Integrated (2/7/2024)    [facility]    OASIS : Social Isolation     How often do you feel lonely or isolated from those around you?: Never   Intimate Partner Violence: Not At Risk (2/7/2024)    [facility]    Humiliation, Afraid, Rape, and Kick questionnaire     Fear of Current or Ex-Partner: No     Emotionally Abused: No     Physically Abused: No     Sexually Abused: No   Housing Stability: Unknown (2/7/2024)    [facility]    Housing Stability Vital Sign     Unable to Pay for Housing in the Last Year: Patient declined     Number of Places Lived in the Last Year: Not on file     Unstable Housing in the Last Year: No       Allergies:  Allergies   Allergen Reactions    No Known Allergies Allergic Rhinitis     Seasonal          MEDS:  Reviewed      Physical exam: (abnormal findings appear in bold and supercede any conflicting normal findings listed below)    /89 (BP Location: Left arm, Patient Position: Sitting, Cuff Size: Large)   Pulse 84   Temp 98.6 °F (37 °C) (Temporal)   Resp 16   Ht 5' 1\" (1.549 m)   Wt 78.9 kg (174 lb)   SpO2 97%   BMI 32.88 kg/m²     Constitutional:  Well developed, well nourished and groomed, in no acute distress.     Eyes:  Extra-ocular movements intact, pupils equally round and reactive to light and accommodation, the lids and conjunctivae are normal in appearance.    Head: Atraumatic, normocephalic, no visible scalp lesions, bony palpation unremarkable without stepoffs, parotid and submandibular salivary glands non-tender to palpation and without masses bilaterally.     Ears:  Auricles normal in appearance bilaterally, mastoid prominence non-tender, external auditory canals clear bilaterally, tympanic membranes intact " bilaterally without evidence of middle ear effusion or masses, normal appearing ossicles. Right TM posterior retraction pocket with squamous debris posterior superiorly. Left ear pars flaccida retraction. No squamous build up.    Nose/Sinuses:  External appearance unremarkable, no maxillary or frontal sinus tenderness to palpation bilaterally. Anterior rhinoscopy demonstrates  pink mucosa. No polyps or other masses identified. Turbinates are non-edematous. No evidence of purulent drainage.    Oral Cavity:  Moist mucus membranes, gums and dentition unremarkable, no oral mucosal masses or lesions, floor of mouth soft, tongue mobile without masses or lesions.     Oropharynx:  Base of tongue soft and without masses, tonsils bilaterally unremarkable, soft palate mucosa unremarkable.     Neck:  No visible or palpable cervical lesions or lymphadenopathy, thyroid gland is normal in size and symmetry and without masses, normal laryngeal elevation with swallowing.     Cardiovascular:  Normal rate and rhythm, no palpable thrills, no jugulovenous distension observed.  Respiratory:  Normal respiratory effort without evidence of retractions or use of accessory muscles.  Integument:  Normal appearing without observed masses or lesions.  Neurologic:  Cranial nerves II-XII intact bilaterally.  Psychiatric:  Alert and oriented to time, place and person, normal affect.      Procedure:  Procedure: Cerumen debridement right EAC     Indications: Cerumen impaction/squamous debris right EAC     Procedure in detail: After informed verbal consent was obtained the ear was visualized using procedural otoscope. Affected ear was debrided of cerumen using cerumen loop, suction, and alligator forceps. The findings below were seen. The patient tolerated the procedure well.     FINDINGS: Cerumen impaction removed without difficulty. Friable tissue and canal granulation underlying        Audiometry:  Audiogram ordered and reviewed with patient and  audiologist. Right ear mild conductive hearing loss. Moderate at 4k Hz. Left ear mild conductive hearing loss. Word recognition 100% at 65 dB. Tympanograms type B right, type C left         Assessment:  1. Thyroid nodule  US thyroid      2. Attic retraction pocket of left tympanic membrane  CT Temporal Bones/IACs/Mastoids WO Contrast      3. Retraction pocket of tympanic membrane of right ear  CT Temporal Bones/IACs/Mastoids WO Contrast    prednisoLONE acetate (PRED FORTE) 1 % ophthalmic suspension      4. Dysfunction of both eustachian tubes  CT Temporal Bones/IACs/Mastoids WO Contrast      5. Conductive hearing loss, bilateral        6. Impacted cerumen of right ear        7. Squamous debris in ear canal            Plan:  1. Park Marte is a 44 y.o. female with acute and chronic problems as above who presents for re-evaluation of thyroid nodules. She had a thyroid US completed on 10/01/24, demonstrating a right lower pole nodule measuring 1.5 x 1.2 x 1.1 cm. TR 3. And a left lower pole nodule measuring 2.1 x 1.2 x 1.3 cm. TR 2. Both have been stable since 2019.    Discussed SHREYAS guidelines, TI-RADS scoring, and indications for further interventions. Discussed risk percentages related to findings. Further surveillance in 1- or 2- year intervals. Thyroid nodules that have demonstrated stability for greater than 5 years do not require further imaging. Biopsy and surgical indications were reviewed. After discussion, patient elects for repeat US in 1 year.          She also mentions chronic ear issues during her visit, specifically the right is most bothersome. Squamous debris removed from the right ear that was adhered to posterior canal. Underlying friable tissue and canal granulation. Findings suspicious for cholesteatoma. There is a posterior retraction pocket that is clean. On the left, there is a pars flaccida retraction. No squamous build up.    Hearing test demonstrates Right>Left CHL    Discussed importance  "of routine debridement to prevent squamous build up and erosion.     Will proceed with CT temporal bone  Pred forte BID x 1 week     She will call for her scan results.   Consider otology referral.         ** Please Note: Portions of the record may have been created with voice recognition software. Occasional wrong word or \"sound a like\" substitutions may have occurred due to the inherent limitations of voice recognition software. There may also be notations and random deletions of words or characters from malfunctioning software. Read the chart carefully and recognize, using context, where substitutions/deletions have occurred.**    "

## 2024-11-07 ENCOUNTER — HOSPITAL ENCOUNTER (OUTPATIENT)
Dept: CT IMAGING | Facility: HOSPITAL | Age: 44
Discharge: HOME | End: 2024-11-07
Payer: COMMERCIAL

## 2024-11-07 DIAGNOSIS — H69.93 DYSFUNCTION OF BOTH EUSTACHIAN TUBES: ICD-10-CM

## 2024-11-07 DIAGNOSIS — H73.892: ICD-10-CM

## 2024-11-07 DIAGNOSIS — H73.891 RETRACTION POCKET OF TYMPANIC MEMBRANE OF RIGHT EAR: ICD-10-CM

## 2024-11-07 PROCEDURE — 70480 CT ORBIT/EAR/FOSSA W/O DYE: CPT

## 2024-11-18 ENCOUNTER — OFFICE VISIT (OUTPATIENT)
Dept: URGENT CARE | Facility: CLINIC | Age: 44
End: 2024-11-18
Payer: COMMERCIAL

## 2024-11-18 VITALS
SYSTOLIC BLOOD PRESSURE: 148 MMHG | RESPIRATION RATE: 20 BRPM | TEMPERATURE: 97.5 F | HEART RATE: 78 BPM | DIASTOLIC BLOOD PRESSURE: 82 MMHG | OXYGEN SATURATION: 98 %

## 2024-11-18 DIAGNOSIS — R10.13 EPIGASTRIC PAIN: ICD-10-CM

## 2024-11-18 DIAGNOSIS — R07.9 CHEST PAIN, UNSPECIFIED TYPE: Primary | ICD-10-CM

## 2024-11-18 LAB
ATRIAL RATE: 78 BPM
ATRIAL RATE: 78 BPM
P AXIS: 26 DEGREES
P AXIS: 26 DEGREES
PR INTERVAL: 156 MS
PR INTERVAL: 156 MS
QRS AXIS: 35 DEGREES
QRS AXIS: 35 DEGREES
QRSD INTERVAL: 82 MS
QRSD INTERVAL: 82 MS
QT INTERVAL: 360 MS
QT INTERVAL: 360 MS
QTC INTERVAL: 410 MS
QTC INTERVAL: 410 MS
T WAVE AXIS: 10 DEGREES
T WAVE AXIS: 10 DEGREES
VENTRICULAR RATE: 78 BPM
VENTRICULAR RATE: 78 BPM

## 2024-11-18 PROCEDURE — 93005 ELECTROCARDIOGRAM TRACING: CPT | Performed by: PHYSICIAN ASSISTANT

## 2024-11-18 PROCEDURE — S9088 SERVICES PROVIDED IN URGENT: HCPCS | Performed by: PHYSICIAN ASSISTANT

## 2024-11-18 PROCEDURE — 99213 OFFICE O/P EST LOW 20 MIN: CPT | Performed by: PHYSICIAN ASSISTANT

## 2024-11-18 RX ORDER — ASPIRIN 81 MG/1
324 TABLET, CHEWABLE ORAL ONCE
Status: COMPLETED | OUTPATIENT
Start: 2024-11-18 | End: 2024-11-18

## 2024-11-18 RX ADMIN — ASPIRIN 324 MG: 81 TABLET, CHEWABLE ORAL at 08:28

## 2024-11-18 NOTE — PROGRESS NOTES
[facility]        NAME: Park Marte is a 44 y.o. female  : 1980    MRN: [id number]  DATE: 2024  TIME: 8:35 AM    Assessment and Plan   Chest pain, unspecified type [R07.9]  1. Chest pain, unspecified type  aspirin chewable tablet 324 mg      2. Epigastric pain          EKG: There are t wave abnormalities V2 on EKG.  Pt received 324mg of Aspirin in clinic.  EMS declined by patient, patient will drive self to the ED.  AMA form completed by patient, risks discussed.    Patient Instructions     ED asap for further evaluation.    If tests have been performed at [facility], our office will contact you with results if changes need to be made to the care plan discussed with you at the visit.  You can review your full results on [facility].    Chief Complaint     Chief Complaint   Patient presents with    Chest Pain     Started last night, epigastric, along with an ache substernal          History of Present Illness       Patient is a 44 year old female presenting to [facility] with epigastric and substernal pain.  Patient reports pain began yesterday and has been persistent.  Pain is worse s/ eating and with certain movements.  There is no pain with deep breathing.  No shortness of breath. No n/V, there has been slight diarrhea.  Patient denies any cardiac history, does not take any medications for HTN or Hypercholestermia.  Pt is not diabetic.    Chest Pain   Associated symptoms include abdominal pain. Pertinent negatives include no back pain, cough, fever, palpitations, shortness of breath or vomiting.   Pertinent negatives for past medical history include no seizures.       Review of Systems   Review of Systems   Constitutional:  Negative for chills and fever.   HENT:  Negative for ear pain and sore throat.    Eyes:  Negative for pain and visual disturbance.   Respiratory:  Negative for cough and shortness of breath.    Cardiovascular:  Positive for chest pain. Negative for  palpitations.   Gastrointestinal:  Positive for abdominal pain and diarrhea. Negative for vomiting.   Genitourinary:  Negative for dysuria and hematuria.   Musculoskeletal:  Negative for arthralgias and back pain.   Skin:  Negative for color change and rash.   Neurological:  Negative for seizures and syncope.   All other systems reviewed and are negative.        Current Medications       Current Outpatient Medications:     albuterol (PROVENTIL HFA,VENTOLIN HFA) 90 mcg/act inhaler, , Disp: , Rfl:     Ascorbic Acid (vitamin C) 1000 MG tablet, Take 1 tablet (1,000 mg total) by mouth daily for 7 days, Disp: 7 tablet, Rfl: 0    celecoxib (CeleBREX) 200 mg capsule, TAKE ONE CAPSULE BY MOUTH EVERY DAY AS NEEDED, Disp: 30 capsule, Rfl: 0    cholecalciferol (VITAMIN D3) 1,000 units tablet, Take 2 tablets (2,000 Units total) by mouth daily for 7 days, Disp: 14 tablet, Rfl: 0    famotidine (PEPCID) 40 MG tablet, take 1 tablet by mouth at bedtime 2 HOURS BEFORE BED, Disp: 30 tablet, Rfl: 3    fluticasone (FLONASE) 50 mcg/act nasal spray, , Disp: , Rfl:     ibuprofen (MOTRIN) 600 mg tablet, Take 1 tablet (600 mg total) by mouth every 6 (six) hours as needed for moderate pain, Disp: 30 tablet, Rfl: 0    Levonorgestrel (Liletta, 52 MG,) 19.5 MCG/DAY IUD IUD, 1 each by Intrauterine route once, Disp: , Rfl:     loratadine (CLARITIN) 10 mg tablet, Take 1 tablet (10 mg total) by mouth daily, Disp: 30 tablet, Rfl: 3    loratadine-pseudoephedrine (CLARITIN-D 12-HOUR) 5-120 mg per tablet, Take 1 tablet by mouth 2 (two) times a day, Disp: 30 tablet, Rfl: 0    methocarbamol (ROBAXIN) 500 mg tablet, Take 1 tablet (500 mg total) by mouth 2 (two) times a day as needed for muscle spasms, Disp: 20 tablet, Rfl: 0    nicotine polacrilex (NICORETTE) 4 mg gum, Chew 4 mg, Disp: , Rfl:     pantoprazole (PROTONIX) 40 mg tablet, Take 1 tablet (40 mg total) by mouth daily, Disp: 30 tablet, Rfl: 3    benzonatate (TESSALON) 200 MG capsule, Take 1 capsule  (200 mg total) by mouth 3 (three) times a day as needed for cough (Patient not taking: Reported on 9/3/2024), Disp: 20 capsule, Rfl: 0    buPROPion (WELLBUTRIN SR) 100 mg 12 hr tablet, TAKE ONE TABLET BY MOUTH 2 TIMES A DAY (Patient not taking: Reported on 9/24/2024), Disp: 60 tablet, Rfl: 0    dicyclomine (BENTYL) 20 mg tablet, Take 1 tablet (20 mg total) by mouth 2 (two) times a day (Patient not taking: Reported on 11/18/2024), Disp: 20 tablet, Rfl: 0    fluticasone (FLONASE) 50 mcg/act nasal spray, 2 sprays into each nostril daily (Patient not taking: Reported on 11/18/2024), Disp: 1 g, Rfl: 0    fluticasone (FLONASE) 50 mcg/act nasal spray, 2 sprays into each nostril daily (Patient not taking: Reported on 11/18/2024), Disp: 1 g, Rfl: 0    ibuprofen (MOTRIN) 800 mg tablet, Take 1 tablet (800 mg total) by mouth every 8 (eight) hours as needed for mild pain (Patient not taking: Reported on 9/3/2024), Disp: 30 tablet, Rfl: 0    loratadine-pseudoephedrine (CLARITIN-D 12-HOUR) 5-120 mg per tablet, Take 1 tablet by mouth 2 (two) times a day (Patient not taking: Reported on 11/18/2024), Disp: 30 tablet, Rfl: 0    mupirocin (BACTROBAN) 2 % ointment, Apply topically 3 (three) times a day for 7 days Apply three times daily specifically to area of swelling on the right breast for the next 7 days., Disp: 15 g, Rfl: 0    nicotine (NICODERM CQ) 14 mg/24hr TD 24 hr patch, Place 1 patch on the skin every 24 hours (Patient not taking: Reported on 1/30/2024), Disp: , Rfl:     nicotine (Nicotrol) 10 MG inhaler, Inhale 1 puff (Patient not taking: Reported on 1/30/2024), Disp: , Rfl:     prednisoLONE acetate (PRED FORTE) 1 % ophthalmic suspension, Place 3 drops into the RIGHT EAR BID for 7 days., Disp: 10 mL, Rfl: 1    Wegovy 1 MG/0.5ML, Inject 1 mg under the skin Once a week (Patient not taking: Reported on 10/3/2024), Disp: , Rfl:   No current facility-administered medications for this visit.    Facility-Administered Medications  Ordered in Other Visits:     Lidocaine Viscous HCl (XYLOCAINE) 2 % mucosal solution, , , Code/Trauma/Sedation Med, Santiago Glasgow, , 15 mL at 05/10/21 1109    Current Allergies     Allergies as of 2024 - Reviewed 2024   Allergen Reaction Noted    No known allergies Allergic Rhinitis 2016            The following portions of the patient's history were reviewed and updated as appropriate: allergies, current medications, past family history, past medical history, past social history, past surgical history and problem list.     Past Medical History:   Diagnosis Date    Allergic     Asthma     Cervical disc disorder with radiculopathy of mid-cervical region 2020    Cervical radiculopathy 2020    GERD (gastroesophageal reflux disease)     Herniated disc, cervical     Pinched nerve in shoulder, right     Thyroid nodule        Past Surgical History:   Procedure Laterality Date     SECTION      EPIDURAL BLOCK INJECTION N/A 3/4/2020    Procedure: C7-T1 Interlaminar Epidural Steroid Injection (43164);  Surgeon: Ana Morrell MD;  Location: [facility];  Service: Pain Management     EPIDURAL BLOCK INJECTION N/A 2020    Procedure: C7-T1 interlaminar epidural steroid injection;  Surgeon: Ana Morrell MD;  Location: [facility];  Service: Pain Management     FL GUIDED NEEDLE PLAC BX/ASP/INJ  3/4/2020    FL GUIDED NEEDLE PLAC BX/ASP/INJ  2020       Family History   Problem Relation Age of Onset    Diabetes Mother     Heart disease Mother     Bone cancer Father     Diabetes Brother     No Known Problems Daughter     No Known Problems Maternal Grandmother     No Known Problems Paternal Grandmother     No Known Problems Maternal Aunt     No Known Problems Paternal Aunt     No Known Problems Paternal Aunt     Breast cancer Cousin     Colon cancer Neg Hx     Ovarian cancer Neg Hx          Medications have been verified.        Objective   /82   Pulse 78   Temp  97.5 °F (36.4 °C)   Resp 20   SpO2 98%   No LMP recorded. Patient has had an implant.       Physical Exam     Physical Exam  Constitutional:       Appearance: Normal appearance.   HENT:      Head: Normocephalic and atraumatic.      Nose: Nose normal.      Mouth/Throat:      Mouth: Mucous membranes are dry.   Eyes:      Extraocular Movements: Extraocular movements intact.      Conjunctiva/sclera: Conjunctivae normal.      Pupils: Pupils are equal, round, and reactive to light.   Cardiovascular:      Rate and Rhythm: Normal rate and regular rhythm.      Heart sounds: No murmur heard.     No friction rub.   Pulmonary:      Effort: Pulmonary effort is normal.      Breath sounds: No wheezing, rhonchi or rales.   Abdominal:      Tenderness: There is abdominal tenderness (Epigastric region, no rebound or guarding).   Musculoskeletal:         General: Normal range of motion.      Cervical back: Normal range of motion and neck supple.   Skin:     General: Skin is warm and dry.      Capillary Refill: Capillary refill takes less than 2 seconds.   Neurological:      General: No focal deficit present.      Mental Status: She is alert and oriented to person, place, and time.   Psychiatric:         Mood and Affect: Mood normal.         Behavior: Behavior normal.

## 2025-01-14 ENCOUNTER — HOSPITAL ENCOUNTER (EMERGENCY)
Facility: HOSPITAL | Age: 45
Discharge: HOME | End: 2025-01-14
Attending: EMERGENCY MEDICINE
Payer: COMMERCIAL

## 2025-01-14 ENCOUNTER — APPOINTMENT (EMERGENCY)
Dept: RADIOLOGY | Facility: HOSPITAL | Age: 45
End: 2025-01-14
Payer: COMMERCIAL

## 2025-01-14 VITALS
BODY MASS INDEX: 32.85 KG/M2 | OXYGEN SATURATION: 93 % | SYSTOLIC BLOOD PRESSURE: 151 MMHG | RESPIRATION RATE: 16 BRPM | TEMPERATURE: 97 F | HEART RATE: 90 BPM | DIASTOLIC BLOOD PRESSURE: 93 MMHG | HEIGHT: 61 IN | WEIGHT: 174 LBS

## 2025-01-14 DIAGNOSIS — J06.9 VIRAL URI WITH COUGH: Primary | ICD-10-CM

## 2025-01-14 LAB
ALBUMIN SERPL BCG-MCNC: 4.3 G/DL (ref 3.5–5)
ALP SERPL-CCNC: 79 U/L (ref 34–104)
ALT SERPL W P-5'-P-CCNC: 14 U/L (ref 7–52)
ANION GAP SERPL CALCULATED.3IONS-SCNC: 9 MMOL/L (ref 4–13)
AST SERPL W P-5'-P-CCNC: 17 U/L (ref 13–39)
BASOPHILS # BLD AUTO: 0.04 THOUSANDS/ΜL (ref 0–0.1)
BASOPHILS NFR BLD AUTO: 1 % (ref 0–1)
BILIRUB SERPL-MCNC: 0.39 MG/DL (ref 0.2–1)
BUN SERPL-MCNC: 10 MG/DL (ref 5–25)
CALCIUM SERPL-MCNC: 8.8 MG/DL (ref 8.4–10.2)
CARDIAC TROPONIN I PNL SERPL HS: 3 NG/L (ref ?–50)
CHLORIDE SERPL-SCNC: 105 MMOL/L (ref 96–108)
CO2 SERPL-SCNC: 28 MMOL/L (ref 21–32)
CREAT SERPL-MCNC: 0.86 MG/DL (ref 0.6–1.3)
D DIMER PPP FEU-MCNC: 0.44 UG/ML FEU
EOSINOPHIL # BLD AUTO: 0.05 THOUSAND/ΜL (ref 0–0.61)
EOSINOPHIL NFR BLD AUTO: 1 % (ref 0–6)
ERYTHROCYTE [DISTWIDTH] IN BLOOD BY AUTOMATED COUNT: 12.1 % (ref 11.6–15.1)
FLUAV AG UPPER RESP QL IA.RAPID: NEGATIVE
FLUBV AG UPPER RESP QL IA.RAPID: NEGATIVE
GFR SERPL CREATININE-BSD FRML MDRD: 81 ML/MIN/1.73SQ M
GLUCOSE SERPL-MCNC: 115 MG/DL (ref 65–140)
HCT VFR BLD AUTO: 46 % (ref 34.8–46.1)
HGB BLD-MCNC: 15.1 G/DL (ref 11.5–15.4)
IMM GRANULOCYTES # BLD AUTO: 0.01 THOUSAND/UL (ref 0–0.2)
IMM GRANULOCYTES NFR BLD AUTO: 0 % (ref 0–2)
LYMPHOCYTES # BLD AUTO: 1.98 THOUSANDS/ΜL (ref 0.6–4.47)
LYMPHOCYTES NFR BLD AUTO: 45 % (ref 14–44)
MCH RBC QN AUTO: 31 PG (ref 26.8–34.3)
MCHC RBC AUTO-ENTMCNC: 32.8 G/DL (ref 31.4–37.4)
MCV RBC AUTO: 95 FL (ref 82–98)
MONOCYTES # BLD AUTO: 0.37 THOUSAND/ΜL (ref 0.17–1.22)
MONOCYTES NFR BLD AUTO: 9 % (ref 4–12)
NEUTROPHILS # BLD AUTO: 1.88 THOUSANDS/ΜL (ref 1.85–7.62)
NEUTS SEG NFR BLD AUTO: 44 % (ref 43–75)
NRBC BLD AUTO-RTO: 0 /100 WBCS
PLATELET # BLD AUTO: 177 THOUSANDS/UL (ref 149–390)
PMV BLD AUTO: 11.4 FL (ref 8.9–12.7)
POTASSIUM SERPL-SCNC: 3.3 MMOL/L (ref 3.5–5.3)
PROT SERPL-MCNC: 6.9 G/DL (ref 6.4–8.4)
RBC # BLD AUTO: 4.87 MILLION/UL (ref 3.81–5.12)
SARS-COV+SARS-COV-2 AG RESP QL IA.RAPID: NEGATIVE
SODIUM SERPL-SCNC: 142 MMOL/L (ref 135–147)
TSH SERPL DL<=0.05 MIU/L-ACNC: 2.07 UIU/ML (ref 0.45–4.5)
WBC # BLD AUTO: 4.33 THOUSAND/UL (ref 4.31–10.16)

## 2025-01-14 PROCEDURE — 85025 COMPLETE CBC W/AUTO DIFF WBC: CPT | Performed by: EMERGENCY MEDICINE

## 2025-01-14 PROCEDURE — 80053 COMPREHEN METABOLIC PANEL: CPT | Performed by: EMERGENCY MEDICINE

## 2025-01-14 PROCEDURE — 85379 FIBRIN DEGRADATION QUANT: CPT | Performed by: EMERGENCY MEDICINE

## 2025-01-14 PROCEDURE — 84443 ASSAY THYROID STIM HORMONE: CPT | Performed by: EMERGENCY MEDICINE

## 2025-01-14 PROCEDURE — 71045 X-RAY EXAM CHEST 1 VIEW: CPT

## 2025-01-14 PROCEDURE — 99283 EMERGENCY DEPT VISIT LOW MDM: CPT

## 2025-01-14 PROCEDURE — 99285 EMERGENCY DEPT VISIT HI MDM: CPT | Performed by: EMERGENCY MEDICINE

## 2025-01-14 PROCEDURE — 87804 INFLUENZA ASSAY W/OPTIC: CPT | Performed by: EMERGENCY MEDICINE

## 2025-01-14 PROCEDURE — 84484 ASSAY OF TROPONIN QUANT: CPT | Performed by: EMERGENCY MEDICINE

## 2025-01-14 PROCEDURE — 36415 COLL VENOUS BLD VENIPUNCTURE: CPT | Performed by: EMERGENCY MEDICINE

## 2025-01-14 PROCEDURE — 87811 SARS-COV-2 COVID19 W/OPTIC: CPT | Performed by: EMERGENCY MEDICINE

## 2025-01-14 RX ORDER — POTASSIUM CHLORIDE 1500 MG/1
20 TABLET, EXTENDED RELEASE ORAL ONCE
Status: COMPLETED | OUTPATIENT
Start: 2025-01-14 | End: 2025-01-14

## 2025-01-14 RX ORDER — DEXAMETHASONE 4 MG/1
10 TABLET ORAL ONCE
Status: COMPLETED | OUTPATIENT
Start: 2025-01-14 | End: 2025-01-14

## 2025-01-14 RX ADMIN — POTASSIUM CHLORIDE 20 MEQ: 1500 TABLET, EXTENDED RELEASE ORAL at 02:48

## 2025-01-14 RX ADMIN — DEXAMETHASONE 10 MG: 4 TABLET ORAL at 02:48

## 2025-01-14 NOTE — ED PROVIDER NOTES
Time reflects when diagnosis was documented in both MDM as applicable and the Disposition within this note       Time User Action Codes Description Comment    1/14/2025  2:43 AM Rizwan Khalil Add [J06.9] Viral URI with cough           ED Disposition       ED Disposition   Discharge    Condition   Stable    Date/Time   Tue Jan 14, 2025  2:43 AM    Comment   Park Marte discharge to home/self care.                   Assessment & Plan       Medical Decision Making  I reviewed the patient's medical chart, PMHx, prior encounters, medications.    My independent interpretation of CXR: No acute cardiopulmonary disease    My DDx includes: ACS, PE, viral syndrome, PTX, arrhythmia, electrolyte abnormality, costochondritis    Suspect viral syndrome is most likely, however given the patient is mildly tachycardic without a measured temperature, will perform labs including D-dimer to evaluate for PE.    Will perform cardiac workup. CMP to evaluate electrolytes, CBC to evaluate for anemia, troponin to evaluate for cardiac ischemia, ECG. Imaging of chest. Will reassess symptoms.     Cardiac workup was unremarkable except for mild hypokalemia of 3.3.  Negative D-dimer, will not proceed with CT PE study. Negative troponin inconsistent with ACS and delta is not required given timeline (>3 hours of sx).  Will replete potassium, patient was given a dose of Decadron for suspected bronchitis, advised to continue Robitussin DM at home, as well as a teaspoon of honey at night to help improve her symptoms.  She is comfortable and agreeable with this plan, she was discharged with strict return precautions.        Amount and/or Complexity of Data Reviewed  Labs: ordered. Decision-making details documented in ED Course.  Radiology: ordered.    Risk  Prescription drug management.        ED Course as of 01/14/25 0258   Tue Jan 14, 2025   0123 FLU/COVID Rapid Antigen (30 min. TAT) - Preferred screening test in ED  Negative viral panel    0211 WBC: 4.33  No leukocytosis   0238 D-Dimer, Quant: 0.44  Negative D-dimer.   0244 Potassium(!): 3.3  Slight hypokalemia, will replete       Medications   dexamethasone (DECADRON) tablet 10 mg (10 mg Oral Given 25)   potassium chloride (Klor-Con M20) CR tablet 20 mEq (20 mEq Oral Given 25)       ED Risk Strat Scores                          SBIRT 22yo+      Flowsheet Row Most Recent Value   Initial Alcohol Screen: US AUDIT-C     1. How often do you have a drink containing alcohol? 0 Filed at: 2025   2. How many drinks containing alcohol do you have on a typical day you are drinking?  0 Filed at: 2025   3a. Male UNDER 65: How often do you have five or more drinks on one occasion? 0 Filed at: 2025   3b. FEMALE Any Age, or MALE 65+: How often do you have 4 or more drinks on one occassion? 0 Filed at: 2025   Audit-C Score 0 Filed at: 2025   ROCHELLE: How many times in the past year have you...    Used an illegal drug or used a prescription medication for non-medical reasons? Never Filed at: 2025                            History of Present Illness       Chief Complaint   Patient presents with    Cough     Patient reports a cough since yesterday that is making her chest hurt. Patient reports taking otc cough medicine without relief.        Past Medical History:   Diagnosis Date    Allergic     Asthma     Cervical disc disorder with radiculopathy of mid-cervical region 2020    Cervical radiculopathy 2020    GERD (gastroesophageal reflux disease)     Herniated disc, cervical     Pinched nerve in shoulder, right     Thyroid nodule       Past Surgical History:   Procedure Laterality Date     SECTION      EPIDURAL BLOCK INJECTION N/A 3/4/2020    Procedure: C7-T1 Interlaminar Epidural Steroid Injection (83778);  Surgeon: Ana Morrell MD;  Location: [facility];  Service: Pain Management     EPIDURAL BLOCK INJECTION  N/A 6/26/2020    Procedure: C7-T1 interlaminar epidural steroid injection;  Surgeon: Ana Morrell MD;  Location: [facility];  Service: Pain Management     FL GUIDED NEEDLE PLAC BX/ASP/INJ  3/4/2020    FL GUIDED NEEDLE PLAC BX/ASP/INJ  6/26/2020      Family History   Problem Relation Age of Onset    Diabetes Mother     Heart disease Mother     Bone cancer Father     Diabetes Brother     No Known Problems Daughter     No Known Problems Maternal Grandmother     No Known Problems Paternal Grandmother     No Known Problems Maternal Aunt     No Known Problems Paternal Aunt     No Known Problems Paternal Aunt     Breast cancer Cousin     Colon cancer Neg Hx     Ovarian cancer Neg Hx       Social History     Tobacco Use    Smoking status: Every Day     Current packs/day: 1.00     Types: Cigarettes    Smokeless tobacco: Never   Vaping Use    Vaping status: Never Used   Substance Use Topics    Alcohol use: No    Drug use: Not Currently     Types: Marijuana      E-Cigarette/Vaping    E-Cigarette Use Never User       E-Cigarette/Vaping Substances    Nicotine No     THC No     CBD No     Flavoring No     Other No     Unknown No       I have reviewed and agree with the history as documented.     44-year-old female who presents for cough.  Patient reports that the cough started yesterday.  Since then, she states that she feels like it is difficult for her to get deep breath.  She denies any chest pain.  Does report some congestion.  Fevers or chills.  Denies any nausea or vomiting.  She has no other complaints currently.  No leg pain or swelling. review of systems otherwise negative.        Review of Systems   Constitutional:  Negative for chills and fever.   HENT:  Negative for congestion, rhinorrhea and sore throat.    Respiratory:  Positive for cough and chest tightness. Negative for shortness of breath.    Cardiovascular:  Negative for chest pain and palpitations.   Gastrointestinal:  Negative for abdominal pain,  constipation, diarrhea, nausea and vomiting.   Genitourinary:  Negative for difficulty urinating and flank pain.   Musculoskeletal:  Negative for arthralgias.   Neurological:  Negative for dizziness, weakness, light-headedness and headaches.   Psychiatric/Behavioral:  Negative for agitation, behavioral problems and confusion.    All other systems reviewed and are negative.          Objective       ED Triage Vitals [01/14/25 0040]   Temperature Pulse Blood Pressure Respirations SpO2 Patient Position - Orthostatic VS   (!) 97.4 °F (36.3 °C) (!) 110 151/98 16 96 % Sitting      Temp Source Heart Rate Source BP Location FiO2 (%) Pain Score    Temporal Monitor Left arm -- No Pain      Vitals      Date and Time Temp Pulse SpO2 Resp BP Pain Score FACES Pain Rating User   01/14/25 0053 96.7 °F (35.9 °C) -- -- -- -- -- -- PP   01/14/25 0040 97.4 °F (36.3 °C) 110 96 % 16 151/98 No Pain -- PP            Physical Exam  Constitutional:       Appearance: She is well-developed.   HENT:      Head: Normocephalic and atraumatic.   Cardiovascular:      Rate and Rhythm: Normal rate and regular rhythm.      Heart sounds: Normal heart sounds. No murmur heard.     No friction rub.   Pulmonary:      Effort: Pulmonary effort is normal. No respiratory distress.      Breath sounds: Normal breath sounds. No wheezing or rales.      Comments: Clear breath sounds BL  Abdominal:      General: Bowel sounds are normal. There is no distension.      Palpations: Abdomen is soft.      Tenderness: There is no abdominal tenderness.   Musculoskeletal:         General: Normal range of motion.      Cervical back: Normal range of motion and neck supple.   Skin:     General: Skin is warm.   Neurological:      Mental Status: She is alert and oriented to person, place, and time.      Coordination: Coordination normal.   Psychiatric:         Behavior: Behavior normal.         Thought Content: Thought content normal.         Judgment: Judgment normal.          Results Reviewed       Procedure Component Value Units Date/Time    TSH, 3rd generation with Free T4 reflex [[id number]]  (Normal) Collected: 01/14/25 0159    Lab Status: Final result Specimen: Blood from Arm, Left Updated: 01/14/25 0240     TSH 3RD GENERATON 2.067 uIU/mL     HS Troponin I 2hr [[id number]]     Lab Status: No result Specimen: Blood     HS Troponin 0hr (reflex protocol) [[id number]]  (Normal) Collected: 01/14/25 0159    Lab Status: Final result Specimen: Blood from Arm, Left Updated: 01/14/25 0232     hs TnI 0hr 3 ng/L     D-dimer, quantitative [[id number]]  (Normal) Collected: 01/14/25 0159    Lab Status: Final result Specimen: Blood from Arm, Left Updated: 01/14/25 0229     D-Dimer, Quant 0.44 ug/ml FEU     Comprehensive metabolic panel [[id number]]  (Abnormal) Collected: 01/14/25 0159    Lab Status: Final result Specimen: Blood from Arm, Left Updated: 01/14/25 0227     Sodium 142 mmol/L      Potassium 3.3 mmol/L      Chloride 105 mmol/L      CO2 28 mmol/L      ANION GAP 9 mmol/L      BUN 10 mg/dL      Creatinine 0.86 mg/dL      Glucose 115 mg/dL      Calcium 8.8 mg/dL      AST 17 U/L      ALT 14 U/L      Alkaline Phosphatase 79 U/L      Total Protein 6.9 g/dL      Albumin 4.3 g/dL      Total Bilirubin 0.39 mg/dL      eGFR 81 ml/min/1.73sq m     Narrative:      National Kidney Disease Foundation guidelines for Chronic Kidney Disease (CKD):     Stage 1 with normal or high GFR (GFR > 90 mL/min/1.73 square meters)    Stage 2 Mild CKD (GFR = 60-89 mL/min/1.73 square meters)    Stage 3A Moderate CKD (GFR = 45-59 mL/min/1.73 square meters)    Stage 3B Moderate CKD (GFR = 30-44 mL/min/1.73 square meters)    Stage 4 Severe CKD (GFR = 15-29 mL/min/1.73 square meters)    Stage 5 End Stage CKD (GFR <15 mL/min/1.73 square meters)  Note: GFR calculation is accurate only with a steady state creatinine    CBC and differential [[id number]]  (Abnormal) Collected: 01/14/25 0159    Lab Status: Final result Specimen:  Blood from Arm, Left Updated: 01/14/25 0210     WBC 4.33 Thousand/uL      RBC 4.87 Million/uL      Hemoglobin 15.1 g/dL      Hematocrit 46.0 %      MCV 95 fL      MCH 31.0 pg      MCHC 32.8 g/dL      RDW 12.1 %      MPV 11.4 fL      Platelets 177 Thousands/uL      nRBC 0 /100 WBCs      Segmented % 44 %      Immature Grans % 0 %      Lymphocytes % 45 %      Monocytes % 9 %      Eosinophils Relative 1 %      Basophils Relative 1 %      Absolute Neutrophils 1.88 Thousands/µL      Absolute Immature Grans 0.01 Thousand/uL      Absolute Lymphocytes 1.98 Thousands/µL      Absolute Monocytes 0.37 Thousand/µL      Eosinophils Absolute 0.05 Thousand/µL      Basophils Absolute 0.04 Thousands/µL     FLU/COVID Rapid Antigen (30 min. TAT) - Preferred screening test in ED [[id number]]  (Normal) Collected: 01/14/25 0052    Lab Status: Final result Specimen: Nares from Nose Updated: 01/14/25 0121     SARS COV Rapid Antigen Negative     Influenza A Rapid Antigen Negative     Influenza B Rapid Antigen Negative    Narrative:      This test has been performed using the [employer] Danitza 2 FLU+SARS Antigen test under the Emergency Use Authorization (EUA). This test has been validated by the  and verified by the performing laboratory. The Danitza uses lateral flow immunofluorescent sandwich assay to detect SARS-COV, Influenza A and Influenza B Antigen.     The [employer] Danitza 2 SARS Antigen test does not differentiate between SARS-CoV and SARS-CoV-2.     Negative results are presumptive and may be confirmed with a molecular assay, if necessary, for patient management. Negative results do not rule out SARS-CoV-2 or influenza infection and should not be used as the sole basis for treatment or patient management decisions. A negative test result may occur if the level of antigen in a sample is below the limit of detection of this test.     Positive results are indicative of the presence of viral antigens, but do not rule out bacterial  infection or co-infection with other viruses.     All test results should be used as an adjunct to clinical observations and other information available to the provider.    FOR PEDIATRIC PATIENTS - copy/paste COVID Guidelines URL to browser: [email or web address]            XR chest 1 view portable    (Results Pending)       Procedures    ED Medication and Procedure Management   Prior to Admission Medications   Prescriptions Last Dose Informant Patient Reported? Taking?   Ascorbic Acid (vitamin C) 1000 MG tablet   No No   Sig: Take 1 tablet (1,000 mg total) by mouth daily for 7 days   Levonorgestrel (Liletta, 52 MG,) 19.5 MCG/DAY IUD IUD  Self Yes No   Si each by Intrauterine route once   Wegovy 1 MG/0.5ML  Self Yes No   Sig: Inject 1 mg under the skin Once a week   Patient not taking: Reported on 10/3/2024   albuterol (PROVENTIL HFA,VENTOLIN HFA) 90 mcg/act inhaler  Self Yes No   benzonatate (TESSALON) 200 MG capsule  Self No No   Sig: Take 1 capsule (200 mg total) by mouth 3 (three) times a day as needed for cough   Patient not taking: Reported on 9/3/2024   buPROPion (WELLBUTRIN SR) 100 mg 12 hr tablet  Self No No   Sig: TAKE ONE TABLET BY MOUTH 2 TIMES A DAY   Patient not taking: Reported on 2024   celecoxib (CeleBREX) 200 mg capsule  Self No No   Sig: TAKE ONE CAPSULE BY MOUTH EVERY DAY AS NEEDED   cholecalciferol (VITAMIN D3) 1,000 units tablet   No No   Sig: Take 2 tablets (2,000 Units total) by mouth daily for 7 days   dicyclomine (BENTYL) 20 mg tablet  Self No No   Sig: Take 1 tablet (20 mg total) by mouth 2 (two) times a day   Patient not taking: Reported on 2024   famotidine (PEPCID) 40 MG tablet  Self No No   Sig: take 1 tablet by mouth at bedtime 2 HOURS BEFORE BED   fluticasone (FLONASE) 50 mcg/act nasal spray  Self Yes No   fluticasone (FLONASE) 50 mcg/act nasal spray  Self No No   Si sprays into each nostril daily   Patient not  taking: Reported on 2024   fluticasone (FLONASE) 50 mcg/act nasal spray  Self No No   Si sprays into each nostril daily   Patient not taking: Reported on 2024   ibuprofen (MOTRIN) 600 mg tablet  Self No No   Sig: Take 1 tablet (600 mg total) by mouth every 6 (six) hours as needed for moderate pain   ibuprofen (MOTRIN) 800 mg tablet  Self No No   Sig: Take 1 tablet (800 mg total) by mouth every 8 (eight) hours as needed for mild pain   Patient not taking: Reported on 9/3/2024   loratadine (CLARITIN) 10 mg tablet  Self No No   Sig: Take 1 tablet (10 mg total) by mouth daily   loratadine-pseudoephedrine (CLARITIN-D 12-HOUR) 5-120 mg per tablet  Self No No   Sig: Take 1 tablet by mouth 2 (two) times a day   loratadine-pseudoephedrine (CLARITIN-D 12-HOUR) 5-120 mg per tablet  Self No No   Sig: Take 1 tablet by mouth 2 (two) times a day   Patient not taking: Reported on 2024   methocarbamol (ROBAXIN) 500 mg tablet  Self No No   Sig: Take 1 tablet (500 mg total) by mouth 2 (two) times a day as needed for muscle spasms   mupirocin (BACTROBAN) 2 % ointment  Self No No   Sig: Apply topically 3 (three) times a day for 7 days Apply three times daily specifically to area of swelling on the right breast for the next 7 days.   nicotine (NICODERM CQ) 14 mg/24hr TD 24 hr patch  Self Yes No   Sig: Place 1 patch on the skin every 24 hours   Patient not taking: Reported on 2024   nicotine (Nicotrol) 10 MG inhaler  Self Yes No   Sig: Inhale 1 puff   Patient not taking: Reported on 2024   nicotine polacrilex (NICORETTE) 4 mg gum  Self Yes No   Sig: Chew 4 mg   pantoprazole (PROTONIX) 40 mg tablet  Self No No   Sig: Take 1 tablet (40 mg total) by mouth daily   prednisoLONE acetate (PRED FORTE) 1 % ophthalmic suspension   No No   Sig: Place 3 drops into the RIGHT EAR BID for 7 days.      Facility-Administered Medications: None     Patient's Medications   Discharge Prescriptions    No medications on file      No discharge procedures on file.  ED SEPSIS DOCUMENTATION   Time reflects when diagnosis was documented in both MDM as applicable and the Disposition within this note       Time User Action Codes Description Comment    1/14/2025  2:43 AM Rizwan Khalil Add [J06.9] Viral URI with cough                  Rizwan Khalil MD  01/14/25 0258

## 2025-05-05 ENCOUNTER — TELEPHONE (OUTPATIENT)
Dept: OBGYN CLINIC | Facility: CLINIC | Age: 45
End: 2025-05-05

## 2025-05-05 NOTE — TELEPHONE ENCOUNTER
Who called:STAFF     Is the patient Pregnant ?No  If so, How many weeks? N/A    Reason for the Call: Insurance inquiry    Action Taken:Spoke to patient      Outcome/Plan/ Recommendations:  Made patient aware we are not in network with her current insurance, [employer]. Gave patient the estimate of $222 - 50% discounted rate. Patient would owe $111. Patient stated she wants to cancel her appointment and will call back if she gets other insurance.

## 2025-05-20 ENCOUNTER — OFFICE VISIT (OUTPATIENT)
Dept: URGENT CARE | Facility: MEDICAL CENTER | Age: 45
End: 2025-05-20
Payer: COMMERCIAL

## 2025-05-20 VITALS
RESPIRATION RATE: 16 BRPM | OXYGEN SATURATION: 98 % | DIASTOLIC BLOOD PRESSURE: 68 MMHG | HEIGHT: 61 IN | WEIGHT: 177.4 LBS | SYSTOLIC BLOOD PRESSURE: 136 MMHG | BODY MASS INDEX: 33.49 KG/M2 | HEART RATE: 93 BPM | TEMPERATURE: 98.4 F

## 2025-05-20 DIAGNOSIS — H69.93 DISORDER OF BOTH EUSTACHIAN TUBES: Primary | ICD-10-CM

## 2025-05-20 PROCEDURE — G0382 LEV 3 HOSP TYPE B ED VISIT: HCPCS

## 2025-05-20 RX ORDER — ATORVASTATIN CALCIUM 20 MG/1
20 TABLET, FILM COATED ORAL DAILY
COMMUNITY
Start: 2025-04-08 | End: 2026-04-08

## 2025-05-20 NOTE — PATIENT INSTRUCTIONS
Flonase 1 spray each nostril twice daily x1 week. Then 1 spray each nostril daily there after.     You may take over the counter Tylenol (Acetaminophen) and/or Motrin (Ibuprofen) as needed, as directed on packaging. Be sure to get plenty of rest, and drinking fluids to remain hydrated.     Please follow up with your primary provider in the next several days. Should you have any worsening of symptoms, or lack of improvement please be re-evaluated. If needed for significant concerns, consider 911 or ER evaluation.

## 2025-05-20 NOTE — PROGRESS NOTES
[facility]        NAME: Park Marte is a 44 y.o. female  : 1980    MRN: [id number]  DATE: May 20, 2025  TIME: 6:03 PM    Assessment and Plan   Disorder of both eustachian tubes [H69.93]  1. Disorder of both eustachian tubes              Patient Instructions       Follow up with PCP in 3-5 days.  Proceed to  ER if symptoms worsen.    If tests are performed, our office will contact you with results only if changes need to made to the care plan discussed with you at the visit. [facility]    Chief Complaint     Chief Complaint   Patient presents with   • Earache     Started 1 day ago  Right earache         History of Present Illness       Patient here with right ear pain which started about 1 day ago. NO fever or chills. Eating and drinking normal. Not taking anything for her symptoms. She was previously prescribed flonase nasal spray but has not been using it routinely recently.         Review of Systems   Review of Systems   Constitutional:  Negative for fatigue and fever.   HENT:  Positive for ear pain. Negative for congestion, dental problem, drooling, ear discharge and facial swelling.    Respiratory:  Negative for cough.          Current Medications     Current Medications[1]    Current Allergies     Allergies as of 2025 - Reviewed 2025   Allergen Reaction Noted   • No known allergies Allergic Rhinitis 2016            The following portions of the patient's history were reviewed and updated as appropriate: allergies, current medications, past family history, past medical history, past social history, past surgical history and problem list.     Past Medical History:   Diagnosis Date   • Allergic    • Asthma    • Cervical disc disorder with radiculopathy of mid-cervical region 2020   • Cervical radiculopathy 2020   • GERD (gastroesophageal reflux disease)    • Herniated disc, cervical    • Pinched nerve in shoulder, right    •  "Thyroid nodule        Past Surgical History:   Procedure Laterality Date   •  SECTION     • EPIDURAL BLOCK INJECTION N/A 3/4/2020    Procedure: C7-T1 Interlaminar Epidural Steroid Injection (78303);  Surgeon: Ana Morrell MD;  Location: [facility];  Service: Pain Management    • EPIDURAL BLOCK INJECTION N/A 2020    Procedure: C7-T1 interlaminar epidural steroid injection;  Surgeon: Ana Morrell MD;  Location: [facility];  Service: Pain Management    • FL GUIDED NEEDLE PLAC BX/ASP/INJ  3/4/2020   • FL GUIDED NEEDLE PLAC BX/ASP/INJ  2020       Family History   Problem Relation Age of Onset   • Diabetes Mother    • Heart disease Mother    • Bone cancer Father    • Diabetes Brother    • No Known Problems Daughter    • No Known Problems Maternal Grandmother    • No Known Problems Paternal Grandmother    • No Known Problems Maternal Aunt    • No Known Problems Paternal Aunt    • No Known Problems Paternal Aunt    • Breast cancer Cousin    • Colon cancer Neg Hx    • Ovarian cancer Neg Hx          Medications have been verified.        Objective   /68   Pulse 93   Temp 98.4 °F (36.9 °C)   Resp 16   Ht 5' 1\" (1.549 m)   Wt 80.5 kg (177 lb 6.4 oz)   LMP  (LMP Unknown)   SpO2 98%   BMI 33.52 kg/m²        Physical Exam     Physical Exam  Vitals and nursing note reviewed.   Constitutional:       General: She is awake. She is not in acute distress.     Appearance: Normal appearance. She is well-developed and normal weight. She is not ill-appearing.   HENT:      Head: Normocephalic and atraumatic.      Right Ear: Ear canal and external ear normal. A middle ear effusion is present. Tympanic membrane is not erythematous or bulging.      Left Ear: Ear canal and external ear normal. A middle ear effusion is present. Tympanic membrane is not erythematous or bulging.      Mouth/Throat:      Lips: Pink.      Mouth: Mucous membranes are moist.     Cardiovascular:      Rate and Rhythm: Normal " rate and regular rhythm.      Pulses: Normal pulses.      Heart sounds: Normal heart sounds.   Pulmonary:      Effort: Pulmonary effort is normal.      Breath sounds: Normal breath sounds.     Skin:     General: Skin is warm and dry.      Capillary Refill: Capillary refill takes less than 2 seconds.      Findings: No rash.     Neurological:      General: No focal deficit present.      Mental Status: She is alert. Mental status is at baseline.     Psychiatric:         Mood and Affect: Mood normal.         Behavior: Behavior is cooperative.                          [1]    Current Outpatient Medications:   •  albuterol (PROVENTIL HFA,VENTOLIN HFA) 90 mcg/act inhaler, , Disp: , Rfl:   •  atorvastatin (LIPITOR) 20 mg tablet, Take 20 mg by mouth daily, Disp: , Rfl:   •  buPROPion (WELLBUTRIN SR) 100 mg 12 hr tablet, TAKE ONE TABLET BY MOUTH 2 TIMES A DAY, Disp: 60 tablet, Rfl: 0  •  celecoxib (CeleBREX) 200 mg capsule, TAKE ONE CAPSULE BY MOUTH EVERY DAY AS NEEDED, Disp: 30 capsule, Rfl: 0  •  famotidine (PEPCID) 40 MG tablet, take 1 tablet by mouth at bedtime 2 HOURS BEFORE BED, Disp: 30 tablet, Rfl: 3  •  fluticasone (FLONASE) 50 mcg/act nasal spray, 2 sprays into each nostril daily, Disp: 1 g, Rfl: 0  •  ibuprofen (MOTRIN) 600 mg tablet, Take 1 tablet (600 mg total) by mouth every 6 (six) hours as needed for moderate pain, Disp: 30 tablet, Rfl: 0  •  Levonorgestrel (Liletta, 52 MG,) 19.5 MCG/DAY IUD IUD, 1 each by Intrauterine route once, Disp: , Rfl:   •  loratadine (CLARITIN) 10 mg tablet, Take 1 tablet (10 mg total) by mouth daily, Disp: 30 tablet, Rfl: 3  •  methocarbamol (ROBAXIN) 500 mg tablet, Take 1 tablet (500 mg total) by mouth 2 (two) times a day as needed for muscle spasms, Disp: 20 tablet, Rfl: 0  •  pantoprazole (PROTONIX) 40 mg tablet, Take 1 tablet (40 mg total) by mouth daily, Disp: 30 tablet, Rfl: 3  No current facility-administered medications for this visit.    Facility-Administered Medications  Ordered in Other Visits:   •  Lidocaine Viscous HCl (XYLOCAINE) 2 % mucosal solution, , , Code/Trauma/Sedation Med, Santiago Glasgow DO, 15 mL at 05/10/21 1106

## (undated) DEVICE — PLASTIC ADHESIVE BANDAGE: Brand: CURITY

## (undated) DEVICE — FLEXIBLE ADHESIVE BANDAGE,X-LARGE: Brand: CURITY

## (undated) DEVICE — TRAY EPIDURAL PERIFIX 20GA X 3.5IN TUOHY 8ML

## (undated) DEVICE — GLOVE SRG BIOGEL 8

## (undated) DEVICE — SYRINGE 5ML LL

## (undated) DEVICE — CHLORAPREP HI-LITE 10.5ML ORANGE

## (undated) DEVICE — BANDAID SHEER SPOT